# Patient Record
Sex: MALE | Race: WHITE | Employment: OTHER | URBAN - METROPOLITAN AREA
[De-identification: names, ages, dates, MRNs, and addresses within clinical notes are randomized per-mention and may not be internally consistent; named-entity substitution may affect disease eponyms.]

---

## 2017-08-19 PROBLEM — J30.9 ALLERGIC RHINITIS: Status: ACTIVE | Noted: 2017-08-19

## 2017-08-19 PROBLEM — N40.0 BPH (BENIGN PROSTATIC HYPERTROPHY): Status: ACTIVE | Noted: 2017-08-19

## 2017-08-19 PROBLEM — M19.90 DJD (DEGENERATIVE JOINT DISEASE): Status: ACTIVE | Noted: 2017-08-19

## 2017-08-19 PROBLEM — R00.2 PALPITATION: Status: ACTIVE | Noted: 2017-08-19

## 2017-08-19 PROBLEM — Z79.899 ON STATIN THERAPY: Status: ACTIVE | Noted: 2017-08-19

## 2017-08-19 PROBLEM — E34.9 TESTOSTERONE DEFICIENCY: Status: ACTIVE | Noted: 2017-08-19

## 2017-08-19 PROBLEM — N18.30 CKD (CHRONIC KIDNEY DISEASE), STAGE III (HCC): Status: ACTIVE | Noted: 2017-08-19

## 2017-08-19 PROBLEM — E78.5 HYPERLIPIDEMIA: Status: ACTIVE | Noted: 2017-08-19

## 2017-08-19 PROBLEM — M10.9 GOUT: Status: ACTIVE | Noted: 2017-08-19

## 2017-08-19 PROBLEM — H81.09 MENIERE DISEASE: Status: ACTIVE | Noted: 2017-08-19

## 2017-08-19 PROBLEM — I63.9 CVA (CEREBROVASCULAR ACCIDENT) (HCC): Status: ACTIVE | Noted: 2017-08-19

## 2017-08-19 PROBLEM — I49.1 PAC (PREMATURE ATRIAL CONTRACTION): Status: ACTIVE | Noted: 2017-08-19

## 2017-08-19 PROBLEM — L71.9 ROSACEA: Status: ACTIVE | Noted: 2017-08-19

## 2017-08-19 PROBLEM — C61 PROSTATE CA (HCC): Status: ACTIVE | Noted: 2017-08-19

## 2017-08-19 PROBLEM — I12.9 HYPERTENSION WITH RENAL DISEASE: Status: ACTIVE | Noted: 2017-08-19

## 2017-09-05 RX ORDER — NEBIVOLOL HYDROCHLORIDE 5 MG/1
TABLET ORAL
Qty: 30 TAB | Refills: 11 | Status: SHIPPED | OUTPATIENT
Start: 2017-09-05 | End: 2018-03-09 | Stop reason: SDUPTHER

## 2017-09-05 NOTE — TELEPHONE ENCOUNTER
Requested Prescriptions     Pending Prescriptions Disp Refills    BYSTOLIC 5 mg tablet [Pharmacy Med Name: BYSTOLIC 5 MG TABLET] 30 Tab 11     Sig: TAKE ONE TABLET BY MOUTH DAILY

## 2017-09-11 ENCOUNTER — OFFICE VISIT (OUTPATIENT)
Dept: INTERNAL MEDICINE CLINIC | Age: 82
End: 2017-09-11

## 2017-09-11 VITALS
HEART RATE: 51 BPM | OXYGEN SATURATION: 93 % | WEIGHT: 220.6 LBS | SYSTOLIC BLOOD PRESSURE: 154 MMHG | HEIGHT: 74 IN | RESPIRATION RATE: 18 BRPM | DIASTOLIC BLOOD PRESSURE: 70 MMHG | BODY MASS INDEX: 28.31 KG/M2

## 2017-09-11 DIAGNOSIS — E78.2 MIXED HYPERLIPIDEMIA: ICD-10-CM

## 2017-09-11 DIAGNOSIS — J30.89 NON-SEASONAL ALLERGIC RHINITIS DUE TO OTHER ALLERGIC TRIGGER: ICD-10-CM

## 2017-09-11 DIAGNOSIS — I49.1 PAC (PREMATURE ATRIAL CONTRACTION): ICD-10-CM

## 2017-09-11 DIAGNOSIS — I63.9 CEREBROVASCULAR ACCIDENT (CVA), UNSPECIFIED MECHANISM (HCC): ICD-10-CM

## 2017-09-11 DIAGNOSIS — I12.9 HYPERTENSION WITH RENAL DISEASE: Primary | ICD-10-CM

## 2017-09-11 DIAGNOSIS — M15.9 PRIMARY OSTEOARTHRITIS INVOLVING MULTIPLE JOINTS: ICD-10-CM

## 2017-09-11 DIAGNOSIS — N18.30 CKD (CHRONIC KIDNEY DISEASE), STAGE III (HCC): ICD-10-CM

## 2017-09-11 DIAGNOSIS — M10.9 GOUT, UNSPECIFIED CAUSE, UNSPECIFIED CHRONICITY, UNSPECIFIED SITE: ICD-10-CM

## 2017-09-11 DIAGNOSIS — L71.9 ROSACEA: ICD-10-CM

## 2017-09-11 DIAGNOSIS — Z12.5 PROSTATE CANCER SCREENING: ICD-10-CM

## 2017-09-11 DIAGNOSIS — Z00.00 MEDICARE ANNUAL WELLNESS VISIT, INITIAL: ICD-10-CM

## 2017-09-11 LAB
ALBUMIN SERPL-MCNC: 4.1 G/DL (ref 3.9–5.4)
ALKALINE PHOS POC: 95 U/L (ref 38–126)
ALT SERPL-CCNC: 60 U/L (ref 9–52)
AST SERPL-CCNC: 46 U/L (ref 14–36)
BACTERIA UA POCT, BACTPOCT: NORMAL
BILIRUB UR QL STRIP: NEGATIVE
BUN BLD-MCNC: 27 MG/DL (ref 9–20)
CALCIUM BLD-MCNC: 9.1 MG/DL (ref 8.4–10.2)
CASTS UA POCT: 0
CHLORIDE BLD-SCNC: 104 MMOL/L (ref 98–107)
CHOLEST SERPL-MCNC: 149 MG/DL (ref 0–200)
CK (CPK) POC: 182 U/L (ref 30–135)
CLUE CELLS, CLUEPOCT: NEGATIVE
CO2 POC: 27 MMOL/L (ref 22–32)
CREAT BLD-MCNC: 1.1 MG/DL (ref 0.8–1.5)
CRYSTALS UA POCT, CRYSPOCT: NEGATIVE
EGFR (POC): 62.2
EPITHELIAL CELLS POCT, EPITHPOCT: NORMAL
GLUCOSE POC: 112 MG/DL (ref 75–110)
GLUCOSE UR-MCNC: NEGATIVE MG/DL
GRAN# POC: 2 K/UL (ref 2–7.8)
GRAN% POC: 55.4 % (ref 37–92)
HCT VFR BLD CALC: 41.2 % (ref 37–51)
HDLC SERPL-MCNC: 34 MG/DL (ref 35–130)
HGB BLD-MCNC: 13.9 G/DL (ref 12–18)
KETONES P FAST UR STRIP-MCNC: NEGATIVE MG/DL
LDL CHOLESTEROL POC: 83.4 MG/DL (ref 0–130)
LY# POC: 1.1 K/UL (ref 0.6–4.1)
LY% POC: 36.3 % (ref 10–58.5)
MCH RBC QN: 33.5 PG (ref 26–32)
MCHC RBC-ENTMCNC: 33.7 G/DL (ref 30–36)
MCV RBC: 99 FL (ref 80–97)
MID #, POC: 0.02 K/UL (ref 0–1.8)
MID% POC: 8.3 % (ref 0.1–24)
MUCUS UA POCT, MUCPOCT: NORMAL
PH UR STRIP: 6 [PH] (ref 5–7)
PLATELET # BLD: 194 K/UL (ref 140–440)
POTASSIUM SERPL-SCNC: 4.7 MMOL/L (ref 3.6–5)
PROT SERPL-MCNC: 6.9 G/DL (ref 6.3–8.2)
PROTEIN,URINE POC: NEGATIVE MG/DL
PSA SERPL-MCNC: 1.2 NG/ML (ref 0–4)
RBC # BLD: 4.16 M/UL (ref 4.2–6.3)
RBC UA POCT, RBCPOCT: 0
SODIUM SERPL-SCNC: 144 MMOL/L (ref 137–145)
SP GR UR STRIP: 1.02 (ref 1.01–1.02)
T4 FREE SERPL-MCNC: 0.88 NG/DL (ref 0.71–1.85)
TCHOL/HDL RATIO (POC): 4.4 (ref 0–4)
TOTAL BILIRUBIN POC: 0.6 MG/DL (ref 0.2–1.3)
TRICH UA POCT, TRICHPOC: NEGATIVE
TRIGL SERPL-MCNC: 158 MG/DL (ref 0–200)
TSH BLD-ACNC: 3.38 UIU/ML (ref 0.4–4.2)
UA UROBILINOGEN AMB POC: NORMAL (ref 0.2–1)
URINALYSIS CLARITY POC: CLEAR
URINALYSIS COLOR POC: NORMAL
URINE BLOOD POC: NEGATIVE
URINE LEUKOCYTES POC: NEGATIVE
URINE NITRITES POC: NEGATIVE
VLDLC SERPL CALC-MCNC: 31.6 MG/DL
WBC # BLD: 3.3 K/UL (ref 4.1–10.9)
WBC UA POCT, WBCPOCT: NORMAL
YEAST UA POCT, YEASTPOC: NEGATIVE

## 2017-09-11 RX ORDER — SILODOSIN 8 MG/1
8 CAPSULE ORAL
COMMUNITY

## 2017-09-11 RX ORDER — AMLODIPINE BESYLATE 10 MG/1
TABLET ORAL DAILY
COMMUNITY
End: 2018-03-09 | Stop reason: SDUPTHER

## 2017-09-11 NOTE — MR AVS SNAPSHOT
Visit Information Date & Time Provider Department Dept. Phone Encounter #  
 9/11/2017  8:10 AM Ellen Payton MD [place] [address] [phone] [id number] Your Appointments 1/4/2018  9:00 AM  
FOLLOW UP 10 with [facility] ([facility]) Appt Note: [address] [facility] [address] [phone] [facility] [place] [place] [phone] Upcoming Health Maintenance Date Due ZOSTER VACCINE AGE 60> 8/17/1994 GLAUCOMA SCREENING Q2Y 10/17/1999 DTaP/Tdap/Td series (1 - Tdap) 6/22/2013 INFLUENZA AGE 9 TO ADULT 8/1/2017 MEDICARE YEARLY EXAM 9/12/2018 Allergies as of 9/11/2017  Review Complete On: 9/11/2017 By: Ellen Payton MD  
  
 Severity Noted Reaction Type Reactions Sulfanilamide  08/19/2017    Rash  
 Sulfa (Sulfonamide Antibiotics) Low 01/18/2011   Systemic Rash Very mild rash several years ago Current Immunizations  Never Reviewed Name Date Influenza Vaccine 11/17/2016, 11/2/2015 Pneumococcal Conjugate (PCV-13) 7/24/2015 Pneumococcal Vaccine (Unspecified Type) 10/13/2005 Td 6/21/2013 Not reviewed this visit You Were Diagnosed With   
  
 Codes Comments Hypertension with renal disease    -  Primary ICD-10-CM: I12.9 ICD-9-CM: 403.90 Mixed hyperlipidemia     ICD-10-CM: E78.2 ICD-9-CM: 272.2 Gout, unspecified cause, unspecified chronicity, unspecified site     ICD-10-CM: M10.9 ICD-9-CM: 274.9 Primary osteoarthritis involving multiple joints     ICD-10-CM: M15.0 ICD-9-CM: 715.09 Cerebrovascular accident (CVA), unspecified mechanism ([facility] [address]     ICD-10-CM: I63.9 ICD-9-CM: 434.91 CKD (chronic kidney disease), stage III     ICD-10-CM: N18.3 ICD-9-CM: 757. 3 Rosacea     ICD-10-CM: L71.9 ICD-9-CM: 695.3 PAC (premature atrial contraction)     ICD-10-CM: I49.1 ICD-9-CM: 427.61 Non-seasonal allergic rhinitis due to other allergic trigger     ICD-10-CM: J30.89 ICD-9-CM: 477.8 Prostate cancer screening     ICD-10-CM: Z12.5 ICD-9-CM: V76.44 Medicare annual wellness visit, initial     ICD-10-CM: Z00.00 ICD-9-CM: V70.0 Vitals BP Pulse Resp Height(growth percentile) Weight(growth percentile) SpO2  
 154/70 (BP 1 Location: Right arm, BP Patient Position: Sitting) (!) 51 18 6' 2\" (1.88 m) 220 lb 9.6 oz (100.1 kg) 93% BMI Smoking Status 28.32 kg/m2 Never Smoker BMI and BSA Data Body Mass Index Body Surface Area  
 28.32 kg/m 2 2.29 m 2 Preferred Pharmacy Pharmacy Name Phone [facility] [address] [place] [phone] Your Updated Medication List  
  
   
This list is accurate as of: 9/11/17  8:56 AM.  Always use your most recent med list.  
  
  
  
  
 AFRIN EXTRA MOISTURIZING NA  
2 Drops by Nasal route nightly. To both nostrils  
  
 allopurinol 100 mg tablet Commonly known as:  Phineas Quince Take 100 mg by mouth daily. amLODIPine 10 mg tablet Commonly known as:  Brea Donovan Take  by mouth daily. aspirin 325 mg tablet Commonly known as:  ASPIRIN Take 325 mg by mouth daily. BYSTOLIC 5 mg tablet Generic drug:  nebivolol TAKE ONE TABLET BY MOUTH DAILY Cetirizine 10 mg Cap Take 1 Tab by mouth daily. cycloSPORINE 0.05 % ophthalmic emulsion Commonly known as:  RESTASIS Administer 2 Drops to both eyes daily. diclofenac EC 75 mg EC tablet Commonly known as:  VOLTAREN Take 75 mg by mouth two (2) times a day. DIOVAN -12.5 mg per tablet Generic drug:  valsartan-hydroCHLOROthiazide Take 1 Tab by mouth daily. doxycycline 100 mg capsule Commonly known as:  VIBRAMYCIN Take 100 mg by mouth daily. FISH OIL PO Take  by mouth. PLAVIX 75 mg Tab Generic drug:  clopidogrel Take 75 mg by mouth daily. PROSCAR 5 mg tablet Generic drug:  finasteride Take 5 mg by mouth daily. RAPAFLO 8 mg capsule Generic drug:  silodosin Take 8 mg by mouth daily (with breakfast). We Performed the Following AMB POC CK (CPK) [42252 CPT(R)] AMB POC COMPLETE CBC,AUTOMATED ENTER A4416022 CPT(R)] AMB POC COMPREHENSIVE METABOLIC PANEL [22211 CPT(R)] AMB POC LIPID PROFILE [57667 CPT(R)] AMB POC PSA  (MEDICARE) [ HCPCS] AMB POC T4, FREE [id number] CPT(R)] AMB POC TSH [50111 CPT(R)] AMB POC URINALYSIS DIP STICK AUTO W/ MICRO  [37046 CPT(R)] Introducing [facility]! [employer] introduces [employer] patient portal. Now you can access parts of your medical record, email your doctor's office, and request medication refills online. 1. In your internet browser, go to [email or web address] [email or web address] the Trend 2. Click on the First Time User? Click Here link in the Sign In box. You will see the New Member Sign Up page. 3. Enter your [employer] Access Code exactly as it appears below. You will not need to use this code after youve completed the sign-up process. If you do not sign up before the expiration date, you must request a new code. · [employer] Access Code: [email or web address] Expires: 12/10/2017  7:58 AM 
 
4. Enter the last four digits of your Social Security Number (xxxx) and Date of Birth (mm/dd/yyyy) as indicated and click Submit. You will be taken to the next sign-up page. 5. Create a [employer] Desk ID. This will be your [employer]k login ID and cannot be changed, so think of one that is secure and easy to remember. 6. Create a [employer] password. You can change your password at any time. 7. Enter your Password Reset Question and Answer. This can be used at a later time if you forget your password. 8. Enter your e-mail address. You will receive e-mail notification when new information is available in [address] 9. Click Sign Up. You can now view and download portions of your medical record. 10. Click the Download Summary menu link to download a portable copy of your medical information. If you have questions, please visit the Frequently Asked Questions section of the [employer] website. Remember, [employer] is NOT to be used for urgent needs. For medical emergencies, dial 911. Now available from your iPhone and Android! Please provide this summary of care documentation to your next provider. Your primary care clinician is listed as Jose. If you have any questions after today's visit, please call [phone].

## 2017-09-11 NOTE — PROGRESS NOTES
This is an Initial Medicare Annual Wellness Exam (AWV) (Performed 12 months after IPPE or effective date of Medicare Part B enrollment, Once in a lifetime)    I have reviewed the patient's medical history in detail and updated the computerized patient record. He presents today for his initial annual Medicare wellness examination. He is also here in follow-up of his medical problems include hypertension, hyperlipidemia, CKD, DJD, gout, and previous CVA. He is doing well and taken his medications as well as trying to get some exercise. He is following his diet. He currently denies chest pain shortness breath or cardiorespiratory complaints. There are no GI/ complaints. There are no headaches or neurologic complaints. He has no other complaints on complete review of systems.     History     Past Medical History:   Diagnosis Date    Allergic rhinitis 8/19/2017    BPH (benign prostatic hypertrophy) 8/19/2017    Cancer ([address]     basal cell carcinoma right ear    Cancer (HCC)     prostate    CKD (chronic kidney disease), stage III 8/19/2017    CVA (cerebrovascular accident) ([facility] [address] 8/19/2017    DJD (degenerative joint disease) 8/19/2017    Gout     Hyperlipidemia 8/19/2017    Hypertension     Hypertension with renal disease 8/19/2017    Meniere disease 8/19/2017    Nausea & vomiting     On statin therapy 8/19/2017    Other ill-defined conditions     meneres disease    PAC (premature atrial contraction) 8/19/2017    Palpitation 8/19/2017    Prostate CA ([address] 8/19/2017    Rosacea 8/19/2017    Stroke ([facility] [address] 2009    stroke    Testosterone deficiency 8/19/2017      Past Surgical History:   Procedure Laterality Date    ABDOMEN SURGERY PROC UNLISTED      bilateral inguinal hernia repair    COLORECTAL SCRN; HI RISK IND  5/3/2016         HX HEENT  1970    tonsillectomy    HX HEENT  1980    basal cell carcinoma right ear    HX PROSTATECTOMY      radiation only    WI COLONOSCOPY FLX DX W/COLLJ SPEC WHEN PFRMD  1/19/2011          Current Outpatient Prescriptions   Medication Sig Dispense Refill    amLODIPine (NORVASC) 10 mg tablet Take  by mouth daily.  silodosin (RAPAFLO) 8 mg capsule Take 8 mg by mouth daily (with breakfast).  OXYMETAZOLINE HCL (AFRIN EXTRA MOISTURIZING NA) 2 Drops by Nasal route nightly. To both nostrils      BYSTOLIC 5 mg tablet TAKE ONE TABLET BY MOUTH DAILY 30 Tab 11    DOCOSAHEXANOIC ACID/EPA (FISH OIL PO) Take  by mouth.  cycloSPORINE (RESTASIS) 0.05 % ophthalmic emulsion Administer 2 Drops to both eyes daily.  Cetirizine 10 mg cap Take 1 Tab by mouth daily.  diclofenac EC (VOLTAREN) 75 mg EC tablet Take 75 mg by mouth two (2) times a day.  allopurinol (ZYLOPRIM) 100 mg tablet Take 100 mg by mouth daily.  finasteride (PROSCAR) 5 mg tablet Take 5 mg by mouth daily.  doxycycline (VIBRAMYCIN) 100 mg capsule Take 100 mg by mouth daily.  clopidogrel (PLAVIX) 75 mg tablet Take 75 mg by mouth daily.  aspirin (ASPIRIN) 325 mg tablet Take 325 mg by mouth daily.  valsartan-hydrochlorothiazide (DIOVAN HCT) 320-12.5 mg per tablet Take 1 Tab by mouth daily.        Allergies   Allergen Reactions    Sulfanilamide Rash    Sulfa (Sulfonamide Antibiotics) Rash     Very mild rash several years ago     Family History   Problem Relation Age of Onset    Cancer Mother      uterine, cervical    Cancer Father      colon ca     Social History   Substance Use Topics    Smoking status: Never Smoker    Smokeless tobacco: Never Used    Alcohol use No     Patient Active Problem List   Diagnosis Code    Meniere disease H81.09    Testosterone deficiency E29.1    Rosacea L71.9    Prostate CA ([facility] [address] C61    Palpitation R00.2    PAC (premature atrial contraction) I49.1    On statin therapy Z79.899    Hypertension with renal disease I12.9    Hyperlipidemia E78.5    Gout M10.9    DJD (degenerative joint disease) M19.90    CVA (cerebrovascular accident) ([facility] [address] I63.9    CKD (chronic kidney disease), stage III N18.3    BPH (benign prostatic hypertrophy) N40.0    Allergic rhinitis J30.9    Medicare annual wellness visit, initial Z00.00       Depression Risk Factor Screening:     PHQ over the last two weeks 9/11/2017   Little interest or pleasure in doing things Not at all   Feeling down, depressed or hopeless Not at all   Total Score PHQ 2 0     Alcohol Risk Factor Screening: You do not drink alcohol or very rarely. Functional Ability and Level of Safety:     Hearing Loss  Hearing is good. Whisper Test done with abnormal results. Activities of Daily Living  The home contains: no safety equipment  Patient does total self care    Fall RiskFall Risk Assessment, last 12 mths 9/11/2017   Able to walk? Yes   Fall in past 12 months? No       Abuse Screen  Patient is not abused    Cognitive Screening   Evaluation of Cognitive Function:  Has your family/caregiver stated any concerns about your memory: no  Normal     ROS:    Constitutional: He denies fevers, weight loss, sweats. Eyes: No blurred or double vision. ENT: No difficulty with swallowing, taste, speech or smell. Neck: no stiffness or swelling  Respiratory: No cough wheezing or shortness of breath. Cardiovascular: Denies chest pain, palpitations, unexplained indigestion or syncope. Gastrointestinal:  No changes in bowel movements, no abdominal pain, no bloating. Genitourinary:  He denies frequency, nocturia or stranguria. Extremities: No joint pain, stiffness or swelling. Neurological:  No numbness, tingling, burring paresthesias or loss of motor strength. No syncope, dizziness or frequent headache  Lymphatic: no adenopathy noted  Hematologic: no easy bruising or bleeding gums  Skin:  No recent rashes or mole changes. Psychiatric/Behavioral:  Negative for depression.       Vitals:    09/11/17 0826   BP: 154/70   Pulse: (!) 51   Resp: 18   SpO2: 93%   Weight: 220 lb 9.6 oz (100.1 kg) Height: 6' 2\" (1.88 m)   PainSc:   0 - No pain        PHYSICAL EXAM:    General appearance - alert, well appearing, and in no distress  Mental status - alert, oriented to person, place, and time  HEENT:  Ears - bilateral TM's and external ear canals clear  Eyes - pupillary responses were normal.  Extraocular muscle function intact. Lids and conjunctiva not injected. Fundoscopic exam revealed sharp disc margins. eye movements intact  Pharynx- clear with teeth in good repair. No masses were noted  Neck - supple without thyromegaly or burit. No JVD noted  Lungs - clear to auscultation and percussion  Cardiac- normal rate, regular rhythm without murmurs. PMI not displaced. No gallop, rub or click  Abdomen - flat, soft, non-tender without palpable organomegaly or mass. No pulsatile mass was felt, and not bruit was heard. Bowel sounds were active  : Circumcised, Testes descended w/o masses  Rectal: Deferred to his urologist and apparently recently done  Extremities -  no clubbing cyanosis or edema  Lymphatics - no palpable lymphadenopathy, no hepatosplenomegaly  Hematologic: no petechiae or purpura  Peripheral vascular -Femoral, Dorsalis pedis and posterior tibial pulses felt without difficulty  Skin - no rash or unusual mole change noted  Neurological - Cranial nerves II-XII grossly intact. Motor strength 5/5. DTR's 2+ and symmetric. Station and gait normal  Back exam - full range of motion, no tenderness, palpable spasm or pain on motion  Musculoskeletal - no joint tenderness, deformity or swelling      Patient Care Team   Patient Care Team:  Dilma Brizuela MD as PCP - General (Internal Medicine)    Assessment/Plan   Education and counseling provided:  Are appropriate based on today's review and evaluation    ASSESSMENT:   1. Hypertension with renal disease    2. Mixed hyperlipidemia    3. Gout, unspecified cause, unspecified chronicity, unspecified site    4.  Primary osteoarthritis involving multiple joints    5. Cerebrovascular accident (CVA), unspecified mechanism ([address]    6. CKD (chronic kidney disease), stage III    7. Rosacea    8. PAC (premature atrial contraction)    9. Non-seasonal allergic rhinitis due to other allergic trigger    10. Prostate cancer screening    11. Medicare annual wellness visit, initial      Impression  1. Hypertension he does have a labile component his blood pressure is a little up today which is usual for him with his labile blood pressure  2. Hyperlipidemia that status is stable on last check we reviewed those numbers repeat status is pending  3. DJD seems to be stable  4. Prior CVA continue aspirin daily  5. Chronic kidney disease repeat status is pending  6. Gout no recent attacks  7. Rosacea currently stable  8. PACs asymptomatic  9. Allergic rhinitis he does take Afrin on a regular basis over the past month I cautioned him against that encouraged him to stop that and go back to his Nasonex I did warn of problems with urinary obstruction and outlet problems  Medicare annual wellness examination and questionnaire performed on patient today. The results were reviewed with him and his questions were answered. Lifestyle recommendations modifications made. Labs are noted is pending we will make further recommendations adjusted based upon lab. Follow stable continue same and recheck in 3 months with a sooner follow-up should to be problems    PLAN:  .  Orders Placed This Encounter    AMB POC CK (CPK)    AMB POC COMPLETE CBC,AUTOMATED ENTER    AMB POC COMPREHENSIVE METABOLIC PANEL    AMB POC LIPID PROFILE    AMB POC T4, FREE    AMB POC URINALYSIS DIP STICK AUTO W/ MICRO     AMB POC TSH    AMB POC PSA  (MEDICARE)    amLODIPine (NORVASC) 10 mg tablet    silodosin (RAPAFLO) 8 mg capsule    OXYMETAZOLINE HCL (AFRIN EXTRA MOISTURIZING NA)         ATTENTION:   This medical record was transcribed using an electronic medical records system.   Although proofread, it may and can contain electronic and spelling errors. Other human spelling and other errors may be present. Corrections may be executed at a later time. Please feel free to contact us for any clarifications as needed.       Follow-up Disposition: Not on File      Excell MD [place]     Health Maintenance Due   Topic Date Due    ZOSTER VACCINE AGE 60>  08/17/1994    GLAUCOMA SCREENING Q2Y  10/17/1999    DTaP/Tdap/Td series (1 - Tdap) 06/22/2013    INFLUENZA AGE 9 TO ADULT  08/01/2017

## 2017-09-11 NOTE — PROGRESS NOTES
1. Have you been to the ER, urgent care clinic since your last visit? Hospitalized since your last visit? No    2. Have you seen or consulted any other health care providers outside of the [address] [place] [address] since your last visit? Include any pap smears or colon screening. Saw Verónica Mehta urologist for follow up of prostate cancer. Has [employer] Directive. 3 month.      No medication this am    Annual exam.

## 2017-09-13 NOTE — PROGRESS NOTES
White blood count is a little bit low and MCV is a little upset I will check a B12 level. His HDL cholesterol remains low as he usually is. As we have discussed before increasing the fiber in his diet adding fish oil and aerobic exercise with only thing he really would help that.

## 2017-09-19 ENCOUNTER — LAB ONLY (OUTPATIENT)
Dept: INTERNAL MEDICINE CLINIC | Age: 82
End: 2017-09-19

## 2017-09-19 DIAGNOSIS — E53.8 VITAMIN B 12 DEFICIENCY: Primary | ICD-10-CM

## 2017-09-20 LAB — VIT B12 SERPL-MCNC: 274 PG/ML (ref 211–946)

## 2017-10-06 ENCOUNTER — TELEPHONE (OUTPATIENT)
Dept: INTERNAL MEDICINE CLINIC | Age: 82
End: 2017-10-06

## 2017-10-23 ENCOUNTER — HOSPITAL ENCOUNTER (OUTPATIENT)
Dept: CT IMAGING | Age: 82
Discharge: HOME | End: 2017-10-23
Attending: OTOLARYNGOLOGY
Payer: MEDICARE

## 2017-10-23 DIAGNOSIS — R22.0 INTRACRANIAL SWELLING: ICD-10-CM

## 2017-10-23 PROCEDURE — 74011250636 HC RX REV CODE- 250/636: Performed by: OTOLARYNGOLOGY

## 2017-10-23 PROCEDURE — 74011636320 HC RX REV CODE- 636/320: Performed by: OTOLARYNGOLOGY

## 2017-10-23 PROCEDURE — 70491 CT SOFT TISSUE NECK W/DYE: CPT

## 2017-10-23 RX ORDER — SODIUM CHLORIDE 9 MG/ML
50 INJECTION, SOLUTION INTRAVENOUS
Status: COMPLETED | OUTPATIENT
Start: 2017-10-23 | End: 2017-10-23

## 2017-10-23 RX ORDER — SODIUM CHLORIDE 0.9 % (FLUSH) 0.9 %
10 SYRINGE (ML) INJECTION
Status: COMPLETED | OUTPATIENT
Start: 2017-10-23 | End: 2017-10-23

## 2017-10-23 RX ADMIN — SODIUM CHLORIDE 50 ML/HR: 900 INJECTION, SOLUTION INTRAVENOUS at 15:00

## 2017-10-23 RX ADMIN — Medication 10 ML: at 15:00

## 2017-10-23 RX ADMIN — IOPAMIDOL 100 ML: 612 INJECTION, SOLUTION INTRAVENOUS at 15:00

## 2017-11-06 ENCOUNTER — HOSPITAL ENCOUNTER (OUTPATIENT)
Dept: ULTRASOUND IMAGING | Age: 82
Discharge: HOME | End: 2017-11-06
Attending: OTOLARYNGOLOGY
Payer: MEDICARE

## 2017-11-06 DIAGNOSIS — R22.1 NECK MASS: ICD-10-CM

## 2017-11-06 PROCEDURE — 74011000250 HC RX REV CODE- 250: Performed by: RADIOLOGY

## 2017-11-06 PROCEDURE — 76942 ECHO GUIDE FOR BIOPSY: CPT

## 2017-11-06 PROCEDURE — 88112 CYTOPATH CELL ENHANCE TECH: CPT | Performed by: OTOLARYNGOLOGY

## 2017-11-06 PROCEDURE — 88305 TISSUE EXAM BY PATHOLOGIST: CPT | Performed by: OTOLARYNGOLOGY

## 2017-11-06 RX ORDER — LIDOCAINE HYDROCHLORIDE 10 MG/ML
5 INJECTION, SOLUTION EPIDURAL; INFILTRATION; INTRACAUDAL; PERINEURAL
Status: COMPLETED | OUTPATIENT
Start: 2017-11-06 | End: 2017-11-06

## 2017-11-06 RX ADMIN — LIDOCAINE HYDROCHLORIDE 5 ML: 10 INJECTION, SOLUTION EPIDURAL; INFILTRATION; INTRACAUDAL; PERINEURAL at 17:00

## 2017-11-13 ENCOUNTER — CLINICAL SUPPORT (OUTPATIENT)
Dept: INTERNAL MEDICINE CLINIC | Age: 82
End: 2017-11-13

## 2017-11-13 DIAGNOSIS — Z23 ENCOUNTER FOR IMMUNIZATION: ICD-10-CM

## 2017-11-13 NOTE — PROGRESS NOTES
Le Hector is a 80 y.o. male who presents for routine immunizations. He denies any symptoms , reactions or allergies that would exclude them from being immunized today. Risks and adverse reactions were discussed and the VIS was given to them. All questions were addressed. He was observed for 15 min post injection. There were no reactions observed.    Also advised to call if any problems occur after he leaves the office    Reina Carlson RN

## 2017-11-13 NOTE — PATIENT INSTRUCTIONS
Vaccine Information Statement    Influenza (Flu) Vaccine (Inactivated or Recombinant): What you need to know    Many Vaccine Information Statements are available in [place]ian and other languages. See www.immunize.org/vis  Hojas de Información Sobre Vacunas están disponibles en Español y en muchos otros idiomas. Visite www.immunize.org/vis    1. Why get vaccinated? Influenza (flu) is a contagious disease that spreads around the [place] every year, usually between October and May. Flu is caused by influenza viruses, and is spread mainly by coughing, sneezing, and close contact. Anyone can get flu. Flu strikes suddenly and can last several days. Symptoms vary by age, but can include:   fever/chills   sore throat   muscle aches   fatigue   cough   headache    runny or stuffy nose    Flu can also lead to pneumonia and blood infections, and cause diarrhea and seizures in children. If you have a medical condition, such as heart or lung disease, flu can make it worse. Flu is more dangerous for some people. Infants and young children, people 72years of age and older, pregnant women, and people with certain health conditions or a weakened immune system are at greatest risk. Each year thousands of people in the [facility] die from flu, and many more are hospitalized. Flu vaccine can:   keep you from getting flu,   make flu less severe if you do get it, and   keep you from spreading flu to your family and other people. 2. Inactivated and recombinant flu vaccines    A dose of flu vaccine is recommended every flu season. Children 6 months through 6years of age may need two doses during the same flu season. Everyone else needs only one dose each flu season.        Some inactivated flu vaccines contain a very small amount of a mercury-based preservative called thimerosal. Studies have not shown thimerosal in vaccines to be harmful, but flu vaccines that do not contain thimerosal are available. There is no live flu virus in flu shots. They cannot cause the flu. There are many flu viruses, and they are always changing. Each year a new flu vaccine is made to protect against three or four viruses that are likely to cause disease in the upcoming flu season. But even when the vaccine doesnt exactly match these viruses, it may still provide some protection    Flu vaccine cannot prevent:   flu that is caused by a virus not covered by the vaccine, or   illnesses that look like flu but are not. It takes about 2 weeks for protection to develop after vaccination, and protection lasts through the flu season. 3. Some people should not get this vaccine    Tell the person who is giving you the vaccine:     If you have any severe, life-threatening allergies. If you ever had a life-threatening allergic reaction after a dose of flu vaccine, or have a severe allergy to any part of this vaccine, you may be advised not to get vaccinated. Most, but not all, types of flu vaccine contain a small amount of egg protein.  If you ever had Guillain-Barré Syndrome (also called GBS). Some people with a history of GBS should not get this vaccine. This should be discussed with your doctor.  If you are not feeling well. It is usually okay to get flu vaccine when you have a mild illness, but you might be asked to come back when you feel better. 4. Risks of a vaccine reaction    With any medicine, including vaccines, there is a chance of reactions. These are usually mild and go away on their own, but serious reactions are also possible. Most people who get a flu shot do not have any problems with it.      Minor problems following a flu shot include:    soreness, redness, or swelling where the shot was given     hoarseness   sore, red or itchy eyes   cough   fever   aches   headache   itching   fatigue  If these problems occur, they usually begin soon after the shot and last 1 or 2 days. More serious problems following a flu shot can include the following:     There may be a small increased risk of Guillain-Barré Syndrome (GBS) after inactivated flu vaccine. This risk has been estimated at 1 or 2 additional cases per million people vaccinated. This is much lower than the risk of severe complications from flu, which can be prevented by flu vaccine.  Young children who get the flu shot along with pneumococcal vaccine (PCV13) and/or DTaP vaccine at the same time might be slightly more likely to have a seizure caused by fever. Ask your doctor for more information. Tell your doctor if a child who is getting flu vaccine has ever had a seizure. Problems that could happen after any injected vaccine:      People sometimes faint after a medical procedure, including vaccination. Sitting or lying down for about 15 minutes can help prevent fainting, and injuries caused by a fall. Tell your doctor if you feel dizzy, or have vision changes or ringing in the ears.  Some people get severe pain in the shoulder and have difficulty moving the arm where a shot was given. This happens very rarely.  Any medication can cause a severe allergic reaction. Such reactions from a vaccine are very rare, estimated at about 1 in a million doses, and would happen within a few minutes to a few hours after the vaccination. As with any medicine, there is a very remote chance of a vaccine causing a serious injury or death. The safety of vaccines is always being monitored. For more information, visit: www.cdc.gov/vaccinesafety/    5. What if there is a serious reaction? What should I look for?  Look for anything that concerns you, such as signs of a severe allergic reaction, very high fever, or unusual behavior.     Signs of a severe allergic reaction can include hives, swelling of the face and throat, difficulty breathing, a fast heartbeat, dizziness, and weakness - usually within a few minutes to a few hours after the vaccination. What should I do?  If you think it is a severe allergic reaction or other emergency that cant wait, call 9-1-1 and get the person to the nearest hospital. Otherwise, call your doctor.  Reactions should be reported to the Vaccine Adverse Event Reporting System (VAERS). Your doctor should file this report, or you can do it yourself through  the VAERS web site at www.vaers. [facility].gov, or by calling 7-985.725.6235. VAERS does not give medical advice. 6. The National Vaccine Injury Compensation Program    The [facility] Vaccine Injury Compensation Program (VICP) is a federal program that was created to compensate people who may have been injured by certain vaccines. Persons who believe they may have been injured by a vaccine can learn about the program and about filing a claim by calling 4-751.417.8029 or visiting the [employer] website at www.[facility].gov/vaccinecompensation. There is a time limit to file a claim for compensation. 7. How can I learn more?  Ask your healthcare provider. He or she can give you the vaccine package insert or suggest other sources of information.  Call your local or state health department.  Contact the Centers for Disease Control and Prevention (CDC):  - Call 5-690.724.4546 (1-800-CDC-INFO) or  - Visit CDCs website at www.cdc.gov/flu    Vaccine Information Statement   Inactivated Influenza Vaccine   8/7/2015  42 DAMIR Kaye Colonconstanza [id number]    Department of Health and Human Services  Centers for Disease Control and Prevention    Office Use Only

## 2017-12-03 DIAGNOSIS — I10 HYPERTENSION, UNSPECIFIED TYPE: Primary | ICD-10-CM

## 2017-12-03 DIAGNOSIS — Z86.73 HISTORY OF CVA (CEREBROVASCULAR ACCIDENT): ICD-10-CM

## 2017-12-04 RX ORDER — VALSARTAN AND HYDROCHLOROTHIAZIDE 320; 12.5 MG/1; MG/1
TABLET, FILM COATED ORAL
Qty: 30 TAB | Refills: 11 | Status: SHIPPED | OUTPATIENT
Start: 2017-12-04 | End: 2018-03-09 | Stop reason: SDUPTHER

## 2017-12-04 RX ORDER — CLOPIDOGREL BISULFATE 75 MG/1
TABLET ORAL
Qty: 30 TAB | Refills: 11 | Status: SHIPPED | OUTPATIENT
Start: 2017-12-04 | End: 2018-03-09 | Stop reason: SDUPTHER

## 2017-12-04 NOTE — TELEPHONE ENCOUNTER
Requested Prescriptions     Pending Prescriptions Disp Refills    clopidogrel (PLAVIX) 75 mg tab [Pharmacy Med Name: CLOPIDOGREL 75 MG TABLET] 30 Tab 11     Sig: TAKE ONE TABLET BY MOUTH DAILY    valsartan-hydroCHLOROthiazide (DIOVAN-HCT) 320-12.5 mg per tablet [Pharmacy Med Name: VALSARTAN-HCTZ 320-12.5 MG TAB] 30 Tab 11     Sig: TAKE ONE TABLET BY MOUTH DAILY

## 2018-01-02 PROBLEM — M15.9 PRIMARY OSTEOARTHRITIS INVOLVING MULTIPLE JOINTS: Status: ACTIVE | Noted: 2017-08-19

## 2018-01-02 PROBLEM — E78.2 MIXED HYPERLIPIDEMIA: Status: ACTIVE | Noted: 2017-08-19

## 2018-01-04 ENCOUNTER — OFFICE VISIT (OUTPATIENT)
Dept: INTERNAL MEDICINE CLINIC | Age: 83
End: 2018-01-04

## 2018-01-04 VITALS
HEIGHT: 74 IN | SYSTOLIC BLOOD PRESSURE: 136 MMHG | RESPIRATION RATE: 16 BRPM | WEIGHT: 220 LBS | DIASTOLIC BLOOD PRESSURE: 80 MMHG | HEART RATE: 60 BPM | OXYGEN SATURATION: 98 % | BODY MASS INDEX: 28.23 KG/M2 | TEMPERATURE: 98 F

## 2018-01-04 DIAGNOSIS — M15.9 PRIMARY OSTEOARTHRITIS INVOLVING MULTIPLE JOINTS: ICD-10-CM

## 2018-01-04 DIAGNOSIS — I12.9 HYPERTENSION WITH RENAL DISEASE: Primary | ICD-10-CM

## 2018-01-04 DIAGNOSIS — E78.2 MIXED HYPERLIPIDEMIA: ICD-10-CM

## 2018-01-04 DIAGNOSIS — J30.89 CHRONIC NONSEASONAL ALLERGIC RHINITIS DUE TO OTHER ALLERGEN: ICD-10-CM

## 2018-01-04 DIAGNOSIS — I63.9 CEREBROVASCULAR ACCIDENT (CVA), UNSPECIFIED MECHANISM (HCC): ICD-10-CM

## 2018-01-04 DIAGNOSIS — N18.30 CKD (CHRONIC KIDNEY DISEASE), STAGE III (HCC): ICD-10-CM

## 2018-01-04 DIAGNOSIS — M10.9 GOUT, UNSPECIFIED CAUSE, UNSPECIFIED CHRONICITY, UNSPECIFIED SITE: ICD-10-CM

## 2018-01-04 LAB
ALBUMIN SERPL-MCNC: 4.1 G/DL (ref 3.9–5.4)
ALKALINE PHOS POC: 78 U/L (ref 38–126)
ALT SERPL-CCNC: 55 U/L (ref 9–52)
AST SERPL-CCNC: 44 U/L (ref 14–36)
BUN BLD-MCNC: 24 MG/DL (ref 9–20)
CALCIUM BLD-MCNC: 9.4 MG/DL (ref 8.4–10.2)
CHLORIDE BLD-SCNC: 102 MMOL/L (ref 98–107)
CHOLEST SERPL-MCNC: 156 MG/DL (ref 0–200)
CO2 POC: 29 MMOL/L (ref 22–32)
CREAT BLD-MCNC: 1.1 MG/DL (ref 0.8–1.5)
EGFR (POC): 61.8
GLUCOSE POC: 115 MG/DL (ref 75–110)
HDLC SERPL-MCNC: 38 MG/DL (ref 35–130)
LDL CHOLESTEROL POC: 94.6 MG/DL (ref 0–130)
POTASSIUM SERPL-SCNC: 4.4 MMOL/L (ref 3.6–5)
PROT SERPL-MCNC: 7.1 G/DL (ref 6.3–8.2)
SODIUM SERPL-SCNC: 140 MMOL/L (ref 137–145)
TCHOL/HDL RATIO (POC): 4.1 (ref 0–4)
TOTAL BILIRUBIN POC: 1 MG/DL (ref 0.2–1.3)
TRIGL SERPL-MCNC: 117 MG/DL (ref 0–200)
VLDLC SERPL CALC-MCNC: 23.4 MG/DL

## 2018-01-04 NOTE — PROGRESS NOTES
1. Have you been to the ER, urgent care clinic since your last visit? Hospitalized since your last visit? No    2. Have you seen or consulted any other health care providers outside of the [address] [place] [address] since your last visit? Include any pap smears or colon screening. Yes, Dr. Jarrod Park in November 2017 for a cyst on his neck and needs the cyst removed on the 01-. Also, saw Dr. Elijah Watson at [facility] for prostate check. To follow up in 4 months. Chief Complaint   Patient presents with    Hypertension     3 mo. f/u    Chronic Kidney Disease     3 mo. f/u    Cholesterol Problem     3 mo.  f/u       Fasting

## 2018-01-04 NOTE — PATIENT INSTRUCTIONS
Arthritis: Care Instructions  Your Care Instructions  Arthritis, also called osteoarthritis, is a breakdown of the cartilage that cushions your joints. When the cartilage wears down, your bones rub against each other. This causes pain and stiffness. Many people have some arthritis as they age. Arthritis most often affects the joints of the spine, hands, hips, knees, or feet. You can take simple measures to protect your joints, ease your pain, and help you stay active. Follow-up care is a key part of your treatment and safety. Be sure to make and go to all appointments, and call your doctor if you are having problems. It's also a good idea to know your test results and keep a list of the medicines you take. How can you care for yourself at home? · Stay at a healthy weight. Being overweight puts extra strain on your joints. · Talk to your doctor or physical therapist about exercises that will help ease joint pain. ¨ Stretch. You may enjoy gentle forms of yoga to help keep your joints and muscles flexible. ¨ Walk instead of jog. Other types of exercise that are less stressful on the joints include riding a bicycle, swimming, kary chi, or water exercise. ¨ Lift weights. Strong muscles help reduce stress on your joints. Stronger thigh muscles, for example, take some of the stress off of the knees and hips. Learn the right way to lift weights so you do not make joint pain worse. · Take your medicines exactly as prescribed. Call your doctor if you think you are having a problem with your medicine. · Take pain medicines exactly as directed. ¨ If the doctor gave you a prescription medicine for pain, take it as prescribed. ¨ If you are not taking a prescription pain medicine, ask your doctor if you can take an over-the-counter medicine. · Use a cane, crutch, walker, or another device if you need help to get around. These can help rest your joints.  You also can use other things to make life easier, such as a higher toilet seat and padded handles on kitchen utensils. · Do not sit in low chairs, which can make it hard to get up. · Put heat or cold on your sore joints as needed. Use whichever helps you most. You also can take turns with hot and cold packs. ¨ Apply heat 2 or 3 times a day for 20 to 30 minutes-using a heating pad, hot shower, or hot pack-to relieve pain and stiffness. ¨ Put ice or a cold pack on your sore joint for 10 to 20 minutes at a time. Put a thin cloth between the ice and your skin. When should you call for help? Call your doctor now or seek immediate medical care if:  ? · You have sudden swelling, warmth, or pain in any joint. ? · You have joint pain and a fever or rash. ? · You have such bad pain that you cannot use a joint. ? Watch closely for changes in your health, and be sure to contact your doctor if:  ? · You have mild joint symptoms that continue even with more than 6 weeks of care at home. ? · You have stomach pain or other problems with your medicine. Where can you learn more? Go to http://krishna-manpreet.info/. Enter W161 in the search box to learn more about \"Arthritis: Care Instructions. \"  Current as of: October 31, 2016  Content Version: 11.4  © 7956-8865 [employer]. Care instructions adapted under license by [employer] (which disclaims liability or warranty for this information). If you have questions about a medical condition or this instruction, always ask your healthcare professional. [place] [address] any warranty or liability for your use of this information.

## 2018-01-04 NOTE — PROGRESS NOTES
Chief Complaint   Patient presents with    Hypertension     3 mo. f/u    Chronic Kidney Disease     3 mo. f/u    Cholesterol Problem     3 mo. f/u       SUBJECTIVE:    Connie Ford is a 80 y.o. male who returns in follow-up for his hypertension, hyperlipidemia, DJD, chronic kidney disease, gout, allergic rhinitis, and prior CVA. He is taking his medications and trying to follow his diet as well as try to get some exercise. He denies any chest pain, shortness of breath or cardiorespiratory complaints. There are no GI or  complaints. He denies any headaches or neurologic complaints. He does note occasional ringing in his ears without other ENT complaints. That is chronic and unchanged. He denies any arthritic complaints and there are no other complaints on complete review of systems. Current Outpatient Prescriptions   Medication Sig Dispense Refill    clopidogrel (PLAVIX) 75 mg tab TAKE ONE TABLET BY MOUTH DAILY 30 Tab 11    valsartan-hydroCHLOROthiazide (DIOVAN-HCT) 320-12.5 mg per tablet TAKE ONE TABLET BY MOUTH DAILY 30 Tab 11    amLODIPine (NORVASC) 10 mg tablet Take  by mouth daily.  silodosin (RAPAFLO) 8 mg capsule Take 8 mg by mouth daily (with breakfast).  BYSTOLIC 5 mg tablet TAKE ONE TABLET BY MOUTH DAILY 30 Tab 11    DOCOSAHEXANOIC ACID/EPA (FISH OIL PO) Take  by mouth.  cycloSPORINE (RESTASIS) 0.05 % ophthalmic emulsion Administer 2 Drops to both eyes daily.  Cetirizine 10 mg cap Take 1 Tab by mouth daily.  diclofenac EC (VOLTAREN) 75 mg EC tablet Take 75 mg by mouth two (2) times a day.  allopurinol (ZYLOPRIM) 100 mg tablet Take 100 mg by mouth daily.  finasteride (PROSCAR) 5 mg tablet Take 5 mg by mouth daily.  doxycycline (VIBRAMYCIN) 100 mg capsule Take 100 mg by mouth daily.  aspirin (ASPIRIN) 325 mg tablet Take 325 mg by mouth daily.        Past Medical History:   Diagnosis Date    Allergic rhinitis 8/19/2017    BPH (benign prostatic hypertrophy) 8/19/2017    Cancer (HCC)     basal cell carcinoma right ear    Cancer (HCC)     prostate    CKD (chronic kidney disease), stage III 8/19/2017    CVA (cerebrovascular accident) ([facility] [address] 8/19/2017    DJD (degenerative joint disease) 8/19/2017    Gout     Hyperlipidemia 8/19/2017    Hypertension     Hypertension with renal disease 8/19/2017    Meniere disease 8/19/2017    Nausea & vomiting     On statin therapy 8/19/2017    Other ill-defined conditions(799.89)     meneres disease    PAC (premature atrial contraction) 8/19/2017    Palpitation 8/19/2017    Prostate CA ([facility] [address] 8/19/2017    Rosacea 8/19/2017    Stroke ([facility] [address] 2009    stroke    Testosterone deficiency 8/19/2017     Past Surgical History:   Procedure Laterality Date    ABDOMEN SURGERY PROC UNLISTED      bilateral inguinal hernia repair    COLORECTAL SCRN; HI RISK IND  5/3/2016         HX HEENT  1970    tonsillectomy    HX HEENT  1980    basal cell carcinoma right ear    HX PROSTATECTOMY      radiation only    CO COLONOSCOPY FLX DX W/COLLJ SPEC WHEN PFRMD  1/19/2011          Allergies   Allergen Reactions    Sulfanilamide Rash    Sulfa (Sulfonamide Antibiotics) Rash     Very mild rash several years ago       REVIEW OF SYSTEMS:  General: negative for - chills or fever, or weight loss or gain  ENT: negative for - headaches, nasal congestion or sore throat but occasional tinnitus  Eyes: no blurred or visual changes  Neck: No stiffness or swollen nodes  Respiratory: negative for - cough, hemoptysis, shortness of breath or wheezing  Cardiovascular : negative for - chest pain, edema, palpitations or shortness of breath  Gastrointestinal: negative for - abdominal pain, blood in stools, heartburn or nausea/vomiting  Genito-Urinary: no dysuria, trouble voiding, or hematuria  Musculoskeletal: negative for - gait disturbance, joint pain, joint stiffness or joint swelling  Neurological: no TIA or stroke symptoms  Hematologic: no bruises, no bleeding  Lymphatic: no swollen glands  Integument: no lumps, mole changes, nail changes or rash  Endocrine:no malaise/lethargy poly uria or polydipsia or unexpected weight changes        Social History     Social History    Marital status:      Spouse name: N/A    Number of children: N/A    Years of education: N/A     Social History Main Topics    Smoking status: Never Smoker    Smokeless tobacco: Never Used    Alcohol use No    Drug use: No    Sexual activity: No     Other Topics Concern    None     Social History Narrative     Family History   Problem Relation Age of Onset    Cancer Mother      uterine, cervical    Cancer Father      colon ca       OBJECTIVE:     Visit Vitals    /80    Pulse 60    Temp 98 °F (36.7 °C) (Oral)    Resp 16    Ht 6' 2\" (1.88 m)    Wt 220 lb (99.8 kg)    SpO2 98%    BMI 28.25 kg/m2     CONSTITUTIONAL:   well nourished, appears age appropriate  EYES: sclera anicteric, PERRL, EOMI  ENMT:nars clear, moist mucous membranes, pharynx clear  NECK: supple. Thyroid normal, No JVD or bruits  RESPIRATORY: Chest: clear to ascultation and percussion, normal inspiratory effort  CARDIOVASCULAR: Heart: regular rate and rhythm no murmurs, rubs or gallops, PMI not displaced, No thrills  GASTROINTESTINAL: Abdomen: non distended, soft, non tender, bowel sounds normal  HEMATOLOGIC: no purpura, petechiae or bruising  LYMPHATIC: No lymph node enlargemant  MUSCULOSKELETAL: Extremities: no edema or active synovitis, pulse 1+   INTEGUMENT: No unusual rashes or suspicious skin lesions noted. Nails appear normal.  PERIPHERAL VASCULAR: normal pulses femoral, PT and DP  NEUROLOGIC: non-focal exam, A & O X 3  PSYCHIATRIC:, appropriate affect     ASSESSMENT:   1. Hypertension with renal disease    2. Mixed hyperlipidemia    3. CKD (chronic kidney disease), stage III    4. Cerebrovascular accident (CVA), unspecified mechanism ([address]    5.  Gout, unspecified cause, unspecified chronicity, unspecified site    6. Primary osteoarthritis involving multiple joints    7. Chronic nonseasonal allergic rhinitis due to other allergen      Impression  1. Hypertension that seems to be well controlled on repeat by me was close to normal by the nurses check  2. Hyperlipidemia reviewed prior labs repeat status pending will make adjustments if necessary  3. CKD repeat status pending reviewed prior labs with him  4. Prior CVA continue aspirin and Plavix daily  5. Gout that is currently stable taken allopurinol  6. DJD currently stable  7. Allergic rhinitis that may become accounting for some of the tinnitus he is having but I do not see a problem with the years  I will call the lab and follow stable continue the same. Immunizations up-to-date. Recheck schedule III months or sooner should there be a problem. PLAN:  .  Orders Placed This Encounter    AMB POC LIPID PROFILE    AMB POC COMPREHENSIVE METABOLIC PANEL         ATTENTION:   This medical record was transcribed using an electronic medical records system. Although proofread, it may and can contain electronic and spelling errors. Other human spelling and other errors may be present. Corrections may be executed at a later time. Please feel free to contact us for any clarifications as needed. Follow-up Disposition:  Return in about 3 months (around 4/4/2018).     Results for orders placed or performed in visit on 01/04/18   AMB POC LIPID PROFILE   Result Value Ref Range    Cholesterol (POC)  0 - 200 mg/dL    Triglycerides (POC)  0 - 200 mg/dL    HDL Cholesterol (POC)  35 - 130 mg/dL    VLDL (POC)  MG/DL    LDL Cholesterol (POC)  0.0 - 130.0 MG/DL    TChol/HDL Ratio (POC)  0.0 - 4.0   AMB POC COMPREHENSIVE METABOLIC PANEL   Result Value Ref Range    GLUCOSE  75 - 110 mg/dL    BUN  9 - 20 mg/dL    Creatinine (POC)  0.8 - 1.5 mg/dL    Sodium (POC)  137 - 145 MMOL/L    Potassium (POC)  3.6 - 5.0 MMOL/L    CHLORIDE  98 - 107 MMOL/L    CO2  22 - 32 MMOL/L    CALCIUM  8.4 - 10.2 mg/dL    TOTAL PROTEIN  6.3 - 8.2 g/dL    ALBUMIN  3.9 - 5.4 g/dL    AST (POC)  14 - 36 U/L    ALT (POC)  9 - 52 U/L    ALKALINE PHOS  38 - 126 U/L    TOTAL BILIRUBIN  0.2 - 1.3 mg/dL    eGFR (POC)         Sedrick Martin MD    The patient verbalized understanding of the problems and plans as explained.

## 2018-01-05 ENCOUNTER — OFFICE VISIT (OUTPATIENT)
Dept: INTERNAL MEDICINE CLINIC | Age: 83
End: 2018-01-05

## 2018-01-05 VITALS
HEART RATE: 64 BPM | SYSTOLIC BLOOD PRESSURE: 128 MMHG | OXYGEN SATURATION: 98 % | HEIGHT: 74 IN | DIASTOLIC BLOOD PRESSURE: 66 MMHG | BODY MASS INDEX: 28.23 KG/M2 | RESPIRATION RATE: 16 BRPM | WEIGHT: 220 LBS

## 2018-01-05 DIAGNOSIS — R59.9 LYMPH NODE ENLARGEMENT: ICD-10-CM

## 2018-01-05 DIAGNOSIS — I12.9 HYPERTENSION WITH RENAL DISEASE: ICD-10-CM

## 2018-01-05 DIAGNOSIS — M10.9 GOUT, UNSPECIFIED CAUSE, UNSPECIFIED CHRONICITY, UNSPECIFIED SITE: ICD-10-CM

## 2018-01-05 DIAGNOSIS — I63.9 CEREBROVASCULAR ACCIDENT (CVA), UNSPECIFIED MECHANISM (HCC): ICD-10-CM

## 2018-01-05 DIAGNOSIS — J30.89 CHRONIC NONSEASONAL ALLERGIC RHINITIS DUE TO OTHER ALLERGEN: ICD-10-CM

## 2018-01-05 DIAGNOSIS — M15.9 PRIMARY OSTEOARTHRITIS INVOLVING MULTIPLE JOINTS: ICD-10-CM

## 2018-01-05 DIAGNOSIS — Z01.810 PRE-OPERATIVE CARDIOVASCULAR EXAMINATION: Primary | ICD-10-CM

## 2018-01-05 DIAGNOSIS — E78.2 MIXED HYPERLIPIDEMIA: ICD-10-CM

## 2018-01-05 DIAGNOSIS — N18.30 CKD (CHRONIC KIDNEY DISEASE), STAGE III (HCC): ICD-10-CM

## 2018-01-05 NOTE — PROGRESS NOTES
HPI:   80 y.o.  presents for preoperative medical consultation and clearance for planned surgery to be done by Dr. Luis Cortes at [facility] on 1/12/2018 for removal of a enlarged lymph node in the right anterior cervical region. He is regular follow-up by me for hypertension, hyperlipidemia, DJD, chronic kidney disease, gout, allergic rhinitis, and prior CVA. He currently denies any head congestion, nasal congestion, fevers, chills or any other cardiorespiratory symptoms of any type. He denies any headaches or neurologic complaints. He denies any GI/ complaints. He has noted the enlargement of the right anterior cervical region but does not have any pain from it. He is noted no arthritic complaints and no other complaints on complete review of systems.     Patient Active Problem List    Diagnosis    Hypertension with renal disease    Mixed hyperlipidemia    Gout    Primary osteoarthritis involving multiple joints    [address]A [facility]ce[facility]bro[facility]) [facility] [address]    CKD (chronic kidney disease), stage III    Rosacea    PAC (premature atrial contraction)    Allergic rhinitis    Pre-operative cardiovascular examination    Lymph node enlargement    Medicare annual wellness visit, initial    Meniere disease    Testosterone deficiency    Prostate CA ([address]    Palpitation    On statin therapy    BPH (benign prostatic hypertrophy)       Past Medical History:   Diagnosis Date    Allergic rhinitis 8/19/2017    BPH (benign prostatic hypertrophy) 8/19/2017    Cancer (HCC)     basal cell carcinoma right ear    Cancer (HCC)     prostate    CKD (chronic kidney disease), stage III 8/19/2017    CVA (cerebrovascular accident) ([address] 8/19/2017    DJD (degenerative joint disease) 8/19/2017    Gout     Hyperlipidemia 8/19/2017    Hypertension     Hypertension with renal disease 8/19/2017    Meniere disease 8/19/2017    Nausea & vomiting     On statin therapy 8/19/2017    Other ill-defined conditions(799.89)     meneres disease    PAC (premature atrial contraction) 8/19/2017    Palpitation 8/19/2017    Prostate CA ([facility] [address] 8/19/2017    Rosacea 8/19/2017    Stroke ([facility] 2009    stroke    Testosterone deficiency 8/19/2017       Social History   Substance Use Topics    Smoking status: Never Smoker    Smokeless tobacco: Never Used    Alcohol use No       Family History   Problem Relation Age of Onset    Cancer Mother      uterine, cervical    Cancer Father      colon ca       Outpatient Prescriptions Marked as Taking for the 1/5/18 encounter (Office Visit) with Sherri Recio MD   Medication Sig Dispense Refill    clopidogrel (PLAVIX) 75 mg tab TAKE ONE TABLET BY MOUTH DAILY 30 Tab 11    valsartan-hydroCHLOROthiazide (DIOVAN-HCT) 320-12.5 mg per tablet TAKE ONE TABLET BY MOUTH DAILY 30 Tab 11    amLODIPine (NORVASC) 10 mg tablet Take  by mouth daily.  silodosin (RAPAFLO) 8 mg capsule Take 8 mg by mouth daily (with breakfast).  BYSTOLIC 5 mg tablet TAKE ONE TABLET BY MOUTH DAILY 30 Tab 11    DOCOSAHEXANOIC ACID/EPA (FISH OIL PO) Take  by mouth.  cycloSPORINE (RESTASIS) 0.05 % ophthalmic emulsion Administer 2 Drops to both eyes daily.  Cetirizine 10 mg cap Take 1 Tab by mouth daily.  diclofenac EC (VOLTAREN) 75 mg EC tablet Take 75 mg by mouth two (2) times a day.  allopurinol (ZYLOPRIM) 100 mg tablet Take 100 mg by mouth daily.  finasteride (PROSCAR) 5 mg tablet Take 5 mg by mouth daily.  doxycycline (VIBRAMYCIN) 100 mg capsule Take 100 mg by mouth daily.  aspirin (ASPIRIN) 325 mg tablet Take 325 mg by mouth daily. Allergies   Allergen Reactions    Sulfanilamide Rash    Sulfa (Sulfonamide Antibiotics) Rash     Very mild rash several years ago       ROS:     Constitutional: He denies fevers, weight loss, sweats. Eyes: No blurred or double vision. ENT: No difficulty with swallowing, taste, speech or smell.   Enlarged node right side noted by Dr. Sebastian Ding  Neck: no stiffness or swelling  Respiratory: No cough wheezing or shortness of breath. Cardiovascular: Denies chest pain, palpitations, unexplained indigestion or syncope. Gastrointestinal:  No changes in bowel movements, no abdominal pain, no bloating. Genitourinary:  He denies frequency, nocturia or stranguria. Extremities: No joint pain, stiffness or swelling. Neurological:  No numbness, tingling, burring paresthesias or loss of motor strength. No syncope, dizziness or frequent headache  Lymphatic: no adenopathy noted  Hematologic: no easy bruising or bleeding gums  Skin:  No recent rashes or mole changes. Psychiatric/Behavioral:  Negative for depression. The patient is not nervous/anxious. PE:     Vitals:    01/05/18 1315 01/05/18 1346   BP: 142/62 128/66   Pulse: 64    Resp: 16    SpO2: 98%    Weight: 220 lb (99.8 kg)    Height: 6' 2\" (1.88 m)         General appearance - alert, well appearing, and in no distress  Mental status - alert, oriented to person, place, and time  HEENT:  Ears - bilateral TM's and external ear canals clear  Eyes - pupillary responses were normal.  Extraocular muscle function intact. Lids and conjunctiva not injected. Fundoscopic exam revealed sharp disc margins. eye movements intact  Pharynx- clear with teeth in good repair. No masses were noted  Neck - supple without thyromegaly or burit. No JVD noted slightly enlarged area and right upper anterior cervical chain but no fixed firm areas noted. Lungs - clear to auscultation and percussion  Cardiac- normal rate, regular rhythm without murmurs. PMI not displaced. No gallop, rub or click  Abdomen - flat, soft, non-tender without palpable organomegaly or mass. No pulsatile mass was felt, and not bruit was heard.   Bowel sounds were active  Extremities -  no clubbing cyanosis or edema  Lymphatics - no palpable lymphadenopathy, no hepatosplenomegaly  Hematologic: no petechiae or purpura  Peripheral vascular -Femoral, Dorsalis pedis and posterior tibial pulses felt without difficulty  Skin - no rash or unusual mole change noted  Neurological - Cranial nerves II-XII grossly intact. Motor strength 5/5. DTR's 2+ and symmetric. Station and gait normal  Back exam - full range of motion, no tenderness, palpable spasm or pain on motion  Musculoskeletal - no joint tenderness, deformity or swelling      Assessment/Plan:     1. Pre-operative cardiovascular examination    2. Lymph node enlargement    3. Hypertension with renal disease    4. Cerebrovascular accident (CVA), unspecified mechanism ([address]    5. Mixed hyperlipidemia    6. CKD (chronic kidney disease), stage III    7. Primary osteoarthritis involving multiple joints    8. Gout, unspecified cause, unspecified chronicity, unspecified site    9. Chronic nonseasonal allergic rhinitis due to other allergen        Impression  1. Preoperative medical consultation and evaluation  2. Lymph node enlargement as described above  3. Hypertension that is well controlled  4. Prior CVA on aspirin daily which she will continue  5. Hyperlipidemia stable last check  6. Chronic kidney disease stable last check  7. DJD that is stable  8. Gout no recent flares  9. Allergic rhinitis probably not playing a role in the lymph node enlargement  He is medically stable for the planned surgery. Copy to Dr. Taya Monzon. Health Maintenance reviewed - updated. No orders of the defined types were placed in this encounter. There are no discontinued medications. Current Outpatient Prescriptions   Medication Sig Dispense Refill    clopidogrel (PLAVIX) 75 mg tab TAKE ONE TABLET BY MOUTH DAILY 30 Tab 11    valsartan-hydroCHLOROthiazide (DIOVAN-HCT) 320-12.5 mg per tablet TAKE ONE TABLET BY MOUTH DAILY 30 Tab 11    amLODIPine (NORVASC) 10 mg tablet Take  by mouth daily.  silodosin (RAPAFLO) 8 mg capsule Take 8 mg by mouth daily (with breakfast).  BYSTOLIC 5 mg tablet TAKE ONE TABLET BY MOUTH DAILY 30 Tab 11    DOCOSAHEXANOIC ACID/EPA (FISH OIL PO) Take  by mouth.  cycloSPORINE (RESTASIS) 0.05 % ophthalmic emulsion Administer 2 Drops to both eyes daily.  Cetirizine 10 mg cap Take 1 Tab by mouth daily.  diclofenac EC (VOLTAREN) 75 mg EC tablet Take 75 mg by mouth two (2) times a day.  allopurinol (ZYLOPRIM) 100 mg tablet Take 100 mg by mouth daily.  finasteride (PROSCAR) 5 mg tablet Take 5 mg by mouth daily.  doxycycline (VIBRAMYCIN) 100 mg capsule Take 100 mg by mouth daily.  aspirin (ASPIRIN) 325 mg tablet Take 325 mg by mouth daily. No results found for any visits on 01/05/18. Recommended healthy diet low in carbohydrates, fats, sodium and cholesterol. Recommended regular cardiovascular exercise 3-6 times per week for 30-60 minutes daily. Verbal and written instructions (see AVS) provided. Patient expresses understanding of diagnosis and treatment plan.       Sedrick Martin MD

## 2018-01-05 NOTE — PATIENT INSTRUCTIONS
Laryngectomy: Before Your Surgery  What is laryngectomy? Laryngectomy is the removal of all or part of the voice box (larynx). The voice box is in the neck and contains the vocal cords. It also helps you swallow and breathe. 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 You will be asleep during the surgery. The doctor will take out all or part of the voice box through a cut in the front of your neck. This cut is called an incision. How much of the voice box the doctor takes out depends on how large the cancer is and how far it has spread. You will have stitches or staples in the incision. Your ability to talk after surgery depends on how much of the voice box is removed. If all of it is removed, you will not be able to talk. In this case, the doctor will also make a hole in your neck to help you breathe. This is called a tracheotomy. The hole is called a stoma. You will probably have a tube in your neck to drain fluid from the incision for 1 to 4 days after surgery. The stitches or staples will be taken out 1 to 2 weeks after surgery. The scar will fade over time. For a week or more after surgery you will need to get food through a tube. The tube goes into your nose and down your throat to your stomach. Your throat will heal in 2 to 3 weeks. You can go home 1 to 2 weeks after surgery. You will probably be able to go back to work 6 to 8 weeks after surgery. It may take 3 to 4 months to feel normal again. Speech therapy will help you learn new ways to communicate if you can't talk normally after surgery. Most people can do their normal activities after a laryngectomy. Losing your ability to talk can be very upsetting and hard to accept. It can affect your self-image and lead to depression. If you need help after surgery, you may want to see a counselor. Follow-up care is a key part of your treatment and safety. Be sure to make and go to all appointments, and call your doctor if you are having problems. It's also a good idea to know your test results and keep a list of the medicines you take. What happens before surgery? ?Surgery can be stressful. This information will help you understand what you can expect. And it will help you safely prepare for surgery. ? Preparing for surgery  ? · Understand exactly what surgery is planned, along with the risks, benefits, and other options. · Tell your doctors ALL the medicines, vitamins, supplements, and herbal remedies you take. Some of these can increase the risk of bleeding or interact with anesthesia. ? · If you take blood thinners, such as warfarin (Coumadin), clopidogrel (Plavix), or aspirin, be sure to talk to your doctor. He or she will tell you if you should stop taking these medicines before your surgery. Make sure that you understand exactly what your doctor wants you to do.   ? · Your doctor will tell you which medicines to take or stop before your surgery. You may need to stop taking certain medicines a week or more before surgery. So talk to your doctor as soon as you can.   ? · If you have an advance directive, let your doctor know. It may include a living will and a durable power of  for health care. Bring a copy to the hospital. If you don't have one, you may want to prepare one. It lets your doctor and loved ones know your health care wishes. Doctors advise that everyone prepare these papers before any type of surgery or procedure. What happens on the day of surgery? · Follow the instructions exactly about when to stop eating and drinking. If you don't, your surgery may be canceled. If your doctor told you to take your medicines on the day of surgery, take them with only a sip of water. ? · Take a bath or shower before you come in for your surgery. Do not apply lotions, perfumes, deodorants, or nail polish. ? · Do not shave the surgical site yourself. ? · Take off all jewelry and piercings.  And take out contact lenses, if you wear them. ? At the hospital or surgery center   · Bring a picture ID. ? · The area for surgery is often marked to make sure there are no errors. ? · You will be kept comfortable and safe by your anesthesia provider. You will be asleep during the surgery. ? · How long the surgery takes depends on how much of the larynx the doctor takes out. ? · You will not be able to eat normally until your throat has healed. You will get food through a tube that goes into your nose and down into your stomach. Going home   · Be sure you have someone to drive you home. Anesthesia and pain medicine make it unsafe for you to drive. ? · You will be given more specific instructions about recovering from your surgery. They will cover things like diet, wound care, follow-up care, driving, and getting back to your normal routine. When should you call your doctor? · You have questions or concerns. ? · You don't understand how to prepare for your surgery. ? · You become ill before the surgery (such as fever, flu, or a cold). ? · You need to reschedule or have changed your mind about having the surgery. Where can you learn more? Go to http://krishna-manpreet.info/. Enter T078 in the search box to learn more about \"Laryngectomy: Before Your Surgery. \"  Current as of: May 12, 2017  Content Version: 11.4  © 3701-8939 [employer]. Care instructions adapted under license by [employer] (which disclaims liability or warranty for this information). If you have questions about a medical condition or this instruction, always ask your healthcare professional. [place] [address] any warranty or liability for your use of this information.

## 2018-01-05 NOTE — MR AVS SNAPSHOT
Visit Information Date & Time Provider Department Dept. Phone Encounter #  
 1/5/2018  1:10 PM Jenny Clark MD [facility] [id number] Your Appointments 4/19/2018  8:20 AM  
FOLLOW UP 10 with MD CLAU Tovar [facility] ([address]) Appt Note: [address]  [address] [id number] [address] [address] Upcoming Health Maintenance Date Due ZOSTER VACCINE AGE 60> 8/17/1994 GLAUCOMA SCREENING Q2Y 10/17/1999 DTaP/Tdap/Td series (1 - Tdap) 6/22/2013 MEDICARE YEARLY EXAM 9/12/2018 Allergies as of 1/5/2018  Review Complete On: 1/5/2018 By: Esau Choi RN Severity Noted Reaction Type Reactions Sulfanilamide  08/19/2017    Rash  
 Sulfa (Sulfonamide Antibiotics) Low 01/18/2011   Systemic Rash Very mild rash several years ago Current Immunizations  Never Reviewed Name Date Influenza High Dose Vaccine PF 11/13/2017 Influenza Vaccine 11/17/2016, 11/2/2015 Pneumococcal Conjugate (PCV-13) 7/24/2015 Pneumococcal Vaccine (Unspecified Type) 10/13/2005 Td 6/21/2013 Not reviewed this visit Vitals BP Pulse Resp Height(growth percentile) Weight(growth percentile) SpO2  
 142/62 (BP 1 Location: Left arm, BP Patient Position: Sitting) 64 16 6' 2\" (1.88 m) 220 lb (99.8 kg) 98% BMI Smoking Status 28.25 kg/m2 Never Smoker BMI and BSA Data Body Mass Index Body Surface Area  
 28.25 kg/m 2 2.28 m 2 Preferred Pharmacy Pharmacy Name Phone [facility] [place], [address] [place] [address] Prachi Sewell [phone] Your Updated Medication List  
  
   
This list is accurate as of: 1/5/18  1:46 PM.  Always use your most recent med list.  
  
  
  
  
 allopurinol 100 mg tablet Commonly known as:  Jaelyn Bowser  
 Take 100 mg by mouth daily. amLODIPine 10 mg tablet Commonly known as:  Senora Ty Take  by mouth daily. aspirin 325 mg tablet Commonly known as:  ASPIRIN Take 325 mg by mouth daily. BYSTOLIC 5 mg tablet Generic drug:  nebivolol TAKE ONE TABLET BY MOUTH DAILY Cetirizine 10 mg Cap Take 1 Tab by mouth daily. clopidogrel 75 mg Tab Commonly known as:  PLAVIX TAKE ONE TABLET BY MOUTH DAILY  
  
 cycloSPORINE 0.05 % ophthalmic emulsion Commonly known as:  RESTASIS Administer 2 Drops to both eyes daily. diclofenac EC 75 mg EC tablet Commonly known as:  VOLTAREN Take 75 mg by mouth two (2) times a day. doxycycline 100 mg capsule Commonly known as:  VIBRAMYCIN Take 100 mg by mouth daily. FISH OIL PO Take  by mouth. PROSCAR 5 mg tablet Generic drug:  finasteride Take 5 mg by mouth daily. RAPAFLO 8 mg capsule Generic drug:  silodosin Take 8 mg by mouth daily (with breakfast). valsartan-hydroCHLOROthiazide 320-12.5 mg per tablet Commonly known as:  DIOVAN-HCT  
TAKE ONE TABLET BY MOUTH DAILY Patient Instructions Laryngectomy: Before Your Surgery What is laryngectomy? Laryngectomy is the removal of all or part of the voice box (larynx). The voice box is in the neck and contains the vocal cords. It also helps you swallow and breathe. 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 You will be asleep during the surgery. The doctor will take out all or part of the voice box through a cut in the front of your neck. This cut is called an incision. How much of the voice box the doctor takes out depends on how large the cancer is and how far it has spread. You will have stitches or staples in the incision. Your ability to talk after surgery depends on how much of the voice box is removed. If all of it is removed, you will not be able to talk.  In this case, the doctor will also make a hole in your neck to help you breathe. This is called a tracheotomy. The hole is called a stoma. You will probably have a tube in your neck to drain fluid from the incision for 1 to 4 days after surgery. The stitches or staples will be taken out 1 to 2 weeks after surgery. The scar will fade over time. For a week or more after surgery you will need to get food through a tube. The tube goes into your nose and down your throat to your stomach. Your throat will heal in 2 to 3 weeks. You can go home 1 to 2 weeks after surgery. You will probably be able to go back to work 6 to 8 weeks after surgery. It may take 3 to 4 months to feel normal again. Speech therapy will help you learn new ways to communicate if you can't talk normally after surgery. Most people can do their normal activities after a laryngectomy. Losing your ability to talk can be very upsetting and hard to accept. It can affect your self-image and lead to depression. If you need help after surgery, you may want to see a counselor. Follow-up care is a key part of your treatment and safety. Be sure to make and go to all appointments, and call your doctor if you are having problems. It's also a good idea to know your test results and keep a list of the medicines you take. What happens before surgery? ?Surgery can be stressful. This information will help you understand what you can expect. And it will help you safely prepare for surgery. ? Preparing for surgery ? · Understand exactly what surgery is planned, along with the risks, benefits, and other options. · Tell your doctors ALL the medicines, vitamins, supplements, and herbal remedies you take. Some of these can increase the risk of bleeding or interact with anesthesia. ? · If you take blood thinners, such as warfarin (Coumadin), clopidogrel (Plavix), or aspirin, be sure to talk to your doctor.  He or she will tell you if you should stop taking these medicines before your surgery. Make sure that you understand exactly what your doctor wants you to do.  
? · Your doctor will tell you which medicines to take or stop before your surgery. You may need to stop taking certain medicines a week or more before surgery. So talk to your doctor as soon as you can.  
? · If you have an advance directive, let your doctor know. It may include a living will and a durable power of  for health care. Bring a copy to the hospital. If you don't have one, you may want to prepare one. It lets your doctor and loved ones know your health care wishes. Doctors advise that everyone prepare these papers before any type of surgery or procedure. What happens on the day of surgery? · Follow the instructions exactly about when to stop eating and drinking. If you don't, your surgery may be canceled. If your doctor told you to take your medicines on the day of surgery, take them with only a sip of water. ? · Take a bath or shower before you come in for your surgery. Do not apply lotions, perfumes, deodorants, or nail polish. ? · Do not shave the surgical site yourself. ? · Take off all jewelry and piercings. And take out contact lenses, if you wear them. ? At the hospital or surgery center · Bring a picture ID. ? · The area for surgery is often marked to make sure there are no errors. ? · You will be kept comfortable and safe by your anesthesia provider. You will be asleep during the surgery. ? · How long the surgery takes depends on how much of the larynx the doctor takes out. ? · You will not be able to eat normally until your throat has healed. You will get food through a tube that goes into your nose and down into your stomach. Going home · Be sure you have someone to drive you home. Anesthesia and pain medicine make it unsafe for you to drive.   
? · You will be given more specific instructions about recovering from your surgery. They will cover things like diet, wound care, follow-up care, driving, and getting back to your normal routine. When should you call your doctor? · You have questions or concerns. ? · You don't understand how to prepare for your surgery. ? · You become ill before the surgery (such as fever, flu, or a cold). ? · You need to reschedule or have changed your mind about having the surgery. Where can you learn more? Go to http://krishna-manpreet.info/. Enter Z635 in the search box to learn more about \"Laryngectomy: Before Your Surgery. \" Current as of: May 12, 2017 Content Version: 11.4 © 0554-0889 [employer]. Care instructions adapted under license by [employer] (which disclaims liability or warranty for this information). If you have questions about a medical condition or this instruction, always ask your healthcare professional. [address] any warranty or liability for your use of this information. Introducing [facility]! [employer] introduces [employer] patient portal. Now you can access parts of your medical record, email your doctor's office, and request medication refills online. 1. In your internet browser, go to [email or web address] 2. Click on the First Time User? Click Here link in the Sign In box. You will see the New Member Sign Up page. 3. Enter your Livescribe Access Code exactly as it appears below. You will not need to use this code after youve completed the sign-up process. If you do not sign up before the expiration date, you must request a new code. · Livescribe Access Code: [email or web address] Expires: 3/20/2018  8:53 AM 
 
4. Enter the last four digits of your Social Security Number (xxxx) and Date of Birth (mm/dd/yyyy) as indicated and click Submit. You will be taken to the next sign-up page. 5. Create a Livescribe ID.  This will be your Livescribe login ID and cannot be changed, so think of one that is secure and easy to remember. 6. Create a [facility] [facility] password. You can change your password at any time. 7. Enter your Password Reset Question and Answer. This can be used at a later time if you forget your password. 8. Enter your e-mail address. You will receive e-mail notification when new information is available in [address] 9. Click Sign Up. You can now view and download portions of your medical record. 10. Click the Download Summary menu link to download a portable copy of your medical information. If you have questions, please visit the Frequently Asked Questions section of the [employer] website. Remember, [facility] is NOT to be used for urgent needs. For medical emergencies, dial 911. Now available from your iPhone and Android! Please provide this summary of care documentation to your next provider. Your primary care clinician is listed as Jose. If you have any questions after today's visit, please call [phone].

## 2018-01-05 NOTE — PROGRESS NOTES
Here today for pre op for a palpable cyst on the right side of his neck. To be removed by Dr. Clint Arnold. Scheduled for January 12, 2018. 1. Have you been to the ER, urgent care clinic since your last visit? Hospitalized since your last visit?  no    2. Have you seen or consulted any other health care providers outside of the [address] [place] [address] since your last visit? Include any pap smears or colon screening. no

## 2018-01-09 NOTE — PERIOP NOTES
[facility]  Ambulatory Surgery Unit  Pre-operative Instructions    Surgery/Procedure Date  01/12/2018            Tentative Arrival Time will call day before with exact time of arrival      1. On the day of your surgery/procedure, please report to the Ambulatory Surgery Unit Registration Desk and sign in at your designated time. The Ambulatory Surgery Unit is located in [facility] on the [facility] side of the [facility] across from the [address] [place] [address]. Please have all of your health insurance cards and a photo ID. 2. You must have someone with you to drive you home, as you should not drive a car for 24 hours following anesthesia. Please make arrangements for a responsible adult friend or family member to stay with you for at least the first 24 hours after your surgery. 3. Do not have anything to eat or drink (including water, gum, mints, coffee, juice) after midnight   01/11/2018. This may not apply to medications prescribed by your physician. (Please note below the special instructions with medications to take the morning of surgery, if applicable.)    4. We recommend you do not drink any alcoholic beverages for 24 hours before and after your surgery. 5. Contact your surgeons office for instructions on the following medications: non-steroidal anti-inflammatory drugs (i.e. Advil, Aleve), vitamins, and supplements. (Some surgeons will want you to stop these medications prior to surgery and others may allow you to take them)   **If you are currently taking Plavix, Coumadin, Aspirin and/or other blood-thinning agents, contact your surgeon for instructions. ** Your surgeon will partner with the physician prescribing these medications to determine if it is safe to stop or if you need to continue taking. Please do not stop taking these medications without instructions from your surgeon.     6. In an effort to help prevent surgical site infection, we ask that you shower with an anti-bacterial soap (i.e. Dial or Safeguard) for 3 days prior to and on the morning of surgery, using a fresh towel after each shower. (Please begin this process with fresh bed linens.) Do not apply any lotions, powders, or deodorants after the shower on the day of your procedure. If applicable, please do not shave the operative site for 48 hours prior to surgery. 7. Wear comfortable clothes. Wear glasses instead of contacts. Do not bring any jewelry or money (other than copays or fees as instructed). Do not wear make-up, particularly mascara, the morning of your surgery. Do not wear nail polish, particularly if you are having foot /hand surgery. Wear your hair loose or down, no ponytails, buns, haley pins or clips. All body piercings must be removed. 8. You should understand that if you do not follow these instructions your surgery may be cancelled. If your physical condition changes (i.e. fever, cold or flu) please contact your surgeon as soon as possible. 9. It is important that you be on time. If a situation occurs where you may be late, or if you have any questions or problems, please call [phone].    10. Your surgery time may be subject to change. You will receive a phone call the day prior to surgery to confirm your arrival time. 11. Pediatric patients: please bring a change of clothes, diapers, bottle/sippy cup, pacifier, etc.      Special Instructions: Take all medications and inhalers, as prescribed, on the morning of surgery with a sip of water EXCEPT: no blood thinners. I understand a pre-operative phone call will be made to verify my surgery time. In the event that I am not available, I give permission for a message to be left on my answering service and/or with another person? yes         ___________________      ___________________      ________________  Pt verbalized understanding of preop instructions via telephone.   (Signature of Patient)          (Witness) (Date and Time)

## 2018-01-11 ENCOUNTER — ANESTHESIA EVENT (OUTPATIENT)
Dept: SURGERY | Age: 83
End: 2018-01-11
Payer: MEDICARE

## 2018-01-12 ENCOUNTER — ANESTHESIA (OUTPATIENT)
Dept: SURGERY | Age: 83
End: 2018-01-12
Payer: MEDICARE

## 2018-01-12 ENCOUNTER — HOSPITAL ENCOUNTER (OUTPATIENT)
Age: 83
Setting detail: OUTPATIENT SURGERY
Discharge: HOME | End: 2018-01-12
Attending: OTOLARYNGOLOGY | Admitting: OTOLARYNGOLOGY
Payer: MEDICARE

## 2018-01-12 VITALS
TEMPERATURE: 97.6 F | SYSTOLIC BLOOD PRESSURE: 132 MMHG | WEIGHT: 217.13 LBS | DIASTOLIC BLOOD PRESSURE: 59 MMHG | HEART RATE: 75 BPM | RESPIRATION RATE: 13 BRPM | OXYGEN SATURATION: 95 % | HEIGHT: 74 IN | BODY MASS INDEX: 27.87 KG/M2

## 2018-01-12 PROCEDURE — 77030011267 HC ELECTRD BLD COVD -A: Performed by: OTOLARYNGOLOGY

## 2018-01-12 PROCEDURE — 77030013079 HC BLNKT BAIR HGGR 3M -A: Performed by: ANESTHESIOLOGY

## 2018-01-12 PROCEDURE — 74011000250 HC RX REV CODE- 250: Performed by: OTOLARYNGOLOGY

## 2018-01-12 PROCEDURE — 76060000062 HC AMB SURG ANES 1 TO 1.5 HR: Performed by: OTOLARYNGOLOGY

## 2018-01-12 PROCEDURE — 76030000001 HC AMB SURG OR TIME 1 TO 1.5: Performed by: OTOLARYNGOLOGY

## 2018-01-12 PROCEDURE — 77030002996 HC SUT SLK J&J -A: Performed by: OTOLARYNGOLOGY

## 2018-01-12 PROCEDURE — 74011250637 HC RX REV CODE- 250/637

## 2018-01-12 PROCEDURE — 77030021352 HC CBL LD SYS DISP COVD -B: Performed by: OTOLARYNGOLOGY

## 2018-01-12 PROCEDURE — 77030018836 HC SOL IRR NACL ICUM -A: Performed by: OTOLARYNGOLOGY

## 2018-01-12 PROCEDURE — 77030002888 HC SUT CHRMC J&J -A: Performed by: OTOLARYNGOLOGY

## 2018-01-12 PROCEDURE — 77030011265 HC ELECTRD BLD HEX COVD -A: Performed by: OTOLARYNGOLOGY

## 2018-01-12 PROCEDURE — 77030002916 HC SUT ETHLN J&J -A: Performed by: OTOLARYNGOLOGY

## 2018-01-12 PROCEDURE — 77030008684 HC TU ET CUF COVD -B: Performed by: ANESTHESIOLOGY

## 2018-01-12 PROCEDURE — 88305 TISSUE EXAM BY PATHOLOGIST: CPT | Performed by: OTOLARYNGOLOGY

## 2018-01-12 PROCEDURE — 77030011640 HC PAD GRND REM COVD -A: Performed by: OTOLARYNGOLOGY

## 2018-01-12 PROCEDURE — 76210000034 HC AMBSU PH I REC 0.5 TO 1 HR: Performed by: OTOLARYNGOLOGY

## 2018-01-12 PROCEDURE — 76210000046 HC AMBSU PH II REC FIRST 0.5 HR: Performed by: OTOLARYNGOLOGY

## 2018-01-12 PROCEDURE — 74011250636 HC RX REV CODE- 250/636: Performed by: ANESTHESIOLOGY

## 2018-01-12 PROCEDURE — 77030006671 HC BLD MYRIN BVR BD -A: Performed by: OTOLARYNGOLOGY

## 2018-01-12 PROCEDURE — 74011250636 HC RX REV CODE- 250/636

## 2018-01-12 PROCEDURE — 74011250636 HC RX REV CODE- 250/636: Performed by: OTOLARYNGOLOGY

## 2018-01-12 PROCEDURE — 77030014006 HC SPNG HEMSTAT J&J -A: Performed by: OTOLARYNGOLOGY

## 2018-01-12 PROCEDURE — 74011000250 HC RX REV CODE- 250

## 2018-01-12 PROCEDURE — 77030020255 HC SOL INJ LR 1000ML BG: Performed by: OTOLARYNGOLOGY

## 2018-01-12 PROCEDURE — 77030012406 HC DRN WND PENRS BARD -A: Performed by: OTOLARYNGOLOGY

## 2018-01-12 PROCEDURE — 77030026438 HC STYL ET INTUB CARD -A: Performed by: ANESTHESIOLOGY

## 2018-01-12 RX ORDER — HYDROMORPHONE HYDROCHLORIDE 1 MG/ML
.2-.5 INJECTION, SOLUTION INTRAMUSCULAR; INTRAVENOUS; SUBCUTANEOUS ONCE
Status: DISCONTINUED | OUTPATIENT
Start: 2018-01-12 | End: 2018-01-12 | Stop reason: HOSPADM

## 2018-01-12 RX ORDER — SODIUM CHLORIDE 0.9 % (FLUSH) 0.9 %
5-10 SYRINGE (ML) INJECTION EVERY 8 HOURS
Status: DISCONTINUED | OUTPATIENT
Start: 2018-01-12 | End: 2018-01-12 | Stop reason: HOSPADM

## 2018-01-12 RX ORDER — PROPOFOL 10 MG/ML
INJECTION, EMULSION INTRAVENOUS AS NEEDED
Status: DISCONTINUED | OUTPATIENT
Start: 2018-01-12 | End: 2018-01-12 | Stop reason: HOSPADM

## 2018-01-12 RX ORDER — SODIUM CHLORIDE, SODIUM LACTATE, POTASSIUM CHLORIDE, CALCIUM CHLORIDE 600; 310; 30; 20 MG/100ML; MG/100ML; MG/100ML; MG/100ML
25 INJECTION, SOLUTION INTRAVENOUS CONTINUOUS
Status: DISCONTINUED | OUTPATIENT
Start: 2018-01-12 | End: 2018-01-12 | Stop reason: HOSPADM

## 2018-01-12 RX ORDER — DEXAMETHASONE SODIUM PHOSPHATE 10 MG/ML
10 INJECTION INTRAMUSCULAR; INTRAVENOUS ONCE
Status: COMPLETED | OUTPATIENT
Start: 2018-01-12 | End: 2018-01-12

## 2018-01-12 RX ORDER — FENTANYL CITRATE 50 UG/ML
INJECTION, SOLUTION INTRAMUSCULAR; INTRAVENOUS AS NEEDED
Status: DISCONTINUED | OUTPATIENT
Start: 2018-01-12 | End: 2018-01-12 | Stop reason: HOSPADM

## 2018-01-12 RX ORDER — OXYCODONE AND ACETAMINOPHEN 5; 325 MG/1; MG/1
1 TABLET ORAL ONCE
Status: DISCONTINUED | OUTPATIENT
Start: 2018-01-12 | End: 2018-01-12 | Stop reason: HOSPADM

## 2018-01-12 RX ORDER — LIDOCAINE HYDROCHLORIDE AND EPINEPHRINE 10; 10 MG/ML; UG/ML
INJECTION, SOLUTION INFILTRATION; PERINEURAL AS NEEDED
Status: DISCONTINUED | OUTPATIENT
Start: 2018-01-12 | End: 2018-01-12 | Stop reason: HOSPADM

## 2018-01-12 RX ORDER — SODIUM CHLORIDE 0.9 % (FLUSH) 0.9 %
5-10 SYRINGE (ML) INJECTION AS NEEDED
Status: DISCONTINUED | OUTPATIENT
Start: 2018-01-12 | End: 2018-01-12 | Stop reason: HOSPADM

## 2018-01-12 RX ORDER — EPHEDRINE SULFATE 50 MG/ML
INJECTION, SOLUTION INTRAVENOUS AS NEEDED
Status: DISCONTINUED | OUTPATIENT
Start: 2018-01-12 | End: 2018-01-12 | Stop reason: HOSPADM

## 2018-01-12 RX ORDER — FENTANYL CITRATE 50 UG/ML
25 INJECTION, SOLUTION INTRAMUSCULAR; INTRAVENOUS
Status: DISCONTINUED | OUTPATIENT
Start: 2018-01-12 | End: 2018-01-12 | Stop reason: HOSPADM

## 2018-01-12 RX ORDER — SCOLOPAMINE TRANSDERMAL SYSTEM 1 MG/1
PATCH, EXTENDED RELEASE TRANSDERMAL
Status: COMPLETED
Start: 2018-01-12 | End: 2018-01-12

## 2018-01-12 RX ORDER — DIPHENHYDRAMINE HYDROCHLORIDE 50 MG/ML
12.5 INJECTION, SOLUTION INTRAMUSCULAR; INTRAVENOUS AS NEEDED
Status: DISCONTINUED | OUTPATIENT
Start: 2018-01-12 | End: 2018-01-12 | Stop reason: HOSPADM

## 2018-01-12 RX ORDER — CEFAZOLIN SODIUM/WATER 2 G/20 ML
2 SYRINGE (ML) INTRAVENOUS ONCE
Status: COMPLETED | OUTPATIENT
Start: 2018-01-12 | End: 2018-01-12

## 2018-01-12 RX ORDER — ONDANSETRON 2 MG/ML
INJECTION INTRAMUSCULAR; INTRAVENOUS AS NEEDED
Status: DISCONTINUED | OUTPATIENT
Start: 2018-01-12 | End: 2018-01-12 | Stop reason: HOSPADM

## 2018-01-12 RX ORDER — ROCURONIUM BROMIDE 10 MG/ML
INJECTION, SOLUTION INTRAVENOUS AS NEEDED
Status: DISCONTINUED | OUTPATIENT
Start: 2018-01-12 | End: 2018-01-12 | Stop reason: HOSPADM

## 2018-01-12 RX ORDER — LIDOCAINE HYDROCHLORIDE 10 MG/ML
0.1 INJECTION, SOLUTION EPIDURAL; INFILTRATION; INTRACAUDAL; PERINEURAL AS NEEDED
Status: DISCONTINUED | OUTPATIENT
Start: 2018-01-12 | End: 2018-01-12 | Stop reason: HOSPADM

## 2018-01-12 RX ORDER — CEFAZOLIN SODIUM/WATER 2 G/20 ML
SYRINGE (ML) INTRAVENOUS
Status: COMPLETED
Start: 2018-01-12 | End: 2018-01-12

## 2018-01-12 RX ORDER — DEXAMETHASONE SODIUM PHOSPHATE 10 MG/ML
INJECTION INTRAMUSCULAR; INTRAVENOUS
Status: COMPLETED
Start: 2018-01-12 | End: 2018-01-12

## 2018-01-12 RX ORDER — SCOLOPAMINE TRANSDERMAL SYSTEM 1 MG/1
PATCH, EXTENDED RELEASE TRANSDERMAL AS NEEDED
Status: DISCONTINUED | OUTPATIENT
Start: 2018-01-12 | End: 2018-01-12 | Stop reason: HOSPADM

## 2018-01-12 RX ORDER — GLYCOPYRROLATE 0.2 MG/ML
INJECTION INTRAMUSCULAR; INTRAVENOUS AS NEEDED
Status: DISCONTINUED | OUTPATIENT
Start: 2018-01-12 | End: 2018-01-12 | Stop reason: HOSPADM

## 2018-01-12 RX ORDER — SUCCINYLCHOLINE CHLORIDE 20 MG/ML
INJECTION INTRAMUSCULAR; INTRAVENOUS AS NEEDED
Status: DISCONTINUED | OUTPATIENT
Start: 2018-01-12 | End: 2018-01-12 | Stop reason: HOSPADM

## 2018-01-12 RX ORDER — MORPHINE SULFATE 10 MG/ML
2 INJECTION, SOLUTION INTRAMUSCULAR; INTRAVENOUS
Status: DISCONTINUED | OUTPATIENT
Start: 2018-01-12 | End: 2018-01-12 | Stop reason: HOSPADM

## 2018-01-12 RX ORDER — LIDOCAINE HYDROCHLORIDE 20 MG/ML
INJECTION, SOLUTION EPIDURAL; INFILTRATION; INTRACAUDAL; PERINEURAL AS NEEDED
Status: DISCONTINUED | OUTPATIENT
Start: 2018-01-12 | End: 2018-01-12 | Stop reason: HOSPADM

## 2018-01-12 RX ADMIN — LIDOCAINE HYDROCHLORIDE 100 MG: 20 INJECTION, SOLUTION EPIDURAL; INFILTRATION; INTRACAUDAL; PERINEURAL at 08:58

## 2018-01-12 RX ADMIN — SUCCINYLCHOLINE CHLORIDE 200 MG: 20 INJECTION INTRAMUSCULAR; INTRAVENOUS at 08:59

## 2018-01-12 RX ADMIN — SCOLOPAMINE TRANSDERMAL SYSTEM 1 PATCH: 1 PATCH, EXTENDED RELEASE TRANSDERMAL at 08:35

## 2018-01-12 RX ADMIN — PROPOFOL 200 MG: 10 INJECTION, EMULSION INTRAVENOUS at 08:58

## 2018-01-12 RX ADMIN — Medication 2 G: at 09:05

## 2018-01-12 RX ADMIN — ONDANSETRON 4 MG: 2 INJECTION INTRAMUSCULAR; INTRAVENOUS at 09:41

## 2018-01-12 RX ADMIN — EPHEDRINE SULFATE 10 MG: 50 INJECTION, SOLUTION INTRAVENOUS at 09:52

## 2018-01-12 RX ADMIN — FENTANYL CITRATE 50 MCG: 50 INJECTION, SOLUTION INTRAMUSCULAR; INTRAVENOUS at 08:58

## 2018-01-12 RX ADMIN — FENTANYL CITRATE 25 MCG: 50 INJECTION, SOLUTION INTRAMUSCULAR; INTRAVENOUS at 09:26

## 2018-01-12 RX ADMIN — ROCURONIUM BROMIDE 5 MG: 10 INJECTION, SOLUTION INTRAVENOUS at 08:58

## 2018-01-12 RX ADMIN — FENTANYL CITRATE 25 MCG: 50 INJECTION, SOLUTION INTRAMUSCULAR; INTRAVENOUS at 09:15

## 2018-01-12 RX ADMIN — GLYCOPYRROLATE 0.1 MG: 0.2 INJECTION INTRAMUSCULAR; INTRAVENOUS at 09:29

## 2018-01-12 RX ADMIN — GLYCOPYRROLATE 0.1 MG: 0.2 INJECTION INTRAMUSCULAR; INTRAVENOUS at 09:13

## 2018-01-12 RX ADMIN — DEXAMETHASONE SODIUM PHOSPHATE 10 MG: 10 INJECTION INTRAMUSCULAR; INTRAVENOUS at 08:05

## 2018-01-12 RX ADMIN — EPHEDRINE SULFATE 15 MG: 50 INJECTION, SOLUTION INTRAVENOUS at 09:38

## 2018-01-12 RX ADMIN — PROPOFOL 50 MG: 10 INJECTION, EMULSION INTRAVENOUS at 09:26

## 2018-01-12 RX ADMIN — SODIUM CHLORIDE, SODIUM LACTATE, POTASSIUM CHLORIDE, AND CALCIUM CHLORIDE 25 ML/HR: 600; 310; 30; 20 INJECTION, SOLUTION INTRAVENOUS at 08:01

## 2018-01-12 RX ADMIN — DEXAMETHASONE SODIUM PHOSPHATE 10 MG: 10 INJECTION, SOLUTION INTRAMUSCULAR; INTRAVENOUS at 08:05

## 2018-01-12 RX ADMIN — EPHEDRINE SULFATE 10 MG: 50 INJECTION, SOLUTION INTRAVENOUS at 09:17

## 2018-01-12 RX ADMIN — SODIUM CHLORIDE, SODIUM LACTATE, POTASSIUM CHLORIDE, AND CALCIUM CHLORIDE: 600; 310; 30; 20 INJECTION, SOLUTION INTRAVENOUS at 09:47

## 2018-01-12 RX ADMIN — EPHEDRINE SULFATE 10 MG: 50 INJECTION, SOLUTION INTRAVENOUS at 09:07

## 2018-01-12 NOTE — OP NOTES
[facility]  ACUTE CARE OP NOTE    Name:DICK PURVIS  MR#: [id number]  : 1934  ACCOUNT #: [de-identified]   DATE OF SERVICE: 2018    SURGEON:  DICK TORRE MD    PREOPERATIVE DIAGNOSIS:  RIGHT NECK ABNORMALITY. POSTOPERATIVE DIAGNOSIS:  RIGHT BRANCHIAL CLEFT CYST. PROCEDURE:  EXCISION OF RIGHT BRANCHIAL CLEFT CYST. INDICATIONS:  The patient is an 25-year-old male with a history of persistent right neck fullness previously observed incidentally on ENT examination. The CT scan of the area confirmed a cystic abnormality, separate from the adjacent major salivary glands. A fine-needle aspiration proved nondiagnostic. Preoperatively, the alternatives, potential benefits and possible risks of the procedure were explained to the patient who understood and requested to proceed. DESCRIPTION OF PROCEDURE:  The patient was brought to the operating room and placed supine on the operating table. Following the smooth induction of general endotracheal tube anesthesia, the table was turned 90 degrees and the patient was positioned for operation. The right neck skin was cleansed with topical alcohol solution. 1% Xylocaine with 1:100,000 epinephrine was injected in a horizontally oriented neck crease approximately 2 fingerbreadths below the margin of the right mandible. The injection was carried posteriorly to the anterior border of the sternocleidomastoid muscle. A routine Betadine prep and drape was carried out. A 15 blade was used to crosshatch the previously injected skin and a horizontally oriented incision was carried through the skin and subcutaneous soft tissue. The platysma was identified and incised. A subplatysmal dissection was carried out utilizing primarily blunt dissection to identify the capsule of the cystic abnormality. A capsular dissection was carried out as the cystic abnormality was elevated off of the underlying soft tissues.   The marginal mandibular branch of the facial nerve was left undisturbed as it was retracted superiorly above the cyst.  The anterior border of the sternocleidomastoid muscle was identified adjacent to the cyst and the fibrous attachments were relieved. The hypoglossal and lingual nerves were allowed to remain in their anatomic position deep to the cyst.  As the cyst was elevated out of the deep tissues, a moderate amount of fluid was observed to exit and the cyst was thereby decompressed. Better visualization was afforded to the medial attachments and the cyst could be removed entirely. A search was undertaken for any extending tracts from the cyst; however, none were perceptible and the cyst was removed completely without attachments. Once this was completed, the wound was inspected for hemostasis. With light use of the bipolar cautery, meticulous hemostasis was observed. The wound was copiously irrigated with sterile saline and closed over a 1/4-inch Penrose drain. Interrupted 3-0 chromic was used to approximate the margins of the platysma and the subcutaneous soft tissues. A running 4-0 plain gut suture was used anteriorly and posteriorly relative to the Penrose drain. The drain was secured with a 4-0 nylon suture. Telfa and 4 x 4's, as well as Spandage provided a sterile dressing. The patient was aroused from general anesthesia, extubated in the operating room and transferred to the recovery area in satisfactory condition. ESTIMATED BLOOD LOSS:  20 mL    COMPLICATIONS:  None apparent. SPECIMENS:  Right branchial cleft cyst.    ANESTHESIA:  General endotracheal tube anesthesia.       Meredith Jon MD       [facility] /   D: 01/12/2018 10:08     T: 01/12/2018 10:38  JOB #: [id number]  CC: Rose Choi MD

## 2018-01-12 NOTE — IP AVS SNAPSHOT
[address] 
[phone] Patient: Smiley Shepard MRN: [id number] :1934 About your hospitalization You were admitted on:  2018 You last received care in the:  [facility] PACU You were discharged on:  2018 Why you were hospitalized Your primary diagnosis was:  Not on File Follow-up Information Follow up With Details Comments Contact Info MD Blanca Eduardo [address] 
[phone] Discharge Orders None A check avril indicates which time of day the medication should be taken. My Medications CONTINUE taking these medications Instructions Each Dose to Equal  
 Morning Noon Evening Bedtime  
 allopurinol 100 mg tablet Commonly known as:  Paula Montgomery Your last dose was: Your next dose is: Take 100 mg by mouth daily. 100 mg  
    
   
   
   
  
 amLODIPine 10 mg tablet Commonly known as:  Luis Antonio Gutierrez Your last dose was: Your next dose is: Take  by mouth daily. BYSTOLIC 5 mg tablet Generic drug:  nebivolol Your last dose was: Your next dose is: TAKE ONE TABLET BY MOUTH DAILY Cetirizine 10 mg Cap Your last dose was: Your next dose is: Take 1 Tab by mouth daily. 1 Tab  
    
   
   
   
  
 cycloSPORINE 0.05 % ophthalmic emulsion Commonly known as:  RESTASIS Your last dose was: Your next dose is:    
   
   
 Administer 2 Drops to both eyes daily. 2 Drop  
    
   
   
   
  
 doxycycline 100 mg capsule Commonly known as:  VIBRAMYCIN Your last dose was: Your next dose is: Take 100 mg by mouth daily. 100 mg FISH OIL PO Your last dose was: Your next dose is: Take  by mouth. PROSCAR 5 mg tablet Generic drug:  finasteride Your last dose was: Your next dose is: Take 5 mg by mouth daily. 5 mg RAPAFLO 8 mg capsule Generic drug:  silodosin Your last dose was: Your next dose is: Take 8 mg by mouth daily (with breakfast). 8 mg  
    
   
   
   
  
 valsartan-hydroCHLOROthiazide 320-12.5 mg per tablet Commonly known as:  DIOVAN-HCT Your last dose was: Your next dose is: TAKE ONE TABLET BY MOUTH DAILY ASK your doctor about these medications Instructions Each Dose to Equal  
 Morning Noon Evening Bedtime  
 aspirin 325 mg tablet Commonly known as:  ASPIRIN Your last dose was: Your next dose is: Take 325 mg by mouth daily. 325 mg  
    
   
   
   
  
 clopidogrel 75 mg Tab Commonly known as:  PLAVIX Your last dose was: Your next dose is: TAKE ONE TABLET BY MOUTH DAILY  
     
   
   
   
  
 diclofenac EC 75 mg EC tablet Commonly known as:  VOLTAREN Your last dose was: Your next dose is: Take 75 mg by mouth two (2) times a day. 75 mg Discharge Instructions [facility], AXEL Garcia. Makayla Woodward M.D., Ph.D., F.A.C.S. Otolaryngology [address] [place] [address]  
[phone] Neck Surgery Discharge Instructions: 
Patient/family to change gauze dressing 2-3 times/day. Clean any crusts around drain site with peroxide and q-tips. Replace dressing at drain site as needed. Prescriptions for Norco and Keflex have been filled prior to surgery. Take antibiotic as prescribed. Take pain medication as needed. Call Dr. Makayal Woodward' office for any questions. May resume Diclofenac in 3 days, resume Aspirin and Plavix in 5 days per . DO NOT TAKE TYLENOL/ACETAMINOPHEN WITH PERCOCET, LORTAB, [address] TAKE NARCOTIC PAIN MEDICATIONS WITH FOOD If given 2 pain narcotics do NOT take together! Narcotics tend to be constipating, we suggest taking a stool softener such as Colace or Miralax (follow package instructions). DO NOT DRIVE WHILE TAKING NARCOTIC PAIN MEDICATIONS. DO NOT TAKE SLEEPING MEDICATIONS OR ANTIANXIETY MEDICATIONS WHILE TAKING NARCOTIC PAIN MEDICATIONS,  ESPECIALLY THE NIGHT OF ANESTHESIA. CPAP PATIENTS BE SURE TO WEAR MACHINE WHENEVER NAPPING OR SLEEPING. DISCHARGE SUMMARY from Nurse The following personal items collected during your admission are returned to you:  
Dental Appliance: Dental Appliances: None Vision: Visual Aid: Glasses (in coat) Hearing Aid:   
Jewelry:   
Clothing: Clothing:  (clothing and glasses under stretcher) Other Valuables:   
Valuables sent to safe:   
 
 
PATIENT INSTRUCTIONS: 
 
 
B - Balance E - Eyes F-  Face looks uneven A-  Arms unable to move or move even S-  Speech slurred or non-existent T-  Time-call 911 as soon as signs and symptoms begin-DO NOT go Back to bed or wait to see if you get better-TIME IS BRAIN. If you have not received your influenza and/or pneumococcal vaccine, please follow up with your primary care physician. The discharge information has been reviewed with the patient and caregiver. The patient and caregiver verbalized understanding. Too Combs THROMBOSIS AND PULMONARY EMBOLUS 
 
SURGICAL PATIENTS Surgical patients are the #1 risk for DVT and PE. WHAT IS DVT? WHAT IS PE? 
DVT is a serious condition where blood clots develop deep in the veins of the legs. PE occurs when a blood clot breaks loose from the wall of a vein and travels to the lungs blocking the pulmonary artery or one of its branches impairing blood flow from the heart, which could result in death. RISK FACTORS 
? Surgery lasting longer than 45 minutes ? History of inflammatory bowel disease 
? Oral contraceptive or hormone replacement therapy ? Immobilization ? Varicose veins / swollen legs ? Smoking  
? CHF / Acute MI / Irregular heart beat ? Family history of thrombosis ? General anesthesia greater than 2 hours ? Obesity ? Infection of less than one month ? Less than 1 month postpartum ? COPD / Pneumonia ? Arthroscopic surgery ? Malignancy / cancer ? Spine surgery ? Blood abnormalities ? Stroke / Paralysis / Coma SIGNS AND SYMPTOMS OF DEEP VEIN TROMBOSIS Usually occurs in one leg, above or below the knee ? Swelling  one calf or thigh may be larger than the other ? Feeling increased warmth in the area of the leg that is swollen or painful ? Leg pain, which may increase when standing or walking ? Swelling along the vein of the leg ? When swollen areas is pressed with a finger, a depression may remain ? Tenderness of the leg that may be confined to one area ? Change in leg color (bluish or red) SIGNS AND SYMPTOMS OF PULMONARY EMBOLUS 
? Chest pain that gets worse with deep breath, coughing or chest movement ? Coughing up blood ? Sweating ? Shortness of breath or difficulty breathing ? Rapid heart beat ? Lightheadedness HOW TO REDUCE THE POSSIBLE RISK OF DVT ? Exercise  simple activities as rotating ankles and wrists, wiggling toes and fingers, tightening and relaxing muscles in calves and thighs promotes circulation while recovering from surgery. Please do these exercises every hour during waking hours ? ROSSANA hose  If you have been given white support hose while having surgery, wear them home. You may remove them for half an hour every 8-hour period and stop wearing them 48 hours after surgery or as prescribed by your physician. ROSSANA hose may be reused for air travel or extended car travel ? Take mediation as prescribed by your physician (Lovenox, Coumadin, Aspirin) ? Resume your normal activities as soon as your doctor advises you to do so. 
? Remember, when traveling, to [place] your legs frequently. Wear non skid shoes when ROSSANA hose are on. They are very slippery! PATIENTS WHO BELIEVE THEY MAY BE EXPERIENCING SIGNS AND SYMPTOMS OF DVT OR PE SHOULD SEEK MEDICAL HELP IMMEDIATELY 
 
 
 
  
 
 
 
 
 
 
ACO Transitions of Care Introducing [facility] [address] offers a voluntary care coordination program to provide high quality service and care to [facility] fee-for-service beneficiaries. Syl Bah was designed to help you enhance your health and well-being through the following services: ? Transitions of Care  support for individuals who are transitioning from one care setting to another (example: Hospital to home). ? Chronic and Complex Care Coordination  support for individuals and caregivers of those with serious or chronic illnesses or with more than one chronic (ongoing) condition and those who take a number of different medications. If you meet specific medical criteria, a [address] [place] [address] Rd may call you directly to coordinate your care with your primary care physician and your other care providers. For questions about the [facility] programs, please, contact your physicians office. For general questions or additional information about Accountable Care Organizations: 
Please visit www.medicare.gov/acos. html or call 1-800-MEDICARE (0-165.642.8570) TTY users should call 5-538.864.3640. Introducing [facility] & HEALTH SERVICES! [employer] introduces [facility] patient portal. Now you can access parts of your medical record, email your doctor's office, and request medication refills online. 1. In your internet browser, go to [email or web address] 2. Click on the First Time User? Click Here link in the Sign In box. You will see the New Member Sign Up page. 3. Enter your [facility] Access Code exactly as it appears below. You will not need to use this code after youve completed the sign-up process. If you do not sign up before the expiration date, you must request a new code. · [facility] Access Code: [email or web address] Expires: 3/20/2018  8:53 AM 
 
 4. Enter the last four digits of your Social Security Number (xxxx) and Date of Birth (mm/dd/yyyy) as indicated and click Submit. You will be taken to the next sign-up page. 5. Create a Bevvy ID. This will be your Bev[employer] login ID and cannot be changed, so think of one that is secure and easy to remember. 6. Create a Bevvy password. You can change your password at any time. 7. Enter your Password Reset Question and Answer. This can be used at a later time if you forget your password. 8. Enter your e-mail address. You will receive e-mail notification when new information is available in [address] 9. Click Sign Up. You can now view and download portions of your medical record. 10. Click the Download Summary menu link to download a portable copy of your medical information. If you have questions, please visit the Frequently Asked Questions section of the [employer] website. Remember, [employer] is NOT to be used for urgent needs. For medical emergencies, dial 911. Now available from your iPhone and Android! Providers Seen During Your Hospitalization Provider Specialty Primary office phone Colby Walker MD Otolaryngology [phone] Your Primary Care Physician (PCP) Primary Care Physician Office Phone Office Fax Kristina Good [phone] You are allergic to the following Allergen Reactions Sulfanilamide Rash  
    
 Sulfa (Sulfonamide Antibiotics) Rash Very mild rash several years ago Recent Documentation Height Weight BMI Smoking Status 1.88 m 98.5 kg 27.88 kg/m2 Never Smoker Emergency Contacts Name Discharge Info Relation Home Work Mobile [address] CAREGIVER [3] Spouse [3] [phone] Patient Belongings  The following personal items are in your possession at time of discharge: 
  Dental Appliances: None  Visual Aid: Glasses (in coat)   Hearing Aids/Status: Does not own         Clothing:  (clothing and glasses under stretcher) Discharge Instructions Attachments/References SCOPOLAMINE (ABSORBED THROUGH THE SKIN) (ENGLISH) Patient Handouts Scopolamine (Absorbed through the skin) Scopolamine ([id number]) Treat nausea and vomiting. Brand Name(s): Transderm Scop There may be other brand names for this medicine. When This Medicine Should Not Be Used: You should not use this medicine if you have had an allergic reaction to scopolamine, or if you have narrow angle glaucoma. How to Use This Medicine:  
Patch · Your doctor will tell you how many patches to use, where to apply them, and how often to apply them. Do not use more patches or apply them more often than your doctor tells you to. · Read and follow the patient instructions that come with this medicine. Talk to your doctor or pharmacist if you have any questions. · To prevent motion sickness, apply the patch at least 4 hours before you need it. · Wash and dry your hands thoroughly before applying the patch. · Leave the patch in its sealed wrapper until you are ready to put it on. Tear the wrapper open carefully. NEVER CUT the wrapper or the patch with scissors. Do not use any patch that has been cut by accident. · Take the liner off the sticky side before applying. · Apply the patch to dry, hairless skin behind the ear. · If the patch is loose or falls off, apply a new patch at a different place behind the ear. · After you take off the patch, wash the place where the patch was and your hands thoroughly. · Only one patch should be used at any time. If a dose is missed: · If you forget to wear or change a patch, put one on as soon as you can. If it is almost time to put on your next patch, wait until then to apply a new patch and skip the one you missed. Do not apply extra patches to make up for a missed dose. How to Store and Dispose of This Medicine: · Store the patches at room temperature in a closed container, away from heat, moisture, and direct light. · Fold the used patch in half with the sticky sides together. Throw any used patch away so that children or pets cannot get to it. You will also need to throw away old patches after the expiration date has passed. · Keep all medicine out of the reach of children. Never share your medicine with anyone. Drugs and Foods to Avoid: Ask your doctor or pharmacist before using any other medicine, including over-the-counter medicines, vitamins, and herbal products. · Tell your doctor if you use anything else that makes you sleepy. Some examples are allergy medicine, narcotic pain medicine, and alcohol. · Do not drink alcohol while you are using this medicine. Warnings While Using This Medicine: · Make sure your doctor knows if you are pregnant or breastfeeding, or if you have glaucoma, prostate problems, trouble urinating, blocked bowels, liver disease, kidney disease, or a history of seizures or mental illness. · This medicine can cause blurring of vision and other vision problems if it comes in contact with the eyes. This medicine may also cause problems with urination. If any of these reactions occur, remove the patch and call your doctor right away. · This medicine may make you dizzy or drowsy. Avoid driving, using machines, or doing anything else that could be dangerous if you are not alert. If you plan to participate in underwater sports, this medicine may cause disorienting effects. If this is a concern for you, talk with your doctor. · This medicine may make you sweat less and cause your body to get too hot. Be careful in hot weather, when you are exercising, or if using a sauna or whirlpool. · Tell any doctor or dentist who treats you that you are using this medicine. This medicine may affect certain medical test results. · Skin burns have been reported at the patch site in several patients wearing an aluminized transdermal system during a magnetic resonance imaging scan (MRI). Because Transderm Sc?p® contains aluminum, it is recommended to remove the system before undergoing an MRI. Possible Side Effects While Using This Medicine:  
Call your doctor right away if you notice any of these side effects: · Allergic reaction: Itching or hives, swelling in your face or hands, swelling or tingling in your mouth or throat, chest tightness, trouble breathing · Blurred vision. · Confusion or memory loss. · Fast, slow, or uneven heartbeat. · Lightheadedness, dizziness, drowsiness, or fainting. · Seeing, hearing, or feeling things that are not there. · Severe eye pain. · Trouble urinating. If you notice these less serious side effects, talk with your doctor: · Dry mouth. · Dry, itchy, or red eyes. · Restlessness. · Skin rash or redness. If you notice other side effects that you think are caused by this medicine, tell your doctor. Call your doctor for medical advice about side effects. You may report side effects to FDA at 0-929-FDA-8663 © 2017 [address] Information is for End User's use only and may not be sold, redistributed or otherwise used for commercial purposes. The above information is an  only. It is not intended as medical advice for individual conditions or treatments. Talk to your doctor, nurse or pharmacist before following any medical regimen to see if it is safe and effective for you. Please provide this summary of care documentation to your next provider. Signatures-by signing, you are acknowledging that this After Visit Summary has been reviewed with you and you have received a copy. Patient Signature:  ____________________________________________________________ Date:  ____________________________________________________________ `    
    
 Provider Signature:  ____________________________________________________________ Date:  ____________________________________________________________

## 2018-01-12 NOTE — PERIOP NOTES
Reviewed discharge instructions w/pts. Wife. She verbalized understanding. Vss. Denies pain. Dressing to right side of neck intact. Educated wife on how to change dressing. Had pts. Wife demonstrate changing the gauze. She did so without difficulty. Extra gauze given to pts. Wife. Pt. W/flat affect. Alert and oriented x4. Assisted to bathroom. Encouraged pt. To try to urinate before discharge. Pt. In bathroom, states does not have the urge to urinate at this time. Encouraged pt. To drink plenty of fluids today. Discussed w/wife that pt. Was unable to urinate at this time. Family instructed that if pt. Has not voided within 8 hours then to call Dr. Leonid Haque. Dr. Leonid Haque notified after pt. Discharge that pt. Was unable to void at this time. Pt discharged via wheelchair, accompanied by RN. Pt discharged awake and alert, respirations equal and unlabored, skin warm, dry, and intact. Pt and family members' questions and concerns addressed prior to discharge.

## 2018-01-12 NOTE — BRIEF OP NOTE
BRIEF OPERATIVE NOTE    Date of Procedure: 1/12/2018   Preoperative Diagnosis: RIGHT NECK MASS  Postoperative Diagnosis: RIGHT NECK MASS    Procedure(s):  EXCISION RIGHT BRANCHIAL CLEFT CYST  Surgeon(s) and Role:     * Dara De La Cruz MD - Primary         Assistant Staff:       Surgical Staff:  Circ-1: Nataly Martinez RN  Scrub Tech-1: Silas Almanzar.  [facility]  Surg Asst-1: Sen Bradshaw RN  Event Time In   Incision Start 0915   Incision Close 5546     Anesthesia: General   Estimated Blood Loss: 20 ml  Specimens:   ID Type Source Tests Collected by Time Destination   1 : Right Branchial Cleft Cyst Preservative   Dara De La Cruz MD 1/12/2018 4464 Pathology      Findings: as expected   Complications: none apparent  Implants: * No implants in log * Internal Medicine

## 2018-01-12 NOTE — PERIOP NOTES
[facility]  1934  [id number]    Situation:  Verbal report given from: Nguyen Figueroa CRNA, Steph Heck, RN  Procedure: Procedure(s):  EXCISION RIGHT NECK MASS    Background:    Preoperative diagnosis: RIGHT NECK MASS    Postoperative diagnosis: RIGHT NECK MASS    :  Dr. Cindy Stephens    Assistant(s): Circ-1: Sol Stoddard RN  Scrub Tech-1: Cleopatra Clarke.  [place]  Surg Asst-1: Dash Jaimes RN    Specimens:   ID Type Source Tests Collected by Time Destination   1 : Right Bracnhial Cleft Cyst Preservative   Yesica Beasley MD 1/12/2018 9235 Pathology       Assessment:  Intra-procedure medications         Anesthesia gave intra-procedure sedation and medications, see anesthesia flow sheet     Intravenous fluids: LR@ KVO     Vital signs stable       Recommendation:    Permission to share finding with family or friend yes

## 2018-01-12 NOTE — ANESTHESIA POSTPROCEDURE EVALUATION
Post-Anesthesia Evaluation and Assessment    Patient: John Michele MRN: [id number]  SSN: [id number]    YOB: 1934  Age: 80 y.o. Sex: male       Cardiovascular Function/Vital Signs  Visit Vitals    /60    Pulse 80    Temp 36.4 °C (97.6 °F)    Resp 13    Ht 6' 2\" (1.88 m)    Wt 98.5 kg (217 lb 2 oz)    SpO2 96%    BMI 27.88 kg/m2       Patient is status post general anesthesia for Procedure(s):  EXCISION RIGHT NECK MASS. Nausea/Vomiting: None    Postoperative hydration reviewed and adequate. Pain:  Pain Scale 1: Numeric (0 - 10) (01/12/18 1032)  Pain Intensity 1: 0 (01/12/18 1032)   Managed    Neurological Status:   Neuro (WDL): Exceptions to WDL (01/12/18 1032)  Neuro  Neurologic State: Drowsy; Eyes open spontaneously (01/12/18 1032)   At baseline    Mental Status and Level of Consciousness: Arousable    Pulmonary Status:   O2 Device: Room air (01/12/18 1032)   Adequate oxygenation and airway patent    Complications related to anesthesia: None    Post-anesthesia assessment completed.  No concerns    Signed By: Matt Zamora MD     January 12, 2018

## 2018-01-12 NOTE — ANESTHESIA PREPROCEDURE EVALUATION
Anesthetic History     PONV          Review of Systems / Medical History  Patient summary reviewed, nursing notes reviewed and pertinent labs reviewed    Pulmonary  Within defined limits                 Neuro/Psych       CVA  TIA     Cardiovascular    Hypertension        Dysrhythmias (Pacs)       Exercise tolerance: >4 METS     GI/Hepatic/Renal  Within defined limits              Endo/Other        Arthritis and cancer (skin cancer , prostate ca)     Other Findings   Comments: Meniere disease      Testosterone deficiency           Physical Exam    Airway  Mallampati: I  TM Distance: 4 - 6 cm  Neck ROM: normal range of motion   Mouth opening: Normal     Cardiovascular  Regular rate and rhythm,  S1 and S2 normal,  no murmur, click, rub, or gallop             Dental  No notable dental hx       Pulmonary  Breath sounds clear to auscultation               Abdominal  GI exam deferred       Other Findings            Anesthetic Plan    ASA: 3  Anesthesia type: general            Anesthetic plan and risks discussed with: Patient

## 2018-01-12 NOTE — DISCHARGE INSTRUCTIONS
[facility], [facility]. Jarrod Pakr M.D., Ph.D., F.A.C.S. [facility]   [place], [address] [facility]   [phone]       Neck Surgery Discharge Instructions:  Patient/family to change gauze dressing 2-3 times/day. Clean any crusts around drain site with peroxide and q-tips. Replace dressing at drain site as needed. Prescriptions for Norco and Keflex have been filled prior to surgery. Take antibiotic as prescribed. Take pain medication as needed. Call Dr. Jarrod Park' office for any questions. May resume Diclofenac in 3 days, resume Aspirin and Plavix in 5 days per . DO NOT TAKE TYLENOL/ACETAMINOPHEN WITH PERCOCET, LORTAB, [address] TAKE NARCOTIC PAIN MEDICATIONS WITH FOOD     If given 2 pain narcotics do NOT take together! Narcotics tend to be constipating, we suggest taking a stool softener such as Colace or Miralax (follow package instructions). DO NOT DRIVE WHILE TAKING NARCOTIC PAIN MEDICATIONS. DO NOT TAKE SLEEPING MEDICATIONS OR ANTIANXIETY MEDICATIONS WHILE TAKING NARCOTIC PAIN MEDICATIONS,  ESPECIALLY THE NIGHT OF ANESTHESIA. CPAP PATIENTS BE SURE TO WEAR MACHINE WHENEVER NAPPING OR SLEEPING. DISCHARGE SUMMARY from Nurse    The following personal items collected during your admission are returned to you:   Dental Appliance: Dental Appliances: None  Vision: Visual Aid: Glasses (in coat)  Hearing Aid:    Jewelry:    Clothing: Clothing:  (clothing and glasses under stretcher)  Other Valuables:    Valuables sent to safe:        PATIENT INSTRUCTIONS:    After General Anesthesia or Intravenous Sedation, for 24 hours or while taking prescription Narcotics:        Someone should be with you for the next 24 hours. For your own safety, a responsible adult must drive you home. · Limit your activities  · Recommended activity: Rest today, up with assistance today.  Do not climb stairs or shower unattended for the next 24 hours. · Please start with a soft bland diet and advance as tolerated (no nausea) to regular diet. · If you have a sore throat you should try the following: fluids, warm salt water gargles, or throat lozenges. If it does not improve after several days please follow up with your primary physician. · Do not drive and operate hazardous machinery  · Do not make important personal or business decisions  · Do  not drink alcoholic beverages  · If you have not urinated within 8 hours after discharge, please contact your surgeon on call. Report the following to your surgeon:  · Excessive pain, swelling, redness or odor of or around the surgical area  · Temperature over 100.5  · Nausea and vomiting lasting longer than 4 hours or if unable to take medications  · Any signs of decreased circulation or nerve impairment to extremity: change in color, persistent  numbness, tingling, coldness or increase pain      · You will receive a Post Operative Call from one of the Recovery Room Nurses on the day after your surgery to check on you. It is very important for us to know how you are recovering after your surgery. If you have an issue or need to speak with someone, please call your surgeon, do not wait for the post operative call. · You may receive an e-mail or letter in the mail from [place] regarding your experience with us in the Ambulatory Surgery Unit. Your feedback is valuable to us and we appreciate your participation in the survey. · If the above instructions are not adequate, please contact Sarah Camacho RN, [facility] anesthesia Nurse Manager or our Anesthesiologist, at [phone]. If you are having problems after your surgery, call the physician at his office number. · We wish you a speedy recovery ? What to do at Home:      *  Please give a list of your current medications to your Primary Care Provider.     *  Please update this list whenever your medications are discontinued, doses are      changed, or new medications (including over-the-counter products) are added. *  Please carry medication information at all times in case of emergency situations. These are general instructions for a healthy lifestyle:    No smoking/ No tobacco products/ Avoid exposure to second hand smoke    Surgeon General's Warning:  Quitting smoking now greatly reduces serious risk to your health. Obesity, smoking, and sedentary lifestyle greatly increases your risk for illness    A healthy diet, regular physical exercise & weight monitoring are important for maintaining a healthy lifestyle    You may be retaining fluid if you have a history of heart failure or if you experience any of the following symptoms:  Weight gain of 3 pounds or more overnight or 5 pounds in a week, increased swelling in our hands or feet or shortness of breath while lying flat in bed. Please call your doctor as soon as you notice any of these symptoms; do not wait until your next office visit. Recognize signs and symptoms of STROKE:    B - Balance  E - Eyes    F-  Face looks uneven    A-  Arms unable to move or move even    S-  Speech slurred or non-existent    T-  Time-call 911 as soon as signs and symptoms begin-DO NOT go       Back to bed or wait to see if you get better-TIME IS BRAIN. If you have not received your influenza and/or pneumococcal vaccine, please follow up with your primary care physician. The discharge information has been reviewed with the patient and caregiver. The patient and caregiver verbalized understanding. Too Juniorian THROMBOSIS AND PULMONARY EMBOLUS    SURGICAL PATIENTS  Surgical patients are the #1 risk for DVT and PE. WHAT IS DVT? WHAT IS PE?  DVT is a serious condition where blood clots develop deep in the veins of the legs.   PE occurs when a blood clot breaks loose from the wall of a vein and travels to the lungs blocking the pulmonary artery or one of its branches impairing blood flow from the heart, which could result in death. RISK FACTORS   Surgery lasting longer than 45 minutes   History of inflammatory bowel disease   Oral contraceptive or hormone replacement therapy   Immobilization   Varicose veins / swollen legs   Smoking    CHF / Acute MI / Irregular heart beat   Family history of thrombosis   General anesthesia greater than 2 hours   Obesity   Infection of less than one month   Less than 1 month postpartum   COPD / Pneumonia   Arthroscopic surgery   Malignancy / cancer   Spine surgery   Blood abnormalities   Stroke / Paralysis / Coma    SIGNS AND SYMPTOMS OF DEEP VEIN TROMBOSIS  Usually occurs in one leg, above or below the knee   Swelling - one calf or thigh may be larger than the other   Feeling increased warmth in the area of the leg that is swollen or painful   Leg pain, which may increase when standing or walking   Swelling along the vein of the leg   When swollen areas is pressed with a finger, a depression may remain   Tenderness of the leg that may be confined to one area   Change in leg color (bluish or red)    SIGNS AND SYMPTOMS OF PULMONARY EMBOLUS   Chest pain that gets worse with deep breath, coughing or chest movement   Coughing up blood   Sweating   Shortness of breath or difficulty breathing   Rapid heart beat   Lightheadedness    HOW TO REDUCE THE POSSIBLE RISK OF DVT   Exercise - simple activities as rotating ankles and wrists, wiggling toes and fingers, tightening and relaxing muscles in calves and thighs promotes circulation while recovering from surgery. Please do these exercises every hour during waking hours   ROSSANA hose - If you have been given white support hose while having surgery, wear them home. You may remove them for half an hour every 8-hour period and stop wearing them 48 hours after surgery or as prescribed by your physician.  ROSSANA hose may be reused for air travel or extended car travel   Take mediation as prescribed by your physician (Lovenox, Coumadin, Aspirin)   Resume your normal activities as soon as your doctor advises you to do so.  Remember, when traveling, to [place] your legs frequently. Wear non skid shoes when ROSSANA hose are on. They are very slippery!     PATIENTS WHO BELIEVE THEY MAY BE EXPERIENCING SIGNS AND SYMPTOMS OF DVT OR PE SHOULD SEEK MEDICAL HELP IMMEDIATELY

## 2018-02-07 RX ORDER — ALLOPURINOL 100 MG/1
TABLET ORAL
Qty: 30 TAB | Refills: 11 | Status: SHIPPED | OUTPATIENT
Start: 2018-02-07 | End: 2018-03-09 | Stop reason: SDUPTHER

## 2018-02-07 NOTE — TELEPHONE ENCOUNTER
Requested Prescriptions     Pending Prescriptions Disp Refills    allopurinol (ZYLOPRIM) 100 mg tablet [Pharmacy Med Name: ALLOPURINOL 100 MG TABLET] 30 Tab 11     Sig: TAKE ONE TABLET BY MOUTH DAILY

## 2018-03-08 DIAGNOSIS — I10 HYPERTENSION, UNSPECIFIED TYPE: ICD-10-CM

## 2018-03-09 DIAGNOSIS — Z86.73 HISTORY OF CVA (CEREBROVASCULAR ACCIDENT): ICD-10-CM

## 2018-03-09 DIAGNOSIS — I10 HYPERTENSION, UNSPECIFIED TYPE: ICD-10-CM

## 2018-03-09 RX ORDER — DICLOFENAC SODIUM 75 MG/1
75 TABLET, DELAYED RELEASE ORAL 2 TIMES DAILY
Qty: 180 TAB | Refills: 3 | Status: SHIPPED | OUTPATIENT
Start: 2018-03-09 | End: 2019-01-22

## 2018-03-09 RX ORDER — ALLOPURINOL 100 MG/1
TABLET ORAL
Qty: 30 TAB | Refills: 11 | Status: SHIPPED | OUTPATIENT
Start: 2018-03-09 | End: 2019-04-06 | Stop reason: SDUPTHER

## 2018-03-09 RX ORDER — DOXYCYCLINE 100 MG/1
100 CAPSULE ORAL DAILY
Qty: 90 CAP | Refills: 3 | Status: SHIPPED | OUTPATIENT
Start: 2018-03-09 | End: 2019-01-22

## 2018-03-09 RX ORDER — CYCLOSPORINE 0.5 MG/ML
2 EMULSION OPHTHALMIC DAILY
Qty: 3 EACH | Refills: 3 | Status: SHIPPED | OUTPATIENT
Start: 2018-03-09 | End: 2019-06-28 | Stop reason: SDUPTHER

## 2018-03-09 RX ORDER — VALSARTAN AND HYDROCHLOROTHIAZIDE 320; 12.5 MG/1; MG/1
TABLET, FILM COATED ORAL
Qty: 30 TAB | Refills: 11 | Status: SHIPPED | OUTPATIENT
Start: 2018-03-09 | End: 2018-08-10 | Stop reason: ALTCHOICE

## 2018-03-09 RX ORDER — NEBIVOLOL 5 MG/1
TABLET ORAL
Qty: 90 TAB | Refills: 3 | Status: SHIPPED | OUTPATIENT
Start: 2018-03-09 | End: 2019-05-07 | Stop reason: SDUPTHER

## 2018-03-09 RX ORDER — CLOPIDOGREL BISULFATE 75 MG/1
TABLET ORAL
Qty: 90 TAB | Refills: 3 | Status: SHIPPED | OUTPATIENT
Start: 2018-03-09 | End: 2019-01-22

## 2018-03-09 RX ORDER — AMLODIPINE BESYLATE 10 MG/1
10 TABLET ORAL DAILY
Qty: 90 TAB | Refills: 3 | Status: SHIPPED | OUTPATIENT
Start: 2018-03-09 | End: 2018-04-19 | Stop reason: SDUPTHER

## 2018-03-09 NOTE — TELEPHONE ENCOUNTER
Requested Prescriptions     Pending Prescriptions Disp Refills    cycloSPORINE (RESTASIS) 0.05 % ophthalmic emulsion 3 Each 3     Sig: Administer 2 Drops to both eyes daily.  valsartan-hydroCHLOROthiazide (DIOVAN-HCT) 320-12.5 mg per tablet 30 Tab 11     Sig: TAKE ONE TABLET BY MOUTH DAILY    doxycycline (VIBRAMYCIN) 100 mg capsule 90 Cap 3     Sig: Take 1 Cap by mouth daily.  nebivolol (BYSTOLIC) 5 mg tablet 90 Tab 3     Sig: TAKE ONE TABLET BY MOUTH DAILY    amLODIPine (NORVASC) 10 mg tablet 90 Tab 3     Sig: Take 1 Tab by mouth daily.  diclofenac EC (VOLTAREN) 75 mg EC tablet 180 Tab 3     Sig: Take 1 Tab by mouth two (2) times a day.     clopidogrel (PLAVIX) 75 mg tab 90 Tab 3     Sig: TAKE ONE TABLET BY MOUTH DAILY    allopurinol (ZYLOPRIM) 100 mg tablet 30 Tab 11     Sig: TAKE ONE TABLET BY MOUTH DAILY

## 2018-04-18 PROBLEM — J30.89 NON-SEASONAL ALLERGIC RHINITIS: Status: ACTIVE | Noted: 2017-08-19

## 2018-04-19 ENCOUNTER — OFFICE VISIT (OUTPATIENT)
Dept: INTERNAL MEDICINE CLINIC | Age: 83
End: 2018-04-19

## 2018-04-19 VITALS
RESPIRATION RATE: 16 BRPM | DIASTOLIC BLOOD PRESSURE: 62 MMHG | SYSTOLIC BLOOD PRESSURE: 118 MMHG | BODY MASS INDEX: 26.95 KG/M2 | OXYGEN SATURATION: 97 % | HEIGHT: 74 IN | HEART RATE: 56 BPM | TEMPERATURE: 97.6 F | WEIGHT: 210 LBS

## 2018-04-19 DIAGNOSIS — C61 PROSTATE CA (HCC): ICD-10-CM

## 2018-04-19 DIAGNOSIS — M15.9 PRIMARY OSTEOARTHRITIS INVOLVING MULTIPLE JOINTS: ICD-10-CM

## 2018-04-19 DIAGNOSIS — M10.9 GOUT, UNSPECIFIED CAUSE, UNSPECIFIED CHRONICITY, UNSPECIFIED SITE: ICD-10-CM

## 2018-04-19 DIAGNOSIS — N18.30 CKD (CHRONIC KIDNEY DISEASE), STAGE III (HCC): ICD-10-CM

## 2018-04-19 DIAGNOSIS — I63.9 CEREBROVASCULAR ACCIDENT (CVA), UNSPECIFIED MECHANISM (HCC): ICD-10-CM

## 2018-04-19 DIAGNOSIS — I12.9 HYPERTENSION WITH RENAL DISEASE: Primary | ICD-10-CM

## 2018-04-19 DIAGNOSIS — E78.2 MIXED HYPERLIPIDEMIA: ICD-10-CM

## 2018-04-19 DIAGNOSIS — J30.89 NON-SEASONAL ALLERGIC RHINITIS DUE TO OTHER ALLERGIC TRIGGER: ICD-10-CM

## 2018-04-19 LAB
ALBUMIN SERPL-MCNC: 3.8 G/DL (ref 3.9–5.4)
ALKALINE PHOS POC: 86 U/L (ref 38–126)
ALT SERPL-CCNC: 75 U/L (ref 9–52)
AST SERPL-CCNC: 34 U/L (ref 14–36)
BUN BLD-MCNC: 25 MG/DL (ref 9–20)
CALCIUM BLD-MCNC: 9 MG/DL (ref 8.4–10.2)
CHLORIDE BLD-SCNC: 101 MMOL/L (ref 98–107)
CHOLEST SERPL-MCNC: 108 MG/DL (ref 0–200)
CK (CPK) POC: 100 U/L (ref 30–135)
CO2 POC: 28 MMOL/L (ref 22–32)
CREAT BLD-MCNC: 1 MG/DL (ref 0.8–1.5)
EGFR (POC): 69.3
GLUCOSE POC: 112 MG/DL (ref 75–110)
GRAN# POC: 5.1 K/UL (ref 2–7.8)
GRAN% POC: 71.7 % (ref 37–92)
HCT VFR BLD CALC: 46.5 % (ref 37–51)
HDLC SERPL-MCNC: 41 MG/DL (ref 35–130)
HGB BLD-MCNC: 15.5 G/DL (ref 12–18)
LDL CHOLESTEROL POC: 50.4 MG/DL (ref 0–130)
LY# POC: 1.5 K/UL (ref 0.6–4.1)
LY% POC: 21.6 % (ref 10–58.5)
MCH RBC QN: 33.4 PG (ref 26–32)
MCHC RBC-ENTMCNC: 33.3 G/DL (ref 30–36)
MCV RBC: 100 FL (ref 80–97)
MID #, POC: 0.4 K/UL (ref 0–1.8)
MID% POC: 6.7 % (ref 0.1–24)
PLATELET # BLD: 156 K/UL (ref 140–440)
POTASSIUM SERPL-SCNC: 4.6 MMOL/L (ref 3.6–5)
PROT SERPL-MCNC: 6.3 G/DL (ref 6.3–8.2)
RBC # BLD: 4.65 M/UL (ref 4.2–6.3)
SODIUM SERPL-SCNC: 138 MMOL/L (ref 137–145)
TCHOL/HDL RATIO (POC): 2.6 (ref 0–4)
TOTAL BILIRUBIN POC: 1.3 MG/DL (ref 0.2–1.3)
TRIGL SERPL-MCNC: 83 MG/DL (ref 0–200)
VLDLC SERPL CALC-MCNC: 16.6 MG/DL
WBC # BLD: 7 K/UL (ref 4.1–10.9)

## 2018-04-19 RX ORDER — AMLODIPINE BESYLATE 10 MG/1
5 TABLET ORAL DAILY
Qty: 90 TAB | Refills: 3
Start: 2018-04-19 | End: 2019-02-08 | Stop reason: SDUPTHER

## 2018-04-19 NOTE — ACP (ADVANCE CARE PLANNING)
Patient states he has a Medical Advance Directives at home and will bring it in to place a copy on his chart.

## 2018-04-19 NOTE — MR AVS SNAPSHOT
[address] [facility] [address] [address] [phone] Patient: Vinay Sanchez MRN: [id number] :1934 Visit Information Date & Time Provider Department Dept. Phone Encounter #  
 2018  8:20 AM Bravo Quesada MD [address] [place] [address] ASSOC[facility]ATES [phone] [id number] Follow-up Instructions Return in about 3 months (around 2018). Your Appointments 2018  8:20 AM  
FOLLOW UP 10 with [facility] ([facility]) Appt Note: [address] [id number] [address] [facility] [address] Upcoming Health Maintenance Date Due ZOSTER VACCINE AGE 60> 1994 GLAUCOMA SCREENING Q2Y 10/17/1999 DTaP/Tdap/Td series (1 - Tdap) 2013 MEDICARE YEARLY EXAM 2018 Allergies as of 2018  Review Complete On: 2018 By: Bravo Quesada MD  
  
 Severity Noted Reaction Type Reactions Sulfanilamide  2017    Rash  
 Sulfa (Sulfonamide Antibiotics) Low 2011   Systemic Rash Very mild rash several years ago Current Immunizations  Never Reviewed Name Date Influenza High Dose Vaccine PF 2017 Influenza Vaccine 2016, 2015 Pneumococcal Conjugate (PCV-13) 2015 Pneumococcal Vaccine (Unspecified Type) 10/13/2005 Td 2013 Not reviewed this visit You Were Diagnosed With   
  
 Codes Comments Hypertension with renal disease    -  Primary ICD-10-CM: I12.9 ICD-9-CM: 403.90 Mixed hyperlipidemia     ICD-10-CM: E78.2 ICD-9-CM: 272.2 Primary osteoarthritis involving multiple joints     ICD-10-CM: M15.0 ICD-9-CM: 715.09 Cerebrovascular accident (CVA), unspecified mechanism ([facility]     ICD-10-CM: I63.9 ICD-9-CM: 434.91   
 CKD (chronic kidney disease), stage III     ICD-10-CM: N18.3 ICD-9-CM: 585.3 Gout, unspecified cause, unspecified chronicity, unspecified site     ICD-10-CM: M10.9 ICD-9-CM: 274.9 Non-seasonal allergic rhinitis due to other allergic trigger     ICD-10-CM: J30.89 ICD-9-CM: 477.8 Prostate [place] [facility])     ICD-10-CM: Q55 ICD-9-CM: 141 Vitals BP Pulse Temp Resp Height(growth percentile) Weight(growth percentile)  
 118/62 (BP 1 Location: Left arm, BP Patient Position: Sitting) (!) 56 97.6 °F (36.4 °C) (Oral) 16 6' 2\" (1.88 m) 210 lb (95.3 kg) SpO2 BMI Smoking Status 97% 26.96 kg/m2 Never Smoker Vitals History BMI and BSA Data Body Mass Index Body Surface Area  
 26.96 kg/m 2 2.23 m 2 Preferred Pharmacy Pharmacy Name Phone [facility] [address] [facility] [phone] Your Updated Medication List  
  
   
This list is accurate as of 4/19/18  9:21 AM.  Always use your most recent med list.  
  
  
  
  
 allopurinol 100 mg tablet Commonly known as:  ZYLOPRIM  
TAKE ONE TABLET BY MOUTH DAILY  
  
 amLODIPine 10 mg tablet Commonly known as:  Skip Newcomer Take 0.5 Tabs by mouth daily. aspirin 325 mg tablet Commonly known as:  ASPIRIN Take 325 mg by mouth daily. Cetirizine 10 mg Cap Take 1 Tab by mouth daily. clopidogrel 75 mg Tab Commonly known as:  PLAVIX TAKE ONE TABLET BY MOUTH DAILY  
  
 cycloSPORINE 0.05 % ophthalmic emulsion Commonly known as:  RESTASIS Administer 2 Drops to both eyes daily. diclofenac EC 75 mg EC tablet Commonly known as:  VOLTAREN Take 1 Tab by mouth two (2) times a day. doxycycline 100 mg capsule Commonly known as:  VIBRAMYCIN Take 1 Cap by mouth daily. FISH OIL PO Take  by mouth. nebivolol 5 mg tablet Commonly known as:  BYSTOLIC  
TAKE ONE TABLET BY MOUTH DAILY  
  
 PROSCAR 5 mg tablet Generic drug:  finasteride Take 5 mg by mouth daily. RAPAFLO 8 mg capsule Generic drug:  silodosin Take 8 mg by mouth daily (with breakfast). valsartan-hydroCHLOROthiazide 320-12.5 mg per tablet Commonly known as:  DIOVAN-HCT  
TAKE ONE TABLET BY MOUTH DAILY We Performed the Following AMB POC CK (CPK) [20772 CPT(R)] AMB POC COMPLETE CBC,AUTOMATED ENTER [id number] CPT(R)] AMB POC COMPREHENSIVE METABOLIC PANEL [21112 CPT(R)] AMB POC LIPID PROFILE [80232 CPT(R)] Follow-up Instructions Return in about 3 months (around 7/19/2018). Patient Instructions Arthritis: Care Instructions Your Care Instructions Arthritis, also called osteoarthritis, is a breakdown of the cartilage that cushions your joints. When the cartilage wears down, your bones rub against each other. This causes pain and stiffness. Many people have some arthritis as they age. Arthritis most often affects the joints of the spine, hands, hips, knees, or feet. You can take simple measures to protect your joints, ease your pain, and help you stay active. Follow-up care is a key part of your treatment and safety. Be sure to make and go to all appointments, and call your doctor if you are having problems. It's also a good idea to know your test results and keep a list of the medicines you take. How can you care for yourself at home? · Stay at a healthy weight. Being overweight puts extra strain on your joints. · Talk to your doctor or physical therapist about exercises that will help ease joint pain. ¨ Stretch. You may enjoy gentle forms of yoga to help keep your joints and muscles flexible. ¨ Walk instead of jog. Other types of exercise that are less stressful on the joints include riding a bicycle, swimming, kary chi, or water exercise. ¨ Lift weights. Strong muscles help reduce stress on your joints.  Stronger thigh muscles, for example, take some of the stress off of the knees and hips. Learn the right way to lift weights so you do not make joint pain worse. · Take your medicines exactly as prescribed. Call your doctor if you think you are having a problem with your medicine. · Take pain medicines exactly as directed. ¨ If the doctor gave you a prescription medicine for pain, take it as prescribed. ¨ If you are not taking a prescription pain medicine, ask your doctor if you can take an over-the-counter medicine. · Use a cane, crutch, walker, or another device if you need help to get around. These can help rest your joints. You also can use other things to make life easier, such as a higher toilet seat and padded handles on kitchen utensils. · Do not sit in low chairs, which can make it hard to get up. · Put heat or cold on your sore joints as needed. Use whichever helps you most. You also can take turns with hot and cold packs. ¨ Apply heat 2 or 3 times a day for 20 to 30 minutes-using a heating pad, hot shower, or hot pack-to relieve pain and stiffness. ¨ Put ice or a cold pack on your sore joint for 10 to 20 minutes at a time. Put a thin cloth between the ice and your skin. When should you call for help? Call your doctor now or seek immediate medical care if: 
? · You have sudden swelling, warmth, or pain in any joint. ? · You have joint pain and a fever or rash. ? · You have such bad pain that you cannot use a joint. ? Watch closely for changes in your health, and be sure to contact your doctor if: 
? · You have mild joint symptoms that continue even with more than 6 weeks of care at home. ? · You have stomach pain or other problems with your medicine. Where can you learn more? Go to http://krishna-manpreet.info/. Enter C686 in the search box to learn more about \"Arthritis: Care Instructions. \" Current as of: October 31, 2016 Content Version: 11.4 © 7668-0419 Healthwise, Incorporated.  Care instructions adapted under license by [address] (which disclaims liability or warranty for this information). If you have questions about a medical condition or this instruction, always ask your healthcare professional. [address] any warranty or liability for your use of this information. Introducing [facility]! [employer] introduces [employer] patient portal. Now you can access parts of your medical record, email your doctor's office, and request medication refills online. 1. In your internet browser, go to [email or web address] 2. Click on the First Time User? Click Here link in the Sign In box. You will see the New Member Sign Up page. 3. Enter your [employer] Access Code exactly as it appears below. You will not need to use this code after youve completed the sign-up process. If you do not sign up before the expiration date, you must request a new code. · [employer] Access Code: [email or web address] Expires: 7/18/2018  8:07 AM 
 
4. Enter the last four digits of your Social Security Number (xxxx) and Date of Birth (mm/dd/yyyy) as indicated and click Submit. You will be taken to the next sign-up page. 5. Create a [employer] ID. This will be your [employer] login ID and cannot be changed, so think of one that is secure and easy to remember. 6. Create a [employer] password. You can change your password at any time. 7. Enter your Password Reset Question and Answer. This can be used at a later time if you forget your password. 8. Enter your e-mail address. You will receive e-mail notification when new information is available in [address] 9. Click Sign Up. You can now view and download portions of your medical record. 10. Click the Download Summary menu link to download a portable copy of your medical information. If you have questions, please visit the Frequently Asked Questions section of the [employer] website.  Remember, [employer] is NOT to be used for urgent needs. For medical emergencies, dial 911. Now available from your iPhone and Android! Please provide this summary of care documentation to your next provider. Your primary care clinician is listed as Kervin Sims. If you have any questions after today's visit, please call [phone].

## 2018-04-19 NOTE — PROGRESS NOTES
Chief Complaint   Patient presents with    Hypertension     3 mo. f/u    Chronic Kidney Disease    Cholesterol Problem       SUBJECTIVE:    Jonah Yin is a 80 y.o. male who returns in follow-up of his medical problems include hypertension, hyperlipidemia, prior CVA, history of PACs, allergic rhinitis, chronic kidney disease stage III, and prostate cancer status post treatment. He is intermittently having some periods of dizziness which she has had before but these have become more frequent over the past 2 weeks. Also of note is been having significant problems with hearing has seen ENT Dr. mcnally who is heading on tapering course of prednisone which has been off the past 3 days. During that time he was not sleeping well. He denies any change in bowel habits or any other GI or  complaints. He denies any chest pain, shortness of breath, palpitations or cardiorespiratory complaints. He denies any headaches or other neurologic complaints except intermittent dizzy spells. He denies any current arthritic complaints and then no other complaints on complete review of systems other than those noted with a hearing problem. Current Outpatient Prescriptions   Medication Sig Dispense Refill    amLODIPine (NORVASC) 10 mg tablet Take 0.5 Tabs by mouth daily. 90 Tab 3    cycloSPORINE (RESTASIS) 0.05 % ophthalmic emulsion Administer 2 Drops to both eyes daily. 3 Each 3    valsartan-hydroCHLOROthiazide (DIOVAN-HCT) 320-12.5 mg per tablet TAKE ONE TABLET BY MOUTH DAILY 30 Tab 11    doxycycline (VIBRAMYCIN) 100 mg capsule Take 1 Cap by mouth daily. 90 Cap 3    nebivolol (BYSTOLIC) 5 mg tablet TAKE ONE TABLET BY MOUTH DAILY 90 Tab 3    diclofenac EC (VOLTAREN) 75 mg EC tablet Take 1 Tab by mouth two (2) times a day.  180 Tab 3    clopidogrel (PLAVIX) 75 mg tab TAKE ONE TABLET BY MOUTH DAILY 90 Tab 3    allopurinol (ZYLOPRIM) 100 mg tablet TAKE ONE TABLET BY MOUTH DAILY 30 Tab 11    silodosin (RAPAFLO) 8 mg capsule Take 8 mg by mouth daily (with breakfast).  DOCOSAHEXANOIC ACID/EPA (FISH OIL PO) Take  by mouth.  Cetirizine 10 mg cap Take 1 Tab by mouth daily.  finasteride (PROSCAR) 5 mg tablet Take 5 mg by mouth daily.  aspirin (ASPIRIN) 325 mg tablet Take 325 mg by mouth daily. Past Medical History:   Diagnosis Date    Adverse effect of anesthesia     very bad gag reflex    Allergic rhinitis 8/19/2017    BPH (benign prostatic hypertrophy) 8/19/2017    Cancer (HCC)     basal cell carcinoma right ear    Cancer (HCC)     prostate    CKD (chronic kidney disease), stage III 8/19/2017    CVA (cerebrovascular accident) ([facility] 8/19/2017    DJD (degenerative joint disease) 8/19/2017    Gout     Hyperlipidemia 8/19/2017    Hypertension     Hypertension with renal disease 8/19/2017    Meniere disease 8/19/2017    Nausea & vomiting     On statin therapy 8/19/2017    Other ill-defined conditions(799.89)     meneres disease    PAC (premature atrial contraction) 8/19/2017    Palpitation 8/19/2017    Prostate CA ([facility] 8/19/2017    Rosacea 8/19/2017    Stroke ([facility] 2009    stroke    Testosterone deficiency 8/19/2017     Past Surgical History:   Procedure Laterality Date    ABDOMEN SURGERY PROC UNLISTED      bilateral inguinal hernia repair    COLORECTAL SCRN; HI RISK IND  5/3/2016         HX HEENT  1970    tonsillectomy    HX HEENT  1980    basal cell carcinoma right ear    HX PROSTATECTOMY      radiation only    MN COLONOSCOPY FLX DX W/COLLJ SPEC WHEN PFRMD  1/19/2011          Allergies   Allergen Reactions    Sulfanilamide Rash    Sulfa (Sulfonamide Antibiotics) Rash     Very mild rash several years ago       REVIEW OF SYSTEMS:  General: negative for - chills or fever, or weight loss or gain  ENT: negative for - headaches, nasal congestion or tinnitus.   Decreased hearing  Eyes: no blurred or visual changes  Neck: No stiffness or swollen nodes  Respiratory: negative for - cough, hemoptysis, shortness of breath or wheezing  Cardiovascular : negative for - chest pain, edema, palpitations or shortness of breath  Gastrointestinal: negative for - abdominal pain, blood in stools, heartburn or nausea/vomiting  Genito-Urinary: no dysuria, trouble voiding, or hematuria  Musculoskeletal: negative for - gait disturbance, joint pain, joint stiffness or joint swelling  Neurological: no TIA or stroke symptoms. Intermittent dizzy spells  Hematologic: no bruises, no bleeding  Lymphatic: no swollen glands  Integument: no lumps, mole changes, nail changes or rash  Endocrine:no malaise/lethargy poly uria or polydipsia or unexpected weight changes        Social History     Social History    Marital status:      Spouse name: N/A    Number of children: N/A    Years of education: N/A     Social History Main Topics    Smoking status: Never Smoker    Smokeless tobacco: Never Used    Alcohol use No    Drug use: No    Sexual activity: No     Other Topics Concern    None     Social History Narrative     Family History   Problem Relation Age of Onset    Cancer Mother      uterine, cervical    Cancer Father      colon ca       OBJECTIVE:     Visit Vitals    /62 (BP 1 Location: Left arm, BP Patient Position: Sitting)    Pulse (!) 56    Temp 97.6 °F (36.4 °C) (Oral)    Resp 16    Ht 6' 2\" (1.88 m)    Wt 210 lb (95.3 kg)    SpO2 97%    BMI 26.96 kg/m2     CONSTITUTIONAL:   well nourished, appears age appropriate  EYES: sclera anicteric, PERRL, EOMI  ENMT:nares clear, moist mucous membranes, pharynx clear  NECK: supple.  Thyroid normal, No JVD or bruits  RESPIRATORY: Chest: clear to ascultation and percussion, normal inspiratory effort  CARDIOVASCULAR: Heart: regular rate and rhythm no murmurs, rubs or gallops, PMI not displaced, No thrills  GASTROINTESTINAL: Abdomen: non distended, soft, non tender, bowel sounds normal  HEMATOLOGIC: no purpura, petechiae or bruising  LYMPHATIC: No lymph node enlargemant  MUSCULOSKELETAL: Extremities: no edema or active synovitis, pulse 1+   INTEGUMENT: No unusual rashes or suspicious skin lesions noted. Nails appear normal.  PERIPHERAL VASCULAR: normal pulses femoral, PT and DP  NEUROLOGIC: non-focal exam, A & O X 3  PSYCHIATRIC:, appropriate affect     ASSESSMENT:   1. Hypertension with renal disease    2. Mixed hyperlipidemia    3. Primary osteoarthritis involving multiple joints    4. Cerebrovascular accident (CVA), unspecified mechanism ([address]    5. CKD (chronic kidney disease), stage III    6. Gout, unspecified cause, unspecified chronicity, unspecified site    7. Non-seasonal allergic rhinitis due to other allergic trigger    8. Prostate CA (HCC)      Impression  1. Hypertension that is well controlled continue current therapy although since his blood pressures little on the low side is having dizzy spells I will go ahead at this point and decrease his Norvasc from 10 mg daily to 5 mg daily and see if that will help eliminate the dizzy spells and I will also check a CBC just to make sure his blood count has not fallen with the doses of prednisone  2. Hyperlipidemia repeat status pending reviewed prior labs will make adjustments if needed  3. DJD that seems to be stable  4. Prior CVA continue aspirin daily I do not think that is playing any role in his dizzy spells  5. CKD stage III repeat status pending  6. Gout that is stable  7. Allergic rhinitis stable  8. Prostate carcinoma status post previous treatment without evidence recurrence  I will call the lab and make further recommendations adjustments. Follow stable continue same and I will follow him up in 3 months or sooner should they be a problem. Above discussed with his wife as well.     PLAN:  .  Orders Placed This Encounter    AMB POC LIPID PROFILE    AMB POC COMPLETE CBC,AUTOMATED ENTER    AMB POC COMPREHENSIVE METABOLIC PANEL    AMB POC CK (CPK)    amLODIPine (NORVASC) 10 mg tablet ATTENTION:   This medical record was transcribed using an electronic medical records system. Although proofread, it may and can contain electronic and spelling errors. Other human spelling and other errors may be present. Corrections may be executed at a later time. Please feel free to contact us for any clarifications as needed. Follow-up Disposition:  Return in about 3 months (around 7/19/2018). No results found for any visits on 04/19/18. Kamila Plunkett MD    The patient verbalized understanding of the problems and plans as explained.

## 2018-04-19 NOTE — PATIENT INSTRUCTIONS
Arthritis: Care Instructions  Your Care Instructions  Arthritis, also called osteoarthritis, is a breakdown of the cartilage that cushions your joints. When the cartilage wears down, your bones rub against each other. This causes pain and stiffness. Many people have some arthritis as they age. Arthritis most often affects the joints of the spine, hands, hips, knees, or feet. You can take simple measures to protect your joints, ease your pain, and help you stay active. Follow-up care is a key part of your treatment and safety. Be sure to make and go to all appointments, and call your doctor if you are having problems. It's also a good idea to know your test results and keep a list of the medicines you take. How can you care for yourself at home? · Stay at a healthy weight. Being overweight puts extra strain on your joints. · Talk to your doctor or physical therapist about exercises that will help ease joint pain. ¨ Stretch. You may enjoy gentle forms of yoga to help keep your joints and muscles flexible. ¨ Walk instead of jog. Other types of exercise that are less stressful on the joints include riding a bicycle, swimming, kary chi, or water exercise. ¨ Lift weights. Strong muscles help reduce stress on your joints. Stronger thigh muscles, for example, take some of the stress off of the knees and hips. Learn the right way to lift weights so you do not make joint pain worse. · Take your medicines exactly as prescribed. Call your doctor if you think you are having a problem with your medicine. · Take pain medicines exactly as directed. ¨ If the doctor gave you a prescription medicine for pain, take it as prescribed. ¨ If you are not taking a prescription pain medicine, ask your doctor if you can take an over-the-counter medicine. · Use a cane, crutch, walker, or another device if you need help to get around. These can help rest your joints.  You also can use other things to make life easier, such as a higher toilet seat and padded handles on kitchen utensils. · Do not sit in low chairs, which can make it hard to get up. · Put heat or cold on your sore joints as needed. Use whichever helps you most. You also can take turns with hot and cold packs. ¨ Apply heat 2 or 3 times a day for 20 to 30 minutes-using a heating pad, hot shower, or hot pack-to relieve pain and stiffness. ¨ Put ice or a cold pack on your sore joint for 10 to 20 minutes at a time. Put a thin cloth between the ice and your skin. When should you call for help? Call your doctor now or seek immediate medical care if:  ? · You have sudden swelling, warmth, or pain in any joint. ? · You have joint pain and a fever or rash. ? · You have such bad pain that you cannot use a joint. ? Watch closely for changes in your health, and be sure to contact your doctor if:  ? · You have mild joint symptoms that continue even with more than 6 weeks of care at home. ? · You have stomach pain or other problems with your medicine. Where can you learn more? Go to http://krishna-manpreet.info/. Enter V879 in the search box to learn more about \"Arthritis: Care Instructions. \"  Current as of: October 31, 2016  Content Version: 11.4  © 7764-7023 [employer]. Care instructions adapted under license by [employer] (which disclaims liability or warranty for this information). If you have questions about a medical condition or this instruction, always ask your healthcare professional. [place] [address] any warranty or liability for your use of this information.

## 2018-04-19 NOTE — PROGRESS NOTES
1. Have you been to the ER, urgent care clinic since your last visit? Hospitalized since your last visit? 01-, [facility], Dr. Danielle Mcgraw, removal of right neck mass. 2. Have you seen or consulted any other health care providers outside of the [address] [place] [address] since your last visit? Include any pap smears or colon screening. Yes, Dr. 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 Chief Complaint   Patient presents with    Hypertension     3 mo.  f/u    Chronic Kidney Disease    Cholesterol Problem       Fasting

## 2018-07-26 ENCOUNTER — OFFICE VISIT (OUTPATIENT)
Dept: INTERNAL MEDICINE CLINIC | Age: 83
End: 2018-07-26

## 2018-07-26 VITALS
BODY MASS INDEX: 27.98 KG/M2 | SYSTOLIC BLOOD PRESSURE: 132 MMHG | HEIGHT: 74 IN | DIASTOLIC BLOOD PRESSURE: 88 MMHG | HEART RATE: 49 BPM | WEIGHT: 218 LBS | OXYGEN SATURATION: 97 %

## 2018-07-26 DIAGNOSIS — M15.9 PRIMARY OSTEOARTHRITIS INVOLVING MULTIPLE JOINTS: ICD-10-CM

## 2018-07-26 DIAGNOSIS — J30.89 NON-SEASONAL ALLERGIC RHINITIS DUE TO OTHER ALLERGIC TRIGGER: ICD-10-CM

## 2018-07-26 DIAGNOSIS — E78.2 MIXED HYPERLIPIDEMIA: ICD-10-CM

## 2018-07-26 DIAGNOSIS — N18.30 CKD (CHRONIC KIDNEY DISEASE), STAGE III (HCC): ICD-10-CM

## 2018-07-26 DIAGNOSIS — C61 PROSTATE CA (HCC): ICD-10-CM

## 2018-07-26 DIAGNOSIS — I63.9 CEREBROVASCULAR ACCIDENT (CVA), UNSPECIFIED MECHANISM (HCC): ICD-10-CM

## 2018-07-26 DIAGNOSIS — I12.9 HYPERTENSION WITH RENAL DISEASE: Primary | ICD-10-CM

## 2018-07-26 DIAGNOSIS — M10.9 GOUT, UNSPECIFIED CAUSE, UNSPECIFIED CHRONICITY, UNSPECIFIED SITE: ICD-10-CM

## 2018-07-26 RX ORDER — MOMETASONE FUROATE 50 UG/1
2 SPRAY, METERED NASAL DAILY
COMMUNITY
End: 2019-01-22

## 2018-07-26 NOTE — MR AVS SNAPSHOT
[facility] 
 
 
 [address] [address] [phone] Patient: Marcella Gray MRN: [id number] :1934 Visit Information Date & Time Provider Department Dept. Phone Encounter #  
 2018  8:20 AM Dominique Rios MD [address] [place] [address] [facility] [phone] [id number] Follow-up Instructions Return in about 3 months (around 10/26/2018). Follow-up and Disposition History Your Appointments 2018  8:50 AM  
FOLLOW UP 10 with MD CLAU Correa [facility] ([facility]) Appt Note: 3 MO FLP   yearly [address] [id number] [address] [address] Upcoming Health Maintenance Date Due ZOSTER VACCINE AGE 60> 1994 GLAUCOMA SCREENING Q2Y 10/17/1999 DTaP/Tdap/Td series (1 - Tdap) 2013 Influenza Age 5 to Adult 2018 MEDICARE YEARLY EXAM 2018 Allergies as of 2018  Review Complete On: 2018 By: Dominique Rios MD  
  
 Severity Noted Reaction Type Reactions Sulfanilamide  2017    Rash  
 Sulfa (Sulfonamide Antibiotics) Low 2011   Systemic Rash Very mild rash several years ago Current Immunizations  Never Reviewed Name Date Influenza High Dose Vaccine PF 2017 Influenza Vaccine 2016, 2015 Pneumococcal Conjugate (PCV-13) 2015 Pneumococcal Vaccine (Unspecified Type) 10/13/2005 Td 2013 Not reviewed this visit You Were Diagnosed With   
  
 Codes Comments Hypertension with renal disease    -  Primary ICD-10-CM: I12.9 ICD-9-CM: 403.90 Mixed hyperlipidemia     ICD-10-CM: E78.2 ICD-9-CM: 272.2 Cerebrovascular accident (CVA), unspecified mechanism ([facility]     ICD-10-CM: I63.9 ICD-9-CM: 434.91   
 CKD (chronic kidney disease), stage III     ICD-10-CM: N18.3 ICD-9-CM: 585.3 Primary osteoarthritis involving multiple joints     ICD-10-CM: M15.0 ICD-9-CM: 715.09 Gout, unspecified cause, unspecified chronicity, unspecified site     ICD-10-CM: M10.9 ICD-9-CM: 274.9 Non-seasonal allergic rhinitis due to other allergic trigger     ICD-10-CM: J30.89 ICD-9-CM: 477.8 Prostate CA [facility])     ICD-10-CM: O96 ICD-9-CM: 639 Vitals BP Pulse Height(growth percentile) Weight(growth percentile) SpO2 BMI  
 132/88 (!) 49 6' 2\" (1.88 m) 218 lb (98.9 kg) 97% 27.99 kg/m2 Smoking Status Never Smoker Vitals History BMI and BSA Data Body Mass Index Body Surface Area  
 27.99 kg/m 2 2.27 m 2 Preferred Pharmacy Pharmacy Name Phone [address] [place], [address] [facility] [phone] Your Updated Medication List  
  
   
This list is accurate as of 7/26/18  8:57 AM.  Always use your most recent med list.  
  
  
  
  
 allopurinol 100 mg tablet Commonly known as:  ZYLOPRIM  
TAKE ONE TABLET BY MOUTH DAILY  
  
 amLODIPine 10 mg tablet Commonly known as:  [place] Citron Take 0.5 Tabs by mouth daily. aspirin 325 mg tablet Commonly known as:  ASPIRIN Take 325 mg by mouth daily. Cetirizine 10 mg Cap Take 1 Tab by mouth daily. clopidogrel 75 mg Tab Commonly known as:  PLAVIX TAKE ONE TABLET BY MOUTH DAILY  
  
 cycloSPORINE 0.05 % ophthalmic emulsion Commonly known as:  RESTASIS Administer 2 Drops to both eyes daily. diclofenac EC 75 mg EC tablet Commonly known as:  VOLTAREN Take 1 Tab by mouth two (2) times a day. doxycycline 100 mg capsule Commonly known as:  VIBRAMYCIN Take 1 Cap by mouth daily. FISH OIL PO Take  by mouth.  
  
 mometasone 50 mcg/actuation nasal spray Commonly known as:  NASONEX  
2 Sprays daily. nebivolol 5 mg tablet Commonly known as:  BYSTOLIC  
TAKE ONE TABLET BY MOUTH DAILY  
  
 RAPAFLO 8 mg capsule Generic drug:  silodosin Take 8 mg by mouth daily (with breakfast). valsartan-hydroCHLOROthiazide 320-12.5 mg per tablet Commonly known as:  DIOVAN-HCT  
TAKE ONE TABLET BY MOUTH DAILY We Performed the Following LIPID PANEL [78753 CPT(R)] METABOLIC PANEL, COMPREHENSIVE [77127 CPT(R)] Follow-up Instructions Return in about 3 months (around 10/26/2018). Patient Instructions Arthritis: Care Instructions Your Care Instructions Arthritis, also called osteoarthritis, is a breakdown of the cartilage that cushions your joints. When the cartilage wears down, your bones rub against each other. This causes pain and stiffness. Many people have some arthritis as they age. Arthritis most often affects the joints of the spine, hands, hips, knees, or feet. You can take simple measures to protect your joints, ease your pain, and help you stay active. Follow-up care is a key part of your treatment and safety. Be sure to make and go to all appointments, and call your doctor if you are having problems. It's also a good idea to know your test results and keep a list of the medicines you take. How can you care for yourself at home? · Stay at a healthy weight. Being overweight puts extra strain on your joints. · Talk to your doctor or physical therapist about exercises that will help ease joint pain. ¨ Stretch. You may enjoy gentle forms of yoga to help keep your joints and muscles flexible. ¨ Walk instead of jog. Other types of exercise that are less stressful on the joints include riding a bicycle, swimming, kary chi, or water exercise. ¨ Lift weights. Strong muscles help reduce stress on your joints. Stronger thigh muscles, for example, take some of the stress off of the knees and hips. Learn the right way to lift weights so you do not make joint pain worse. · Take your medicines exactly as prescribed. Call your doctor if you think you are having a problem with your medicine. · Take pain medicines exactly as directed. ¨ If the doctor gave you a prescription medicine for pain, take it as prescribed. ¨ If you are not taking a prescription pain medicine, ask your doctor if you can take an over-the-counter medicine. · Use a cane, crutch, walker, or another device if you need help to get around. These can help rest your joints. You also can use other things to make life easier, such as a higher toilet seat and padded handles on kitchen utensils. · Do not sit in low chairs, which can make it hard to get up. · Put heat or cold on your sore joints as needed. Use whichever helps you most. You also can take turns with hot and cold packs. ¨ Apply heat 2 or 3 times a day for 20 to 30 minutes-using a heating pad, hot shower, or hot pack-to relieve pain and stiffness. ¨ Put ice or a cold pack on your sore joint for 10 to 20 minutes at a time. Put a thin cloth between the ice and your skin. When should you call for help? Call your doctor now or seek immediate medical care if: 
  · You have sudden swelling, warmth, or pain in any joint.  
  · You have joint pain and a fever or rash.  
  · You have such bad pain that you cannot use a joint.  
 Watch closely for changes in your health, and be sure to contact your doctor if: 
  · You have mild joint symptoms that continue even with more than 6 weeks of care at home.  
  · You have stomach pain or other problems with your medicine. Where can you learn more? Go to http://krishna-manpreet.info/. Enter U566 in the search box to learn more about \"Arthritis: Care Instructions. \" Current as of: October 10, 2017 Content Version: 11.7 © 2953-9320 [employer].  Care instructions adapted under license by [employer] (which disclaims liability or warranty for this information). If you have questions about a medical condition or this instruction, always ask your healthcare professional. [address] any warranty or liability for your use of this information. Patient Instructions History Introducing [facility]! [employer] introduces [employer] patient portal. Now you can access parts of your medical record, email your doctor's office, and request medication refills online. 1. In your internet browser, go to [email or web address]://[employer]. Language Learning Class/[employer] 2. Click on the First Time User? Click Here link in the Sign In box. You will see the New Member Sign Up page. 3. Enter your [employer] Access Code exactly as it appears below. You will not need to use this code after youve completed the sign-up process. If you do not sign up before the expiration date, you must request a new code. · Softfront Access Code: [email or web address] Expires: 10/24/2018  8:15 AM 
 
4. Enter the last four digits of your Social Security Number (xxxx) and Date of Birth (mm/dd/yyyy) as indicated and click Submit. You will be taken to the next sign-up page. 5. Create a Softfront ID. This will be your Softfront login ID and cannot be changed, so think of one that is secure and easy to remember. 6. Create a [employer] password. You can change your password at any time. 7. Enter your Password Reset Question and Answer. This can be used at a later time if you forget your password. 8. Enter your e-mail address. You will receive e-mail notification when new information is available in [address] 9. Click Sign Up. You can now view and download portions of your medical record. 10. Click the Download Summary menu link to download a portable copy of your medical information. If you have questions, please visit the Frequently Asked Questions section of the [employer] website. Remember, Softfront is NOT to be used for urgent needs. For medical emergencies, dial 911. Now available from your iPhone and Android! Please provide this summary of care documentation to your next provider. Your primary care clinician is listed as Jose. If you have any questions after today's visit, please call [phone].

## 2018-07-26 NOTE — PROGRESS NOTES
Sofiyor Jeans presents with   Chief Complaint   Patient presents with    Hypertension     3 month follow up   Patient is here for a 3 month followup and fasting labs. No new complaints. States he has an ACP & will bring. Sees Dr Riya Cortez for a \"cancer shot\" every 6 months. 1. Have you been to the ER, urgent care clinic since your last visit? Hospitalized since your last visit? No    2. Have you seen or consulted any other health care providers outside of the [facility] since your last visit? Include any pap smears or colon screening.  No

## 2018-07-26 NOTE — PATIENT INSTRUCTIONS
Arthritis: Care Instructions Your Care Instructions Arthritis, also called osteoarthritis, is a breakdown of the cartilage that cushions your joints. When the cartilage wears down, your bones rub against each other. This causes pain and stiffness. Many people have some arthritis as they age. Arthritis most often affects the joints of the spine, hands, hips, knees, or feet. You can take simple measures to protect your joints, ease your pain, and help you stay active. Follow-up care is a key part of your treatment and safety. Be sure to make and go to all appointments, and call your doctor if you are having problems. It's also a good idea to know your test results and keep a list of the medicines you take. How can you care for yourself at home? · Stay at a healthy weight. Being overweight puts extra strain on your joints. · Talk to your doctor or physical therapist about exercises that will help ease joint pain. ¨ Stretch. You may enjoy gentle forms of yoga to help keep your joints and muscles flexible. ¨ Walk instead of jog. Other types of exercise that are less stressful on the joints include riding a bicycle, swimming, kary chi, or water exercise. ¨ Lift weights. Strong muscles help reduce stress on your joints. Stronger thigh muscles, for example, take some of the stress off of the knees and hips. Learn the right way to lift weights so you do not make joint pain worse. · Take your medicines exactly as prescribed. Call your doctor if you think you are having a problem with your medicine. · Take pain medicines exactly as directed. ¨ If the doctor gave you a prescription medicine for pain, take it as prescribed. ¨ If you are not taking a prescription pain medicine, ask your doctor if you can take an over-the-counter medicine. · Use a cane, crutch, walker, or another device if you need help to get around. These can help rest your joints.  You also can use other things to make life easier, such as a higher toilet seat and padded handles on kitchen utensils. · Do not sit in low chairs, which can make it hard to get up. · Put heat or cold on your sore joints as needed. Use whichever helps you most. You also can take turns with hot and cold packs. ¨ Apply heat 2 or 3 times a day for 20 to 30 minutes-using a heating pad, hot shower, or hot pack-to relieve pain and stiffness. ¨ Put ice or a cold pack on your sore joint for 10 to 20 minutes at a time. Put a thin cloth between the ice and your skin. When should you call for help? Call your doctor now or seek immediate medical care if: 
  · You have sudden swelling, warmth, or pain in any joint.  
  · You have joint pain and a fever or rash.  
  · You have such bad pain that you cannot use a joint.  
 Watch closely for changes in your health, and be sure to contact your doctor if: 
  · You have mild joint symptoms that continue even with more than 6 weeks of care at home.  
  · You have stomach pain or other problems with your medicine. Where can you learn more? Go to http://krishna-manpreet.info/. Enter I821 in the search box to learn more about \"Arthritis: Care Instructions. \" Current as of: October 10, 2017 Content Version: 11.7 © 4873-0632 [employer]. Care instructions adapted under license by [employer] (which disclaims liability or warranty for this information). If you have questions about a medical condition or this instruction, always ask your healthcare professional. [place] [address] any warranty or liability for your use of this information.

## 2018-07-26 NOTE — PROGRESS NOTES
Chief Complaint   Patient presents with    Hypertension     3 month follow up       SUBJECTIVE:    Zee Gutierrez is a 80 y.o. male who returns in follow-up of his medical problems include hypertension, hyperlipidemia, prior CVA, history of PACs, allergic rhinitis, chronic kidney disease stage III, and prostate cancer status post treatment. He denies any change in bowel habits or any other GI or  complaints. He denies any chest pain, shortness of breath, palpitations or cardiorespiratory complaints. He denies any headaches or other neurologic complaints except intermittent dizzy spells which are chronic and unchanged. He denies any current arthritic complaints and then no other complaints on complete review of systems except hot flashes related to his chemotherapy for his prostate cancer      Current Outpatient Prescriptions   Medication Sig Dispense Refill    mometasone (NASONEX) 50 mcg/actuation nasal spray 2 Sprays daily.  amLODIPine (NORVASC) 10 mg tablet Take 0.5 Tabs by mouth daily. 90 Tab 3    cycloSPORINE (RESTASIS) 0.05 % ophthalmic emulsion Administer 2 Drops to both eyes daily. 3 Each 3    valsartan-hydroCHLOROthiazide (DIOVAN-HCT) 320-12.5 mg per tablet TAKE ONE TABLET BY MOUTH DAILY 30 Tab 11    doxycycline (VIBRAMYCIN) 100 mg capsule Take 1 Cap by mouth daily. 90 Cap 3    nebivolol (BYSTOLIC) 5 mg tablet TAKE ONE TABLET BY MOUTH DAILY 90 Tab 3    diclofenac EC (VOLTAREN) 75 mg EC tablet Take 1 Tab by mouth two (2) times a day. 180 Tab 3    clopidogrel (PLAVIX) 75 mg tab TAKE ONE TABLET BY MOUTH DAILY 90 Tab 3    allopurinol (ZYLOPRIM) 100 mg tablet TAKE ONE TABLET BY MOUTH DAILY 30 Tab 11    silodosin (RAPAFLO) 8 mg capsule Take 8 mg by mouth daily (with breakfast).  DOCOSAHEXANOIC ACID/EPA (FISH OIL PO) Take  by mouth.  Cetirizine 10 mg cap Take 1 Tab by mouth daily.  aspirin (ASPIRIN) 325 mg tablet Take 325 mg by mouth daily.        Past Medical History: Diagnosis Date    Adverse effect of anesthesia     very bad gag reflex    Allergic rhinitis 8/19/2017    BPH (benign prostatic hypertrophy) 8/19/2017    Cancer (HCC)     basal cell carcinoma right ear    Cancer (HCC)     prostate    CKD (chronic kidney disease), stage III 8/19/2017    CVA (cerebrovascular accident) ([address] 8/19/2017    DJD (degenerative joint disease) 8/19/2017    Gout     Hyperlipidemia 8/19/2017    Hypertension     Hypertension with renal disease 8/19/2017    Meniere disease 8/19/2017    Nausea & vomiting     On statin therapy 8/19/2017    Other ill-defined conditions(799.89)     meneres disease    PAC (premature atrial contraction) 8/19/2017    Palpitation 8/19/2017    Prostate CA ([address] 8/19/2017    Rosacea 8/19/2017    Stroke [facility] 2009    stroke    Testosterone deficiency 8/19/2017     Past Surgical History:   Procedure Laterality Date    ABDOMEN SURGERY PROC UNLISTED      bilateral inguinal hernia repair    COLORECTAL SCRN; HI RISK IND  5/3/2016         HX HEENT  1970    tonsillectomy    HX HEENT  1980    basal cell carcinoma right ear    HX PROSTATECTOMY      radiation only    MS COLONOSCOPY FLX DX W/COLLJ SPEC WHEN PFRMD  1/19/2011          Allergies   Allergen Reactions    Sulfanilamide Rash    Sulfa (Sulfonamide Antibiotics) Rash     Very mild rash several years ago       REVIEW OF SYSTEMS:  General: negative for - chills or fever, or weight loss or gain. Heart flashes related to chemotherapy from his prostate cancer  ENT: negative for - headaches, nasal congestion or tinnitus.   Decreased hearing  Eyes: no blurred or visual changes  Neck: No stiffness or swollen nodes  Respiratory: negative for - cough, hemoptysis, shortness of breath or wheezing  Cardiovascular : negative for - chest pain, edema, palpitations or shortness of breath  Gastrointestinal: negative for - abdominal pain, blood in stools, heartburn or nausea/vomiting  Genito-Urinary: no dysuria, trouble voiding, or hematuria  Musculoskeletal: negative for - gait disturbance, joint pain, joint stiffness or joint swelling  Neurological: no TIA or stroke symptoms. Intermittent dizzy spells  Hematologic: no bruises, no bleeding  Lymphatic: no swollen glands  Integument: no lumps, mole changes, nail changes or rash  Endocrine:no malaise/lethargy poly uria or polydipsia or unexpected weight changes        Social History     Social History    Marital status:      Spouse name: N/A    Number of children: N/A    Years of education: N/A     Social History Main Topics    Smoking status: Never Smoker    Smokeless tobacco: Never Used    Alcohol use No    Drug use: No    Sexual activity: No     Other Topics Concern    None     Social History Narrative     Family History   Problem Relation Age of Onset    Cancer Mother      uterine, cervical    Cancer Father      colon ca       OBJECTIVE:     Visit Vitals    /88    Pulse (!) 49    Ht 6' 2\" (1.88 m)    Wt 218 lb (98.9 kg)    SpO2 97%    BMI 27.99 kg/m2     CONSTITUTIONAL:   well nourished, appears age appropriate  EYES: sclera anicteric, PERRL, EOMI  ENMT:nares clear, moist mucous membranes, pharynx clear  NECK: supple. Thyroid normal, No JVD or bruits  RESPIRATORY: Chest: clear to ascultation and percussion, normal inspiratory effort  CARDIOVASCULAR: Heart: regular rate and rhythm no murmurs, rubs or gallops, PMI not displaced, No thrills  GASTROINTESTINAL: Abdomen: non distended, soft, non tender, bowel sounds normal  HEMATOLOGIC: no purpura, petechiae or bruising  LYMPHATIC: No lymph node enlargemant  MUSCULOSKELETAL: Extremities: no edema or active synovitis, pulse 1+   INTEGUMENT: No unusual rashes or suspicious skin lesions noted. Nails appear normal.  PERIPHERAL VASCULAR: normal pulses femoral, PT and DP  NEUROLOGIC: non-focal exam, A & O X 3  PSYCHIATRIC:, appropriate affect     ASSESSMENT:   1. Hypertension with renal disease    2. Mixed hyperlipidemia    3. Cerebrovascular accident (CVA), unspecified mechanism ([address]    4. CKD (chronic kidney disease), stage III    5. Primary osteoarthritis involving multiple joints    6. Gout, unspecified cause, unspecified chronicity, unspecified site    7. Non-seasonal allergic rhinitis due to other allergic trigger    8. Prostate CA (HCC)      Impression  1. Hypertension that is well controlled so continue Norvasc that is low dose of 5 mg daily and other medications. 2.  Hyperlipidemia repeat status pending and I reviewed prior labs and I will make adjustments if needed  3. DJD that seems to be stable  4. Prior CVA continue aspirin daily. I do not think that is playing any role in his dizzy spells  5. CKD stage III repeat status pending  6. Gout that is stable  7. Allergic rhinitis stable  8. Prostate carcinoma status post previous treatment without evidence recurrence and then with some side effects on the hormonal treatment but nothing to really change with that. I will call the lab results and make further recommendations or adjustments. If stable continue same and I will follow him up in 3 months or sooner should there be a problem. PLAN:  .  Orders Placed This Encounter    METABOLIC PANEL, COMPREHENSIVE    LIPID PANEL    mometasone (NASONEX) 50 mcg/actuation nasal spray         ATTENTION:   This medical record was transcribed using an electronic medical records system. Although proofread, it may and can contain electronic and spelling errors. Other human spelling and other errors may be present. Corrections may be executed at a later time. Please feel free to contact us for any clarifications as needed. Follow-up Disposition:  Return in about 3 months (around 10/26/2018). No results found for any visits on 07/26/18. Princess Brooks MD    The patient verbalized understanding of the problems and plans as explained.

## 2018-07-27 LAB
ALBUMIN SERPL-MCNC: 3.9 G/DL (ref 3.5–4.7)
ALBUMIN/GLOB SERPL: 1.8 {RATIO} (ref 1.2–2.2)
ALP SERPL-CCNC: 82 IU/L (ref 39–117)
ALT SERPL-CCNC: 52 IU/L (ref 0–44)
AST SERPL-CCNC: 50 IU/L (ref 0–40)
BILIRUB SERPL-MCNC: 0.4 MG/DL (ref 0–1.2)
BUN SERPL-MCNC: 22 MG/DL (ref 8–27)
BUN/CREAT SERPL: 22 (ref 10–24)
CALCIUM SERPL-MCNC: 9.4 MG/DL (ref 8.6–10.2)
CHLORIDE SERPL-SCNC: 103 MMOL/L (ref 96–106)
CHOLEST SERPL-MCNC: 145 MG/DL (ref 100–199)
CO2 SERPL-SCNC: 24 MMOL/L (ref 20–29)
CREAT SERPL-MCNC: 1 MG/DL (ref 0.76–1.27)
GLOBULIN SER CALC-MCNC: 2.2 G/DL (ref 1.5–4.5)
GLUCOSE SERPL-MCNC: 122 MG/DL (ref 65–99)
HDLC SERPL-MCNC: 35 MG/DL
LDLC SERPL CALC-MCNC: 82 MG/DL (ref 0–99)
POTASSIUM SERPL-SCNC: 4.5 MMOL/L (ref 3.5–5.2)
PROT SERPL-MCNC: 6.1 G/DL (ref 6–8.5)
SODIUM SERPL-SCNC: 143 MMOL/L (ref 134–144)
TRIGL SERPL-MCNC: 142 MG/DL (ref 0–149)
VLDLC SERPL CALC-MCNC: 28 MG/DL (ref 5–40)

## 2018-08-09 ENCOUNTER — TELEPHONE (OUTPATIENT)
Dept: INTERNAL MEDICINE CLINIC | Age: 83
End: 2018-08-09

## 2018-08-09 NOTE — TELEPHONE ENCOUNTER
Patient identified with name &     Patient was called and notified of the recall on Valsartan. Patient is to call the pharmacy to see if it is recall.   Patient is to have the pharmacy to send a request for a new script if it is on the recall list.

## 2018-08-10 RX ORDER — LOSARTAN POTASSIUM AND HYDROCHLOROTHIAZIDE 25; 100 MG/1; MG/1
1 TABLET ORAL DAILY
Qty: 90 TAB | Refills: 3 | Status: SHIPPED | OUTPATIENT
Start: 2018-08-10 | End: 2018-11-07 | Stop reason: ALTCHOICE

## 2018-08-10 NOTE — TELEPHONE ENCOUNTER
Patient's wife called concerning recall on Valsartan. Mary instructed her to notify us to send in replacement. Informed wife we will send in Losartan/HCTZ 100mg-25mg. Requested Prescriptions     Pending Prescriptions Disp Refills    losartan-hydroCHLOROthiazide (HYZAAR) 100-25 mg per tablet 90 Tab 3     Sig: Take 1 Tab by mouth daily.

## 2018-09-27 ENCOUNTER — CLINICAL SUPPORT (OUTPATIENT)
Dept: INTERNAL MEDICINE CLINIC | Age: 83
End: 2018-09-27

## 2018-09-27 DIAGNOSIS — Z23 ENCOUNTER FOR IMMUNIZATION: ICD-10-CM

## 2018-09-27 NOTE — PROGRESS NOTES
After obtaining consent, and per verbal order from Dr. Ko Cook, patient received influenza vaccine given by Brown Baker LPN in Left Deltoid. Influenza Vaccine 0.5 mL IM now. Patient was observed for 15 minutes post injection. Patient tolerated injection well with no adverse effects. VIS given.

## 2018-09-27 NOTE — MR AVS SNAPSHOT
[facility] 
 
 
 [address] [address] [phone] Patient: Cristian Lyles MRN: [id number] :1934 Visit Information Date & Time Provider Department Dept. Phone Encounter #  
 2018  3:00 PM Naima Del Rosario [address] [phone] [id number] Your Appointments 2018  8:50 AM  
FOLLOW UP 10 with MD CLAU [facility] ([facility]) Appt Note: 3 MO FLP   yearly [address] [address] [address] [facility] [address] Upcoming Health Maintenance Date Due Shingrix Vaccine Age 50> (1 of 2) 10/17/1984 GLAUCOMA SCREENING Q2Y 10/17/1999 DTaP/Tdap/Td series (1 - Tdap) 2013 Influenza Age 5 to Adult 2018 MEDICARE YEARLY EXAM 2018 Allergies as of 2018  Review Complete On: 2018 By: Naima Del Rosario MD  
  
 Severity Noted Reaction Type Reactions Sulfanilamide  2017    Rash  
 Sulfa (Sulfonamide Antibiotics) Low 2011   Systemic Rash Very mild rash several years ago Current Immunizations  Never Reviewed Name Date Influenza High Dose Vaccine PF 2017 Influenza Vaccine 2016, 2015 Influenza Vaccine (Tri) Adjuvanted  Incomplete Pneumococcal Conjugate (PCV-13) 2015 Pneumococcal Vaccine (Unspecified Type) 10/13/2005 Td 2013 Not reviewed this visit You Were Diagnosed With   
  
 Codes Comments Encounter for immunization     ICD-10-CM: N69 ICD-9-CM: V03.89 Vitals Smoking Status Never Smoker Preferred Pharmacy Pharmacy Name Phone [facility] [address], [address] [place] [address] [phone] Your Updated Medication List  
  
   
 This list is accurate as of 9/27/18  3:07 PM.  Always use your most recent med list.  
  
  
  
  
 allopurinol 100 mg tablet Commonly known as:  ZYLOPRIM  
TAKE ONE TABLET BY MOUTH DAILY  
  
 amLODIPine 10 mg tablet Commonly known as:  [place] Take 0.5 Tabs by mouth daily. aspirin 325 mg tablet Commonly known as:  ASPIRIN Take 325 mg by mouth daily. Cetirizine 10 mg Cap Take 1 Tab by mouth daily. clopidogrel 75 mg Tab Commonly known as:  PLAVIX TAKE ONE TABLET BY MOUTH DAILY  
  
 cycloSPORINE 0.05 % ophthalmic emulsion Commonly known as:  RESTASIS Administer 2 Drops to both eyes daily. diclofenac EC 75 mg EC tablet Commonly known as:  VOLTAREN Take 1 Tab by mouth two (2) times a day. doxycycline 100 mg capsule Commonly known as:  VIBRAMYCIN Take 1 Cap by mouth daily. FISH OIL PO Take  by mouth.  
  
 losartan-hydroCHLOROthiazide 100-25 mg per tablet Commonly known as:  HYZAAR Take 1 Tab by mouth daily. mometasone 50 mcg/actuation nasal spray Commonly known as:  NASONEX  
2 Sprays daily. nebivolol 5 mg tablet Commonly known as:  BYSTOLIC  
TAKE ONE TABLET BY MOUTH DAILY  
  
 RAPAFLO 8 mg capsule Generic drug:  silodosin Take 8 mg by mouth daily (with breakfast). We Performed the Following ADMIN INFLUENZA VIRUS VAC [ [facility]] INFLUENZA VACCINE INACTIVATED (IIV), SUBUNIT, ADJUVANTED, IM [id number] CPT(R)] Introducing [facility]! Samanta James introduces [employer] patient portal. Now you can access parts of your medical record, email your doctor's office, and request medication refills online. 1. In your internet browser, go to [email or web address] 2. Click on the First Time User? Click Here link in the Sign In box. You will see the New Member Sign Up page. 3. Enter your [employer] Access Code exactly as it appears below.  You will not need to use this code after youve completed the sign-up process. If you do not sign up before the expiration date, you must request a new code. · [employer] Access Code: [email or web address] Expires: 10/24/2018  8:15 AM 
 
4. Enter the last four digits of your Social Security Number (xxxx) and Date of Birth (mm/dd/yyyy) as indicated and click Submit. You will be taken to the next sign-up page. 5. Create a [employer] ID. This will be your [employer] login ID and cannot be changed, so think of one that is secure and easy to remember. 6. Create a [employer] password. You can change your password at any time. 7. Enter your Password Reset Question and Answer. This can be used at a later time if you forget your password. 8. Enter your e-mail address. You will receive e-mail notification when new information is available in [address] 9. Click Sign Up. You can now view and download portions of your medical record. 10. Click the Download Summary menu link to download a portable copy of your medical information. If you have questions, please visit the Frequently Asked Questions section of the [employer] website. Remember, [employer] is NOT to be used for urgent needs. For medical emergencies, dial 911. Now available from your iPhone and Android! Please provide this summary of care documentation to your next provider. Your primary care clinician is listed as Jose. If you have any questions after today's visit, please call [phone].

## 2018-10-12 ENCOUNTER — APPOINTMENT (OUTPATIENT)
Dept: CT IMAGING | Age: 83
End: 2018-10-12
Attending: EMERGENCY MEDICINE
Payer: MEDICARE

## 2018-10-12 ENCOUNTER — HOSPITAL ENCOUNTER (EMERGENCY)
Age: 83
Discharge: HOME | End: 2018-10-13
Attending: EMERGENCY MEDICINE | Admitting: EMERGENCY MEDICINE
Payer: MEDICARE

## 2018-10-12 VITALS
TEMPERATURE: 98.6 F | DIASTOLIC BLOOD PRESSURE: 71 MMHG | HEIGHT: 74 IN | WEIGHT: 215 LBS | SYSTOLIC BLOOD PRESSURE: 179 MMHG | BODY MASS INDEX: 27.59 KG/M2 | HEART RATE: 62 BPM | RESPIRATION RATE: 16 BRPM | OXYGEN SATURATION: 99 %

## 2018-10-12 DIAGNOSIS — H57.12 ACUTE LEFT EYE PAIN: ICD-10-CM

## 2018-10-12 DIAGNOSIS — J01.91 ACUTE RECURRENT SINUSITIS, UNSPECIFIED LOCATION: Primary | ICD-10-CM

## 2018-10-12 LAB
ALBUMIN SERPL-MCNC: 3.7 G/DL (ref 3.5–5)
ALBUMIN/GLOB SERPL: 0.8 {RATIO} (ref 1.1–2.2)
ALP SERPL-CCNC: 91 U/L (ref 45–117)
ALT SERPL-CCNC: 43 U/L (ref 12–78)
ANION GAP SERPL CALC-SCNC: 7 MMOL/L (ref 5–15)
AST SERPL-CCNC: 36 U/L (ref 15–37)
BASOPHILS # BLD: 0 K/UL (ref 0–0.1)
BASOPHILS NFR BLD: 0 % (ref 0–1)
BILIRUB SERPL-MCNC: 0.5 MG/DL (ref 0.2–1)
BUN SERPL-MCNC: 24 MG/DL (ref 6–20)
BUN/CREAT SERPL: 24 (ref 12–20)
CALCIUM SERPL-MCNC: 9.4 MG/DL (ref 8.5–10.1)
CHLORIDE SERPL-SCNC: 102 MMOL/L (ref 97–108)
CO2 SERPL-SCNC: 28 MMOL/L (ref 21–32)
CREAT SERPL-MCNC: 1 MG/DL (ref 0.7–1.3)
DIFFERENTIAL METHOD BLD: ABNORMAL
EOSINOPHIL # BLD: 0 K/UL (ref 0–0.4)
EOSINOPHIL NFR BLD: 0 % (ref 0–7)
ERYTHROCYTE [DISTWIDTH] IN BLOOD BY AUTOMATED COUNT: 12.8 % (ref 11.5–14.5)
ERYTHROCYTE [SEDIMENTATION RATE] IN BLOOD: 58 MM/HR (ref 0–20)
GLOBULIN SER CALC-MCNC: 4.5 G/DL (ref 2–4)
GLUCOSE SERPL-MCNC: 117 MG/DL (ref 65–100)
HCT VFR BLD AUTO: 41.4 % (ref 36.6–50.3)
HGB BLD-MCNC: 14.7 G/DL (ref 12.1–17)
IMM GRANULOCYTES # BLD: 0 K/UL (ref 0–0.04)
IMM GRANULOCYTES NFR BLD AUTO: 0 % (ref 0–0.5)
LYMPHOCYTES # BLD: 1.3 K/UL (ref 0.8–3.5)
LYMPHOCYTES NFR BLD: 14 % (ref 12–49)
MCH RBC QN AUTO: 33 PG (ref 26–34)
MCHC RBC AUTO-ENTMCNC: 35.5 G/DL (ref 30–36.5)
MCV RBC AUTO: 93 FL (ref 80–99)
MONOCYTES # BLD: 1.1 K/UL (ref 0–1)
MONOCYTES NFR BLD: 12 % (ref 5–13)
NEUTS SEG # BLD: 6.5 K/UL (ref 1.8–8)
NEUTS SEG NFR BLD: 73 % (ref 32–75)
NRBC # BLD: 0 K/UL (ref 0–0.01)
NRBC BLD-RTO: 0 PER 100 WBC
PLATELET # BLD AUTO: 266 K/UL (ref 150–400)
PMV BLD AUTO: 9.4 FL (ref 8.9–12.9)
POTASSIUM SERPL-SCNC: 3.8 MMOL/L (ref 3.5–5.1)
PROT SERPL-MCNC: 8.2 G/DL (ref 6.4–8.2)
RBC # BLD AUTO: 4.45 M/UL (ref 4.1–5.7)
SODIUM SERPL-SCNC: 137 MMOL/L (ref 136–145)
WBC # BLD AUTO: 8.9 K/UL (ref 4.1–11.1)

## 2018-10-12 PROCEDURE — 85652 RBC SED RATE AUTOMATED: CPT | Performed by: EMERGENCY MEDICINE

## 2018-10-12 PROCEDURE — 85025 COMPLETE CBC W/AUTO DIFF WBC: CPT | Performed by: EMERGENCY MEDICINE

## 2018-10-12 PROCEDURE — 80053 COMPREHEN METABOLIC PANEL: CPT | Performed by: EMERGENCY MEDICINE

## 2018-10-12 PROCEDURE — 74011000250 HC RX REV CODE- 250: Performed by: EMERGENCY MEDICINE

## 2018-10-12 PROCEDURE — 74011636637 HC RX REV CODE- 636/637: Performed by: EMERGENCY MEDICINE

## 2018-10-12 PROCEDURE — 36415 COLL VENOUS BLD VENIPUNCTURE: CPT | Performed by: EMERGENCY MEDICINE

## 2018-10-12 PROCEDURE — 74011250637 HC RX REV CODE- 250/637: Performed by: EMERGENCY MEDICINE

## 2018-10-12 PROCEDURE — 99283 EMERGENCY DEPT VISIT LOW MDM: CPT

## 2018-10-12 PROCEDURE — 70450 CT HEAD/BRAIN W/O DYE: CPT

## 2018-10-12 PROCEDURE — A9270 NON-COVERED ITEM OR SERVICE: HCPCS | Performed by: EMERGENCY MEDICINE

## 2018-10-12 RX ORDER — TETRACAINE HYDROCHLORIDE 5 MG/ML
1 SOLUTION OPHTHALMIC
Status: COMPLETED | OUTPATIENT
Start: 2018-10-12 | End: 2018-10-12

## 2018-10-12 RX ORDER — PREDNISONE 20 MG/1
60 TABLET ORAL
Status: COMPLETED | OUTPATIENT
Start: 2018-10-12 | End: 2018-10-12

## 2018-10-12 RX ORDER — AMOXICILLIN AND CLAVULANATE POTASSIUM 875; 125 MG/1; MG/1
1 TABLET, FILM COATED ORAL
Status: COMPLETED | OUTPATIENT
Start: 2018-10-12 | End: 2018-10-12

## 2018-10-12 RX ORDER — AMOXICILLIN AND CLAVULANATE POTASSIUM 875; 125 MG/1; MG/1
1 TABLET, FILM COATED ORAL 2 TIMES DAILY
Qty: 14 TAB | Refills: 0 | Status: SHIPPED | OUTPATIENT
Start: 2018-10-12 | End: 2018-11-07 | Stop reason: ALTCHOICE

## 2018-10-12 RX ORDER — PREDNISONE 10 MG/1
TABLET ORAL
Qty: 48 TAB | Refills: 0 | Status: SHIPPED | OUTPATIENT
Start: 2018-10-12 | End: 2018-10-13 | Stop reason: SINTOL

## 2018-10-12 RX ADMIN — TETRACAINE HYDROCHLORIDE 1 DROP: 5 SOLUTION OPHTHALMIC at 23:01

## 2018-10-12 RX ADMIN — PREDNISONE 60 MG: 20 TABLET ORAL at 23:00

## 2018-10-12 RX ADMIN — AMOXICILLIN AND CLAVULANATE POTASSIUM 1 TABLET: 875; 125 TABLET, FILM COATED ORAL at 22:42

## 2018-10-12 RX ADMIN — FLUORESCEIN SODIUM 1 STRIP: 1 STRIP OPHTHALMIC at 23:00

## 2018-10-13 RX ORDER — DEXAMETHASONE 6 MG/1
6 TABLET ORAL DAILY
Qty: 3 TAB | Refills: 0 | Status: SHIPPED | OUTPATIENT
Start: 2018-10-13 | End: 2018-10-16

## 2018-10-13 NOTE — DISCHARGE INSTRUCTIONS
Sinusitis: Care Instructions  Your Care Instructions    Sinusitis is an infection of the lining of the sinus cavities in your head. Sinusitis often follows a cold. It causes pain and pressure in your head and face. In most cases, sinusitis gets better on its own in 1 to 2 weeks. But some mild symptoms may last for several weeks. Sometimes antibiotics are needed. Follow-up care is a key part of your treatment and safety. Be sure to make and go to all appointments, and call your doctor if you are having problems. It's also a good idea to know your test results and keep a list of the medicines you take. How can you care for yourself at home? · Take an over-the-counter pain medicine, such as acetaminophen (Tylenol), ibuprofen (Advil, Motrin), or naproxen (Aleve). Read and follow all instructions on the label. · If the doctor prescribed antibiotics, take them as directed. Do not stop taking them just because you feel better. You need to take the full course of antibiotics. · Be careful when taking over-the-counter cold or flu medicines and Tylenol at the same time. Many of these medicines have acetaminophen, which is Tylenol. Read the labels to make sure that you are not taking more than the recommended dose. Too much acetaminophen (Tylenol) can be harmful. · Breathe warm, moist air from a steamy shower, a hot bath, or a sink filled with hot water. Avoid cold, dry air. Using a humidifier in your home may help. Follow the directions for cleaning the machine. · Use saline (saltwater) nasal washes to help keep your nasal passages open and wash out mucus and bacteria. You can buy saline nose drops at a grocery store or drugstore. Or you can make your own at home by adding 1 teaspoon of salt and 1 teaspoon of baking soda to 2 cups of distilled water. If you make your own, fill a bulb syringe with the solution, insert the tip into your nostril, and squeeze gently. Clearance Crane your nose.   · Put a hot, wet towel or a warm gel pack on your face 3 or 4 times a day for 5 to 10 minutes each time. · Try a decongestant nasal spray like oxymetazoline (Afrin). Do not use it for more than 3 days in a row. Using it for more than 3 days can make your congestion worse. When should you call for help? Call your doctor now or seek immediate medical care if:    · You have new or worse swelling or redness in your face or around your eyes.     · You have a new or higher fever.    Watch closely for changes in your health, and be sure to contact your doctor if:    · You have new or worse facial pain.     · The mucus from your nose becomes thicker (like pus) or has new blood in it.     · You are not getting better as expected. Where can you learn more? Go to http://krishna-manpreet.info/. Enter G215 in the search box to learn more about \"Sinusitis: Care Instructions. \"  Current as of: March 28, 2018  Content Version: 11.8  © 6257-1182 [employer]. Care instructions adapted under license by [employer] (which disclaims liability or warranty for this information). If you have questions about a medical condition or this instruction, always ask your healthcare professional. [place] [address] any warranty or liability for your use of this information. Learning About Your Eyes  What do your eyes do? You see with your eyes. In a healthy eye, light goes through the pupil. Then your lens focuses the light on the retina at the back of the eye. The retina changes the light into electrical signals that go to the brain. The brain turns these into images. What problems can happen to your eyes? Problems with your eyes may include:  · Infections. These include pinkeye, a stye, or blepharitis. ¨ Pinkeye is redness and swelling of the lining of the eyelid and the surface of the eye. A gray or yellow fluid may ooze from the eye.   ¨ A stye is an infection in the tiny oil glands along the edge of the eyelid. Your eyelid may be swollen or tender. And you may have a tender lump on it. Fluid may drain from this lump. ¨ Blepharitis is swelling or infection of the eyelid. The edge of your eyelid may be itchy, red, and irritated. You may also have sores on your eyelid. Your eye may burn or itch. · Eye injuries, such as a chemical or object in your eye. Long-term eye problems may include:  · Cataracts. This is a painless, cloudy area in the lens of the eye. It blocks light from the retina and may cause vision problems. · Nearsightedness. This means it is hard to see things far from you. · Farsightedness. This means it is hard to see things close to you. · Macular degeneration. This is the loss of your central vision. · Glaucoma. This causes you to lose your vision, especially your side vision. It can lead to blindness. · Diabetic retinopathy. This can damage the retina and make your vision worse over time. How can you prevent eye problems? · Don't rub your eyes. Rubbing can irritate them. It can also move bacteria into your eyes and cause infections. If you need to touch your eyes, wash your hands first.  · Protect your eyes from dust and air pollution. For example, wear safety glasses when you rake or mow the lawn. · If you have diabetes, keep your blood sugar under control. · Wear goggles or protective glasses when you work with power tools or chemicals or when you hammer nails. · Wear goggles or protective glasses when you play sports that can be dangerous for your eyes. These include racquetball and hockey. · When you are in the sun, wear sunglasses that block UV rays and hats with big brims. When you use a tanning lamp or tanning parsons, protect your eyes. · Get regular vision checkups:  ¨ Every 2 years if you wear glasses. ¨ Every 5 years if you don't wear glasses.   ¨ As your doctor recommends, if you have diabetes, a family history of eye problems, or a vision problem such as glaucoma, cataracts, or macular degeneration. ¨ As your doctor recommends, if you take medicines that may affect the eyes. These include any medicine that you put in your eyes, medicines (called blood thinners) that prevent blood clots, and medicines for bladder control problems. Where can you learn more? Go to http://krishna-manpreet.info/. Enter R092 in the search box to learn more about \"Learning About Your Eyes. \"  Current as of: December 3, 2017  Content Version: 11.8  © 1379-2304 [employer]. Care instructions adapted under license by [employer] (which disclaims liability or warranty for this information).  If you have questions about a medical condition or this instruction, always ask your healthcare professional. [address] any warranty or liability for your use of this information.

## 2018-10-13 NOTE — ED PROVIDER NOTES
EMERGENCY DEPARTMENT HISTORY AND PHYSICAL EXAM 
 
Date: 10/12/2018 Patient Name: Awa Souza History of Presenting Illness Chief Complaint Patient presents with  Eye Pain Pt sent by [facility] for CT scan Pt c/o L eye watering and intermittent pain to L temple area History Provided By: Patient HPI: Awa Souza is a 80 y.o. male, pmhx Stroke, HTN, CKD, who presents ambulatory to the ED c/o persistent, pressuring pain located behind the left eye x1 week. Pt states over the past x1 month, he has had persistent sinus pressure and sinus drainage. He notes the pain has gradually worsened behind the left eye over this past week, noting his vision has started to Ulysses Adore. \" Pt denies any blurred vision. He notes he has attempted to be seen by his PCP and his ENT this past week, but both are currently on vacation. Pt states he was seen at [facility], where he was referred to the ED for further evaluation of his sxs. He notes his optometrist is located in [place], where he used to live. He denies taking any medications for relief of symptoms or any other relieving or exacerbating factors. Pt specifically denies any fever, congestion, cough, shortness of breath, chest pain, abdominal pain, nausea, vomiting, diarrhea, dysuria, or urinary frequency. PCP: Sandhya Cox MD 
 
PMHx: Significant for Stroke, HTN, Gout, Prostate Cancer, DJD, CKD, BPH PSHx: Significant for Colonoscopy, Prostatectomy Social Hx: -tobacco, -EtOH, -Illicit Drugs There are no other complaints, changes, or physical findings at this time. Current Outpatient Prescriptions Medication Sig Dispense Refill  dexamethasone (DECADRON) 6 mg tablet Take 1 Tab by mouth daily for 3 days. 3 Tab 0  
 amoxicillin-clavulanate (AUGMENTIN) 875-125 mg per tablet Take 1 Tab by mouth two (2) times a day. 14 Tab 0  
 losartan-hydroCHLOROthiazide (HYZAAR) 100-25 mg per tablet Take 1 Tab by mouth daily.  90 Tab 3  
  mometasone (NASONEX) 50 mcg/actuation nasal spray 2 Sprays daily.  amLODIPine (NORVASC) 10 mg tablet Take 0.5 Tabs by mouth daily. 90 Tab 3  cycloSPORINE (RESTASIS) 0.05 % ophthalmic emulsion Administer 2 Drops to both eyes daily. 3 Each 3  
 doxycycline (VIBRAMYCIN) 100 mg capsule Take 1 Cap by mouth daily. 90 Cap 3  
 nebivolol (BYSTOLIC) 5 mg tablet TAKE ONE TABLET BY MOUTH DAILY 90 Tab 3  
 diclofenac EC (VOLTAREN) 75 mg EC tablet Take 1 Tab by mouth two (2) times a day. 180 Tab 3  clopidogrel (PLAVIX) 75 mg tab TAKE ONE TABLET BY MOUTH DAILY 90 Tab 3  
 allopurinol (ZYLOPRIM) 100 mg tablet TAKE ONE TABLET BY MOUTH DAILY 30 Tab 11  
 silodosin (RAPAFLO) 8 mg capsule Take 8 mg by mouth daily (with breakfast).  DOCOSAHEXANOIC ACID/EPA (FISH OIL PO) Take  by mouth.  aspirin (ASPIRIN) 325 mg tablet Take 325 mg by mouth daily.  Cetirizine 10 mg cap Take 1 Tab by mouth daily. Past History Past Medical History: 
Past Medical History:  
Diagnosis Date  Adverse effect of anesthesia   
 very bad gag reflex  Allergic rhinitis 8/19/2017  BPH (benign prostatic hypertrophy) 8/19/2017  Cancer ([facility] [address]   
 basal cell carcinoma right ear  Cancer [facility])   
 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  
 stroke  Testosterone deficiency 8/19/2017 Past Surgical History: 
Past Surgical History:  
Procedure Laterality Date  ABDOMEN SURGERY PROC UNLISTED    
 bilateral inguinal hernia repair  COLORECTAL SCRN; HI RISK IND  5/3/2016  HX HEENT  1970  
 tonsillectomy  HX HEENT  1980  
 basal cell carcinoma right ear  HX PROSTATECTOMY    
 radiation only  OR COLONOSCOPY FLX DX W/COLLJ SPEC WHEN PFRMD  1/19/2011 Family History: 
Family History Problem Relation Age of Onset  Cancer Mother   
  uterine, cervical  
 Cancer Father   
  colon ca Social History: 
Social History Substance Use Topics  Smoking status: Never Smoker  Smokeless tobacco: Never Used  Alcohol use No  
 
 
Allergies: Allergies Allergen Reactions  Sulfanilamide Rash  Sulfa (Sulfonamide Antibiotics) Rash Very mild rash several years ago Review of Systems Review of Systems Constitutional: Negative for chills and fever. HENT: Positive for rhinorrhea and sinus pressure. Eyes: Positive for pain. Respiratory: Negative for cough, chest tightness and shortness of breath. Cardiovascular: Negative for chest pain and leg swelling. Gastrointestinal: Negative for abdominal pain, diarrhea, nausea and vomiting. Endocrine: Negative. Genitourinary: Negative for difficulty urinating and dysuria. Musculoskeletal: Negative for myalgias. Skin: Negative. Neurological: Positive for headaches. Psychiatric/Behavioral: Negative. All other systems reviewed and are negative. Physical Exam  
Physical Exam  
Constitutional: He is oriented to person, place, and time. He appears well-developed and well-nourished. No distress. HENT:  
Head: Normocephalic and atraumatic. Nose: Nose normal.  
Mouth/Throat: No oropharyngeal exudate. Eyes: EOM and lids are normal. Pupils are equal, round, and reactive to light. Left conjunctiva is injected. Slit lamp exam: 
     The left eye shows no fluorescein uptake. Neck: Normal range of motion. Neck supple. No JVD present. Cardiovascular: Normal rate, regular rhythm, normal heart sounds and intact distal pulses. Exam reveals no friction rub. No murmur heard. Pulmonary/Chest: Effort normal and breath sounds normal. No stridor. No respiratory distress. He has no wheezes. He has no rales. Abdominal: Soft. Bowel sounds are normal. He exhibits no distension. There is no tenderness. There is no rebound. Musculoskeletal: Normal range of motion. He exhibits no tenderness. Neurological: He is alert and oriented to person, place, and time. No cranial nerve deficit. Skin: Skin is warm and dry. No rash noted. He is not diaphoretic. Psychiatric: He has a normal mood and affect. His speech is normal and behavior is normal. Judgment and thought content normal. Cognition and memory are normal.  
Nursing note and vitals reviewed. Diagnostic Study Results Labs - Recent Results (from the past 12 hour(s)) CBC WITH AUTOMATED DIFF Collection Time: 10/12/18 10:11 PM  
Result Value Ref Range WBC 8.9 4.1 - 11.1 K/uL  
 RBC 4.45 4.10 - 5.70 M/uL  
 HGB 14.7 12.1 - 17.0 g/dL HCT 41.4 36.6 - 50.3 % MCV 93.0 80.0 - 99.0 FL  
 MCH 33.0 26.0 - 34.0 PG  
 MCHC 35.5 30.0 - 36.5 g/dL  
 RDW 12.8 11.5 - 14.5 % PLATELET 939 903 - 034 K/uL MPV 9.4 8.9 - 12.9 FL  
 NRBC 0.0 0  WBC ABSOLUTE NRBC 0.00 0.00 - 0.01 K/uL NEUTROPHILS 73 32 - 75 % LYMPHOCYTES 14 12 - 49 % MONOCYTES 12 5 - 13 % EOSINOPHILS 0 0 - 7 % BASOPHILS 0 0 - 1 % IMMATURE GRANULOCYTES 0 0.0 - 0.5 % ABS. NEUTROPHILS 6.5 1.8 - 8.0 K/UL  
 ABS. LYMPHOCYTES 1.3 0.8 - 3.5 K/UL  
 ABS. MONOCYTES 1.1 (H) 0.0 - 1.0 K/UL  
 ABS. EOSINOPHILS 0.0 0.0 - 0.4 K/UL  
 ABS. BASOPHILS 0.0 0.0 - 0.1 K/UL  
 ABS. IMM. GRANS. 0.0 0.00 - 0.04 K/UL  
 DF AUTOMATED METABOLIC PANEL, COMPREHENSIVE Collection Time: 10/12/18 10:11 PM  
Result Value Ref Range Sodium 137 136 - 145 mmol/L  Potassium 3.8 3.5 - 5.1 mmol/L  
 Chloride 102 97 - 108 mmol/L  
 CO2 28 21 - 32 mmol/L Anion gap 7 5 - 15 mmol/L Glucose 117 (H) 65 - 100 mg/dL BUN 24 (H) 6 - 20 MG/DL Creatinine 1.00 0.70 - 1.30 MG/DL  
 BUN/Creatinine ratio 24 (H) 12 - 20 GFR est AA >60 >60 ml/min/1.73m2 GFR est non-AA >60 >60 ml/min/1.73m2 Calcium 9.4 8.5 - 10.1 MG/DL Bilirubin, total 0.5 0.2 - 1.0 MG/DL  
 ALT (SGPT) 43 12 - 78 U/L  
 AST (SGOT) 36 15 - 37 U/L Alk. phosphatase 91 45 - 117 U/L Protein, total 8.2 6.4 - 8.2 g/dL Albumin 3.7 3.5 - 5.0 g/dL Globulin 4.5 (H) 2.0 - 4.0 g/dL A-G Ratio 0.8 (L) 1.1 - 2.2 SED RATE (ESR) Collection Time: 10/12/18 10:11 PM  
Result Value Ref Range Sed rate, automated 58 (H) 0 - 20 mm/hr Radiologic Studies -  
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 No evidence of acute infarct,  
intracranial hemorrhage, or intracranial mass. Narrative:  EXAM:  CT HEAD WO CONT INDICATION:   L eye pain COMPARISON: None. CONTRAST:  None. TECHNIQUE: Unenhanced CT of the head was performed using 5 mm images. Brain and  
bone windows were generated. CT dose reduction was achieved through use of a  
standardized protocol tailored for this examination and automatic exposure  
control for dose modulation. FINDINGS:  
The ventricles and sulci are normal in size, shape and configuration and  
midline. A small focus of encephalomalacia is again seen in the left posterior  
temporal lobe, compatible with old infarct. There is no significant white matter  
disease. There is no intracranial hemorrhage, extra-axial collection, mass, mass  
effect or midline shift. The basilar cisterns are open. No acute infarct is  
identified. The bone windows demonstrate no abnormalities. The visualized portions of the paranasal sinuses and mastoid air cells are clear. CXR Results  (Last 48 hours) None Medical Decision Making I am the first provider for this patient. I reviewed the vital signs, available nursing notes, past medical history, past surgical history, family history and social history. Vital Signs-Reviewed the patient's vital signs. Patient Vitals for the past 12 hrs: 
 Temp Pulse Resp BP SpO2  
10/12/18 1758 98.6 °F (37 °C) 62 16 179/71 99 % Pulse Oximetry Analysis - 99% on RA Cardiac Monitor:  
Rate: 62 bpm 
Rhythm: Normal Sinus Rhythm Records Reviewed: Nursing Notes, Old Medical Records, Previous Radiology Studies and Previous Laboratory Studies Provider Notes (Medical Decision Making): DDX: 
Corneal abrasion, temporal arteritis, ich, conjunctivitis, sinusitis Plan: 
Head ct, labs, fluorescein testing, analgesic Impression: 
Chronic eye pain, temporal headache ED Course:  
Initial assessment performed. The patients presenting problems have been discussed, and they are in agreement with the care plan formulated and outlined with them. I have encouraged them to ask questions as they arise throughout their visit. I reviewed our electronic medical record system for any past medical records that were available that may contribute to the patients current condition, the nursing notes and and vital signs from today's visit Nursing notes will be reviewed as they become available in realtime while the pt has been in the ED. Amaris Garrett MD 
 
I personally reviewed pt's imaging. Official read by radiology listed above. Amaris Garrett MD 
 
11:45 AM 
Progress note: 
Pt noted to be feeling better, ready for discharge. Discussed lab and imaging findings with pt and/or family, specifically noting head ct without any acute findings. Pt will follow up as instructed.  All questions have been answered, pt voiced understanding and agreement with plan. If narcotics were prescribed, pt was advised not to drive or operate heavy machinery. If abx were prescribed, pt advised that diarrhea and rash are possible side effects of the medications. Specific return precautions provided in addition to instructions for pt to return to the ED immediately should sx worsen at any time he experiences loss of vision, or vomiting. She Steel MD 
 
 
Critical Care Time:  
 
none PLAN: 
1. Discharge Medication List as of 10/12/2018 11:45 PM  
  
START taking these medications Details  
amoxicillin-clavulanate (AUGMENTIN) 875-125 mg per tablet Take 1 Tab by mouth two (2) times a day., Normal, Disp-14 Tab, R-0  
  
predniSONE (STERAPRED DS) 10 mg dose pack Take as directed on packaging, Normal, Disp-48 Tab, R-0  
  
  
CONTINUE these medications which have NOT CHANGED Details  
losartan-hydroCHLOROthiazide (HYZAAR) 100-25 mg per tablet Take 1 Tab by mouth daily. , Normal, Disp-90 Tab, R-3  
  
mometasone (NASONEX) 50 mcg/actuation nasal spray 2 Sprays daily. , Historical Med  
  
amLODIPine (NORVASC) 10 mg tablet Take 0.5 Tabs by mouth daily. , No Print, Disp-90 Tab, R-3  
  
cycloSPORINE (RESTASIS) 0.05 % ophthalmic emulsion Administer 2 Drops to both eyes daily. , NormalPatient needs 90 day supplyDisp-3 Each, R-3  
  
doxycycline (VIBRAMYCIN) 100 mg capsule Take 1 Cap by mouth daily. , Normal, Disp-90 Cap, R-3  
  
nebivolol (BYSTOLIC) 5 mg tablet TAKE ONE TABLET BY MOUTH DAILY, Normal, Disp-90 Tab, R-3  
  
diclofenac EC (VOLTAREN) 75 mg EC tablet Take 1 Tab by mouth two (2) times a day., Normal, Disp-180 Tab, R-3  
  
clopidogrel (PLAVIX) 75 mg tab TAKE ONE TABLET BY MOUTH DAILY, Normal, Disp-90 Tab, R-3  
  
allopurinol (ZYLOPRIM) 100 mg tablet TAKE ONE TABLET BY MOUTH DAILY, Normal, Disp-30 Tab, R-11  
  
silodosin (RAPAFLO) 8 mg capsule Take 8 mg by mouth daily (with breakfast). , Historical Med DOCOSAHEXANOIC ACID/EPA (FISH OIL PO) Take  by mouth., Historical Med  
  
aspirin (ASPIRIN) 325 mg tablet Take 325 mg by mouth daily. , Historical Med Cetirizine 10 mg cap Take 1 Tab by mouth daily. , Historical Med 2. Follow-up Information Follow up With Details Comments Contact Info Bubba Weinberg MD In 2 days  [facility] [address] [facility] [address]. 
[phone] Risa Oh MD Schedule an appointment as soon as possible for a visit in 2 days  [facility] [address] [address] 
[phone] Return to ED if worse Disposition: 
 
11:45 AM 
The patient's results have been reviewed with family and/or caregiver. They verbally convey their understanding and agreement of the patient's signs, symptoms, diagnosis, treatment and prognosis and additionally agree to follow up as recommended in the discharge instructions or to return to the Emergency Room should the patient's condition change prior to their follow-up appointment. The family and/or caregiver verbally agrees with the care-plan and all of their questions have been answered. The discharge instructions have also been provided to the them with educational information regarding the patient's diagnosis as well a list of reasons why the patient would want to return to the ER prior to their follow-up appointment should their condition change. Natalia Watters MD 
 
 
Diagnosis Clinical Impression: 1. Acute recurrent sinusitis, unspecified location 2. Acute left eye pain Attestations: This note is prepared by Chauncey Edgar, acting as Scribe for MD Natalia Hamilton MD : The scribe's documentation has been prepared under my direction and personally reviewed by me in its entirety. I confirm that the note above accurately reflects all work, treatment, procedures, and medical decision making performed by me. This note will not be viewable in [address]

## 2018-11-06 PROBLEM — Z00.00 MEDICARE ANNUAL WELLNESS VISIT, SUBSEQUENT: Status: ACTIVE | Noted: 2018-01-05

## 2018-11-07 ENCOUNTER — OFFICE VISIT (OUTPATIENT)
Dept: INTERNAL MEDICINE CLINIC | Age: 83
End: 2018-11-07

## 2018-11-07 VITALS
RESPIRATION RATE: 17 BRPM | OXYGEN SATURATION: 97 % | DIASTOLIC BLOOD PRESSURE: 60 MMHG | BODY MASS INDEX: 28.03 KG/M2 | WEIGHT: 218.4 LBS | HEART RATE: 81 BPM | HEIGHT: 74 IN | SYSTOLIC BLOOD PRESSURE: 138 MMHG

## 2018-11-07 DIAGNOSIS — N18.30 CKD (CHRONIC KIDNEY DISEASE), STAGE III (HCC): ICD-10-CM

## 2018-11-07 DIAGNOSIS — J30.89 NON-SEASONAL ALLERGIC RHINITIS DUE TO OTHER ALLERGIC TRIGGER: ICD-10-CM

## 2018-11-07 DIAGNOSIS — Z00.00 MEDICARE ANNUAL WELLNESS VISIT, SUBSEQUENT: ICD-10-CM

## 2018-11-07 DIAGNOSIS — M15.9 PRIMARY OSTEOARTHRITIS INVOLVING MULTIPLE JOINTS: ICD-10-CM

## 2018-11-07 DIAGNOSIS — I12.9 HYPERTENSION WITH RENAL DISEASE: Primary | ICD-10-CM

## 2018-11-07 DIAGNOSIS — E78.2 MIXED HYPERLIPIDEMIA: ICD-10-CM

## 2018-11-07 DIAGNOSIS — L71.9 ROSACEA: ICD-10-CM

## 2018-11-07 DIAGNOSIS — M10.9 GOUT, UNSPECIFIED CAUSE, UNSPECIFIED CHRONICITY, UNSPECIFIED SITE: ICD-10-CM

## 2018-11-07 DIAGNOSIS — C61 PROSTATE CA (HCC): ICD-10-CM

## 2018-11-07 DIAGNOSIS — I49.1 PAC (PREMATURE ATRIAL CONTRACTION): ICD-10-CM

## 2018-11-07 DIAGNOSIS — I63.9 CEREBROVASCULAR ACCIDENT (CVA), UNSPECIFIED MECHANISM (HCC): ICD-10-CM

## 2018-11-07 RX ORDER — TELMISARTAN AND HYDROCHLORTHIAZIDE 80; 25 MG/1; MG/1
1 TABLET ORAL DAILY
Qty: 30 TAB | Status: SHIPPED | OUTPATIENT
Start: 2018-11-07 | End: 2019-12-03 | Stop reason: SDUPTHER

## 2018-11-07 RX ORDER — AZELASTINE HCL 205.5 UG/1
SPRAY NASAL 2 TIMES DAILY
COMMUNITY
End: 2019-01-22

## 2018-11-07 NOTE — PATIENT INSTRUCTIONS
Arthritis: Care Instructions Your Care Instructions Arthritis, also called osteoarthritis, is a breakdown of the cartilage that cushions your joints. When the cartilage wears down, your bones rub against each other. This causes pain and stiffness. Many people have some arthritis as they age. Arthritis most often affects the joints of the spine, hands, hips, knees, or feet. You can take simple measures to protect your joints, ease your pain, and help you stay active. Follow-up care is a key part of your treatment and safety. Be sure to make and go to all appointments, and call your doctor if you are having problems. It's also a good idea to know your test results and keep a list of the medicines you take. How can you care for yourself at home? · Stay at a healthy weight. Being overweight puts extra strain on your joints. · Talk to your doctor or physical therapist about exercises that will help ease joint pain. ? Stretch. You may enjoy gentle forms of yoga to help keep your joints and muscles flexible. ? Walk instead of jog. Other types of exercise that are less stressful on the joints include riding a bicycle, swimming, kary chi, or water exercise. ? Lift weights. Strong muscles help reduce stress on your joints. Stronger thigh muscles, for example, take some of the stress off of the knees and hips. Learn the right way to lift weights so you do not make joint pain worse. · Take your medicines exactly as prescribed. Call your doctor if you think you are having a problem with your medicine. · Take pain medicines exactly as directed. ? If the doctor gave you a prescription medicine for pain, take it as prescribed. ? If you are not taking a prescription pain medicine, ask your doctor if you can take an over-the-counter medicine. · Use a cane, crutch, walker, or another device if you need help to get around. These can help rest your joints.  You also can use other things to make life easier, such as a higher toilet seat and padded handles on kitchen utensils. · Do not sit in low chairs, which can make it hard to get up. · Put heat or cold on your sore joints as needed. Use whichever helps you most. You also can take turns with hot and cold packs. ? Apply heat 2 or 3 times a day for 20 to 30 minutesusing a heating pad, hot shower, or hot packto relieve pain and stiffness. ? Put ice or a cold pack on your sore joint for 10 to 20 minutes at a time. Put a thin cloth between the ice and your skin. When should you call for help? Call your doctor now or seek immediate medical care if: 
  · You have sudden swelling, warmth, or pain in any joint.  
  · You have joint pain and a fever or rash.  
  · You have such bad pain that you cannot use a joint.  
 Watch closely for changes in your health, and be sure to contact your doctor if: 
  · You have mild joint symptoms that continue even with more than 6 weeks of care at home.  
  · You have stomach pain or other problems with your medicine. Where can you learn more? Go to http://krishna-manpreet.info/. Enter M651 in the search box to learn more about \"Arthritis: Care Instructions. \" Current as of: June 11, 2018 Content Version: 11.8 © 9552-3896 [employer]. Care instructions adapted under license by [employer] (which disclaims liability or warranty for this information). If you have questions about a medical condition or this instruction, always ask your healthcare professional. [place] [address] any warranty or liability for your use of this information.

## 2018-11-07 NOTE — PROGRESS NOTES
Chief Complaint Patient presents with [place] Annual Wellness Visit 1. Have you been to the ER, urgent care clinic since your last visit? Hospitalized since your last visit? Yes, Went to [facility] on October 12, 2018 for pain in eyes. 2. Have you seen or consulted any other health care providers outside of the [address] [place] [address] since your last visit? Include any pap smears or colon screening. Yes, [facility] to figure out sinus problems and has new diagnosis. 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 Visit Vitals /60 (BP 1 Location: Left arm, BP Patient Position: Sitting) Pulse 81 Resp 17 Ht 6' 2\" (1.88 m) Wt 218 lb 6.4 oz (99.1 kg) SpO2 97% BMI 28.04 kg/m²

## 2018-11-07 NOTE — PROGRESS NOTES
This is a Subsequent Medicare Annual Wellness Visit providing Personalized Prevention Plan Services (PPPS) (Performed 12 months after initial AWV and PPPS ) I have reviewed the patient's medical history in detail and updated the computerized patient record. He presents today for his Medicare subsequent annual wellness examination screening questionnaire. He is also here in follow-up of his multiple medical problems include hypertension, hyperlipidemia, CKD stage III, prior CVA, history of PACs, prostate carcinoma, allergic rhinitis, DJD and other medical problems. Since he last saw me he ended up going to the emergency room for evaluation of headache and was subsequent found to have a sinus infection is been seen by Dr. Candie mcnally from ENT as well as seen by an eye doctor and he has gotten better with the current treatment. He currently denies any further headaches and denies any dizziness or neurologic complaints. He denies any chest congestion cough, chest pain, palpitations, PND, orthopnea or cardiorespiratory complaints. He denies any GI or  complaints. He denies any current change of his chronic arthritic complaints and then no other complaints on complete review of systems. He is taking his medications and trying to follow his diet and remain physically active. History Past Medical History:  
Diagnosis Date  Adverse effect of anesthesia   
 very bad gag reflex  Allergic rhinitis 8/19/2017  BPH (benign prostatic hypertrophy) 8/19/2017  Cancer ([address]   
 basal cell carcinoma right ear  Cancer [facility])   
 prostate  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  CKD (chronic kidney disease), stage III ([address] 8/19/2017  CVA (cerebrovascular accident) ([address] 8/19/2017  DJD (degenerative joint disease) 8/19/2017  Gout  Hyperlipidemia 8/19/2017  Hypertension  Hypertension with renal disease 8/19/2017  Meniere disease 8/19/2017  Nausea & vomiting  On statin therapy 8/19/2017  Other ill-defined conditions(799.89) meneres disease  PAC (premature atrial contraction) 8/19/2017  Palpitation 8/19/2017  Prostate CA ([address] 8/19/2017  Pseudophakia of both eyes 10/16/2018  Rosacea 8/19/2017  Stroke [facility]) 2009  
 stroke  Testosterone deficiency 8/19/2017 Past Surgical History:  
Procedure Laterality Date  ABDOMEN SURGERY PROC UNLISTED    
 bilateral inguinal hernia repair  COLORECTAL SCRN; HI RISK IND  5/3/2016  HX HEENT  1970  
 tonsillectomy  HX HEENT  1980  
 basal cell carcinoma right ear  HX PROSTATECTOMY    
 radiation only  MT COLONOSCOPY FLX DX W/COLLJ SPEC WHEN PFRMD  1/19/2011 Social History Tobacco Use  Smoking status: Never Smoker  Smokeless tobacco: Never Used Substance Use Topics  Alcohol use: No  
 Drug use: No  
 
Current Outpatient Medications Medication Sig Dispense Refill  Cetirizine (ZYRTEC) 10 mg cap Take  by mouth.  CIPROFLOXACIN HCL OP Apply  to eye.  Azelastine (ASTEPRO) 0.15 % (205.5 mcg) nasal spray two (2) times a day.  telmisartan-hydroCHLOROthiazide (MICARDIS HCT) 80-25 mg per tablet Take 1 Tab by mouth daily. 30 Tab prn  mometasone (NASONEX) 50 mcg/actuation nasal spray 2 Sprays daily.  amLODIPine (NORVASC) 10 mg tablet Take 0.5 Tabs by mouth daily. 90 Tab 3  cycloSPORINE (RESTASIS) 0.05 % ophthalmic emulsion Administer 2 Drops to both eyes daily. 3 Each 3  
 doxycycline (VIBRAMYCIN) 100 mg capsule Take 1 Cap by mouth daily. 90 Cap 3  
 nebivolol (BYSTOLIC) 5 mg tablet TAKE ONE TABLET BY MOUTH DAILY 90 Tab 3  
 diclofenac EC (VOLTAREN) 75 mg EC tablet Take 1 Tab by mouth two (2) times a day. 180 Tab 3  clopidogrel (PLAVIX) 75 mg tab TAKE ONE TABLET BY MOUTH DAILY 90 Tab 3  
 allopurinol (ZYLOPRIM) 100 mg tablet TAKE ONE TABLET BY MOUTH DAILY 30 Tab 11  
 silodosin (RAPAFLO) 8 mg capsule Take 8 mg by mouth daily (with breakfast).  DOCOSAHEXANOIC ACID/EPA (FISH OIL PO) Take  by mouth.  aspirin (ASPIRIN) 325 mg tablet Take 325 mg by mouth daily. Allergies Allergen Reactions  Sulfanilamide Rash  Sulfa (Sulfonamide Antibiotics) Rash Very mild rash several years ago Family History Problem Relation Age of Onset  Cancer Mother   
     uterine, cervical  
 Cancer Father   
     colon ca Patient Active Problem List  
 Diagnosis  Hypertension with renal disease  Mixed hyperlipidemia  Gout  Primary osteoarthritis involving multiple joints  [facility] ([facility]rebro[facility]) [facility]  CKD (chronic kidney disease), stage III ([facility]  Rosacea  PAC (premature atrial contraction)  Non-seasonal allergic rhinitis  Medicare annual wellness visit, subsequent  Lymph node enlargement  Meniere disease  Testosterone deficiency  Prostate CA ([facility]  Palpitation  BPH (benign prostatic hypertrophy) Patient Care Team: 
Antonio Deluca MD as PCP - General (Internal Medicine) Depression Risk Factor Screening: PHQ over the last two weeks 11/7/2018 Little interest or pleasure in doing things Not at all Feeling down, depressed, irritable, or hopeless Not at all Total Score PHQ 2 0 Alcohol Risk Factor Screening: You do not drink alcohol or very rarely. Functional Ability and Level of Safety:  
 
Fall Risk Fall Risk Assessment, last 12 mths 11/7/2018 Able to walk? Yes Fall in past 12 months? No  
 
 
Hearing Loss  
mild Activities of Daily Living Self-care. ADL Assessment 1/4/2018 Feeding yourself No Help Needed Getting from bed to chair No Help Needed Getting dressed No Help Needed Bathing or showering No Help Needed Walk across the room (includes cane/walker) No Help Needed Using the telphone No Help Needed Taking your medications No Help Needed Preparing meals No Help Needed Managing money (expenses/bills) No Help Needed Moderately strenuous housework (laundry) No Help Needed Shopping for personal items (toiletries/medicines) No Help Needed Shopping for groceries No Help Needed Driving No Help Needed Climbing a flight of stairs No Help Needed Getting to places beyond walking distances No Help Needed Abuse Screen Patient is not abused Social History Social History Narrative  Not on file Review of Systems ROS: 
 
Constitutional: He denies fevers, weight loss, sweats. Eyes: No blurred or double vision. ENT: No difficulty with swallowing, taste, speech or smell. Neck: no stiffness or swelling Respiratory: No cough wheezing or shortness of breath. Cardiovascular: Denies chest pain, palpitations, unexplained indigestion or syncope. Gastrointestinal:  No changes in bowel movements, no abdominal pain, no bloating. Genitourinary:  He denies frequency, nocturia or stranguria. Extremities: No joint pain, stiffness or swelling. Neurological:  No numbness, tingling, burring paresthesias or loss of motor strength. No syncope, dizziness or frequent headache Lymphatic: no adenopathy noted Hematologic: no easy bruising or bleeding gums Skin:  No recent rashes or mole changes. Psychiatric/Behavioral:  Negative for depression. Physical Examination Evaluation of Cognitive Function: 
Mood/affect:  happy Appearance: age appropriate Family member/caregiver input: wife Visit Vitals /60 (BP 1 Location: Left arm, BP Patient Position: Sitting) Pulse 81 Resp 17 Ht 6' 2\" (1.88 m) Wt 218 lb 6.4 oz (99.1 kg) SpO2 97% BMI 28.04 kg/m² Vitals:  
 11/07/18 1028 BP: 138/60 Pulse: 81 Resp: 17 SpO2: 97% Weight: 218 lb 6.4 oz (99.1 kg) Height: 6' 2\" (1.88 m) PainSc:   0 - No pain PHYSICAL EXAM: 
 
General appearance - alert, well appearing, and in no distress Mental status - alert, oriented to person, place, and time HEENT: 
Ears - bilateral TM's and external ear canals clear Eyes - pupillary responses were normal.  Extraocular muscle function intact. Lids and conjunctiva not injected. Fundoscopic exam revealed sharp disc margins. eye movements intact Pharynx- clear with teeth in good repair. No masses were noted Neck - supple without thyromegaly or burit. No JVD noted Lungs - clear to auscultation and percussion Cardiac- normal rate, regular rhythm without murmurs. PMI not displaced. No gallop, rub or click Abdomen - flat, soft, non-tender without palpable organomegaly or mass. No pulsatile mass was felt, and not bruit was heard. Bowel sounds were active : Circumcised, Testes descended w/o masses Rectal: normal sphincter tone, prostate normal, no masses, stool brown and hemacult negative Extremities -  no clubbing cyanosis or edema Lymphatics - no palpable lymphadenopathy, no hepatosplenomegaly Hematologic: no petechiae or purpura Peripheral vascular -Femoral, Dorsalis pedis and posterior tibial pulses felt without difficulty Skin - no rash or unusual mole change noted Neurological - Cranial nerves II-XII grossly intact. Motor strength 5/5. DTR's 2+ and symmetric. Station and gait normal 
Back exam - full range of motion, no tenderness, palpable spasm or pain on motion Musculoskeletal - no joint tenderness, deformity or swelling Results for orders placed or performed during the hospital encounter of 10/12/18 CBC WITH AUTOMATED DIFF Result Value Ref Range WBC 8.9 4.1 - 11.1 K/uL  
 RBC 4.45 4.10 - 5.70 M/uL  
 HGB 14.7 12.1 - 17.0 g/dL HCT 41.4 36.6 - 50.3 % MCV 93.0 80.0 - 99.0 FL  
 MCH 33.0 26.0 - 34.0 PG  
 MCHC 35.5 30.0 - 36.5 g/dL  
 RDW 12.8 11.5 - 14.5 % PLATELET 260 508 - 839 K/uL MPV 9.4 8.9 - 12.9 FL  
 NRBC 0.0 0  WBC ABSOLUTE NRBC 0.00 0.00 - 0.01 K/uL NEUTROPHILS 73 32 - 75 % LYMPHOCYTES 14 12 - 49 % MONOCYTES 12 5 - 13 % EOSINOPHILS 0 0 - 7 % BASOPHILS 0 0 - 1 % IMMATURE GRANULOCYTES 0 0.0 - 0.5 % ABS. NEUTROPHILS 6.5 1.8 - 8.0 K/UL  
 ABS. LYMPHOCYTES 1.3 0.8 - 3.5 K/UL  
 ABS. MONOCYTES 1.1 (H) 0.0 - 1.0 K/UL  
 ABS. EOSINOPHILS 0.0 0.0 - 0.4 K/UL  
 ABS. BASOPHILS 0.0 0.0 - 0.1 K/UL  
 ABS. IMM. GRANS. 0.0 0.00 - 0.04 K/UL  
 DF AUTOMATED METABOLIC PANEL, COMPREHENSIVE Result Value Ref Range Sodium 137 136 - 145 mmol/L Potassium 3.8 3.5 - 5.1 mmol/L Chloride 102 97 - 108 mmol/L  
 CO2 28 21 - 32 mmol/L Anion gap 7 5 - 15 mmol/L Glucose 117 (H) 65 - 100 mg/dL BUN 24 (H) 6 - 20 MG/DL Creatinine 1.00 0.70 - 1.30 MG/DL  
 BUN/Creatinine ratio 24 (H) 12 - 20 GFR est AA >60 >60 ml/min/1.73m2 GFR est non-AA >60 >60 ml/min/1.73m2 Calcium 9.4 8.5 - 10.1 MG/DL Bilirubin, total 0.5 0.2 - 1.0 MG/DL  
 ALT (SGPT) 43 12 - 78 U/L  
 AST (SGOT) 36 15 - 37 U/L Alk. phosphatase 91 45 - 117 U/L Protein, total 8.2 6.4 - 8.2 g/dL Albumin 3.7 3.5 - 5.0 g/dL Globulin 4.5 (H) 2.0 - 4.0 g/dL A-G Ratio 0.8 (L) 1.1 - 2.2 SED RATE (ESR) Result Value Ref Range Sed rate, automated 58 (H) 0 - 20 mm/hr Advice/Referrals/Counseling Education and counseling provided: 
Are appropriate based on today's review and evaluation End-of-Life planning (with patient's consent) Pneumococcal Vaccine Influenza Vaccine Colorectal cancer screening tests Assessment/Plan ASSESSMENT:  
1. Hypertension with renal disease 2. Mixed hyperlipidemia 3. Primary osteoarthritis involving multiple joints 4. Cerebrovascular accident (CVA), unspecified mechanism ([address] 5. CKD (chronic kidney disease), stage III ([facility] 6. Gout, unspecified cause, unspecified chronicity, unspecified site 7. PAC (premature atrial contraction) 8. Rosacea 9. Non-seasonal allergic rhinitis due to other allergic trigger 10. Medicare annual wellness visit, subsequent 11. Prostate CA ([facility] Impression 1. Hypertension with a labile component he brought in a list of blood pressures from home and in light of the labile blood pressure list which is losartan HCT to losartan HCT and hopefully insurance will cover that since I think that would give us better control. I reviewed his other medicines and he will continue his amlodipine 10 mg daily as well as his Bystolic 5 mg daily. 2.  Hyperlipidemia prior labs reviewed repeat status pending I will adjust if necessary. 3 DJD that is stable 4. CKD repeat status is pending next #5 prior CVA continue aspirin daily 6. Gout that is stable 7. History of PACs appears to be normal today 8. Rosacea stable 9. Allergic rhinitis stable 10. Prostate carcinoma repeat PSA pending Medicare subsequent annual wellness examination screening questionnaire is completed today. The results were reviewed with he and his wife and the questions were answered. Lifestyle recommendations and modifications discussed and made. I will call with lab results and make further recommendations or adjustments if necessary. Follow stable continue same and I will recheck a myself again and 3 months or sooner should there be a problem. He will continue to monitor his blood pressures at home. PLAN: 
. Orders Placed This Encounter  T4, FREE  
 METABOLIC PANEL, COMPREHENSIVE  
 TSH 3RD GENERATION  
 LIPID PANEL  
 CK  PROSTATE SPECIFIC AG  
 AMB POC COMPLETE CBC,AUTOMATED ENTER  AMB POC URINALYSIS DIP STICK AUTO W/ MICRO  Cetirizine (ZYRTEC) 10 mg cap  CIPROFLOXACIN HCL OP  
 Azelastine (ASTEPRO) 0.15 % (205.5 mcg) nasal spray  telmisartan-hydroCHLOROthiazide (MICARDIS HCT) 80-25 mg per tablet ATTENTION:  
This medical record was transcribed using an electronic medical records system.   Although proofread, it may and can contain electronic and spelling errors. Other human spelling and other errors may be present. Corrections may be executed at a later time. Please feel free to contact us for any clarifications as needed. Follow-up Disposition: 
Return in about 3 months (around 2/7/2019). Lee Ann Valera MD 
 
Recommended healthy diet low in carbohydrates, fats, sodium and cholesterol. Recommended regular cardiovascular exercise 3-6 times per week for 30-60 minutes daily. Current Outpatient Medications Medication Sig Dispense Refill  Cetirizine (ZYRTEC) 10 mg cap Take  by mouth.  CIPROFLOXACIN HCL OP Apply  to eye.  Azelastine (ASTEPRO) 0.15 % (205.5 mcg) nasal spray two (2) times a day.  telmisartan-hydroCHLOROthiazide (MICARDIS HCT) 80-25 mg per tablet Take 1 Tab by mouth daily. 30 Tab prn  mometasone (NASONEX) 50 mcg/actuation nasal spray 2 Sprays daily.  amLODIPine (NORVASC) 10 mg tablet Take 0.5 Tabs by mouth daily. 90 Tab 3  cycloSPORINE (RESTASIS) 0.05 % ophthalmic emulsion Administer 2 Drops to both eyes daily. 3 Each 3  
 doxycycline (VIBRAMYCIN) 100 mg capsule Take 1 Cap by mouth daily. 90 Cap 3  
 nebivolol (BYSTOLIC) 5 mg tablet TAKE ONE TABLET BY MOUTH DAILY 90 Tab 3  
 diclofenac EC (VOLTAREN) 75 mg EC tablet Take 1 Tab by mouth two (2) times a day. 180 Tab 3  clopidogrel (PLAVIX) 75 mg tab TAKE ONE TABLET BY MOUTH DAILY 90 Tab 3  
 allopurinol (ZYLOPRIM) 100 mg tablet TAKE ONE TABLET BY MOUTH DAILY 30 Tab 11  
 silodosin (RAPAFLO) 8 mg capsule Take 8 mg by mouth daily (with breakfast).  DOCOSAHEXANOIC ACID/EPA (FISH OIL PO) Take  by mouth.  aspirin (ASPIRIN) 325 mg tablet Take 325 mg by mouth daily. No results found for any visits on 11/07/18. Verbal and written instructions (see AVS) provided. Patient expresses understanding of diagnosis and treatment plan.  
 
Lee Ann Valera MD

## 2018-11-08 LAB
ALBUMIN SERPL-MCNC: 3.7 G/DL (ref 3.5–4.7)
ALBUMIN/GLOB SERPL: 1.4 {RATIO} (ref 1.2–2.2)
ALP SERPL-CCNC: 87 IU/L (ref 39–117)
ALT SERPL-CCNC: 59 IU/L (ref 0–44)
AST SERPL-CCNC: 54 IU/L (ref 0–40)
BACTERIA UA POCT, BACTPOCT: NORMAL
BILIRUB SERPL-MCNC: 0.4 MG/DL (ref 0–1.2)
BILIRUB UR QL STRIP: NEGATIVE
BUN SERPL-MCNC: 14 MG/DL (ref 8–27)
BUN/CREAT SERPL: 14 (ref 10–24)
CALCIUM SERPL-MCNC: 9 MG/DL (ref 8.6–10.2)
CASTS UA POCT: 0
CHLORIDE SERPL-SCNC: 102 MMOL/L (ref 96–106)
CHOLEST SERPL-MCNC: 121 MG/DL (ref 100–199)
CK SERPL-CCNC: 65 U/L (ref 24–204)
CLUE CELLS, CLUEPOCT: NEGATIVE
CO2 SERPL-SCNC: 27 MMOL/L (ref 20–29)
CREAT SERPL-MCNC: 0.98 MG/DL (ref 0.76–1.27)
CRYSTALS UA POCT, CRYSPOCT: NEGATIVE
EPITHELIAL CELLS POCT: NEGATIVE
GLOBULIN SER CALC-MCNC: 2.7 G/DL (ref 1.5–4.5)
GLUCOSE SERPL-MCNC: 120 MG/DL (ref 65–99)
GLUCOSE UR-MCNC: NEGATIVE MG/DL
GRAN# POC: 1.9 K/UL (ref 2–7.8)
GRAN% POC: 46.7 % (ref 37–92)
HCT VFR BLD CALC: 39.7 % (ref 37–51)
HDLC SERPL-MCNC: 33 MG/DL
HGB BLD-MCNC: 13.3 G/DL (ref 12–18)
KETONES P FAST UR STRIP-MCNC: NEGATIVE MG/DL
LDLC SERPL CALC-MCNC: 61 MG/DL (ref 0–99)
LY# POC: 1.5 K/UL (ref 0.6–4.1)
LY% POC: 44.9 % (ref 10–58.5)
MCH RBC QN: 32 PG (ref 26–32)
MCHC RBC-ENTMCNC: 33.6 G/DL (ref 30–36)
MCV RBC: 95 FL (ref 80–97)
MID #, POC: 0.2 K/UL (ref 0–1.8)
MID% POC: 8.4 % (ref 0.1–24)
MUCUS UA POCT, MUCPOCT: NORMAL
PH UR STRIP: 6 [PH] (ref 5–7)
PLATELET # BLD: 308 K/UL (ref 140–440)
POTASSIUM SERPL-SCNC: 4.1 MMOL/L (ref 3.5–5.2)
PROT SERPL-MCNC: 6.4 G/DL (ref 6–8.5)
PROT UR QL STRIP: NEGATIVE
PSA SERPL-MCNC: 0.3 NG/ML (ref 0–4)
RBC # BLD: 4.17 M/UL (ref 4.2–6.3)
RBC UA POCT, RBCPOCT: 0
SODIUM SERPL-SCNC: 140 MMOL/L (ref 134–144)
SP GR UR STRIP: 1.02 (ref 1.01–1.02)
T4 FREE SERPL-MCNC: 1.23 NG/DL (ref 0.82–1.77)
TRICH UA POCT, TRICHPOC: NEGATIVE
TRIGL SERPL-MCNC: 136 MG/DL (ref 0–149)
TSH SERPL DL<=0.005 MIU/L-ACNC: 4.25 UIU/ML (ref 0.45–4.5)
UA UROBILINOGEN AMB POC: NORMAL (ref 0.2–1)
URINALYSIS CLARITY POC: CLEAR
URINALYSIS COLOR POC: NORMAL
URINE BLOOD POC: NEGATIVE
URINE CULT COMMENT, POCT: NORMAL
URINE LEUKOCYTES POC: NEGATIVE
URINE NITRITES POC: NEGATIVE
VLDLC SERPL CALC-MCNC: 27 MG/DL (ref 5–40)
WBC # BLD: 3.6 K/UL (ref 4.1–10.9)
WBC UA POCT, WBCPOCT: 0
YEAST UA POCT, YEASTPOC: NEGATIVE

## 2018-12-13 ENCOUNTER — HOSPITAL ENCOUNTER (OUTPATIENT)
Dept: CT IMAGING | Age: 83
Discharge: HOME | End: 2018-12-13
Attending: OTOLARYNGOLOGY
Payer: MEDICARE

## 2018-12-13 DIAGNOSIS — J32.9 CHRONIC INFECTION OF SINUS: ICD-10-CM

## 2018-12-13 PROCEDURE — 70486 CT MAXILLOFACIAL W/O DYE: CPT

## 2019-01-11 NOTE — PERIOP NOTES
[facility] Ambulatory Surgery Unit Pre-operative Instructions Surgery/Procedure Date  1/22/19         Tentative Arrival Time 8178 1. On the day of your surgery/procedure, please report to the Ambulatory Surgery Unit Registration Desk and sign in at your designated time. The Ambulatory Surgery Unit is located in [facility] on the [facility] side of the hospitals across from the [address] [place] [address]. Please have all of your health insurance cards and a photo ID. 2. You must have someone with you to drive you home, as you should not drive a car for 24 hours following anesthesia. Please make arrangements for a responsible adult friend or family member to stay with you for at least the first 24 hours after your surgery. 3. Do not have anything to eat or drink (including water, gum, mints, coffee, juice) after 11:59 PM  01/21/19. This may not apply to medications prescribed by your physician. (Please note below the special instructions with medications to take the morning of surgery, if applicable.) 4. We recommend you do not drink any alcoholic beverages for 24 hours before and after your surgery. 5. Contact your surgeons office for instructions on the following medications: non-steroidal anti-inflammatory drugs (i.e. Advil, Aleve), vitamins, and supplements. (Some surgeons will want you to stop these medications prior to surgery and others may allow you to take them) **If you are currently taking Plavix, Coumadin, Aspirin and/or other blood-thinning agents, contact your surgeon for instructions. ** Your surgeon will partner with the physician prescribing these medications to determine if it is safe to stop or if you need to continue taking. Please do not stop taking these medications without instructions from your surgeon.  
 
6. In an effort to help prevent surgical site infection, we ask that you shower with an anti-bacterial soap (i.e. Dial/Safeguard, or the soap provided to you at your preadmission testing appointment) for 3 days prior to and on the morning of surgery, using a fresh towel after each shower. (Please begin this process with fresh bed linens.) Do not apply any lotions, powders, or deodorants after the shower on the day of your procedure. If applicable, please do not shave the operative site for 48 hours prior to surgery. 7. Wear comfortable clothes. Wear glasses instead of contacts. Do not bring any jewelry or money (other than copays or fees as instructed). Do not wear make-up, particularly mascara, the morning of your surgery. Do not wear nail polish, particularly if you are having foot /hand surgery. Wear your hair loose or down, no ponytails, buns, haley pins or clips. All body piercings must be removed. 8. You should understand that if you do not follow these instructions your surgery may be cancelled. If your physical condition changes (i.e. fever, cold or flu) please contact your surgeon as soon as possible. 9. It is important that you be on time. If a situation occurs where you may be late, or if you have any questions or problems, please call [phone]. 
 
10. Your surgery time may be subject to change. You will receive a phone call the day prior to surgery to confirm your arrival time. Special Instructions: Take all medications and inhalers, as prescribed, on the morning of surgery with a sip of water EXCEPT: Plavix and aspirin per Dr Tosha Harris I understand a pre-operative phone call will be made to verify my surgery time. In the event that I am not available, I give permission for a message to be left on my answering service and/or with another person? yes 
 
 
 
 ___________________      ___________________      ________________ 
(Signature of Patient)          (Witness)                   (Date and Time)Preop instructions reviewed  Pt verbalized understanding.

## 2019-01-21 ENCOUNTER — ANESTHESIA EVENT (OUTPATIENT)
Dept: SURGERY | Age: 84
End: 2019-01-21
Payer: MEDICARE

## 2019-01-22 ENCOUNTER — HOSPITAL ENCOUNTER (OUTPATIENT)
Age: 84
Setting detail: OUTPATIENT SURGERY
Discharge: HOME | End: 2019-01-22
Attending: OTOLARYNGOLOGY | Admitting: OTOLARYNGOLOGY
Payer: MEDICARE

## 2019-01-22 ENCOUNTER — ANESTHESIA (OUTPATIENT)
Dept: SURGERY | Age: 84
End: 2019-01-22
Payer: MEDICARE

## 2019-01-22 VITALS
HEIGHT: 74 IN | OXYGEN SATURATION: 96 % | HEART RATE: 57 BPM | DIASTOLIC BLOOD PRESSURE: 50 MMHG | WEIGHT: 218 LBS | TEMPERATURE: 97.6 F | SYSTOLIC BLOOD PRESSURE: 108 MMHG | BODY MASS INDEX: 27.98 KG/M2 | RESPIRATION RATE: 13 BRPM

## 2019-01-22 PROCEDURE — 76030000020 HC AMB SURG 1.5 TO 2 HR INTENSV-TIER 1: Performed by: OTOLARYNGOLOGY

## 2019-01-22 PROCEDURE — 77030034511 HC PK NSL XEROGEL DISOLV ENTM -C: Performed by: OTOLARYNGOLOGY

## 2019-01-22 PROCEDURE — 74011000250 HC RX REV CODE- 250: Performed by: OTOLARYNGOLOGY

## 2019-01-22 PROCEDURE — 76210000036 HC AMBSU PH I REC 1.5 TO 2 HR: Performed by: OTOLARYNGOLOGY

## 2019-01-22 PROCEDURE — 77030006908 HC BLD SNUS SHV MEDT -D: Performed by: OTOLARYNGOLOGY

## 2019-01-22 PROCEDURE — 77030008684 HC TU ET CUF COVD -B: Performed by: NURSE ANESTHETIST, CERTIFIED REGISTERED

## 2019-01-22 PROCEDURE — 74011250636 HC RX REV CODE- 250/636

## 2019-01-22 PROCEDURE — 77030013079 HC BLNKT BAIR HGGR 3M -A: Performed by: NURSE ANESTHETIST, CERTIFIED REGISTERED

## 2019-01-22 PROCEDURE — 77030020256 HC SOL INJ NACL 0.9%  500ML: Performed by: OTOLARYNGOLOGY

## 2019-01-22 PROCEDURE — 77030026438 HC STYL ET INTUB CARD -A: Performed by: NURSE ANESTHETIST, CERTIFIED REGISTERED

## 2019-01-22 PROCEDURE — 77030003666 HC NDL SPINAL BD -A: Performed by: OTOLARYNGOLOGY

## 2019-01-22 PROCEDURE — 77030018836 HC SOL IRR NACL ICUM -A: Performed by: OTOLARYNGOLOGY

## 2019-01-22 PROCEDURE — 77030020255 HC SOL INJ LR 1000ML BG: Performed by: OTOLARYNGOLOGY

## 2019-01-22 PROCEDURE — 88305 TISSUE EXAM BY PATHOLOGIST: CPT

## 2019-01-22 PROCEDURE — 74011250636 HC RX REV CODE- 250/636: Performed by: OTOLARYNGOLOGY

## 2019-01-22 PROCEDURE — 74011250637 HC RX REV CODE- 250/637: Performed by: OTOLARYNGOLOGY

## 2019-01-22 PROCEDURE — 76210000050 HC AMBSU PH II REC 0.5 TO 1 HR: Performed by: OTOLARYNGOLOGY

## 2019-01-22 PROCEDURE — 74011250636 HC RX REV CODE- 250/636: Performed by: ANESTHESIOLOGY

## 2019-01-22 PROCEDURE — 77030006689 HC BLD OPHTH BVR BD -A: Performed by: OTOLARYNGOLOGY

## 2019-01-22 PROCEDURE — 74011000250 HC RX REV CODE- 250

## 2019-01-22 PROCEDURE — 76060000063 HC AMB SURG ANES 1.5 TO 2 HR: Performed by: OTOLARYNGOLOGY

## 2019-01-22 RX ORDER — ACETAMINOPHEN 10 MG/ML
INJECTION, SOLUTION INTRAVENOUS AS NEEDED
Status: DISCONTINUED | OUTPATIENT
Start: 2019-01-22 | End: 2019-01-22 | Stop reason: HOSPADM

## 2019-01-22 RX ORDER — CEFAZOLIN SODIUM/WATER 2 G/20 ML
2 SYRINGE (ML) INTRAVENOUS ONCE
Status: COMPLETED | OUTPATIENT
Start: 2019-01-22 | End: 2019-01-22

## 2019-01-22 RX ORDER — SODIUM CHLORIDE 0.9 % (FLUSH) 0.9 %
5-40 SYRINGE (ML) INJECTION EVERY 8 HOURS
Status: DISCONTINUED | OUTPATIENT
Start: 2019-01-22 | End: 2019-01-22 | Stop reason: HOSPADM

## 2019-01-22 RX ORDER — LIDOCAINE HYDROCHLORIDE 20 MG/ML
INJECTION, SOLUTION EPIDURAL; INFILTRATION; INTRACAUDAL; PERINEURAL AS NEEDED
Status: DISCONTINUED | OUTPATIENT
Start: 2019-01-22 | End: 2019-01-22 | Stop reason: HOSPADM

## 2019-01-22 RX ORDER — SUCCINYLCHOLINE CHLORIDE 20 MG/ML
INJECTION INTRAMUSCULAR; INTRAVENOUS AS NEEDED
Status: DISCONTINUED | OUTPATIENT
Start: 2019-01-22 | End: 2019-01-22 | Stop reason: HOSPADM

## 2019-01-22 RX ORDER — GENTAMICIN SULFATE 1 MG/G
CREAM TOPICAL ONCE
Status: COMPLETED | OUTPATIENT
Start: 2019-01-22 | End: 2019-01-22

## 2019-01-22 RX ORDER — HYDROMORPHONE HYDROCHLORIDE 1 MG/ML
.2-.5 INJECTION, SOLUTION INTRAMUSCULAR; INTRAVENOUS; SUBCUTANEOUS ONCE
Status: DISCONTINUED | OUTPATIENT
Start: 2019-01-22 | End: 2019-01-22 | Stop reason: HOSPADM

## 2019-01-22 RX ORDER — SODIUM CHLORIDE, SODIUM LACTATE, POTASSIUM CHLORIDE, CALCIUM CHLORIDE 600; 310; 30; 20 MG/100ML; MG/100ML; MG/100ML; MG/100ML
25 INJECTION, SOLUTION INTRAVENOUS CONTINUOUS
Status: DISCONTINUED | OUTPATIENT
Start: 2019-01-22 | End: 2019-01-22 | Stop reason: HOSPADM

## 2019-01-22 RX ORDER — LIDOCAINE HYDROCHLORIDE 10 MG/ML
0.1 INJECTION, SOLUTION EPIDURAL; INFILTRATION; INTRACAUDAL; PERINEURAL AS NEEDED
Status: DISCONTINUED | OUTPATIENT
Start: 2019-01-22 | End: 2019-01-22 | Stop reason: HOSPADM

## 2019-01-22 RX ORDER — TRIAMCINOLONE ACETONIDE 1 MG/G
CREAM TOPICAL ONCE
Status: COMPLETED | OUTPATIENT
Start: 2019-01-22 | End: 2019-01-22

## 2019-01-22 RX ORDER — LIDOCAINE HYDROCHLORIDE AND EPINEPHRINE 10; 10 MG/ML; UG/ML
1.5 INJECTION, SOLUTION INFILTRATION; PERINEURAL ONCE
Status: COMPLETED | OUTPATIENT
Start: 2019-01-22 | End: 2019-01-22

## 2019-01-22 RX ORDER — MORPHINE SULFATE 10 MG/ML
2 INJECTION, SOLUTION INTRAMUSCULAR; INTRAVENOUS
Status: DISCONTINUED | OUTPATIENT
Start: 2019-01-22 | End: 2019-01-22 | Stop reason: HOSPADM

## 2019-01-22 RX ORDER — DIPHENHYDRAMINE HYDROCHLORIDE 50 MG/ML
12.5 INJECTION, SOLUTION INTRAMUSCULAR; INTRAVENOUS AS NEEDED
Status: DISCONTINUED | OUTPATIENT
Start: 2019-01-22 | End: 2019-01-22 | Stop reason: HOSPADM

## 2019-01-22 RX ORDER — OXYMETAZOLINE HCL 0.05 %
3 SPRAY, NON-AEROSOL (ML) NASAL ONCE
Status: COMPLETED | OUTPATIENT
Start: 2019-01-22 | End: 2019-01-22

## 2019-01-22 RX ORDER — ROCURONIUM BROMIDE 10 MG/ML
INJECTION, SOLUTION INTRAVENOUS AS NEEDED
Status: DISCONTINUED | OUTPATIENT
Start: 2019-01-22 | End: 2019-01-22 | Stop reason: HOSPADM

## 2019-01-22 RX ORDER — PROPOFOL 10 MG/ML
INJECTION, EMULSION INTRAVENOUS AS NEEDED
Status: DISCONTINUED | OUTPATIENT
Start: 2019-01-22 | End: 2019-01-22 | Stop reason: HOSPADM

## 2019-01-22 RX ORDER — SODIUM CHLORIDE 0.9 % (FLUSH) 0.9 %
5-40 SYRINGE (ML) INJECTION AS NEEDED
Status: DISCONTINUED | OUTPATIENT
Start: 2019-01-22 | End: 2019-01-22 | Stop reason: HOSPADM

## 2019-01-22 RX ORDER — PHENYLEPHRINE 10 MG/250 ML(40 MCG/ML)IN 0.9 % SOD.CHLORIDE INTRAVENOUS
Status: DISCONTINUED | OUTPATIENT
Start: 2019-01-22 | End: 2019-01-22 | Stop reason: HOSPADM

## 2019-01-22 RX ORDER — EPHEDRINE SULFATE 50 MG/ML
INJECTION, SOLUTION INTRAVENOUS AS NEEDED
Status: DISCONTINUED | OUTPATIENT
Start: 2019-01-22 | End: 2019-01-22 | Stop reason: HOSPADM

## 2019-01-22 RX ORDER — OXYCODONE AND ACETAMINOPHEN 5; 325 MG/1; MG/1
1 TABLET ORAL
Status: DISCONTINUED | OUTPATIENT
Start: 2019-01-22 | End: 2019-01-22 | Stop reason: HOSPADM

## 2019-01-22 RX ORDER — FENTANYL CITRATE 50 UG/ML
INJECTION, SOLUTION INTRAMUSCULAR; INTRAVENOUS AS NEEDED
Status: DISCONTINUED | OUTPATIENT
Start: 2019-01-22 | End: 2019-01-22 | Stop reason: HOSPADM

## 2019-01-22 RX ORDER — OXYMETAZOLINE HCL 0.05 %
2 SPRAY, NON-AEROSOL (ML) NASAL ONCE
Status: COMPLETED | OUTPATIENT
Start: 2019-01-22 | End: 2019-01-22

## 2019-01-22 RX ORDER — FENTANYL CITRATE 50 UG/ML
25 INJECTION, SOLUTION INTRAMUSCULAR; INTRAVENOUS
Status: DISCONTINUED | OUTPATIENT
Start: 2019-01-22 | End: 2019-01-22 | Stop reason: HOSPADM

## 2019-01-22 RX ORDER — ONDANSETRON 2 MG/ML
INJECTION INTRAMUSCULAR; INTRAVENOUS AS NEEDED
Status: DISCONTINUED | OUTPATIENT
Start: 2019-01-22 | End: 2019-01-22 | Stop reason: HOSPADM

## 2019-01-22 RX ORDER — BACITRACIN ZINC 500 UNIT/G
OINTMENT (GRAM) TOPICAL ONCE
Status: COMPLETED | OUTPATIENT
Start: 2019-01-22 | End: 2019-01-22

## 2019-01-22 RX ADMIN — SUCCINYLCHOLINE CHLORIDE 140 MG: 20 INJECTION INTRAMUSCULAR; INTRAVENOUS at 09:02

## 2019-01-22 RX ADMIN — SODIUM CHLORIDE, SODIUM LACTATE, POTASSIUM CHLORIDE, AND CALCIUM CHLORIDE 25 ML/HR: 600; 310; 30; 20 INJECTION, SOLUTION INTRAVENOUS at 08:22

## 2019-01-22 RX ADMIN — EPHEDRINE SULFATE 5 MG: 50 INJECTION, SOLUTION INTRAVENOUS at 09:24

## 2019-01-22 RX ADMIN — ONDANSETRON 4 MG: 2 INJECTION INTRAMUSCULAR; INTRAVENOUS at 10:04

## 2019-01-22 RX ADMIN — ROCURONIUM BROMIDE 20 MG: 10 INJECTION, SOLUTION INTRAVENOUS at 09:11

## 2019-01-22 RX ADMIN — FENTANYL CITRATE 50 MCG: 50 INJECTION, SOLUTION INTRAMUSCULAR; INTRAVENOUS at 09:02

## 2019-01-22 RX ADMIN — OXYMETAZOLINE HYDROCHLORIDE 3 SPRAY: 5 SPRAY NASAL at 08:22

## 2019-01-22 RX ADMIN — EPHEDRINE SULFATE 5 MG: 50 INJECTION, SOLUTION INTRAVENOUS at 09:51

## 2019-01-22 RX ADMIN — PHENYLEPHRINE 10 MG/250 ML(40 MCG/ML)IN 0.9 % SOD.CHLORIDE INTRAVENOUS 20 MCG/MIN: at 09:40

## 2019-01-22 RX ADMIN — ACETAMINOPHEN 1000 MG: 10 INJECTION, SOLUTION INTRAVENOUS at 09:33

## 2019-01-22 RX ADMIN — EPHEDRINE SULFATE 5 MG: 50 INJECTION, SOLUTION INTRAVENOUS at 09:33

## 2019-01-22 RX ADMIN — ROCURONIUM BROMIDE 10 MG: 10 INJECTION, SOLUTION INTRAVENOUS at 09:20

## 2019-01-22 RX ADMIN — PROPOFOL 200 MG: 10 INJECTION, EMULSION INTRAVENOUS at 09:02

## 2019-01-22 RX ADMIN — Medication 2 G: at 09:06

## 2019-01-22 RX ADMIN — LIDOCAINE HYDROCHLORIDE 60 MG: 20 INJECTION, SOLUTION EPIDURAL; INFILTRATION; INTRACAUDAL; PERINEURAL at 09:02

## 2019-01-22 RX ADMIN — EPHEDRINE SULFATE 5 MG: 50 INJECTION, SOLUTION INTRAVENOUS at 09:18

## 2019-01-22 RX ADMIN — LIDOCAINE HYDROCHLORIDE 80 MG: 20 INJECTION, SOLUTION EPIDURAL; INFILTRATION; INTRACAUDAL; PERINEURAL at 10:19

## 2019-01-22 NOTE — ANESTHESIA POSTPROCEDURE EVALUATION
Procedure(s): BILATERAL IMAGE GUIDANCE ETHMOIDECTOMY EXPLORATION NASAL FRONTAL DUCT WITH BILATERAL MAXILLARY ANTROSTOMY. Anesthesia Post Evaluation Patient location during evaluation: PACU Note status: Adequate. Level of consciousness: responsive to verbal stimuli and sleepy but conscious Pain management: satisfactory to patient Airway patency: patent Anesthetic complications: no 
Cardiovascular status: acceptable Respiratory status: acceptable Hydration status: acceptable Comments: +Post-Anesthesia Evaluation and Assessment Patient: Olga Lidia Greco MRN: [id number]  SSN: [id number] YOB: 1934  Age: 80 y.o. Sex: male Cardiovascular Function/Vital Signs /50 (BP 1 Location: Right arm, BP Patient Position: Sitting)   Pulse (!) 57   Temp 36.4 °C (97.6 °F)   Resp 13   Ht 6' 2\" (1.88 m)   Wt 98.9 kg (218 lb)   SpO2 96%   BMI 27.99 kg/m² Patient is status post Procedure(s): BILATERAL IMAGE GUIDANCE ETHMOIDECTOMY EXPLORATION NASAL FRONTAL DUCT WITH BILATERAL MAXILLARY ANTROSTOMY. Nausea/Vomiting: Controlled. Postoperative hydration reviewed and adequate. Pain: 
Pain Scale 1: Numeric (0 - 10) (01/22/19 1230) Pain Intensity 1: 0 (01/22/19 1230) Managed. Neurological Status:  
Neuro (WDL): Within Defined Limits (01/22/19 1230) At baseline. Mental Status and Level of Consciousness: Arousable. Pulmonary Status:  
O2 Device: Room air (01/22/19 1230) Adequate oxygenation and airway patent. Complications related to anesthesia: None Post-anesthesia assessment completed. No concerns. Signed By: Mckenna Reed MD  
 1/22/2019 Post anesthesia nausea and vomiting:  controlled Visit Vitals /50 (BP 1 Location: Right arm, BP Patient Position: Sitting) Pulse (!) 57 Temp 36.4 °C (97.6 °F) Resp 13 Ht 6' 2\" (1.88 m) Wt 98.9 kg (218 lb) SpO2 96% BMI 27.99 kg/m²

## 2019-01-22 NOTE — DISCHARGE INSTRUCTIONS
[facility], AXEL Castillo. Dayami Jackson M.D., Ph.D., F.A.C.S. Celia Lopezu, [address] [facility]  [phone]    POST OPERATIVE INSTRUCTIONS-SINUS SURGERY/SEPTOPLASTY    The following instructions are designed to help you recover from surgery as easily as possible. Taking care of yourself can prevent complications. It is very important that you read this sheet often and follow the instructions carefully while you are at home. Your doctor or nurse will be happy to answer any questions. 1. DIET:  You may resume your normal diet in 24 hours. We suggest nothing heavy or greasy and avoid dairy products the first 24 hours. It is important to remember that good overall diet and health promotes healing. 2.  ACTIVITY RESTRICTIONS:  The following guidelines should be followed until your doctor tells you otherwise:   Do not blow your nose, sniff and if you have to sneeze or cough-do with mouth open   Do not lift anything over five pounds.  Do not bend over. Avoid doing things like tying your shoes or picking up things off the floor.  Do not do heavy exercise or play contact sports   Do not strain for a bowel movement. If you are constipated, take a stool softener or a gentle laxative.  Go back to work or school in one week. 3.  HOW TO TAKE CARE OF YOUR NOSE AND SINUSES:  Take the antibiotic prescribed by your doctor. Call if you have any problems or questions. We expect bloody drainage from your nose. You are to change the dressing below your nose as needed and expect to do this 10 to 12 times the first day. We want the drainage to come forward and not down your throat. We suggest you rest and sleep elevated on several pillows on your side. You will have less drainage each day and the swelling will reduce in several days. Call the office if you have bloody saturated gauze more than once an hour.     Clean the nasal openings twice daily with a Q-tip soaked in hydrogen peroxide until clear of all crusts. Take the pain medication prescribed by your doctor as needed for discomfort. No Aspirin, Motrin, Alleve, Advil or similar products for 3 weeks. You make take Tylenol (Acetaminophen) when you no longer need prescribed medication. Do not take Tylenol on top of pain medication because Tylenol is in the pain medication. Avoid smoke, dust, fumes or anything else that might irritate the nose. Protect yourself from any unexpected injury to your nose or face, such as being hit by small children or a restless bedmate. You may find that a cool mist room humidifier is helpful in decreasing the amount of dryness in your nose and mouth. After surgery you should use a nasal saline spray such as Ayr or Simply Saline. Use 4 sprays in each nostril every two hours while awake to help prevent crusts from forming in your nose. 4. RETURN APPOINTMENT:   You need to make a follow-up appointment with your doctor in one week. At this time your nose will be gently and carefully examined and your packing will be removed. 5. NOTIFY YOUR DOCTOR IF YOU HAVE:    A sudden increase in the amount of bleeding from the nose   A fever above 101 degrees F, which persists despite increasing the amount of fluids you take. A person with a fever should try to drink one cup of water each waking hour.  Persistent sharp pain that is not relieved by the pain medication you receive.  Increased swelling or redness of your nose. If you have any questions or concerns following your surgery do not hesitate to contact our office at [phone]    >>>You received an IV form of Tylenol 1000mg during your surgery, you may take tylenol (or pain medication containing Tylenol or Acetaminophen) in 6 hours at 3:30pm.<<<     DO NOT TAKE TYLENOL/ACETAMINOPHEN WITH PERCOCET, [address], [address] [facility].   TAKE NARCOTIC PAIN MEDICATIONS WITH FOOD Narcotics tend to be constipating, we suggest taking a stool softener such as Colace or Miralax (follow package instructions). DO NOT DRIVE WHILE TAKING NARCOTIC PAIN MEDICATIONS. DO NOT TAKE SLEEPING MEDICATIONS OR ANTIANXIETY MEDICATIONS WHILE TAKING NARCOTIC PAIN MEDICATIONS,  ESPECIALLY THE NIGHT OF ANESTHESIA! CPAP PATIENTS BE SURE TO WEAR MACHINE WHENEVER NAPPING OR SLEEPING! DISCHARGE SUMMARY from Nurse    The following personal items collected during your admission are returned to you:   Dental Appliance: Dental Appliances: None  Vision: Visual Aid: Glasses, At home  Hearing Aid:    Jewelry: Jewelry: Ring(wife)  Clothing: Clothing: With patient  Other Valuables: Other Valuables: Other (comment)(hearing aids to wife)  Valuables sent to safe:        PATIENT INSTRUCTIONS:    After General Anesthesia or Intravenous Sedation, for 24 hours or while taking prescription Narcotics:        Someone should be with you for the next 24 hours. For your own safety, a responsible adult must drive you home. · Limit your activities  · Recommended activity: Rest today, up with assistance today. Do not climb stairs or shower unattended for the next 24 hours. · Please start with a soft bland diet and advance as tolerated (no nausea) to regular diet. · If you have a sore throat you should try the following: fluids, warm salt water gargles, or throat lozenges. If it does not improve after several days please follow up with your primary physician. · Do not drive and operate hazardous machinery  · Do not make important personal or business decisions  · Do  not drink alcoholic beverages  · If you have not urinated within 8 hours after discharge, please contact your surgeon on call.     Report the following to your surgeon:  · Excessive pain, swelling, redness or odor of or around the surgical area  · Temperature over 100.5  · Nausea and vomiting lasting longer than 4 hours or if unable to take medications  · Any signs of decreased circulation or nerve impairment to extremity: change in color, persistent  numbness, tingling, coldness or increase pain      · You will receive a Post Operative Call from one of the Recovery Room Nurses on the day after your surgery to check on you. It is very important for us to know how you are recovering after your surgery. If you have an issue or need to speak with someone, please call your surgeon, do not wait for the post operative call. · You may receive an e-mail or letter in the mail from [facility] regarding your experience with us in the Ambulatory Surgery Unit. Your feedback is valuable to us and we appreciate your participation in the survey. · If the above instructions are not adequate or you are having problems after your surgery, call the physician at their office number. · We wish you a speedy recovery ? What to do at Home:      *  Please give a list of your current medications to your Primary Care Provider. *  Please update this list whenever your medications are discontinued, doses are      changed, or new medications (including over-the-counter products) are added. *  Please carry medication information at all times in case of emergency situations. These are general instructions for a healthy lifestyle:    No smoking/ No tobacco products/ Avoid exposure to second hand smoke    Surgeon General's Warning:  Quitting smoking now greatly reduces serious risk to your health.     Obesity, smoking, and sedentary lifestyle greatly increases your risk for illness    A healthy diet, regular physical exercise & weight monitoring are important for maintaining a healthy lifestyle    You may be retaining fluid if you have a history of heart failure or if you experience any of the following symptoms:  Weight gain of 3 pounds or more overnight or 5 pounds in a week, increased swelling in our hands or feet or shortness of breath while lying flat in bed. Please call your doctor as soon as you notice any of these symptoms; do not wait until your next office visit. Recognize signs and symptoms of STROKE:    B - Balance  E - Eyes    F-  Face looks uneven  A-  Arms unable to move or move even  S-  Speech slurred or non-existent  T-  Time-call 911 as soon as signs and symptoms begin-DO NOT go       Back to bed or wait to see if you get better-TIME IS BRAIN. If you have not received your influenza and/or pneumococcal vaccine, please follow up with your primary care physician. The discharge information has been reviewed with the patient and caregiver. The patient and caregiver verbalized understanding. [place] THROMBOSIS AND PULMONARY EMBOLUS    SURGICAL PATIENTS  Surgical patients are the #1 risk for DVT and PE. WHAT IS DVT? WHAT IS PE?  DVT is a serious condition where blood clots develop deep in the veins of the legs. PE occurs when a blood clot breaks loose from the wall of a vein and travels to the lungs blocking the pulmonary artery or one of its branches impairing blood flow from the heart, which could result in death.   RISK FACTORS   Surgery lasting longer than 45 minutes   History of inflammatory bowel disease   Oral contraceptive or hormone replacement therapy   Immobilization   Varicose veins / swollen legs   Smoking    CHF / Acute MI / Irregular heart beat   Family history of thrombosis   General anesthesia greater than 2 hours   Obesity   Infection of less than one month   Less than 1 month postpartum   COPD / Pneumonia   Arthroscopic surgery   Malignancy / cancer   Spine surgery   Blood abnormalities   Stroke / Paralysis / Coma    SIGNS AND SYMPTOMS OF DEEP VEIN TROMBOSIS  Usually occurs in one leg, above or below the knee   Swelling - one calf or thigh may be larger than the other   Feeling increased warmth in the area of the leg that is swollen or painful   Leg pain, which may increase when standing or walking   Swelling along the vein of the leg   When swollen areas is pressed with a finger, a depression may remain   Tenderness of the leg that may be confined to one area   Change in leg color (bluish or red)    SIGNS AND SYMPTOMS OF PULMONARY EMBOLUS   Chest pain that gets worse with deep breath, coughing or chest movement   Coughing up blood   Sweating   Shortness of breath or difficulty breathing   Rapid heart beat   Lightheadedness    HOW TO REDUCE THE POSSIBLE RISK OF DVT   Exercise - simple activities as rotating ankles and wrists, wiggling toes and fingers, tightening and relaxing muscles in calves and thighs promotes circulation while recovering from surgery. Please do these exercises every hour during waking hours   ROSSANA hose - If you have been given white support hose while having surgery, wear them home. You may remove them for half an hour every 8-hour period and stop wearing them 48 hours after surgery or as prescribed by your physician. ROSSANA hose may be reused for air travel or extended car travel   Take mediation as prescribed by your physician (Lovenox, Coumadin, Aspirin)   Resume your normal activities as soon as your doctor advises you to do so.  Remember, when traveling, to [place] your legs frequently. Wear non skid shoes when ROSSANA hose are on. They are very slippery!     PATIENTS WHO BELIEVE THEY MAY BE EXPERIENCING SIGNS AND SYMPTOMS OF DVT OR PE SHOULD SEEK MEDICAL HELP IMMEDIATELY

## 2019-01-22 NOTE — PERIOP NOTES
Lacy Mike 1934 
[id number] Situation: 
Verbal report given from: Rah PANCHAL and [employer] Procedure: Procedure(s): BILATERAL IMAGE GUIDANCE ETHMOIDECTOMY EXPLORATION NASAL FRONTAL DUCT WITH BILATERAL MAXILLARY ANTROSTOMY Background: 
 
Preoperative diagnosis: CHRONIC SINUSITIS Postoperative diagnosis: CHRONIC SINUSITIS :  Dr. Harleen Galvez Assistant(s): Circ-1: Sanchez Gonzales RN Scrub Tech-1: Bj Nelson Specimens:  
ID Type Source Tests Collected by Time Destination 1 : contents of left middle meatus Preservative Nose  Eva Lewis MD 1/22/2019 6072 Pathology 2 : contents of right middle meatus Preservative Nose  Eva Lewis MD 1/22/2019 0945 Pathology Assessment: 
Intra-procedure medications Anesthesia gave intra-procedure sedation and medications, see anesthesia flow sheet Intravenous fluids: Marylene Medici Vital signs stable Recommendation: 
 
Permission to share finding with Danitza/wife : yes

## 2019-01-22 NOTE — BRIEF OP NOTE
BRIEF OPERATIVE NOTE Date of Procedure: 1/22/2019 Preoperative Diagnosis: CHRONIC BILATERAL MAXILLARY, ETHMOID, FRONTAL SINUSITIS Postoperative Diagnosis: CHRONIC BILATERAL MAXILLARY, ETHMOID, FRONTAL SINUSITIS Procedure(s): BILATERAL ENDOSCOPIC IMAGE GUIDANCE ETHMOIDECTOMY EXPLORATION NASAL FRONTAL DUCT WITH BILATERAL MAXILLARY ANTROSTOMY Surgeon(s) and Role: Kathy Joel MD - Primary Surgical Assistant: none Surgical Staff: 
Circ-1: Gloria Barry RN Scrub Tech-1: Yaritza Lundberg Event Time In Time Out Incision Start 4903 Incision Close 1021 Anesthesia: General  
Estimated Blood Loss: 150 ml Specimens:  
ID Type Source Tests Collected by Time Destination 1 : contents of left middle meatus Preservative Nose  Betzaida Tellez MD 1/22/2019 4915 Pathology 2 : contents of right middle meatus Preservative Nose  Betzaida Tellez MD 1/22/2019 0945 Pathology Findings: as expected Complications: none apparent Implants: * No implants in log *

## 2019-01-22 NOTE — PERIOP NOTES
Permission received to review discharge instructions and discuss private health information with Brandi Bradshaw, wife.

## 2019-01-22 NOTE — ANESTHESIA PREPROCEDURE EVALUATION
Anesthetic History PONV Review of Systems / Medical History Patient summary reviewed, nursing notes reviewed and pertinent labs reviewed Pulmonary Within defined limits Neuro/Psych CVA TIA Cardiovascular Hypertension Dysrhythmias Hyperlipidemia Exercise tolerance: >4 METS Comments: PACs GI/Hepatic/Renal 
  
 
 
Renal disease: CRI Endo/Other Arthritis Other Findings Comments: Meniere disease   Testosterone deficiency Gout Physical Exam 
 
Airway Mallampati: I 
TM Distance: 4 - 6 cm Neck ROM: normal range of motion Mouth opening: Normal 
 
 Cardiovascular Regular rate and rhythm,  S1 and S2 normal,  no murmur, click, rub, or gallop Dental 
 
Dentition: Caps/crowns Pulmonary Breath sounds clear to auscultation Abdominal 
GI exam deferred Other Findings Anesthetic Plan ASA: 3 Anesthesia type: general 
 
 
 
 
 
Anesthetic plan and risks discussed with: Patient

## 2019-01-22 NOTE — PERIOP NOTES
1033: pt reaching for face tent and attempting to pull off. Oral suction provided thin bloody drainage. 1035: large blood clot removed from pt airway with suction. 1040: Dr. Ines Mullins at bedside, notified of blood clot removed. Will continue to monitor pt for bleeding and need for suctioning. Per Dr. Ines Mullins, send Afrin nasal spray home with pt and advise only to use if bleeding occurs and does not stop. 1105: pt wife/Danitza at bedside. Updated about pt condition, pt sleeping, opens eyes to verbal stimuli. VSS. Pt calm not pulling at face mask. Mumtaz De La Rosa states,\"we have abx at home and Dr. Ines Mullins said to stop Plavix and ASA for 5 days\". 1110: Reviewed pt DC instructions with wife/Danitza, who verbalized understanding with no further questions. 1140: pt with increased alertness, sat upright, tolerated small amount of ice chips, oral suctioning as needed, pt requested to take off face tent, removed but placed below chin for blow by. 
1200: pt wife states,\"I don't feel comfortable taking him home yet can we let him wake up more?\" 
1245: Asst'd pt to dress, and allowed pt wife to stay to witness pt to make her feel more comfortable about his ability to function at home for safety. Pt followed directions without difficulty, increased alertness, able to walk independently with safe gait to [facility], pt wife witnessed pt ambulate to . Reji Velez states, \"I think we will be okay to go home now and I can call my son to help me\". 1250: Mumtaz De La Rosa states, \"I parked by the Emergency Room so it will take me a bit to get the car\". : pt transported via [facility] by nurse to [facility] to be DC to home with wife. Pt ambulated from [facility] to [facility] without difficulty.

## 2019-01-22 NOTE — OP NOTES
[facility]  OPERATIVE REPORT    Name:DICK PURVIS  MR#: [id number]  : 1934  ACCOUNT #: [de-identified]   DATE OF SERVICE: 2019    PREOPERATIVE DIAGNOSIS:  Chronic maxillary, ethmoid and frontal sinusitis. POSTOPERATIVE DIAGNOSIS:  Chronic maxillary, ethmoid and frontal sinusitis. PROCEDURE PERFORMED:  Bilateral endoscopic image guidance ethmoidectomy, exploration of nasal frontal duct, bilateral maxillary antrostomy. SURGEON:  Rubina Peters. Ko Mistry MD    INDICATIONS:  The patient is an 54-year-old male with a long history of chronic sinusitis difficulties which have been refractory to medical therapy. The patient has observed frequent persistent left frontal headache in addition to nasal congestion. A preoperative CT scan confirmed the presence of complete opacification of the left frontal sinus in addition to evidence of chronic sinus disease involving the ethmoids, right inferior frontal and bilateral maxillary sinuses. Preoperatively, the alternatives, potential benefits and possible risks of the procedure were explained to the patient and his family who understood and requested to proceed. DESCRIPTION OF PROCEDURE:  The patient received 0.25% oxymetazoline in the preanesthesia holding area. The patient was brought to the operating room and placed supine on the operating table. Following the smooth induction of general endotracheal tube anesthesia, the table was turned 90 degrees and the patient was positioned for operation. The Fusion image guidance system was applied and tested and found to function satisfactorily. Routine Betadine prep and drape was carried out. Using a combination of 0 degree and 30 degree nasal telescopes, the nasal cavity was examined. Image guidance system was used to confirm the location of the nasolacrimal duct on each side.   1% Xylocaine with epinephrine was injected to the middle turbinate, middle meatus and sphenopalatine artery regions bilaterally. After satisfactory vasoconstriction was observed, attention was turned to the left nasal cavity. The left middle meatus was gently medialized to give better exposure to the middle meatus. After use of the image guidance to confirm landmarks, a sharp sickle knife was used to make a left uncinate incision and the uncinate process was then removed using a Blakesley forceps. Once this was completed, the Straightshot handpiece with a 4 mm sinus blade attached was used to optimize the margins of the left maxillary antrostomy. Attention was turned to the ethmoidectomy. The anterior ethmoid bulla was entered with a straight Blakesley forceps and a subsequent left ethmoidectomy was carried out. Once again, the image guidance system was used to allow identification and preservation of the lamina papyracea and fovea ethmoidalis. The Straightshot handpiece with a 4 mm sinus blade attached was used to optimize the margins of the ethmoid dissection. Once this was satisfactorily completed, the image guidance was utilized to allow exploration of the left nasal frontal duct. After patency of the left nasal frontal duct was confirmed and having completed dissection of the left agger nasi cells, the left sinus cavity was satisfactorily completed. Attention was turned to the contralateral side. In a nearly identical fashion, the right middle turbinate was medialized to give better exposure to the middle meatus. A sharp sickle knife was used to make an uncinate incision posterior to the nasolacrimal duct. A straight Blakesley forceps and subsequently the Straightshot handpiece with 4 mm sinus blade was used to create and then optimize the margins of mucosa at the right maxillary antrostomy.   Straight Blakesley forceps was used to enter the anterior ethmoid bulla and an ethmoidectomy was carried out with use of the image guidance system to allow identification and preservation of the lamina papyracea and fovea ethmoidalis. After dissecting the agger nasi cells on the right side, the image guidance system with frontal sinus seeker was used to cannulate the right nasal frontal duct to confirm patency. Once this was completed, minor optimization of the ethmoid and maxillary sinus mucosa was completed and attention was turned to placement of nasal packing. Xerogel was placed to each sinus cavity. A mixture of triamcinolone and gentamicin cream was subsequently applied topically to the areas of sinus surgery. At the conclusion of the patient's operation, minimal bleeding was observed. The patient tolerated the procedure well and was aroused from general anesthesia. The patient was extubated in the operating room and transferred to the recovery area in satisfactory condition. ESTIMATED BLOOD LOSS:  150 mL. COMPLICATIONS:  None apparent. SPECIMENS REMOVED:  1. Contents of left middle meatus. 2.  Contents of right middle meatus    ASSISTANTS:  None. ANESTHESIA:  General endotracheal tube anesthesia. IMPLANTS:  None.       Ghanshyam Martinez MD       [facility] / [place]  D: 01/22/2019 10:56     T: 01/22/2019 11:39  JOB #: [id number]  CC: Paula Mathis MD

## 2019-02-07 NOTE — PROGRESS NOTES
Chief Complaint   Patient presents with    Hypertension     3 month follow up    Chronic Kidney Disease       SUBJECTIVE:    Ofilia Frankel is a 80 y.o. male returns in follow-up for his medical problems include hypertension, hyperlipidemia, prior CVA, DJD, history of PACs, rosacea, history of prostate cancer, CKD stage III and other medical problems. He is taking his medications and trying to follow his diet and try to get some exercise regular. He has had several fainting episodes and actually one episode he caught his blood pressure while he was actually having the fainting spell and he recorded a blood pressure 106/47 with a pulse of 67. The other is he caught his blood pressure afterwards and the blood pressure was okay after the event. He notes no associated chest pain, palpitations, PND, orthopnea or other cardiorespiratory complaints and any other symptoms associated with the fainting other than a lightheaded sensation. He denies any headaches, dizziness at all the times all other neurologic complaints. He denies any GI or  complaints. He denies any current arthritic complaints and there are no other complaints on complete review of systems. Current Outpatient Medications   Medication Sig Dispense Refill    doxycycline (MONODOX) 100 mg capsule Take 100 mg by mouth two (2) times a day.  mometasone (NASONEX) 50 mcg/actuation nasal spray 2 Sprays daily.  clopidogrel (PLAVIX) 75 mg tab Take  by mouth.  diclofenac EC (VOLTAREN) 75 mg EC tablet Take  by mouth.  omega-3 fatty acids 1,000 mg cap Take  by mouth.  aspirin (ASPIRIN) 325 mg tablet Take 325 mg by mouth daily.  amLODIPine (NORVASC) 5 mg tablet Take 1 Tab by mouth daily. 90 Tab prn    leuprolide acetate (LUPRON DEPOT, 6 MONTH, IM) by IntraMUSCular route every 6 months.  Cetirizine (ZYRTEC) 10 mg cap Take  by mouth.       telmisartan-hydroCHLOROthiazide (MICARDIS HCT) 80-25 mg per tablet Take 1 Tab by mouth daily. 30 Tab prn    cycloSPORINE (RESTASIS) 0.05 % ophthalmic emulsion Administer 2 Drops to both eyes daily. 3 Each 3    nebivolol (BYSTOLIC) 5 mg tablet TAKE ONE TABLET BY MOUTH DAILY 90 Tab 3    allopurinol (ZYLOPRIM) 100 mg tablet TAKE ONE TABLET BY MOUTH DAILY 30 Tab 11    silodosin (RAPAFLO) 8 mg capsule Take 8 mg by mouth daily (with breakfast).        Past Medical History:   Diagnosis Date    Adverse effect of anesthesia     very bad gag reflex    Allergic rhinitis 8/19/2017    BPH (benign prostatic hypertrophy) 8/19/2017    Cancer (HCC)     basal cell carcinoma right ear    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    CKD (chronic kidney disease), stage III ([address] 8/19/2017    CVA (cerebrovascular accident) ([address] 2009    DJD (degenerative joint disease) 8/19/2017    Gout     Hyperlipidemia 8/19/2017    Hypertension     Hypertension with renal disease 8/19/2017    Meniere disease 8/19/2017    Nausea & vomiting     On statin therapy 8/19/2017    PAC (premature atrial contraction) 8/19/2017    Palpitation 8/19/2017    Prostate CA ([facility] 8/19/2017    Pseudophakia of both eyes 10/16/2018    Rosacea 8/19/2017    Testosterone deficiency 8/19/2017     Past Surgical History:   Procedure Laterality Date    ABDOMEN SURGERY PROC UNLISTED      bilateral inguinal hernia repair    COLORECTAL SCRN; HI RISK IND  5/3/2016         HX HEENT  1970    tonsillectomy    HX HEENT  1980    basal cell carcinoma right ear    HX OTHER SURGICAL Right 01/21/2018    EXCISION RIGHT NECK MASS     HX PROSTATECTOMY      radiation only    [place] COLONOSCOPY FLX DX W/COLLJ SPEC WHEN PFRMD  1/19/2011          Allergies   Allergen Reactions    Sulfanilamide Rash    Sulfa (Sulfonamide Antibiotics) Rash     Very mild rash several years ago       REVIEW OF SYSTEMS:  General: negative for - chills or fever, or weight loss or gain  ENT: negative for - headaches, nasal congestion or tinnitus  Eyes: no blurred or visual changes  Neck: No stiffness or swollen nodes  Respiratory: negative for - cough, hemoptysis, shortness of breath or wheezing  Cardiovascular : negative for - chest pain, edema, palpitations or shortness of breath. Positive for fainting spells with documented low blood pressure during 1 of these  Gastrointestinal: negative for - abdominal pain, blood in stools, heartburn or nausea/vomiting  Genito-Urinary: no dysuria, trouble voiding, or hematuria  Musculoskeletal: negative for - gait disturbance, joint pain, joint stiffness or joint swelling  Neurological: no TIA or stroke symptoms  Hematologic: no bruises, no bleeding  Lymphatic: no swollen glands  Integument: no lumps, mole changes, nail changes or rash  Endocrine:no malaise/lethargy poly uria or polydipsia or unexpected weight changes        Social History     Socioeconomic History    Marital status:      Spouse name: Not on file    Number of children: Not on file    Years of education: Not on file    Highest education level: Not on file   Tobacco Use    Smoking status: Never Smoker    Smokeless tobacco: Never Used   Substance and Sexual Activity    Alcohol use: No    Drug use: No    Sexual activity: No     Family History   Problem Relation Age of Onset    Cancer Mother         uterine, cervical    Cancer Father         colon ca       OBJECTIVE:     Visit Vitals  /62 (BP 1 Location: Left arm, BP Patient Position: Sitting)   Pulse 60   Temp 97.5 °F (36.4 °C) (Oral)   Resp 16   Ht 6' 2\" (1.88 m)   Wt 221 lb 12.8 oz (100.6 kg)   SpO2 95%   BMI 28.48 kg/m²     CONSTITUTIONAL:   well nourished, appears age appropriate  EYES: sclera anicteric, PERRL, EOMI  ENMT:nares clear, moist mucous membranes, pharynx clear  NECK: supple.  Thyroid normal, No JVD or bruits  RESPIRATORY: Chest: clear to ascultation and percussion, normal inspiratory effort  CARDIOVASCULAR: Heart: regular rate and rhythm no murmurs, rubs or gallops, PMI not displaced, No thrills  GASTROINTESTINAL: Abdomen: non distended, soft, non tender, bowel sounds normal  HEMATOLOGIC: no purpura, petechiae or bruising  LYMPHATIC: No lymph node enlargemant  MUSCULOSKELETAL: Extremities: no edema or active synovitis, pulse 1+   INTEGUMENT: No unusual rashes or suspicious skin lesions noted. Nails appear normal.  PERIPHERAL VASCULAR: normal pulses femoral, PT and DP  NEUROLOGIC: non-focal exam, A & O X 3  PSYCHIATRIC:, appropriate affect     ASSESSMENT:   1. Hypertension with renal disease    2. Mixed hyperlipidemia    3. Primary osteoarthritis involving multiple joints    4. Cerebrovascular accident (CVA), unspecified mechanism ([address]    5. CKD (chronic kidney disease), stage III ([address]    6. Prostate CA (HCC)      Impression  1. Hypertension that is controlled today but I think he has a labile white coat component I think having to tightly control with his fainting spells so I am decreasing his Norvasc to 5 mg daily. 2.  Hyperlipidemia prior lab reviewed and repeat status pending I will adjust if needed. 3. DJD that is stable  4. Prior CVA no evidence that these findings were all related to that continue aspirin daily  5. CKD stage III repeat status is pending  6. Prostate carcinoma status post treatment and in remission  At this point we would decrease his blood pressure medicine without other changes pending today's results of his lab. Follow-up as scheduled for 3 months or sooner should he have further fainting spells although I think reducing his blood pressure medicine that would not be a problem.     PLAN:  .  Orders Placed This Encounter    METABOLIC PANEL, COMPREHENSIVE    LIPID PANEL    CK    doxycycline (MONODOX) 100 mg capsule    mometasone (NASONEX) 50 mcg/actuation nasal spray    clopidogrel (PLAVIX) 75 mg tab    diclofenac EC (VOLTAREN) 75 mg EC tablet    omega-3 fatty acids 1,000 mg cap    aspirin (ASPIRIN) 325 mg tablet    amLODIPine (NORVASC) 5 mg tablet ATTENTION:   This medical record was transcribed using an electronic medical records system. Although proofread, it may and can contain electronic and spelling errors. Other human spelling and other errors may be present. Corrections may be executed at a later time. Please feel free to contact us for any clarifications as needed. Follow-up Disposition:  Return in about 3 months (around 5/8/2019). No results found for any visits on 02/08/19. Stacey Butt MD    The patient verbalized understanding of the problems and plans as explained.

## 2019-02-08 ENCOUNTER — OFFICE VISIT (OUTPATIENT)
Dept: INTERNAL MEDICINE CLINIC | Age: 84
End: 2019-02-08

## 2019-02-08 VITALS
SYSTOLIC BLOOD PRESSURE: 130 MMHG | OXYGEN SATURATION: 95 % | DIASTOLIC BLOOD PRESSURE: 62 MMHG | RESPIRATION RATE: 16 BRPM | WEIGHT: 221.8 LBS | HEIGHT: 74 IN | BODY MASS INDEX: 28.47 KG/M2 | HEART RATE: 60 BPM | TEMPERATURE: 97.5 F

## 2019-02-08 DIAGNOSIS — E78.2 MIXED HYPERLIPIDEMIA: ICD-10-CM

## 2019-02-08 DIAGNOSIS — I63.9 CEREBROVASCULAR ACCIDENT (CVA), UNSPECIFIED MECHANISM (HCC): ICD-10-CM

## 2019-02-08 DIAGNOSIS — M15.9 PRIMARY OSTEOARTHRITIS INVOLVING MULTIPLE JOINTS: ICD-10-CM

## 2019-02-08 DIAGNOSIS — I12.9 HYPERTENSION WITH RENAL DISEASE: Primary | ICD-10-CM

## 2019-02-08 DIAGNOSIS — C61 PROSTATE CA (HCC): ICD-10-CM

## 2019-02-08 DIAGNOSIS — N18.30 CKD (CHRONIC KIDNEY DISEASE), STAGE III (HCC): ICD-10-CM

## 2019-02-08 PROBLEM — R55 SYNCOPE: Status: ACTIVE | Noted: 2019-02-08

## 2019-02-08 RX ORDER — OMEGA-3 FATTY ACIDS 1000 MG
CAPSULE ORAL
COMMUNITY
End: 2022-09-25 | Stop reason: ALTCHOICE

## 2019-02-08 RX ORDER — MOMETASONE FUROATE 50 UG/1
2 SPRAY, METERED NASAL DAILY
COMMUNITY
End: 2019-09-03 | Stop reason: ALTCHOICE

## 2019-02-08 RX ORDER — AMLODIPINE BESYLATE 5 MG/1
5 TABLET ORAL DAILY
Qty: 90 TAB | Status: SHIPPED | OUTPATIENT
Start: 2019-02-08 | End: 2020-06-29

## 2019-02-08 RX ORDER — ASPIRIN 325 MG
325 TABLET ORAL DAILY
COMMUNITY

## 2019-02-08 RX ORDER — DOXYCYCLINE 100 MG/1
100 CAPSULE ORAL 2 TIMES DAILY
COMMUNITY
End: 2021-03-10 | Stop reason: ALTCHOICE

## 2019-02-08 RX ORDER — DICLOFENAC SODIUM 75 MG/1
TABLET, DELAYED RELEASE ORAL
COMMUNITY
End: 2021-03-10 | Stop reason: ALTCHOICE

## 2019-02-08 RX ORDER — CLOPIDOGREL BISULFATE 75 MG/1
TABLET ORAL
COMMUNITY
End: 2021-03-10 | Stop reason: ALTCHOICE

## 2019-02-08 NOTE — PATIENT INSTRUCTIONS
Arthritis: Care Instructions  Your Care Instructions  Arthritis, also called osteoarthritis, is a breakdown of the cartilage that cushions your joints. When the cartilage wears down, your bones rub against each other. This causes pain and stiffness. Many people have some arthritis as they age. Arthritis most often affects the joints of the spine, hands, hips, knees, or feet. You can take simple measures to protect your joints, ease your pain, and help you stay active. Follow-up care is a key part of your treatment and safety. Be sure to make and go to all appointments, and call your doctor if you are having problems. It's also a good idea to know your test results and keep a list of the medicines you take. How can you care for yourself at home? · Stay at a healthy weight. Being overweight puts extra strain on your joints. · Talk to your doctor or physical therapist about exercises that will help ease joint pain. ? Stretch. You may enjoy gentle forms of yoga to help keep your joints and muscles flexible. ? Walk instead of jog. Other types of exercise that are less stressful on the joints include riding a bicycle, swimming, kary chi, or water exercise. ? Lift weights. Strong muscles help reduce stress on your joints. Stronger thigh muscles, for example, take some of the stress off of the knees and hips. Learn the right way to lift weights so you do not make joint pain worse. · Take your medicines exactly as prescribed. Call your doctor if you think you are having a problem with your medicine. · Take pain medicines exactly as directed. ? If the doctor gave you a prescription medicine for pain, take it as prescribed. ? If you are not taking a prescription pain medicine, ask your doctor if you can take an over-the-counter medicine. · Use a cane, crutch, walker, or another device if you need help to get around. These can help rest your joints.  You also can use other things to make life easier, such as a higher toilet seat and padded handles on kitchen utensils. · Do not sit in low chairs, which can make it hard to get up. · Put heat or cold on your sore joints as needed. Use whichever helps you most. You also can take turns with hot and cold packs. ? Apply heat 2 or 3 times a day for 20 to 30 minutes--using a heating pad, hot shower, or hot pack--to relieve pain and stiffness. ? Put ice or a cold pack on your sore joint for 10 to 20 minutes at a time. Put a thin cloth between the ice and your skin. When should you call for help? Call your doctor now or seek immediate medical care if:    · You have sudden swelling, warmth, or pain in any joint.     · You have joint pain and a fever or rash.     · You have such bad pain that you cannot use a joint.    Watch closely for changes in your health, and be sure to contact your doctor if:    · You have mild joint symptoms that continue even with more than 6 weeks of care at home.     · You have stomach pain or other problems with your medicine. Where can you learn more? Go to http://krishna-manpreet.info/. Enter R312 in the search box to learn more about \"Arthritis: Care Instructions. \"  Current as of: Britany 10, 2018  Content Version: 11.9  © 6712-0572 [employer]. Care instructions adapted under license by [employer] (which disclaims liability or warranty for this information). If you have questions about a medical condition or this instruction, always ask your healthcare professional. [address] any warranty or liability for your use of this information.

## 2019-02-08 NOTE — PROGRESS NOTES
Chief Complaint   Patient presents with    Hypertension     3 month follow up    Chronic Kidney Disease     1. Have you been to the ER, urgent care clinic since your last visit? Hospitalized since your last visit? No    2. Have you seen or consulted any other health care providers outside of the [address] [place] [address] since your last visit? Include any pap smears or colon screening. Yes, Nasal Surgery at [facility] in January 2019. He does not know the exact day.      Visit Vitals  /62 (BP 1 Location: Left arm, BP Patient Position: Sitting)   Pulse 60   Temp 97.5 °F (36.4 °C) (Oral)   Resp 16   Ht 6' 2\" (1.88 m)   Wt 221 lb 12.8 oz (100.6 kg)   SpO2 95%   BMI 28.48 kg/m²

## 2019-02-09 LAB
ALBUMIN SERPL-MCNC: 4.1 G/DL (ref 3.5–4.7)
ALBUMIN/GLOB SERPL: 1.6 {RATIO} (ref 1.2–2.2)
ALP SERPL-CCNC: 96 IU/L (ref 39–117)
ALT SERPL-CCNC: 47 IU/L (ref 0–44)
AST SERPL-CCNC: 55 IU/L (ref 0–40)
BILIRUB SERPL-MCNC: 0.4 MG/DL (ref 0–1.2)
BUN SERPL-MCNC: 27 MG/DL (ref 8–27)
BUN/CREAT SERPL: 26 (ref 10–24)
CALCIUM SERPL-MCNC: 9.9 MG/DL (ref 8.6–10.2)
CHLORIDE SERPL-SCNC: 104 MMOL/L (ref 96–106)
CHOLEST SERPL-MCNC: 158 MG/DL (ref 100–199)
CK SERPL-CCNC: 109 U/L (ref 24–204)
CO2 SERPL-SCNC: 22 MMOL/L (ref 20–29)
CREAT SERPL-MCNC: 1.05 MG/DL (ref 0.76–1.27)
GLOBULIN SER CALC-MCNC: 2.6 G/DL (ref 1.5–4.5)
GLUCOSE SERPL-MCNC: 130 MG/DL (ref 65–99)
HDLC SERPL-MCNC: 35 MG/DL
LDLC SERPL CALC-MCNC: 91 MG/DL (ref 0–99)
POTASSIUM SERPL-SCNC: 4.7 MMOL/L (ref 3.5–5.2)
PROT SERPL-MCNC: 6.7 G/DL (ref 6–8.5)
SODIUM SERPL-SCNC: 144 MMOL/L (ref 134–144)
TRIGL SERPL-MCNC: 160 MG/DL (ref 0–149)
VLDLC SERPL CALC-MCNC: 32 MG/DL (ref 5–40)

## 2019-04-08 RX ORDER — ALLOPURINOL 100 MG/1
TABLET ORAL
Qty: 90 TAB | Refills: 3 | Status: SHIPPED | OUTPATIENT
Start: 2019-04-08 | End: 2020-04-14

## 2019-04-08 RX ORDER — AMLODIPINE BESYLATE 5 MG/1
5 TABLET ORAL DAILY
Qty: 90 TAB | Refills: 3 | Status: SHIPPED | OUTPATIENT
Start: 2019-04-08 | End: 2019-12-11 | Stop reason: ALTCHOICE

## 2019-04-08 NOTE — TELEPHONE ENCOUNTER
RX refill request from the patient/pharmacy. Patient last seen 02- with labs, and next appt. scheduled for 05-  Requested Prescriptions     Pending Prescriptions Disp Refills    amLODIPine (NORVASC) 5 mg tablet 90 Tab 3     Sig: Take 1 Tab by mouth daily.  allopurinol (ZYLOPRIM) 100 mg tablet [Pharmacy Med Name: ALLOPURINOL 100 MG TABLET] 90 Tab 3     Sig: TAKE ONE TABLET BY MOUTH DAILY   .

## 2019-05-07 RX ORDER — NEBIVOLOL 5 MG/1
TABLET ORAL
Qty: 90 TAB | Refills: 3 | Status: SHIPPED | OUTPATIENT
Start: 2019-05-07 | End: 2020-04-23

## 2019-05-07 NOTE — TELEPHONE ENCOUNTER
RX refill request from the patient/pharmacy. Patient last seen 02- with labs, and next appt. scheduled for 05-  Requested Prescriptions     Pending Prescriptions Disp Refills    nebivolol (BYSTOLIC) 5 mg tablet 90 Tab 3     Sig: TAKE ONE TABLET BY MOUTH DAILY   .

## 2019-05-16 NOTE — PROGRESS NOTES
Chief Complaint Patient presents with  Blood Pressure Check SUBJECTIVE: 
 
Kayley Vaz is a 80 y.o. male who returns in follow-up of his medical problems include hypertension, hyperlipidemia, CKD stage III, prior CVA, BPH with subsequent history of prostate cancer, allergic rhinitis, DJD and other medical problems. He is taking his medications and trying to follow his diet and is trying to get some physical activity on a regular basis. He currently denies any chest pain, shortness of breath, palpitations, PND, orthopnea or other cardiorespiratory complaints. He denies any GI or  complaints. He denies any headaches, dizziness or neurologic complaints. There are no current arthritic complaints other than some chronic joint aches and pains. He has no other complaints on complete review of systems. Current Outpatient Medications Medication Sig Dispense Refill  varicella-zoster recombinant, PF, (SHINGRIX, PF,) 50 mcg/0.5 mL susr injection 0.5 mL by IntraMUSCular route once for 1 dose. 0.5 mL 1  
 nebivolol (BYSTOLIC) 5 mg tablet TAKE ONE TABLET BY MOUTH DAILY 90 Tab 3  
 amLODIPine (NORVASC) 5 mg tablet Take 1 Tab by mouth daily. 90 Tab 3  
 allopurinol (ZYLOPRIM) 100 mg tablet TAKE ONE TABLET BY MOUTH DAILY 90 Tab 3  
 doxycycline (MONODOX) 100 mg capsule Take 100 mg by mouth two (2) times a day.  mometasone (NASONEX) 50 mcg/actuation nasal spray 2 Sprays daily.  clopidogrel (PLAVIX) 75 mg tab Take  by mouth.  diclofenac EC (VOLTAREN) 75 mg EC tablet Take  by mouth.  omega-3 fatty acids 1,000 mg cap Take  by mouth.  aspirin (ASPIRIN) 325 mg tablet Take 325 mg by mouth daily.  amLODIPine (NORVASC) 5 mg tablet Take 1 Tab by mouth daily. 90 Tab prn  leuprolide acetate (LUPRON DEPOT, 6 MONTH, IM) by IntraMUSCular route every 6 months.  Cetirizine (ZYRTEC) 10 mg cap Take  by mouth.  telmisartan-hydroCHLOROthiazide (MICARDIS HCT) 80-25 mg per tablet Take 1 Tab by mouth daily. 30 Tab prn  cycloSPORINE (RESTASIS) 0.05 % ophthalmic emulsion Administer 2 Drops to both eyes daily. 3 Each 3  
 silodosin (RAPAFLO) 8 mg capsule Take 8 mg by mouth daily (with breakfast). Past Medical History:  
Diagnosis Date  Adverse effect of anesthesia   
 very bad gag reflex  Allergic rhinitis 8/19/2017  BPH (benign prostatic hypertrophy) 8/19/2017  Cancer ([address]   
 basal cell carcinoma right ear  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  CKD (chronic kidney disease), stage III ([address] 8/19/2017  CVA (cerebrovascular accident) ([address] 2009  DJD (degenerative joint disease) 8/19/2017  Gout  Hyperlipidemia 8/19/2017  Hypertension  Hypertension with renal disease 8/19/2017  Meniere disease 8/19/2017  Nausea & vomiting  On statin therapy 8/19/2017  PAC (premature atrial contraction) 8/19/2017  Palpitation 8/19/2017  Prostate CA ([address] 8/19/2017  Pseudophakia of both eyes 10/16/2018  Rosacea 8/19/2017  Testosterone deficiency 8/19/2017 Past Surgical History:  
Procedure Laterality Date  ABDOMEN SURGERY PROC UNLISTED    
 bilateral inguinal hernia repair  COLORECTAL SCRN; HI RISK IND  5/3/2016  HX HEENT  1970  
 tonsillectomy  HX HEENT  1980  
 basal cell carcinoma right ear  HX OTHER SURGICAL Right 01/21/2018 EXCISION RIGHT NECK MASS   
 HX PROSTATECTOMY    
 radiation only  [place] COLONOSCOPY FLX DX W/COLLJ SPEC WHEN PFRMD  1/19/2011 Allergies Allergen Reactions  Sulfanilamide Rash  Sulfa (Sulfonamide Antibiotics) Rash Very mild rash several years ago REVIEW OF SYSTEMS: 
General: negative for - chills or fever, or weight loss or gain ENT: negative for - headaches, nasal congestion or tinnitus Eyes: no blurred or visual changes Neck: No stiffness or swollen nodes Respiratory: negative for - cough, hemoptysis, shortness of breath or wheezing Cardiovascular : negative for - chest pain, edema, palpitations or shortness of breath Gastrointestinal: negative for - abdominal pain, blood in stools, heartburn or nausea/vomiting Genito-Urinary: no dysuria, trouble voiding, or hematuria Musculoskeletal: negative for - gait disturbance, change of his chronic joint pain, joint stiffness or joint swelling Neurological: no TIA or stroke symptoms Hematologic: no bruises, no bleeding Lymphatic: no swollen glands Integument: no lumps, mole changes, nail changes or rash Endocrine:no malaise/lethargy poly uria or polydipsia or unexpected weight changes Social History Socioeconomic History  Marital status:  Spouse name: Not on file  Number of children: Not on file  Years of education: Not on file  Highest education level: Not on file Tobacco Use  Smoking status: Never Smoker  Smokeless tobacco: Never Used Substance and Sexual Activity  Alcohol use: No  
 Drug use: No  
 Sexual activity: Never Family History Problem Relation Age of Onset  Cancer Mother   
     uterine, cervical  
 Cancer Father   
     colon ca OBJECTIVE:  
 
Visit Vitals /84 Pulse (!) 48 Temp 97.7 °F (36.5 °C) (Oral) Resp 20 Ht 6' 2.21\" (1.885 m) Wt 218 lb (98.9 kg) SpO2 96% BMI 27.83 kg/m² CONSTITUTIONAL:   well nourished, appears age appropriate EYES: sclera anicteric, PERRL, EOMI 
ENMT:nares clear, moist mucous membranes, pharynx clear NECK: supple. Thyroid normal, No JVD or bruits RESPIRATORY: Chest: clear to ascultation and percussion, normal inspiratory effort CARDIOVASCULAR: Heart: regular rate and rhythm no murmurs, rubs or gallops, PMI not displaced, No thrills GASTROINTESTINAL: Abdomen: non distended, soft, non tender, bowel sounds normal 
HEMATOLOGIC: no purpura, petechiae or bruising LYMPHATIC: No lymph node enlargemant MUSCULOSKELETAL: Extremities: no edema or active synovitis, pulse 1+ INTEGUMENT: No unusual rashes or suspicious skin lesions noted. Nails appear normal. 
PERIPHERAL VASCULAR: normal pulses femoral, PT and DP NEUROLOGIC: non-focal exam, A & O X 3 PSYCHIATRIC:, appropriate affect ASSESSMENT:  
1. Hypertension with renal disease 2. Mixed hyperlipidemia 3. Primary osteoarthritis involving multiple joints 4. Cerebrovascular accident (CVA), unspecified mechanism ([address] 5. Gout, unspecified cause, unspecified chronicity, unspecified site 6. CKD (chronic kidney disease), stage III ([address] Impression 1. Hypertension he has a labile component but is normal by my check today so continue current medicine reviewed with him. 2.  Hyperlipidemia prior lab reviewed and repeat status pending I will adjust if needed. 3   DJD that is stable 4   Prior CVA continue aspirin daily 5. History of gout stable 6. CKD stage III repeat status pending I will call with lab and make further recommendations or adjustments if necessary. Follow-up as scheduled again for 3 months or sooner if there is a problem. PLAN: 
. Orders Placed This Encounter  METABOLIC PANEL, COMPREHENSIVE ([facility])  LIPID PANEL ([facility])  CK ([facility])  varicella-zoster recombinant, PF, (SHINGRIX, PF,) 50 mcg/0.5 mL susr injection ATTENTION:  
This medical record was transcribed using an electronic medical records system. Although proofread, it may and can contain electronic and spelling errors. Other human spelling and other errors may be present. Corrections may be executed at a later time. Please feel free to contact us for any clarifications as needed. No results found for any visits on 05/17/19. Mary Franklin MD 
 
The patient verbalized understanding of the problems and plans as explained.

## 2019-05-17 ENCOUNTER — OFFICE VISIT (OUTPATIENT)
Dept: INTERNAL MEDICINE CLINIC | Age: 84
End: 2019-05-17

## 2019-05-17 VITALS
OXYGEN SATURATION: 96 % | HEART RATE: 48 BPM | RESPIRATION RATE: 20 BRPM | WEIGHT: 218 LBS | HEIGHT: 74 IN | DIASTOLIC BLOOD PRESSURE: 84 MMHG | BODY MASS INDEX: 27.98 KG/M2 | TEMPERATURE: 97.7 F | SYSTOLIC BLOOD PRESSURE: 138 MMHG

## 2019-05-17 DIAGNOSIS — I12.9 HYPERTENSION WITH RENAL DISEASE: Primary | ICD-10-CM

## 2019-05-17 DIAGNOSIS — M15.9 PRIMARY OSTEOARTHRITIS INVOLVING MULTIPLE JOINTS: ICD-10-CM

## 2019-05-17 DIAGNOSIS — N18.30 CKD (CHRONIC KIDNEY DISEASE), STAGE III (HCC): ICD-10-CM

## 2019-05-17 DIAGNOSIS — M10.9 GOUT, UNSPECIFIED CAUSE, UNSPECIFIED CHRONICITY, UNSPECIFIED SITE: ICD-10-CM

## 2019-05-17 DIAGNOSIS — E78.2 MIXED HYPERLIPIDEMIA: ICD-10-CM

## 2019-05-17 DIAGNOSIS — I63.9 CEREBROVASCULAR ACCIDENT (CVA), UNSPECIFIED MECHANISM (HCC): ICD-10-CM

## 2019-05-17 LAB
A-G RATIO,AGRAT: 1.3 RATIO
ALBUMIN SERPL-MCNC: 4.3 G/DL (ref 3.9–5.4)
ALP SERPL-CCNC: 119 U/L (ref 38–126)
ALT SERPL-CCNC: 64 U/L (ref 9–52)
ANION GAP SERPL CALC-SCNC: 18 MMOL/L
AST SERPL W P-5'-P-CCNC: 67 U/L (ref 14–36)
BILIRUB SERPL-MCNC: 1.1 MG/DL (ref 0.2–1.3)
BUN SERPL-MCNC: 23 MG/DL (ref 9–20)
BUN/CREATININE RATIO,BUCR: 26 RATIO
CALCIUM SERPL-MCNC: 9.3 MG/DL (ref 8.4–10.2)
CHLORIDE SERPL-SCNC: 100 MMOL/L (ref 98–107)
CHOL/HDL RATIO,CHHD: 5 RATIO (ref 0–4)
CHOLEST SERPL-MCNC: 158 MG/DL (ref 0–200)
CK SERPL-CCNC: 89 U/L (ref 30–135)
CO2 SERPL-SCNC: 23 MMOL/L (ref 22–32)
CREAT SERPL-MCNC: 0.9 MG/DL (ref 0.8–1.5)
GLOBULIN,GLOB: 3.3
GLUCOSE SERPL-MCNC: 117 MG/DL (ref 75–110)
HDLC SERPL-MCNC: 30 MG/DL (ref 35–130)
LDL/HDL RATIO,LDHD: 3 RATIO
LDLC SERPL CALC-MCNC: 95 MG/DL (ref 0–130)
POTASSIUM SERPL-SCNC: 4.5 MMOL/L (ref 3.6–5)
PROT SERPL-MCNC: 7.6 G/DL (ref 6.3–8.2)
SODIUM SERPL-SCNC: 141 MMOL/L (ref 137–145)
TRIGL SERPL-MCNC: 166 MG/DL (ref 0–200)
VLDLC SERPL CALC-MCNC: 33 MG/DL

## 2019-05-17 NOTE — PROGRESS NOTES
Tala Nicholas  Identified pt with two pt identifiers(name and ). Chief Complaint Patient presents with  Blood Pressure Check 1. Have you been to the ER, urgent care clinic since your last visit? Hospitalized since your last visit? no 
 
2. Have you seen or consulted any other health care providers outside of the [address] [place] [address] since your last visit? Include any pap smears or colon screening. Dr. Addis Mattson for eye exam 
 
 
Would you like to sign up for MyChart today, if you have not already done so? No 
If not, would you like information on MyChart, and how to sign up at a later time? No 
 
 
Medication reconciliation up to date and corrected with patient at this time. Today's provider has been notified of reason for visit, vitals and flowsheets obtained on patients. Reviewed record in preparation for visit, huddled with provider and have obtained necessary documentation. Health Maintenance Due Topic  DTaP/Tdap/Td series (1 - Tdap) Wt Readings from Last 3 Encounters:  
19 218 lb (98.9 kg) 19 221 lb 12.8 oz (100.6 kg) 19 218 lb (98.9 kg) Temp Readings from Last 3 Encounters:  
19 97.7 °F (36.5 °C) (Oral) 19 97.5 °F (36.4 °C) (Oral) 19 97.6 °F (36.4 °C) BP Readings from Last 3 Encounters:  
19 159/72  
19 130/62  
19 108/50 Pulse Readings from Last 3 Encounters:  
19 (!) 48  
19 60  
19 (!) 57 Vitals:  
 19 0930 BP: 159/72 Pulse: (!) 48 Resp: 20 Temp: 97.7 °F (36.5 °C) TempSrc: Oral  
SpO2: 96% Weight: 218 lb (98.9 kg) Height: 6' 2.21\" (1.885 m) PainSc:   0 - No pain Learning Assessment: 
:  
 
Learning Assessment 2017 PRIMARY LEARNER Patient HIGHEST LEVEL OF EDUCATION - PRIMARY LEARNER  [address] [place] [address] PRIMARY LANGUAGE ENGLISH  
LEARNER PREFERENCE PRIMARY LISTENING  
ANSWERED BY patient RELATIONSHIP SELF  
 
 
 Depression Screening: 
:  
 
3 most recent PHQ Screens 5/17/2019 Little interest or pleasure in doing things Not at all Feeling down, depressed, irritable, or hopeless Not at all Total Score PHQ 2 0 Fall Risk Assessment: 
:  
 
Fall Risk Assessment, last 12 mths 5/17/2019 Able to walk? Yes Fall in past 12 months? No  
 
 
Abuse Screening: 
:  
 
Abuse Screening Questionnaire 5/17/2019 4/19/2018 9/11/2017 Do you ever feel afraid of your partner? N N N Are you in a relationship with someone who physically or mentally threatens you? N N N Is it safe for you to go home? Ty Cooley  
 
 
ADL Screening: 
:  
 

## 2019-05-17 NOTE — PATIENT INSTRUCTIONS
Arthritis: Care Instructions Your Care Instructions Arthritis, also called osteoarthritis, is a breakdown of the cartilage that cushions your joints. When the cartilage wears down, your bones rub against each other. This causes pain and stiffness. Many people have some arthritis as they age. Arthritis most often affects the joints of the spine, hands, hips, knees, or feet. You can take simple measures to protect your joints, ease your pain, and help you stay active. Follow-up care is a key part of your treatment and safety. Be sure to make and go to all appointments, and call your doctor if you are having problems. It's also a good idea to know your test results and keep a list of the medicines you take. How can you care for yourself at home? · Stay at a healthy weight. Being overweight puts extra strain on your joints. · Talk to your doctor or physical therapist about exercises that will help ease joint pain. ? Stretch. You may enjoy gentle forms of yoga to help keep your joints and muscles flexible. ? Walk instead of jog. Other types of exercise that are less stressful on the joints include riding a bicycle, swimming, kary chi, or water exercise. ? Lift weights. Strong muscles help reduce stress on your joints. Stronger thigh muscles, for example, take some of the stress off of the knees and hips. Learn the right way to lift weights so you do not make joint pain worse. · Take your medicines exactly as prescribed. Call your doctor if you think you are having a problem with your medicine. · Take pain medicines exactly as directed. ? If the doctor gave you a prescription medicine for pain, take it as prescribed. ? If you are not taking a prescription pain medicine, ask your doctor if you can take an over-the-counter medicine. · Use a cane, crutch, walker, or another device if you need help to get around. These can help rest your joints.  You also can use other things to make life easier, such as a higher toilet seat and padded handles on kitchen utensils. · Do not sit in low chairs, which can make it hard to get up. · Put heat or cold on your sore joints as needed. Use whichever helps you most. You also can take turns with hot and cold packs. ? Apply heat 2 or 3 times a day for 20 to 30 minutesusing a heating pad, hot shower, or hot packto relieve pain and stiffness. ? Put ice or a cold pack on your sore joint for 10 to 20 minutes at a time. Put a thin cloth between the ice and your skin. When should you call for help? Call your doctor now or seek immediate medical care if: 
  · You have sudden swelling, warmth, or pain in any joint.  
  · You have joint pain and a fever or rash.  
  · You have such bad pain that you cannot use a joint.  
 Watch closely for changes in your health, and be sure to contact your doctor if: 
  · You have mild joint symptoms that continue even with more than 6 weeks of care at home.  
  · You have stomach pain or other problems with your medicine. Where can you learn more? Go to http://rkishna-manpreet.info/. Enter Y020 in the search box to learn more about \"Arthritis: Care Instructions. \" Current as of: Britany 10, 2018 Content Version: 11.9 © 4247-1405 Healthwise, Incorporated. Care instructions adapted under license by [employer] (which disclaims liability or warranty for this information). If you have questions about a medical condition or this instruction, always ask your healthcare professional. [place] [address] any warranty or liability for your use of this information.

## 2019-05-30 RX ORDER — DOXYCYCLINE 100 MG/1
CAPSULE ORAL
Qty: 90 CAP | Refills: 3 | Status: SHIPPED | OUTPATIENT
Start: 2019-05-30 | End: 2020-06-02

## 2019-05-30 NOTE — TELEPHONE ENCOUNTER
RX refill request from the patient/pharmacy. Patient last seen 05- with labs, and next appt. scheduled for 09-  Requested Prescriptions     Pending Prescriptions Disp Refills    doxycycline (VIBRAMYCIN) 100 mg capsule [Pharmacy Med Name: DOXYCYCLINE HYCLATE 100 MG CAP] 90 Cap 3     Sig: TAKE ONE CAPSULE BY MOUTH DAILY   .

## 2019-06-05 DIAGNOSIS — Z86.73 HISTORY OF CVA (CEREBROVASCULAR ACCIDENT): ICD-10-CM

## 2019-06-05 RX ORDER — CLOPIDOGREL BISULFATE 75 MG/1
TABLET ORAL
Qty: 90 TAB | Refills: 3 | Status: SHIPPED | OUTPATIENT
Start: 2019-06-05 | End: 2020-06-02

## 2019-06-05 NOTE — TELEPHONE ENCOUNTER
RX refill request from the patient/pharmacy. Patient last seen 05- with labs, and next appt. scheduled for 09-  Requested Prescriptions     Pending Prescriptions Disp Refills    clopidogrel (PLAVIX) 75 mg tab [Pharmacy Med Name: CLOPIDOGREL 75 MG TABLET] 90 Tab 3     Sig: TAKE ONE TABLET BY MOUTH DAILY   .

## 2019-06-28 RX ORDER — DICLOFENAC SODIUM 75 MG/1
TABLET, DELAYED RELEASE ORAL
Qty: 180 TAB | Refills: 2 | Status: SHIPPED | OUTPATIENT
Start: 2019-06-28 | End: 2021-01-26

## 2019-06-28 RX ORDER — CYCLOSPORINE 0.5 MG/ML
EMULSION OPHTHALMIC
Qty: 1 EACH | Refills: 2 | Status: SHIPPED | OUTPATIENT
Start: 2019-06-28 | End: 2020-01-21 | Stop reason: SDUPTHER

## 2019-06-28 NOTE — TELEPHONE ENCOUNTER
PCP: Andrea Guerrero MD    Last appt: 5/17/2019  Future Appointments   Date Time Provider Maximino Araiza   9/3/2019  8:40 AM Andrea Guerrero MD 3 Mukesh Walker       Requested Prescriptions     Pending Prescriptions Disp Refills    RESTASIS 0.05 % dpet [Pharmacy Med Name: RESTASIS 0.05% EYE EMULSION - 30 VL]  2     Sig: INSTILL 2 DROPS IN BOTH EYES DAILY    diclofenac EC (VOLTAREN) 75 mg EC tablet [Pharmacy Med Name: DICLOFENAC SOD DR 75 MG TAB] 180 Tab 2     Sig: TAKE ONE TABLET BY MOUTH TWICE A DAY

## 2019-08-07 LAB — PSA, EXTERNAL: 12.29

## 2019-08-30 NOTE — PROGRESS NOTES
Chief Complaint   Patient presents with    Hypertension     3 month f/u    Cholesterol Problem       SUBJECTIVE:    Eloy Gr is a 80 y.o. male past medical problems include hypertension, prior CVA, CKD stage III, hyperlipidemia, DJD, allergic rhinitis, prostate carcinoma, history of palpitations, history of syncope, and other medical problems. He has been noted to have a significant increase of his PSA up to 12.29 and is now been started on Casodex 50 mg daily. He was previously on Depo-Lupron injection. He currently is taking all his other medications and trying to follow his diet and trying to remain physically active. He denies any chest pain, shortness of breath, palpitations, PND, orthopnea or other cardiorespiratory complaints except he occasionally has a faint sensation or sensation that he is lightheaded but that will pass on its own and is been long-term without any change. He has been monitoring his blood pressure and for the most part it has been running within acceptable range. He denies any headaches, dizziness or neurologic complaints other than intermittent faint sensation. He denies any GI or  complaints. He denies any change of his chronic arthritic complaints and there are no other complaints on complete review of systems. Current Outpatient Medications   Medication Sig Dispense Refill    RESTASIS 0.05 % dpet INSTILL 2 DROPS IN BOTH EYES DAILY 1 Each 2    diclofenac EC (VOLTAREN) 75 mg EC tablet TAKE ONE TABLET BY MOUTH TWICE A  Tab 2    clopidogrel (PLAVIX) 75 mg tab TAKE ONE TABLET BY MOUTH DAILY 90 Tab 3    doxycycline (VIBRAMYCIN) 100 mg capsule TAKE ONE CAPSULE BY MOUTH DAILY 90 Cap 3    nebivolol (BYSTOLIC) 5 mg tablet TAKE ONE TABLET BY MOUTH DAILY 90 Tab 3    amLODIPine (NORVASC) 5 mg tablet Take 1 Tab by mouth daily.  90 Tab 3    allopurinol (ZYLOPRIM) 100 mg tablet TAKE ONE TABLET BY MOUTH DAILY 90 Tab 3    doxycycline (MONODOX) 100 mg capsule Take 100 mg by mouth two (2) times a day.  clopidogrel (PLAVIX) 75 mg tab Take  by mouth.  diclofenac EC (VOLTAREN) 75 mg EC tablet Take  by mouth.  omega-3 fatty acids 1,000 mg cap Take  by mouth.  aspirin (ASPIRIN) 325 mg tablet Take 325 mg by mouth daily.  amLODIPine (NORVASC) 5 mg tablet Take 1 Tab by mouth daily. 90 Tab prn    Cetirizine (ZYRTEC) 10 mg cap Take  by mouth.  telmisartan-hydroCHLOROthiazide (MICARDIS HCT) 80-25 mg per tablet Take 1 Tab by mouth daily. 30 Tab prn    silodosin (RAPAFLO) 8 mg capsule Take 8 mg by mouth daily (with breakfast).       bicalutamide (CASODEX) 50 mg tablet        Past Medical History:   Diagnosis Date    Adverse effect of anesthesia     very bad gag reflex    Allergic rhinitis 8/19/2017    BPH (benign prostatic hypertrophy) 8/19/2017    Cancer ([address]     basal cell carcinoma right ear    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    CKD (chronic kidney disease), stage III ([address] 8/19/2017    CVA (cerebrovascular accident) [facility]) 2009    DJD (degenerative joint disease) 8/19/2017    Gout     Hyperlipidemia 8/19/2017    Hypertension     Hypertension with renal disease 8/19/2017    Meniere disease 8/19/2017    Nausea & vomiting     On statin therapy 8/19/2017    PAC (premature atrial contraction) 8/19/2017    Palpitation 8/19/2017    Prostate CA ([address] 8/19/2017    Pseudophakia of both eyes 10/16/2018    Rosacea 8/19/2017    Testosterone deficiency 8/19/2017     Past Surgical History:   Procedure Laterality Date    ABDOMEN SURGERY PROC UNLISTED      bilateral inguinal hernia repair    COLORECTAL SCRN; HI RISK IND  5/3/2016         HX HEENT  1970    tonsillectomy    HX HEENT  1980    basal cell carcinoma right ear    HX OTHER SURGICAL Right 01/21/2018    EXCISION RIGHT NECK MASS     HX PROSTATECTOMY      radiation only    [place] COLONOSCOPY FLX DX W/COLLJ SPEC WHEN PFRMD  1/19/2011          Allergies   Allergen Reactions    Sulfanilamide Rash    Sulfa (Sulfonamide Antibiotics) Rash     Very mild rash several years ago       REVIEW OF SYSTEMS:  General: negative for - chills or fever, or weight loss or gain  ENT: negative for - headaches, nasal congestion or tinnitus  Eyes: no blurred or visual changes  Neck: No stiffness or swollen nodes  Respiratory: negative for - cough, hemoptysis, shortness of breath or wheezing  Cardiovascular : negative for - chest pain, edema, palpitations or shortness of breath  Gastrointestinal: negative for - abdominal pain, blood in stools, heartburn or nausea/vomiting  Genito-Urinary: no dysuria, trouble voiding, or hematuria  Musculoskeletal: negative for - gait disturbance, change of his chronic joint pain, joint stiffness or joint swelling  Neurological: no TIA or stroke symptoms are intermittent occasional faint sensation which he has had long-term and is without change  Hematologic: no bruises, no bleeding  Lymphatic: no swollen glands  Integument: no lumps, mole changes, nail changes or rash  Endocrine:no malaise/lethargy poly uria or polydipsia or unexpected weight changes        Social History     Socioeconomic History    Marital status:      Spouse name: Not on file    Number of children: Not on file    Years of education: Not on file    Highest education level: Not on file   Tobacco Use    Smoking status: Never Smoker    Smokeless tobacco: Never Used   Substance and Sexual Activity    Alcohol use: No    Drug use: No    Sexual activity: Never     Family History   Problem Relation Age of Onset    Cancer Mother         uterine, cervical    Cancer Father         colon ca       OBJECTIVE:     Visit Vitals  /59 (BP 1 Location: Right arm, BP Patient Position: Sitting)   Pulse (!) 50   Temp 97.7 °F (36.5 °C) (Oral)   Resp 16   Ht 6' 2\" (1.88 m)   Wt 216 lb 6.4 oz (98.2 kg)   SpO2 95%   BMI 27.78 kg/m²     CONSTITUTIONAL:   well nourished, appears age appropriate  EYES: sclera anicteric, PERRL, EOMI  ENMT:nares clear, moist mucous membranes, pharynx clear  NECK: supple. Thyroid normal, No JVD or bruits  RESPIRATORY: Chest: clear to ascultation and percussion, normal inspiratory effort  CARDIOVASCULAR: Heart: regular rate and rhythm no murmurs, rubs or gallops, PMI not displaced, No thrills  GASTROINTESTINAL: Abdomen: non distended, soft, non tender, bowel sounds normal  HEMATOLOGIC: no purpura, petechiae or bruising  LYMPHATIC: No lymph node enlargemant  MUSCULOSKELETAL: Extremities: no edema or active synovitis, pulse 1+   INTEGUMENT: No unusual rashes or suspicious skin lesions noted. Nails appear normal.  PERIPHERAL VASCULAR: normal pulses femoral, PT and DP  NEUROLOGIC: non-focal exam, A & O X 3  PSYCHIATRIC:, appropriate affect     ASSESSMENT:   1. Hypertension with renal disease    2. Mixed hyperlipidemia    3. Cerebrovascular accident (CVA), unspecified mechanism ([address]    4. CKD (chronic kidney disease), stage III ([address]    5. Primary osteoarthritis involving multiple joints    6. Gout, unspecified cause, unspecified chronicity, unspecified site    7. Non-seasonal allergic rhinitis due to other allergic trigger      Impression  1. Hypertension with a known labile component that seems to be stable  2. Hyperlipidemia prior lab reviewed and repeat status pending I will adjust if needed. 3.  Prior CVA continue aspirin daily  4   CKD stage III repeat status pending   5   DJD that is stable  6. Gout no recent symptoms  7. Allergic rhinitis stable  8. Intermittent faint sensation which has been evaluated before and is chronic and unchanged. I did discuss doing a 30-day event monitor and he wants to hold off on that  I will recheck a myself in 3 months or sooner should to be a problem. I will call with lab results. I will see him sooner if there is a problem.     PLAN:  .  Orders Placed This Encounter    METABOLIC PANEL, COMPREHENSIVE ([facility])    LIPID PANEL ([address] [facility])    CK ([facility])    bicalutamide (CASODEX) 50 mg tablet         ATTENTION:   This medical record was transcribed using an electronic medical records system. Although proofread, it may and can contain electronic and spelling errors. Other human spelling and other errors may be present. Corrections may be executed at a later time. Please feel free to contact us for any clarifications as needed. Follow-up and Dispositions    · Return in about 3 months (around 12/3/2019). No results found for any visits on 09/03/19. Kaur Morales MD    The patient verbalized understanding of the problems and plans as explained.

## 2019-09-03 ENCOUNTER — OFFICE VISIT (OUTPATIENT)
Dept: INTERNAL MEDICINE CLINIC | Age: 84
End: 2019-09-03

## 2019-09-03 VITALS
SYSTOLIC BLOOD PRESSURE: 138 MMHG | BODY MASS INDEX: 27.77 KG/M2 | HEART RATE: 50 BPM | TEMPERATURE: 97.7 F | OXYGEN SATURATION: 95 % | WEIGHT: 216.4 LBS | HEIGHT: 74 IN | RESPIRATION RATE: 16 BRPM | DIASTOLIC BLOOD PRESSURE: 59 MMHG

## 2019-09-03 DIAGNOSIS — I12.9 HYPERTENSION WITH RENAL DISEASE: Primary | ICD-10-CM

## 2019-09-03 DIAGNOSIS — N18.30 CKD (CHRONIC KIDNEY DISEASE), STAGE III (HCC): ICD-10-CM

## 2019-09-03 DIAGNOSIS — M10.9 GOUT, UNSPECIFIED CAUSE, UNSPECIFIED CHRONICITY, UNSPECIFIED SITE: ICD-10-CM

## 2019-09-03 DIAGNOSIS — M15.9 PRIMARY OSTEOARTHRITIS INVOLVING MULTIPLE JOINTS: ICD-10-CM

## 2019-09-03 DIAGNOSIS — E78.2 MIXED HYPERLIPIDEMIA: ICD-10-CM

## 2019-09-03 DIAGNOSIS — J30.89 NON-SEASONAL ALLERGIC RHINITIS DUE TO OTHER ALLERGIC TRIGGER: ICD-10-CM

## 2019-09-03 DIAGNOSIS — I63.9 CEREBROVASCULAR ACCIDENT (CVA), UNSPECIFIED MECHANISM (HCC): ICD-10-CM

## 2019-09-03 LAB
A-G RATIO,AGRAT: 1.3 RATIO
ALBUMIN SERPL-MCNC: 3.8 G/DL (ref 3.9–5.4)
ALP SERPL-CCNC: 130 U/L (ref 38–126)
ALT SERPL-CCNC: 61 U/L (ref 0–50)
ANION GAP SERPL CALC-SCNC: 9 MMOL/L
AST SERPL W P-5'-P-CCNC: 60 U/L (ref 14–36)
BILIRUB SERPL-MCNC: 0.5 MG/DL (ref 0.2–1.3)
BUN SERPL-MCNC: 36 MG/DL (ref 9–20)
BUN/CREATININE RATIO,BUCR: 28 RATIO
CALCIUM SERPL-MCNC: 9 MG/DL (ref 8.4–10.2)
CHLORIDE SERPL-SCNC: 106 MMOL/L (ref 98–107)
CHOL/HDL RATIO,CHHD: 4 RATIO (ref 0–4)
CHOLEST SERPL-MCNC: 151 MG/DL (ref 0–200)
CK SERPL-CCNC: 92 U/L (ref 30–135)
CO2 SERPL-SCNC: 26 MMOL/L (ref 22–32)
CREAT SERPL-MCNC: 1.3 MG/DL (ref 0.8–1.5)
GLOBULIN,GLOB: 3
GLUCOSE SERPL-MCNC: 112 MG/DL (ref 75–110)
HDLC SERPL-MCNC: 36 MG/DL (ref 35–130)
LDL/HDL RATIO,LDHD: 3 RATIO
LDLC SERPL CALC-MCNC: 90 MG/DL (ref 0–130)
POTASSIUM SERPL-SCNC: 4.5 MMOL/L (ref 3.6–5)
PROT SERPL-MCNC: 6.8 G/DL (ref 6.3–8.2)
SODIUM SERPL-SCNC: 141 MMOL/L (ref 137–145)
TRIGL SERPL-MCNC: 125 MG/DL (ref 0–200)
VLDLC SERPL CALC-MCNC: 25 MG/DL

## 2019-09-03 RX ORDER — BICALUTAMIDE 50 MG/1
50 TABLET, FILM COATED ORAL DAILY
COMMUNITY
Start: 2019-08-07

## 2019-09-03 NOTE — PROGRESS NOTES
Chief Complaint   Patient presents with    Hypertension     3 month f/u    Cholesterol Problem         1. Have you been to the ER, urgent care clinic since your last visit? Hospitalized since your last visit? no    2. Have you seen or consulted any other health care providers outside of the [address] [place] [address] since your last visit? Include any pap smears or colon screening.   no

## 2019-09-03 NOTE — PATIENT INSTRUCTIONS
Arthritis: Care Instructions  Your Care Instructions  Arthritis, also called osteoarthritis, is a breakdown of the cartilage that cushions your joints. When the cartilage wears down, your bones rub against each other. This causes pain and stiffness. Many people have some arthritis as they age. Arthritis most often affects the joints of the spine, hands, hips, knees, or feet. You can take simple measures to protect your joints, ease your pain, and help you stay active. Follow-up care is a key part of your treatment and safety. Be sure to make and go to all appointments, and call your doctor if you are having problems. It's also a good idea to know your test results and keep a list of the medicines you take. How can you care for yourself at home? · Stay at a healthy weight. Being overweight puts extra strain on your joints. · Talk to your doctor or physical therapist about exercises that will help ease joint pain. ? Stretch. You may enjoy gentle forms of yoga to help keep your joints and muscles flexible. ? Walk instead of jog. Other types of exercise that are less stressful on the joints include riding a bicycle, swimming, kary chi, or water exercise. ? Lift weights. Strong muscles help reduce stress on your joints. Stronger thigh muscles, for example, take some of the stress off of the knees and hips. Learn the right way to lift weights so you do not make joint pain worse. · Take your medicines exactly as prescribed. Call your doctor if you think you are having a problem with your medicine. · Take pain medicines exactly as directed. ? If the doctor gave you a prescription medicine for pain, take it as prescribed. ? If you are not taking a prescription pain medicine, ask your doctor if you can take an over-the-counter medicine. · Use a cane, crutch, walker, or another device if you need help to get around. These can help rest your joints.  You also can use other things to make life easier, such as a higher toilet seat and padded handles on kitchen utensils. · Do not sit in low chairs, which can make it hard to get up. · Put heat or cold on your sore joints as needed. Use whichever helps you most. You also can take turns with hot and cold packs. ? Apply heat 2 or 3 times a day for 20 to 30 minutesusing a heating pad, hot shower, or hot packto relieve pain and stiffness. ? Put ice or a cold pack on your sore joint for 10 to 20 minutes at a time. Put a thin cloth between the ice and your skin. When should you call for help? Call your doctor now or seek immediate medical care if:    · You have sudden swelling, warmth, or pain in any joint.     · You have joint pain and a fever or rash.     · You have such bad pain that you cannot use a joint.    Watch closely for changes in your health, and be sure to contact your doctor if:    · You have mild joint symptoms that continue even with more than 6 weeks of care at home.     · You have stomach pain or other problems with your medicine. Where can you learn more? Go to http://krishna-manpreet.info/. Enter H509 in the search box to learn more about \"Arthritis: Care Instructions. \"  Current as of: April 1, 2019  Content Version: 12.1  © 9929-3583 Healthwise, Incorporated. Care instructions adapted under license by [employer] (which disclaims liability or warranty for this information). If you have questions about a medical condition or this instruction, always ask your healthcare professional. [place] [address] any warranty or liability for your use of this information.

## 2019-09-23 PROBLEM — Z00.00 MEDICARE ANNUAL WELLNESS VISIT, SUBSEQUENT: Status: RESOLVED | Noted: 2018-01-05 | Resolved: 2019-09-23

## 2019-10-17 NOTE — PROGRESS NOTES
After obtaining written consent and per orders of Dr. Ramon Reese, injection of Fluad given by Gaby Triana. Patient tolerated procedure well. VIS was given to them. No reactions noted.

## 2019-10-18 ENCOUNTER — CLINICAL SUPPORT (OUTPATIENT)
Dept: INTERNAL MEDICINE CLINIC | Age: 84
End: 2019-10-18

## 2019-10-18 VITALS
HEART RATE: 53 BPM | DIASTOLIC BLOOD PRESSURE: 70 MMHG | RESPIRATION RATE: 18 BRPM | SYSTOLIC BLOOD PRESSURE: 118 MMHG | OXYGEN SATURATION: 97 % | HEIGHT: 74 IN | WEIGHT: 216.2 LBS | TEMPERATURE: 98.2 F | BODY MASS INDEX: 27.75 KG/M2

## 2019-10-18 DIAGNOSIS — Z23 ENCOUNTER FOR IMMUNIZATION: ICD-10-CM

## 2019-10-18 NOTE — PATIENT INSTRUCTIONS
Vaccine Information Statement    Influenza (Flu) Vaccine (Inactivated or Recombinant): What You Need to Know    Many Vaccine Information Statements are available in Pashto and other languages. See www.immunize.org/vis  Hojas de información sobre vacunas están disponibles en español y en muchos otros idiomas. Visite www.immunize.org/vis    1. Why get vaccinated? Influenza vaccine can prevent influenza (flu). Flu is a contagious disease that spreads around the [facility] every year, usually between October and May. Anyone can get the flu, but it is more dangerous for some people. Infants and young children, people 72years of age and older, pregnant women, and people with certain health conditions or a weakened immune system are at greatest risk of flu complications. Pneumonia, bronchitis, sinus infections and ear infections are examples of flu-related complications. If you have a medical condition, such as heart disease, cancer or diabetes, flu can make it worse. Flu can cause fever and chills, sore throat, muscle aches, fatigue, cough, headache, and runny or stuffy nose. Some people may have vomiting and diarrhea, though this is more common in children than adults. Each year thousands of people in the [facility] die from flu, and many more are hospitalized. Flu vaccine prevents millions of illnesses and flu-related visits to the doctor each year. 2. Influenza vaccines     CDC recommends everyone 10months of age and older get vaccinated every flu season. Children 6 months through 6years of age may need 2 doses during a single flu season. Everyone else needs only 1 dose each flu season. It takes about 2 weeks for protection to develop after vaccination. There are many flu viruses, and they are always changing. Each year a new flu vaccine is made to protect against three or four viruses that are likely to cause disease in the upcoming flu season.  Even when the vaccine doesnt exactly match these viruses, it may still provide some protection. Influenza vaccine does not cause flu. Influenza vaccine may be given at the same time as other vaccines. 3. Talk with your health care provider    Tell your vaccine provider if the person getting the vaccine:   Has had an allergic reaction after a previous dose of influenza vaccine, or has any severe, life-threatening allergies.  Has ever had Guillain-Barré Syndrome (also called GBS). In some cases, your health care provider may decide to postpone influenza vaccination to a future visit. People with minor illnesses, such as a cold, may be vaccinated. People who are moderately or severely ill should usually wait until they recover before getting influenza vaccine. Your health care provider can give you more information. 4. Risks of a reaction     Soreness, redness, and swelling where shot is given, fever, muscle aches, and headache can happen after influenza vaccine.  There may be a very small increased risk of Guillain-Barré Syndrome (GBS) after inactivated influenza vaccine (the flu shot). [facility] children who get the flu shot along with pneumococcal vaccine (PCV13), and/or DTaP vaccine at the same time might be slightly more likely to have a seizure caused by fever. Tell your health care provider if a child who is getting flu vaccine has ever had a seizure. People sometimes faint after medical procedures, including vaccination. Tell your provider if you feel dizzy or have vision changes or ringing in the ears. As with any medicine, there is a very remote chance of a vaccine causing a severe allergic reaction, other serious injury, or death. 5. What if there is a serious problem? An allergic reaction could occur after the vaccinated person leaves the clinic.  If you see signs of a severe allergic reaction (hives, swelling of the face and throat, difficulty breathing, a fast heartbeat, dizziness, or weakness), call 9-1-1 and get the person to the nearest hospital.    For other signs that concern you, call your health care provider. Adverse reactions should be reported to the Vaccine Adverse Event Reporting System (VAERS). Your health care provider will usually file this report, or you can do it yourself. Visit the VAERS website at www.vaers. [facility].gov or call 4-771.635.3347. VAERS is only for reporting reactions, and VAERS staff do not give medical advice. 6. The National Vaccine Injury Compensation Program    The [facility] Vaccine Injury Compensation Program (VICP) is a federal program that was created to compensate people who may have been injured by certain vaccines. Visit the VICP website at www.hrsa.gov/vaccinecompensation or call 1-935.426.7534 to learn about the program and about filing a claim. There is a time limit to file a claim for compensation. 7. How can I learn more?  Ask your health care provider.  Call your local or state health department.  Contact the Centers for Disease Control and Prevention (CDC):  - Call 7-969.936.5334 (1-800-CDC-INFO) or  - Visit CDCs influenza website at www.cdc.gov/flu    Vaccine Information Statement (Interim)  Inactivated Influenza Vaccine   8/15/2019  42 DAMIR Escobedo [id number]   Department of Health and Human Services  Centers for Disease Control and Prevention    Office Use Only

## 2019-12-03 RX ORDER — TELMISARTAN AND HYDROCHLORTHIAZIDE 80; 25 MG/1; MG/1
TABLET ORAL
Qty: 30 TAB | Status: SHIPPED | OUTPATIENT
Start: 2019-12-03 | End: 2020-01-29

## 2019-12-03 NOTE — TELEPHONE ENCOUNTER
RX refill request from the patient/pharmacy. Patient last seen 09- with labs, and next appt. scheduled for 12-  Requested Prescriptions     Pending Prescriptions Disp Refills    telmisartan-hydroCHLOROthiazide (MICARDIS HCT) 80-25 mg per tablet [Pharmacy Med Name: TELMISARTAN-HCTZ 80-25 MG TAB] 30 Tab PRN     Sig: TAKE ONE TABLET BY MOUTH DAILY   .

## 2019-12-10 PROBLEM — Z13.39 ALCOHOL SCREENING: Status: ACTIVE | Noted: 2019-12-10

## 2019-12-10 NOTE — PROGRESS NOTES
This is a Subsequent Medicare Annual Wellness Visit providing Personalized Prevention Plan Services (PPPS) (Performed 12 months after initial AWV and PPPS )    I have reviewed the patient's medical history in detail and updated the computerized patient record. He presents today accompanied by his wife his Medicare subsequent annual wellness examination and screening questionnaire. He is also here in follow-up of his multiple medical problems to include hypertension with a labile component, hyperlipidemia, prior CVA, history of PACs, DJD, Ménière's disease followed by ENT, gout, CKD stage III and other medical problems. He is taking his medications and trying to follow his diet and remain physically active. He currently denies any chest pain, shortness of breath, palpitations, PND, orthopnea or other cardiorespiratory complaints. He notes no GI or  complaints. He notes no headaches, dizziness or neurologic complaints. He has some chronic unchanged arthritic complaints. There are no other complaints on complete review of systems.     History     Past Medical History:   Diagnosis Date    Adverse effect of anesthesia     very bad gag reflex    Allergic rhinitis 8/19/2017    BPH (benign prostatic hypertrophy) 8/19/2017    Cancer (HCC)     basal cell carcinoma right ear    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    CKD (chronic kidney disease), stage III ([address] 8/19/2017    CVA (cerebro[facility] accident[facility]) 2009    DJD (degenerative joint disease) 8/19/2017    Gout     Hyperlipidemia 8/19/2017    Hypertension     Hypertension with renal disease 8/19/2017    Meniere disease 8/19/2017    Nausea & vomiting     On statin therapy 8/19/2017    PAC (premature atrial contraction) 8/19/2017    Palpitation 8/19/2017    Prostate CA ([address] 8/19/2017    Pseudophakia of both eyes 10/16/2018    Rosacea 8/19/2017    Testosterone deficiency 8/19/2017      Past Surgical History:   Procedure Laterality Date    ABDOMEN SURGERY PROC UNLISTED      bilateral inguinal hernia repair    COLORECTAL SCRN; HI RISK IND  5/3/2016         HX HEENT  1970    tonsillectomy    HX HEENT  1980    basal cell carcinoma right ear    HX OTHER SURGICAL Right 01/21/2018    EXCISION RIGHT NECK MASS     HX PROSTATECTOMY      radiation only    NM COLONOSCOPY FLX DX W/COLLJ SPEC WHEN PFRMD  1/19/2011          Social History     Tobacco Use    Smoking status: Never Smoker    Smokeless tobacco: Never Used   Substance Use Topics    Alcohol use: No    Drug use: No     Current Outpatient Medications   Medication Sig Dispense Refill    fluorouracil (EFUDEX) 5 % chemo cream       telmisartan-hydroCHLOROthiazide (MICARDIS HCT) 80-25 mg per tablet TAKE ONE TABLET BY MOUTH DAILY 30 Tab PRN    bicalutamide (CASODEX) 50 mg tablet       RESTASIS 0.05 % dpet INSTILL 2 DROPS IN BOTH EYES DAILY 1 Each 2    diclofenac EC (VOLTAREN) 75 mg EC tablet TAKE ONE TABLET BY MOUTH TWICE A  Tab 2    clopidogrel (PLAVIX) 75 mg tab TAKE ONE TABLET BY MOUTH DAILY 90 Tab 3    doxycycline (VIBRAMYCIN) 100 mg capsule TAKE ONE CAPSULE BY MOUTH DAILY 90 Cap 3    nebivolol (BYSTOLIC) 5 mg tablet TAKE ONE TABLET BY MOUTH DAILY 90 Tab 3    allopurinol (ZYLOPRIM) 100 mg tablet TAKE ONE TABLET BY MOUTH DAILY 90 Tab 3    doxycycline (MONODOX) 100 mg capsule Take 100 mg by mouth two (2) times a day.  clopidogrel (PLAVIX) 75 mg tab Take  by mouth.  diclofenac EC (VOLTAREN) 75 mg EC tablet Take  by mouth.  omega-3 fatty acids 1,000 mg cap Take  by mouth.  aspirin (ASPIRIN) 325 mg tablet Take 325 mg by mouth daily.  amLODIPine (NORVASC) 5 mg tablet Take 1 Tab by mouth daily. 90 Tab prn    Cetirizine (ZYRTEC) 10 mg cap Take  by mouth.  silodosin (RAPAFLO) 8 mg capsule Take 8 mg by mouth daily (with breakfast).        Allergies   Allergen Reactions    Sulfanilamide Rash    Sulfa (Sulfonamide Antibiotics) Rash     Very mild rash several years ago     Family History   Problem Relation Age of Onset    Cancer Mother         uterine, cervical    Cancer Father         colon ca       Patient Active Problem List    Diagnosis    Hypertension with renal disease    Mixed hyperlipidemia    Gout    Primary osteoarthritis involving multiple joints    CVA (cerebrovascular accident) ([address]    CKD (chronic kidney disease), stage III ([address]    Rosacea    PAC (premature atrial contraction)    Non-seasonal allergic rhinitis    Alcohol screening    Syncope    Medicare annual wellness visit, subsequent    Lymph node enlargement    Meniere disease    Testosterone deficiency    Prostate CA ([address]    Palpitation    BPH (benign prostatic hypertrophy)       Patient Care Team:  Nilda Corley MD as PCP - General (Internal Medicine)  Nilda Corley MD as PCP - [facility]    Depression Risk Factor Screening:     3 most recent PHQ Screens 12/11/2019   Little interest or pleasure in doing things Not at all   Feeling down, depressed, irritable, or hopeless Not at all   Total Score PHQ 2 0     Alcohol Risk Factor Screening: You do not drink alcohol or very rarely. Functional Ability and Level of Safety:     Fall Risk     Fall Risk Assessment, last 12 mths 12/11/2019   Able to walk? Yes   Fall in past 12 months? No       Hearing Loss   mild    Activities of Daily Living   Self-care.    ADL Assessment 12/11/2019   Feeding yourself No Help Needed   Getting from bed to chair No Help Needed   Getting dressed No Help Needed   Bathing or showering No Help Needed   Walk across the room (includes cane/walker) No Help Needed   Using the telphone No Help Needed   Taking your medications No Help Needed   Preparing meals No Help Needed   Managing money (expenses/bills) No Help Needed   Moderately strenuous housework (laundry) No Help Needed   Shopping for personal items (toiletries/medicines) No Help Needed   Shopping for groceries No Help Needed   Driving No Help Needed   Climbing a flight of stairs No Help Needed   Getting to places beyond walking distances No Help Needed       Abuse Screen   Patient is not abused    Social History     Patient does not qualify to have social determinant information on file (likely too young). Social History Narrative    Not on file       Review of Systems      ROS:    Constitutional: He denies fevers, weight loss, sweats. Eyes: No blurred or double vision. ENT: No difficulty with swallowing, taste, speech or smell. Neck: no stiffness or swelling  Respiratory: No cough wheezing or shortness of breath. Cardiovascular: Denies chest pain, palpitations, unexplained indigestion or syncope. Gastrointestinal:  No changes in bowel movements, no abdominal pain, no bloating. Genitourinary:  He denies frequency, nocturia or stranguria. Extremities: No joint pain, stiffness or swelling. Neurological:  No numbness, tingling, burring paresthesias or loss of motor strength. No syncope, dizziness or frequent headache  Lymphatic: no adenopathy noted  Hematologic: no easy bruising or bleeding gums  Skin:  No recent rashes or mole changes. Psychiatric/Behavioral:  Negative for depression.       Physical Examination     Evaluation of Cognitive Function:  Mood/affect:  happy  Appearance: age appropriate  Family member/caregiver input: wife    Visit Vitals  /76 (BP 1 Location: Left arm, BP Patient Position: Sitting)   Pulse (!) 52   Temp 97.7 °F (36.5 °C)   Resp 18   Ht 6' 2\" (1.88 m)   Wt 217 lb 3.2 oz (98.5 kg)   SpO2 93%   BMI 27.89 kg/m²     Vitals:    12/11/19 0835   BP: 156/76   Pulse: (!) 52   Resp: 18   Temp: 97.7 °F (36.5 °C)   SpO2: 93%   Weight: 217 lb 3.2 oz (98.5 kg)   Height: 6' 2\" (1.88 m)   PainSc:   0 - No pain        PHYSICAL EXAM:    General appearance - alert, well appearing, and in no distress  Mental status - alert, oriented to person, place, and time  HEENT:  Ears - bilateral TM's and external ear canals clear  Eyes - pupillary responses were normal.  Extraocular muscle function intact. Lids and conjunctiva not injected. Fundoscopic exam revealed sharp disc margins. eye movements intact  Pharynx- clear with teeth in good repair. No masses were noted  Neck - supple without thyromegaly or burit. No JVD noted  Lungs - clear to auscultation and percussion  Cardiac- normal rate, regular rhythm without murmurs. PMI not displaced. No gallop, rub or click  Abdomen - flat, soft, non-tender without palpable organomegaly or mass. No pulsatile mass was felt, and not bruit was heard. Bowel sounds were active  : Circumcised, Testes descended w/o masses  Rectal: normal sphincter tone, prostate normal, no masses, stool brown and hemacult negative  Extremities -  no clubbing cyanosis or edema  Lymphatics - no palpable lymphadenopathy, no hepatosplenomegaly  Hematologic: no petechiae or purpura  Peripheral vascular -Femoral, Dorsalis pedis and posterior tibial pulses felt without difficulty  Skin - no rash or unusual mole change noted  Neurological - Cranial nerves II-XII grossly intact. Motor strength 5/5. DTR's 2+ and symmetric. Station and gait normal  Back exam - full range of motion, no tenderness, palpable spasm or pain on motion  Musculoskeletal - no joint tenderness, deformity or swelling        Results for orders placed or performed in visit on 16/19/66   METABOLIC PANEL, COMPREHENSIVE   Result Value Ref Range    Glucose 112 (H) 75 - 110 mg/dL    BUN 36.0 (H) 9.0 - 20.0 mg/dL    Creatinine 1.3 0.8 - 1.5 mg/dL    Sodium 141 137 - 145 mmol/L    Potassium 4.5 3.6 - 5.0 mmol/L    Chloride 106 98 - 107 mmol/L    CO2 26.0 22.0 - 32.0 mmol/L    Calcium 9.0 8.4 - 10.2 mg/dl    Protein, total 6.8 6.3 - 8.2 g/dL    Albumin 3.8 (L) 3.9 - 5.4 g/dL    ALT (SGPT) 61 (H) 0 - 50 U/L    AST (SGOT) 60.0 (H) 14.0 - 36.0 U/L    Alk.  phosphatase 130 (H) 38 - 126 U/L    Bilirubin, total 0.5 0.2 - 1.3 mg/dL    BUN/Creatinine ratio 28 Ratio    GFR est AA >60 mL/min/1.73m2    GFR est non-AA 53 <60 mL/min/1.73m2    Globulin 3.00     A-G Ratio 1.3 Ratio    Anion gap 9 mmol/L   LIPID PANEL   Result Value Ref Range    Cholesterol, total 151 0 - 200 mg/dL    Triglyceride 125 0 - 200 mg/dL    HDL Cholesterol 36 35 - 130 mg/dL    VLDL 25 mg/dL    LDL, calculated 90 0 - 130 mg/dL    CHOL/HDL Ratio 4 0 - 4 Ratio    LDL/HDL Ratio 3 Ratio   CK   Result Value Ref Range    CK 92.00 30.00 - 135.00 U/L       Advice/Referrals/Counseling   Education and counseling provided:  Are appropriate based on today's review and evaluation  End-of-Life planning (with patient's consent)  Pneumococcal Vaccine  Influenza Vaccine  Colorectal cancer screening tests      Assessment/Plan     ASSESSMENT:   1. Hypertension with renal disease    2. Mixed hyperlipidemia    3. Cerebrovascular accident (CVA), unspecified mechanism ([address]    4. CKD (chronic kidney disease), stage III ([address]    5. Gout, unspecified cause, unspecified chronicity, unspecified site    6. Primary osteoarthritis involving multiple joints    7. Non-seasonal allergic rhinitis due to other allergic trigger    8. PAC (premature atrial contraction)    9. Rosacea    10. Prostate CA [facility]    11. Alcohol screening    12. Medicare annual wellness visit, subsequent      Impression  1. Hypertension has a labile component yesterday by his check at home 134/76 we will not make changes in medicine today. 2.  Hyperlipidemia prior lab reviewed and repeat status pending and I will adjust if needed. 3   Prior CVA currently stable and on Plavix which we will continue  4. CKD stage III repeat status pending  5. Gout currently stable  6. DJD currently stable  7. Allergic rhinitis currently stable and on nasal spray which seems to be working  8. PACs stable exam today  9. Rosacea stable  10. Prostate cancer followed by urology and last PSA 0.815 and on Casodex  11.   Annual alcohol screening is done he does not drink alcohol at all. I did caution regarding drinking more than 2 drinks per day in males with increased cardiovascular risk as well as increased risk of liver disease and other GI problems  Medicare subsequent annual wellness examination screening questionnaire is done today and results were discussed with he and his wife. Lifestyle recommendations modifications discussed and made. I will call with lab results and make further recommendations or adjustments if necessary. Follow-up scheduled again for 3 months or sooner if there is a problem. PLAN:  .  Orders Placed This Encounter    CBC WITH AUTOMATED DIFF    METABOLIC PANEL, COMPREHENSIVE ([facility])    LIPID PANEL ([facility])    CK ([facility])    T4, FREE ([facility])    TSH 3RD GENERATION ([facility])    URINALYSIS W/O MICRO ([facility])    fluorouracil (EFUDEX) 5 % chemo cream         ATTENTION:   This medical record was transcribed using an electronic medical records system. Although proofread, it may and can contain electronic and spelling errors. Other human spelling and other errors may be present. Corrections may be executed at a later time. Please feel free to contact us for any clarifications as needed. Follow-up and Dispositions    · Return in about 3 months (around 3/11/2020). Dale Stewart MD    Recommended healthy diet low in carbohydrates, fats, sodium and cholesterol. Recommended regular cardiovascular exercise 3-6 times per week for 30-60 minutes daily.       Current Outpatient Medications   Medication Sig Dispense Refill    fluorouracil (EFUDEX) 5 % chemo cream       telmisartan-hydroCHLOROthiazide (MICARDIS HCT) 80-25 mg per tablet TAKE ONE TABLET BY MOUTH DAILY 30 Tab PRN    bicalutamide (CASODEX) 50 mg tablet       RESTASIS 0.05 % dpet INSTILL 2 DROPS IN BOTH EYES DAILY 1 Each 2    diclofenac EC (VOLTAREN) 75 mg EC tablet TAKE ONE TABLET BY MOUTH TWICE A  Tab 2    clopidogrel (PLAVIX) 75 mg tab TAKE ONE TABLET BY MOUTH DAILY 90 Tab 3    doxycycline (VIBRAMYCIN) 100 mg capsule TAKE ONE CAPSULE BY MOUTH DAILY 90 Cap 3    nebivolol (BYSTOLIC) 5 mg tablet TAKE ONE TABLET BY MOUTH DAILY 90 Tab 3    allopurinol (ZYLOPRIM) 100 mg tablet TAKE ONE TABLET BY MOUTH DAILY 90 Tab 3    doxycycline (MONODOX) 100 mg capsule Take 100 mg by mouth two (2) times a day.  clopidogrel (PLAVIX) 75 mg tab Take  by mouth.  diclofenac EC (VOLTAREN) 75 mg EC tablet Take  by mouth.  omega-3 fatty acids 1,000 mg cap Take  by mouth.  aspirin (ASPIRIN) 325 mg tablet Take 325 mg by mouth daily.  amLODIPine (NORVASC) 5 mg tablet Take 1 Tab by mouth daily. 90 Tab prn    Cetirizine (ZYRTEC) 10 mg cap Take  by mouth.  silodosin (RAPAFLO) 8 mg capsule Take 8 mg by mouth daily (with breakfast). No results found for any visits on 12/11/19. Verbal and written instructions (see AVS) provided. Patient expresses understanding of diagnosis and treatment plan.     Artemio Felipe MD

## 2019-12-11 ENCOUNTER — OFFICE VISIT (OUTPATIENT)
Dept: INTERNAL MEDICINE CLINIC | Age: 84
End: 2019-12-11

## 2019-12-11 VITALS
TEMPERATURE: 97.7 F | HEART RATE: 52 BPM | HEIGHT: 74 IN | DIASTOLIC BLOOD PRESSURE: 76 MMHG | RESPIRATION RATE: 18 BRPM | BODY MASS INDEX: 27.87 KG/M2 | SYSTOLIC BLOOD PRESSURE: 156 MMHG | WEIGHT: 217.2 LBS | OXYGEN SATURATION: 93 %

## 2019-12-11 DIAGNOSIS — E78.2 MIXED HYPERLIPIDEMIA: ICD-10-CM

## 2019-12-11 DIAGNOSIS — J30.89 NON-SEASONAL ALLERGIC RHINITIS DUE TO OTHER ALLERGIC TRIGGER: ICD-10-CM

## 2019-12-11 DIAGNOSIS — N39.0 URINARY TRACT INFECTION WITH HEMATURIA, SITE UNSPECIFIED: ICD-10-CM

## 2019-12-11 DIAGNOSIS — I49.1 PAC (PREMATURE ATRIAL CONTRACTION): ICD-10-CM

## 2019-12-11 DIAGNOSIS — M15.9 PRIMARY OSTEOARTHRITIS INVOLVING MULTIPLE JOINTS: ICD-10-CM

## 2019-12-11 DIAGNOSIS — M10.9 GOUT, UNSPECIFIED CAUSE, UNSPECIFIED CHRONICITY, UNSPECIFIED SITE: ICD-10-CM

## 2019-12-11 DIAGNOSIS — Z13.39 ALCOHOL SCREENING: ICD-10-CM

## 2019-12-11 DIAGNOSIS — N18.30 CKD (CHRONIC KIDNEY DISEASE), STAGE III (HCC): ICD-10-CM

## 2019-12-11 DIAGNOSIS — I63.9 CEREBROVASCULAR ACCIDENT (CVA), UNSPECIFIED MECHANISM (HCC): ICD-10-CM

## 2019-12-11 DIAGNOSIS — Z00.00 MEDICARE ANNUAL WELLNESS VISIT, SUBSEQUENT: ICD-10-CM

## 2019-12-11 DIAGNOSIS — L71.9 ROSACEA: ICD-10-CM

## 2019-12-11 DIAGNOSIS — C61 PROSTATE CA (HCC): ICD-10-CM

## 2019-12-11 DIAGNOSIS — R31.9 URINARY TRACT INFECTION WITH HEMATURIA, SITE UNSPECIFIED: ICD-10-CM

## 2019-12-11 DIAGNOSIS — I12.9 HYPERTENSION WITH RENAL DISEASE: Primary | ICD-10-CM

## 2019-12-11 LAB
A-G RATIO,AGRAT: 1.3 RATIO
ALBUMIN SERPL-MCNC: 4 G/DL (ref 3.9–5.4)
ALP SERPL-CCNC: 116 U/L (ref 38–126)
ALT SERPL-CCNC: 40 U/L (ref 0–50)
ANION GAP SERPL CALC-SCNC: 13 MMOL/L
AST SERPL W P-5'-P-CCNC: 43 U/L (ref 14–36)
BACTERIA,BACTU: ABNORMAL
BILIRUB SERPL-MCNC: 0.8 MG/DL (ref 0.2–1.3)
BILIRUB UR QL: NEGATIVE
BUN SERPL-MCNC: 30 MG/DL (ref 9–20)
BUN/CREATININE RATIO,BUCR: 27 RATIO
CALCIUM SERPL-MCNC: 9.6 MG/DL (ref 8.4–10.2)
CHLORIDE SERPL-SCNC: 103 MMOL/L (ref 98–107)
CHOL/HDL RATIO,CHHD: 3 RATIO (ref 0–4)
CHOLEST SERPL-MCNC: 138 MG/DL (ref 0–200)
CK SERPL-CCNC: 99 U/L (ref 30–135)
CLARITY: CLEAR
CO2 SERPL-SCNC: 25 MMOL/L (ref 22–32)
COLOR UR: ABNORMAL
CREAT SERPL-MCNC: 1.1 MG/DL (ref 0.8–1.5)
GLOBULIN,GLOB: 3.2
GLUCOSE 24H UR-MRATE: NEGATIVE G/(24.H)
GLUCOSE SERPL-MCNC: 112 MG/DL (ref 75–110)
HDLC SERPL-MCNC: 40 MG/DL (ref 35–130)
HGB UR QL STRIP: ABNORMAL
KETONES UR QL STRIP.AUTO: NEGATIVE
LDL/HDL RATIO,LDHD: 2 RATIO
LDLC SERPL CALC-MCNC: 73 MG/DL (ref 0–130)
LEUKOCYTE ESTERASE: NEGATIVE
NITRITE UR QL STRIP.AUTO: NEGATIVE
PH UR STRIP: 6 [PH] (ref 5–7)
POTASSIUM SERPL-SCNC: 4.7 MMOL/L (ref 3.6–5)
PROT SERPL-MCNC: 7.2 G/DL (ref 6.3–8.2)
PROT UR STRIP-MCNC: ABNORMAL MG/DL
RBC #/AREA URNS HPF: ABNORMAL #/HPF
SODIUM SERPL-SCNC: 141 MMOL/L (ref 137–145)
SP GR UR REFRACTOMETRY: 1.01 (ref 1–1.03)
TRIGL SERPL-MCNC: 124 MG/DL (ref 0–200)
UROBILINOGEN UR QL STRIP.AUTO: NEGATIVE
VLDLC SERPL CALC-MCNC: 25 MG/DL
WBC URNS QL MICRO: 0 #/HPF

## 2019-12-11 RX ORDER — FLUOROURACIL 50 MG/G
CREAM TOPICAL
COMMUNITY
Start: 2019-11-07 | End: 2021-05-06 | Stop reason: ALTCHOICE

## 2019-12-11 NOTE — PROGRESS NOTES
Love Nguyen  Identified pt with two pt identifiers(name and ). Chief Complaint   Patient presents with   Amelia Carrasco Annual Wellness Visit       1. Have you been to the ER, urgent care clinic since your last visit? Hospitalized since your last visit? no    2. Have you seen or consulted any other health care providers outside of the [address] [place] [address] since your last visit? Include any pap smears or colon screening. Has seen Dr. Lu De Leon, urologist; ENT, Dr. Ivan Villagomez and his Dermatologist.      Health Maintenance Topics with due status: Overdue       Topic Date Due    DTaP/Tdap/Td series 10/17/1945    MEDICARE YEARLY EXAM 2019     Health Maintenance Topics with due status: Not Due       Topic Last Completion Date    GLAUCOMA SCREENING Q2Y 10/16/2018     Health Maintenance Topics with due status: Completed       Topic Last Completion Date    Pneumococcal 65+ years 2015    Influenza Age 5 to Adult 10/18/2019           Medication reconciliation up to date and corrected with patient at this time. Today's provider has been notified of reason for visit, vitals and flowsheets obtained on patients. Reviewed record in preparation for visit, huddled with provider and have obtained necessary documentation.         Wt Readings from Last 3 Encounters:   19 217 lb 3.2 oz (98.5 kg)   10/18/19 216 lb 3.2 oz (98.1 kg)   19 216 lb 6.4 oz (98.2 kg)     Temp Readings from Last 3 Encounters:   19 97.7 °F (36.5 °C)   10/18/19 98.2 °F (36.8 °C)   19 97.7 °F (36.5 °C) (Oral)     BP Readings from Last 3 Encounters:   19 156/76   10/18/19 118/70   19 138/59     Pulse Readings from Last 3 Encounters:   19 (!) 52   10/18/19 (!) 53   19 (!) 50     Vitals:    19 0835   BP: 156/76   Pulse: (!) 52   Resp: 18   Temp: 97.7 °F (36.5 °C)   SpO2: 93%   Weight: 217 lb 3.2 oz (98.5 kg)   Height: 6' 2\" (1.88 m)   PainSc:   0 - No pain         Learning Assessment:  :     Learning Assessment 9/11/2017   PRIMARY LEARNER Patient   HIGHEST LEVEL OF EDUCATION - PRIMARY LEARNER  4 YEARS OF COLLEGE   PRIMARY LANGUAGE ENGLISH   LEARNER PREFERENCE PRIMARY LISTENING   ANSWERED BY patient   RELATIONSHIP SELF       Depression Screening:  :     3 most recent PHQ Screens 9/3/2019   Little interest or pleasure in doing things Not at all   Feeling down, depressed, irritable, or hopeless Not at all   Total Score PHQ 2 0       No flowsheet data found. Fall Risk Assessment:  :     Fall Risk Assessment, last 12 mths 9/3/2019   Able to walk? Yes   Fall in past 12 months? No       Abuse Screening:  :     Abuse Screening Questionnaire 5/17/2019 4/19/2018 9/11/2017   Do you ever feel afraid of your partner? N N N   Are you in a relationship with someone who physically or mentally threatens you? N N N   Is it safe for you to go home?  Y Y Y       ADL Screening:  :     ADL Assessment 12/11/2019   Feeding yourself No Help Needed   Getting from bed to chair No Help Needed   Getting dressed No Help Needed   Bathing or showering No Help Needed   Walk across the room (includes cane/walker) No Help Needed   Using the telphone No Help Needed   Taking your medications No Help Needed   Preparing meals No Help Needed   Managing money (expenses/bills) No Help Needed   Moderately strenuous housework (laundry) No Help Needed   Shopping for personal items (toiletries/medicines) No Help Needed   Shopping for groceries No Help Needed   Driving No Help Needed   Climbing a flight of stairs No Help Needed   Getting to places beyond walking distances No Help Needed

## 2019-12-11 NOTE — PATIENT INSTRUCTIONS
Arthritis: Care Instructions Your Care Instructions Arthritis, also called osteoarthritis, is a breakdown of the cartilage that cushions your joints. When the cartilage wears down, your bones rub against each other. This causes pain and stiffness. Many people have some arthritis as they age. Arthritis most often affects the joints of the spine, hands, hips, knees, or feet. You can take simple measures to protect your joints, ease your pain, and help you stay active. Follow-up care is a key part of your treatment and safety. Be sure to make and go to all appointments, and call your doctor if you are having problems. It's also a good idea to know your test results and keep a list of the medicines you take. How can you care for yourself at home? · Stay at a healthy weight. Being overweight puts extra strain on your joints. · Talk to your doctor or physical therapist about exercises that will help ease joint pain. ? Stretch. You may enjoy gentle forms of yoga to help keep your joints and muscles flexible. ? Walk instead of jog. Other types of exercise that are less stressful on the joints include riding a bicycle, swimming, kary chi, or water exercise. ? Lift weights. Strong muscles help reduce stress on your joints. Stronger thigh muscles, for example, take some of the stress off of the knees and hips. Learn the right way to lift weights so you do not make joint pain worse. · Take your medicines exactly as prescribed. Call your doctor if you think you are having a problem with your medicine. · Take pain medicines exactly as directed. ? If the doctor gave you a prescription medicine for pain, take it as prescribed. ? If you are not taking a prescription pain medicine, ask your doctor if you can take an over-the-counter medicine. · Use a cane, crutch, walker, or another device if you need help to get around. These can help rest your joints.  You also can use other things to make life easier, such as a higher toilet seat and padded handles on kitchen utensils. · Do not sit in low chairs, which can make it hard to get up. · Put heat or cold on your sore joints as needed. Use whichever helps you most. You also can take turns with hot and cold packs. ? Apply heat 2 or 3 times a day for 20 to 30 minutesusing a heating pad, hot shower, or hot packto relieve pain and stiffness. ? Put ice or a cold pack on your sore joint for 10 to 20 minutes at a time. Put a thin cloth between the ice and your skin. When should you call for help? Call your doctor now or seek immediate medical care if: 
  · You have sudden swelling, warmth, or pain in any joint.  
  · You have joint pain and a fever or rash.  
  · You have such bad pain that you cannot use a joint.  
 Watch closely for changes in your health, and be sure to contact your doctor if: 
  · You have mild joint symptoms that continue even with more than 6 weeks of care at home.  
  · You have stomach pain or other problems with your medicine. Where can you learn more? Go to http://krishna-manpreet.info/. Enter J595 in the search box to learn more about \"Arthritis: Care Instructions. \" Current as of: April 1, 2019 Content Version: 12.2 © 0564-6657 [employer]. Care instructions adapted under license by [employer] (which disclaims liability or warranty for this information). If you have questions about a medical condition or this instruction, always ask your healthcare professional. [place] [address] any warranty or liability for your use of this information.

## 2019-12-12 LAB
BASOPHILS # BLD AUTO: 0.1 X10E3/UL (ref 0–0.2)
BASOPHILS NFR BLD AUTO: 2 %
EOSINOPHIL # BLD AUTO: 0.3 X10E3/UL (ref 0–0.4)
EOSINOPHIL NFR BLD AUTO: 7 %
ERYTHROCYTE [DISTWIDTH] IN BLOOD BY AUTOMATED COUNT: 13 % (ref 12.3–15.4)
HCT VFR BLD AUTO: 39.7 % (ref 37.5–51)
HGB BLD-MCNC: 14 G/DL (ref 13–17.7)
IMM GRANULOCYTES # BLD AUTO: 0 X10E3/UL (ref 0–0.1)
IMM GRANULOCYTES NFR BLD AUTO: 0 %
LYMPHOCYTES # BLD AUTO: 1.4 X10E3/UL (ref 0.7–3.1)
LYMPHOCYTES NFR BLD AUTO: 34 %
MCH RBC QN AUTO: 33.7 PG (ref 26.6–33)
MCHC RBC AUTO-ENTMCNC: 35.3 G/DL (ref 31.5–35.7)
MCV RBC AUTO: 95 FL (ref 79–97)
MONOCYTES # BLD AUTO: 0.5 X10E3/UL (ref 0.1–0.9)
MONOCYTES NFR BLD AUTO: 13 %
NEUTROPHILS # BLD AUTO: 1.7 X10E3/UL (ref 1.4–7)
NEUTROPHILS NFR BLD AUTO: 44 %
PLATELET # BLD AUTO: 177 X10E3/UL (ref 150–450)
RBC # BLD AUTO: 4.16 X10E6/UL (ref 4.14–5.8)
T4 FREE SERPL-MCNC: 0.86 NG/DL (ref 0.58–2.3)
TSH SERPL DL<=0.05 MIU/L-ACNC: 3.28 UIU/ML (ref 0.34–5.6)
WBC # BLD AUTO: 3.9 X10E3/UL (ref 3.4–10.8)

## 2019-12-13 LAB — BACTERIA UR CULT: ABNORMAL

## 2019-12-13 RX ORDER — NITROFURANTOIN 25; 75 MG/1; MG/1
100 CAPSULE ORAL 2 TIMES DAILY
Qty: 20 CAP | Refills: 0 | OUTPATIENT
Start: 2019-12-13 | End: 2020-06-30 | Stop reason: ALTCHOICE

## 2019-12-13 NOTE — PROGRESS NOTES
Labs okay except for urinary tract infection should treat with Macrobid 100 twice daily for 10 days and get a follow-up urine

## 2019-12-13 NOTE — PROGRESS NOTES
Called and spoke to patient  Two pt identifiers confirmed  Informed patient per Dr. Mallory Patel that labs are ok but he does have a uti. Per Dr. Mallory Patel treat with macrobid 100mg twice daily for 10 days. Pt will get follow up urine 3-5 days after finishing antibiotics. Rx was called into [facility] on [address]. Patient verbalized understanding of information discussed  with no further questions at this time.

## 2019-12-13 NOTE — TELEPHONE ENCOUNTER
Per Dr. Pfeiffer Cuff pt has uti and treat with macrobid 100mg twice daily for 10 days. Rx was called into [facility] on [address].

## 2019-12-27 ENCOUNTER — LAB ONLY (OUTPATIENT)
Dept: INTERNAL MEDICINE CLINIC | Age: 84
End: 2019-12-27

## 2019-12-27 DIAGNOSIS — N39.0 URINARY TRACT INFECTION WITHOUT HEMATURIA, SITE UNSPECIFIED: Primary | ICD-10-CM

## 2019-12-27 LAB
BACTERIA,BACTU: ABNORMAL
BILIRUB UR QL: NEGATIVE
CLARITY: CLEAR
COLOR UR: ABNORMAL
GLUCOSE 24H UR-MRATE: NEGATIVE G/(24.H)
HGB UR QL STRIP: ABNORMAL
KETONES UR QL STRIP.AUTO: NEGATIVE
LEUKOCYTE ESTERASE: NEGATIVE
NITRITE UR QL STRIP.AUTO: NEGATIVE
PH UR STRIP: 6 [PH] (ref 5–7)
PROT UR STRIP-MCNC: ABNORMAL MG/DL
RBC #/AREA URNS HPF: ABNORMAL #/HPF
SP GR UR REFRACTOMETRY: 1.02 (ref 1–1.03)
UROBILINOGEN UR QL STRIP.AUTO: NEGATIVE
WBC URNS QL MICRO: ABNORMAL #/HPF

## 2019-12-29 LAB — BACTERIA UR CULT: NO GROWTH

## 2019-12-31 NOTE — PROGRESS NOTES
F/U urine culture neg but still with RBC s in UA so Urology appt. Discussed with wife. States her  just saw Dr. Toni Rizvi and got a \"good check up. \"  Wants us to fax a copy to Dr. Toni Rizvi and she will call him regarding necessary f/up.

## 2020-01-09 PROBLEM — Z00.00 MEDICARE ANNUAL WELLNESS VISIT, SUBSEQUENT: Status: RESOLVED | Noted: 2018-01-05 | Resolved: 2020-01-09

## 2020-01-21 RX ORDER — CYCLOSPORINE 0.5 MG/ML
EMULSION OPHTHALMIC
Qty: 1 EACH | Refills: 5 | Status: SHIPPED | OUTPATIENT
Start: 2020-01-21 | End: 2021-02-23

## 2020-01-29 RX ORDER — TELMISARTAN AND HYDROCHLORTHIAZIDE 80; 25 MG/1; MG/1
TABLET ORAL
Qty: 30 TAB | Refills: 11 | Status: SHIPPED | OUTPATIENT
Start: 2020-01-29 | End: 2020-04-30

## 2020-01-29 NOTE — TELEPHONE ENCOUNTER
RX refill request from the patient/pharmacy.  Patient last seen 12- with labs, and next appt. scheduled for 03-  Requested Prescriptions     Pending Prescriptions Disp Refills    telmisartan-hydroCHLOROthiazide (MICARDIS HCT) 80-25 mg per tablet [Pharmacy Med Name: TELMISARTAN-HCTZ 80-25 MG TAB] 30 Tab 11     Sig: TAKE ONE TABLET BY MOUTH DAILY

## 2020-03-30 PROBLEM — C61 PROSTATE CA (HCC): Status: RESOLVED | Noted: 2017-08-19 | Resolved: 2020-03-30

## 2020-03-30 PROBLEM — Z85.46 HISTORY OF PROSTATE CANCER: Status: ACTIVE | Noted: 2020-03-30

## 2020-04-14 RX ORDER — ALLOPURINOL 100 MG/1
TABLET ORAL
Qty: 30 TAB | Refills: 5 | Status: SHIPPED | OUTPATIENT
Start: 2020-04-14 | End: 2020-10-15

## 2020-04-14 NOTE — TELEPHONE ENCOUNTER
RX refill request from the patient/pharmacy. Patient last seen 12- with labs, and next appt. scheduled for 05-  Requested Prescriptions     Pending Prescriptions Disp Refills    allopurinoL (ZYLOPRIM) 100 mg tablet [Pharmacy Med Name: ALLOPURINOL 100 MG TABLET] 30 Tab 5     Sig: TAKE ONE TABLET BY MOUTH DAILY   .

## 2020-04-23 RX ORDER — NEBIVOLOL 5 MG/1
TABLET ORAL
Qty: 30 TAB | Refills: 2 | Status: SHIPPED | OUTPATIENT
Start: 2020-04-23 | End: 2020-07-29

## 2020-04-23 NOTE — TELEPHONE ENCOUNTER
PCP: Melvin Mitchell MD    Last appt: 12/11/2019  Future Appointments   Date Time Provider Maximino Araiza   5/5/2020 10:40 AM Melvin Mitchell MD [facility]       Requested Prescriptions     Pending Prescriptions Disp Refills    nebivoloL (Bystolic) 5 mg tablet 30 Tab 2     Sig: TAKE ONE TABLET BY MOUTH DAILY       Prior labs and Blood pressures:  BP Readings from Last 3 Encounters:   12/11/19 156/76   10/18/19 118/70   09/03/19 138/59     Lab Results   Component Value Date/Time    Sodium 141 12/11/2019 09:15 AM    Potassium 4.7 12/11/2019 09:15 AM    Chloride 103 12/11/2019 09:15 AM    CO2 25.0 12/11/2019 09:15 AM    Anion gap 13 12/11/2019 09:15 AM    Glucose 112 (H) 12/11/2019 09:15 AM    BUN 30.0 (H) 12/11/2019 09:15 AM    Creatinine 1.1 12/11/2019 09:15 AM    BUN/Creatinine ratio 27 12/11/2019 09:15 AM    GFR est AA >60 12/11/2019 09:15 AM    GFR est non-AA >60 12/11/2019 09:15 AM    Calcium 9.6 12/11/2019 09:15 AM     Lab Results   Component Value Date/Time    Hemoglobin A1c 5.7 03/09/2009 02:25 AM     Lab Results   Component Value Date/Time    Cholesterol, total 138 12/11/2019 09:15 AM    Cholesterol (POC) 108.0 04/19/2018 09:15 AM    HDL Cholesterol 40 12/11/2019 09:15 AM    HDL Cholesterol (POC) 41.0 04/19/2018 09:15 AM    LDL Cholesterol (POC) 50.4 04/19/2018 09:15 AM    LDL, calculated 73 12/11/2019 09:15 AM    VLDL, calculated 32 02/08/2019 09:38 AM    VLDL 25 12/11/2019 09:15 AM    Triglyceride 124 12/11/2019 09:15 AM    Triglycerides (POC) 83.0 04/19/2018 09:15 AM    CHOL/HDL Ratio 3 12/11/2019 09:15 AM     No results found for: Aleida Massey [id number]    Lab Results   Component Value Date/Time    TSH 4.250 11/07/2018 11:13 AM    TSH, 3rd generation 3.28 12/11/2019 09:15 AM

## 2020-04-30 NOTE — TELEPHONE ENCOUNTER
PCP: Ghislaine Charlton MD    Last appt: 12/11/2019  Future Appointments   Date Time Provider Maximino Araiza   5/5/2020 10:40 AM Ghislaine Charlton MD [address]       Requested Prescriptions     Pending Prescriptions Disp Refills    telmisartan-hydroCHLOROthiazide (MICARDIS HCT) 80-25 mg per tablet [Pharmacy Med Name: Dheeraj Drape 80-25 MG TAB] 90 Tab 0     Sig: TAKE ONE TABLET BY MOUTH DAILY

## 2020-05-01 RX ORDER — TELMISARTAN AND HYDROCHLORTHIAZIDE 80; 25 MG/1; MG/1
TABLET ORAL
Qty: 90 TAB | Refills: 0 | Status: SHIPPED | OUTPATIENT
Start: 2020-05-01 | End: 2020-07-29

## 2020-06-02 DIAGNOSIS — Z86.73 HISTORY OF CVA (CEREBROVASCULAR ACCIDENT): ICD-10-CM

## 2020-06-02 RX ORDER — CLOPIDOGREL BISULFATE 75 MG/1
TABLET ORAL
Qty: 90 TAB | Refills: 2 | Status: SHIPPED | OUTPATIENT
Start: 2020-06-02 | End: 2021-03-08

## 2020-06-02 RX ORDER — DOXYCYCLINE 100 MG/1
CAPSULE ORAL
Qty: 90 CAP | Refills: 2 | Status: SHIPPED | OUTPATIENT
Start: 2020-06-02 | End: 2021-10-12

## 2020-06-29 RX ORDER — AMLODIPINE BESYLATE 5 MG/1
TABLET ORAL
Qty: 90 TAB | Refills: 3 | Status: SHIPPED | OUTPATIENT
Start: 2020-06-29 | End: 2020-09-30 | Stop reason: ALTCHOICE

## 2020-06-29 NOTE — PROGRESS NOTES
Chief Complaint   Patient presents with    Hypertension     3 month f/up but overdue    Chronic Kidney Disease    Benign Prostatic Hypertrophy    Cholesterol Problem       SUBJECTIVE:    Thu Knott is a 80 y.o. male returns in follow-up for medical problems to include hypertension, prior CVA, hyperlipidemia, DJD, CKD stage III, gout and other multiple medical problems. He is taking his medications and trying to follow his diet and try and remain physically active. He currently denies any chest pain, shortness of breath, palpitations, PND, orthopnea or other cardiorespiratory complaints. There are no GI or  complaints. He has no headaches, dizziness or neurologic complaints. He does occasionally some burning in his legs after going up stairs but is only very rarely he goes up stairs most of time with no difficulty whatsoever and has no burning in his legs or difficulty when he is walking on a regular basis. He does occasionally get some back pain when he sleeping but changing positions seems to help that. He has no other current arthritic complaints. There are no other complaints on complete review of systems.       Current Outpatient Medications   Medication Sig Dispense Refill    amLODIPine (NORVASC) 5 mg tablet TAKE ONE TABLET BY MOUTH DAILY 90 Tab 3    doxycycline (VIBRAMYCIN) 100 mg capsule TAKE ONE CAPSULE BY MOUTH DAILY 90 Cap 2    clopidogreL (PLAVIX) 75 mg tab TAKE ONE TABLET BY MOUTH DAILY 90 Tab 2    telmisartan-hydroCHLOROthiazide (MICARDIS HCT) 80-25 mg per tablet TAKE ONE TABLET BY MOUTH DAILY 90 Tab 0    nebivoloL (Bystolic) 5 mg tablet TAKE ONE TABLET BY MOUTH DAILY 30 Tab 2    allopurinoL (ZYLOPRIM) 100 mg tablet TAKE ONE TABLET BY MOUTH DAILY 30 Tab 5    cycloSPORINE (RESTASIS) 0.05 % dpet INSTILL 2 DROPS IN BOTH EYES DAILY 1 Each 5    fluorouracil (EFUDEX) 5 % chemo cream       bicalutamide (CASODEX) 50 mg tablet       diclofenac EC (VOLTAREN) 75 mg EC tablet TAKE ONE TABLET BY MOUTH TWICE A  Tab 2    doxycycline (MONODOX) 100 mg capsule Take 100 mg by mouth two (2) times a day.  clopidogrel (PLAVIX) 75 mg tab Take  by mouth.  diclofenac EC (VOLTAREN) 75 mg EC tablet Take  by mouth.  omega-3 fatty acids 1,000 mg cap Take  by mouth.  aspirin (ASPIRIN) 325 mg tablet Take 325 mg by mouth daily.  Cetirizine (ZYRTEC) 10 mg cap Take  by mouth.  silodosin (RAPAFLO) 8 mg capsule Take 8 mg by mouth daily (with breakfast).        Past Medical History:   Diagnosis Date    Adverse effect of anesthesia     very bad gag reflex    Allergic rhinitis 8/19/2017    BPH (benign prostatic hypertrophy) 8/19/2017    Cancer (HCC)     basal cell carcinoma right ear    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    CKD (chronic kidney disease), stage III ([address] 8/19/2017    CVA (cerebrovascular accident) [facility]) 2009    DJD (degenerative joint disease) 8/19/2017    Gout     Hyperlipidemia 8/19/2017    Hypertension     Hypertension with renal disease 8/19/2017    Meniere disease 8/19/2017    Nausea & vomiting     On statin therapy 8/19/2017    PAC (premature atrial contraction) 8/19/2017    Palpitation 8/19/2017    Prostate CA ([address] 8/19/2017    Pseudophakia of both eyes 10/16/2018    Rosacea 8/19/2017    Testosterone deficiency 8/19/2017     Past Surgical History:   Procedure Laterality Date    ABDOMEN SURGERY PROC UNLISTED      bilateral inguinal hernia repair    COLORECTAL SCRN; HI RISK IND  5/3/2016         HX HEENT  1970    tonsillectomy    HX HEENT  1980    basal cell carcinoma right ear    HX OTHER SURGICAL Right 01/21/2018    EXCISION RIGHT NECK MASS     HX PROSTATECTOMY      radiation only    HI COLONOSCOPY FLX DX W/COLLJ SPEC WHEN PFRMD  1/19/2011          Allergies   Allergen Reactions    Sulfanilamide Rash    Sulfa (Sulfonamide Antibiotics) Rash     Very mild rash several years ago       REVIEW OF SYSTEMS:  General: negative for - chills or fever, or weight loss or gain  ENT: negative for - headaches, nasal congestion or tinnitus  Eyes: no blurred or visual changes  Neck: No stiffness or swollen nodes  Respiratory: negative for - cough, hemoptysis, shortness of breath or wheezing  Cardiovascular : negative for - chest pain, edema, palpitations or shortness of breath  Gastrointestinal: negative for - abdominal pain, blood in stools, heartburn or nausea/vomiting  Genito-Urinary: no dysuria, trouble voiding, or hematuria  Musculoskeletal: negative for - gait disturbance, joint pain, joint stiffness or joint swelling. Positive for occasional arthritic back pains and occasional burning in his legs but not on a regular basis  Neurological: no TIA or stroke symptoms  Hematologic: no bruises, no bleeding  Lymphatic: no swollen glands  Integument: no lumps, mole changes, nail changes or rash  Endocrine:no malaise/lethargy poly uria or polydipsia or unexpected weight changes        Social History     Socioeconomic History    Marital status:      Spouse name: Not on file    Number of children: Not on file    Years of education: Not on file    Highest education level: Not on file   Tobacco Use    Smoking status: Never Smoker    Smokeless tobacco: Never Used   Substance and Sexual Activity    Alcohol use: No    Drug use: No    Sexual activity: Never     Family History   Problem Relation Age of Onset    Cancer Mother         uterine, cervical    Cancer Father         colon ca       OBJECTIVE:     Visit Vitals  /74   Pulse (!) 49   Temp 98.1 °F (36.7 °C)   Resp 18   Ht 6' 2\" (1.88 m)   Wt 217 lb (98.4 kg)   SpO2 96%   BMI 27.86 kg/m²     CONSTITUTIONAL:   well nourished, appears age appropriate  EYES: sclera anicteric, PERRL, EOMI  ENMT:nares clear, moist mucous membranes, pharynx clear  NECK: supple.  Thyroid normal, No JVD or bruits  RESPIRATORY: Chest: clear to ascultation and percussion, normal inspiratory effort  CARDIOVASCULAR: Heart: regular rate and rhythm no murmurs, rubs or gallops, PMI not displaced, No thrills, no peripheral edema  GASTROINTESTINAL: Abdomen: non distended, soft, non tender, bowel sounds normal  HEMATOLOGIC: no purpura, petechiae or bruising  LYMPHATIC: No lymph node enlargemant  MUSCULOSKELETAL: Extremities: no active synovitis, pulse 1+   INTEGUMENT: No unusual rashes or suspicious skin lesions noted. Nails appear normal.  PERIPHERAL VASCULAR: normal pulses femoral, PT and DP  NEUROLOGIC: non-focal exam, A & O X 3  PSYCHIATRIC:, appropriate affect     ASSESSMENT:   1. Hypertension with renal disease    2. Mixed hyperlipidemia    3. Cerebrovascular accident (CVA), unspecified mechanism ([address]    4. CKD (chronic kidney disease), stage III ([address]    5. Gout, unspecified cause, unspecified chronicity, unspecified site    6. Primary osteoarthritis involving multiple joints      Impression  1. Hypertension that is controlled so continue current therapy reviewed with him. 2.  Hyperlipidemia prior lab reviewed and repeat status pending and I will adjust if needed. 3.  Prior CVA continue aspirin daily  4. CKD stage III repeat status pending  5. Gout that is stable  6. DJD stable with intermittent low back discomfort at this point no change in treatment will continue current. I will call with lab and make further recommendations or adjustments if necessary. Follow-up in 3 months. PLAN:  .  Orders Placed This Encounter    METABOLIC PANEL, COMPREHENSIVE ([facility])    LIPID PANEL ([facility])    CK ([facility])         ATTENTION:   This medical record was transcribed using an electronic medical records system. Although proofread, it may and can contain electronic and spelling errors. Other human spelling and other errors may be present. Corrections may be executed at a later time. Please feel free to contact us for any clarifications as needed.       Follow-up and Dispositions    · Return in about 3 months (around 9/30/2020). No results found for any visits on 06/30/20. Saranya Ram MD    The patient verbalized understanding of the problems and plans as explained.

## 2020-06-29 NOTE — TELEPHONE ENCOUNTER
RX refill request from the patient/pharmacy. Patient last seen 12- with labs, and next appt. scheduled for 06-  Requested Prescriptions     Pending Prescriptions Disp Refills    amLODIPine (NORVASC) 5 mg tablet [Pharmacy Med Name: amLODIPine BESYLATE 5 MG TAB] 90 Tab 3     Sig: TAKE ONE TABLET BY MOUTH DAILY   .

## 2020-06-30 ENCOUNTER — OFFICE VISIT (OUTPATIENT)
Dept: INTERNAL MEDICINE CLINIC | Age: 85
End: 2020-06-30

## 2020-06-30 VITALS
SYSTOLIC BLOOD PRESSURE: 138 MMHG | BODY MASS INDEX: 27.85 KG/M2 | TEMPERATURE: 98.1 F | WEIGHT: 217 LBS | HEART RATE: 49 BPM | OXYGEN SATURATION: 96 % | HEIGHT: 74 IN | RESPIRATION RATE: 18 BRPM | DIASTOLIC BLOOD PRESSURE: 74 MMHG

## 2020-06-30 DIAGNOSIS — M15.9 PRIMARY OSTEOARTHRITIS INVOLVING MULTIPLE JOINTS: ICD-10-CM

## 2020-06-30 DIAGNOSIS — E78.2 MIXED HYPERLIPIDEMIA: ICD-10-CM

## 2020-06-30 DIAGNOSIS — N18.30 CKD (CHRONIC KIDNEY DISEASE), STAGE III (HCC): ICD-10-CM

## 2020-06-30 DIAGNOSIS — M10.9 GOUT, UNSPECIFIED CAUSE, UNSPECIFIED CHRONICITY, UNSPECIFIED SITE: ICD-10-CM

## 2020-06-30 DIAGNOSIS — I12.9 HYPERTENSION WITH RENAL DISEASE: Primary | ICD-10-CM

## 2020-06-30 DIAGNOSIS — I63.9 CEREBROVASCULAR ACCIDENT (CVA), UNSPECIFIED MECHANISM (HCC): ICD-10-CM

## 2020-06-30 LAB
A-G RATIO,AGRAT: 1.5 RATIO
ALBUMIN SERPL-MCNC: 4.4 G/DL (ref 3.9–5.4)
ALP SERPL-CCNC: 110 U/L (ref 38–126)
ALT SERPL-CCNC: 40 U/L (ref 0–50)
ANION GAP SERPL CALC-SCNC: 7 MMOL/L
AST SERPL W P-5'-P-CCNC: 43 U/L (ref 14–36)
BILIRUB SERPL-MCNC: 0.8 MG/DL (ref 0.2–1.3)
BUN SERPL-MCNC: 37 MG/DL (ref 9–20)
BUN/CREATININE RATIO,BUCR: 28 RATIO
CALCIUM SERPL-MCNC: 9.6 MG/DL (ref 8.4–10.2)
CHLORIDE SERPL-SCNC: 104 MMOL/L (ref 98–107)
CHOL/HDL RATIO,CHHD: 4 RATIO (ref 0–4)
CHOLEST SERPL-MCNC: 163 MG/DL (ref 0–200)
CK SERPL-CCNC: 93 U/L (ref 30–135)
CO2 SERPL-SCNC: 27 MMOL/L (ref 22–32)
CREAT SERPL-MCNC: 1.3 MG/DL (ref 0.8–1.5)
GLOBULIN,GLOB: 3
GLUCOSE SERPL-MCNC: 101 MG/DL (ref 75–110)
HDLC SERPL-MCNC: 42 MG/DL (ref 35–130)
LDL/HDL RATIO,LDHD: 2 RATIO
LDLC SERPL CALC-MCNC: 99 MG/DL (ref 0–130)
POTASSIUM SERPL-SCNC: 5 MMOL/L (ref 3.6–5)
PROT SERPL-MCNC: 7.4 G/DL (ref 6.3–8.2)
SODIUM SERPL-SCNC: 138 MMOL/L (ref 137–145)
TRIGL SERPL-MCNC: 108 MG/DL (ref 0–200)
VLDLC SERPL CALC-MCNC: 22 MG/DL

## 2020-06-30 NOTE — PROGRESS NOTES
Chief Complaint   Patient presents with    Hypertension     3 month f/up but overdue    Chronic Kidney Disease    Benign Prostatic Hypertrophy    Cholesterol Problem     1. Have you been to the ER, urgent care clinic since your last visit? Hospitalized since your last visit? Recently saw urologist and ENT. 2. Have you seen or consulted any other health care providers outside of the [address] [place] [address] since your last visit? Include any pap smears or colon screening.  No

## 2020-06-30 NOTE — PATIENT INSTRUCTIONS
Allergies: Care Instructions Your Care Instructions Allergies occur when your body's defense system (immune system) overreacts to certain substances. The immune system treats a harmless substance as if it were a harmful germ or virus. Many things can cause this overreaction, including pollens, medicine, food, dust, animal dander, and mold. Allergies can be mild or severe. Mild allergies can be managed with home treatment. But medicine may be needed to prevent problems. Managing your allergies is an important part of staying healthy. Your doctor may suggest that you have allergy testing to help find out what is causing your allergies. When you know what things trigger your symptoms, you can avoid them. This can prevent allergy symptoms and other health problems. For severe allergies that cause reactions that affect your whole body (anaphylactic reactions), your doctor may prescribe a shot of epinephrine to carry with you in case you have a severe reaction. Learn how to give yourself the shot and keep it with you at all times. Make sure it is not . Follow-up care is a key part of your treatment and safety. Be sure to make and go to all appointments, and call your doctor if you are having problems. It's also a good idea to know your test results and keep a list of the medicines you take. How can you care for yourself at home? · If you have been told by your doctor that dust or dust mites are causing your allergy, decrease the dust around your bed: 
? Wash sheets, pillowcases, and other bedding in hot water every week. ? Use dust-proof covers for pillows, duvets, and mattresses. Avoid plastic covers because they tear easily and do not \"breathe. \" Wash as instructed on the label. ? Do not use any blankets and pillows that you do not need. ? Use blankets that you can wash in your washing machine. ? Consider removing drapes and carpets, which attract and hold dust, from your bedroom. · If you are allergic to house dust and mites, do not use home humidifiers. Your doctor can suggest ways you can control dust and mites. · Look for signs of cockroaches. Cockroaches cause allergic reactions. Use cockroach baits to get rid of them. Then, clean your home well. Cockroaches like areas where grocery bags, newspapers, empty bottles, or cardboard boxes are stored. Do not keep these inside your home, and keep trash and food containers sealed. Seal off any spots where cockroaches might enter your home. · If you are allergic to mold, get rid of furniture, rugs, and drapes that smell musty. Check for mold in the bathroom. · If you are allergic to outdoor pollen or mold spores, use air-conditioning. Change or clean all filters every month. Keep windows closed. · If you are allergic to pollen, stay inside when pollen counts are high. Use a vacuum  with a HEPA filter or a double-thickness filter at least two times each week. · Stay inside when air pollution is bad. Avoid paint fumes, perfumes, and other strong odors. · Avoid conditions that make your allergies worse. Stay away from smoke. Do not smoke or let anyone else smoke in your house. Do not use fireplaces or wood-burning stoves. · If you are allergic to your pets, change the air filter in your furnace every month. Use high-efficiency filters. · If you are allergic to pet dander, keep pets outside or out of your bedroom. Old carpet and cloth furniture can hold a lot of animal dander. You may need to replace them. When should you call for help? Give an epinephrine shot if: 
· You think you are having a severe allergic reaction. · You have symptoms in more than one body area, such as mild nausea and an itchy mouth. After giving an epinephrine shot call 911, even if you feel better. [id number] if: 
· You have symptoms of a severe allergic reaction. These may include: 
? Sudden raised, red areas (hives) all over your body. ? Swelling of the throat, mouth, lips, or tongue. ? Trouble breathing. ? Passing out (losing consciousness). Or you may feel very lightheaded or suddenly feel weak, confused, or restless. · You have been given an epinephrine shot, even if you feel better. Call your doctor now or seek immediate medical care if: 
· You have symptoms of an allergic reaction, such as: ? A rash or hives (raised, red areas on the skin). ? Itching. ? Swelling. ? Belly pain, nausea, or vomiting. Watch closely for changes in your health, and be sure to contact your doctor if: 
· You do not get better as expected. Where can you learn more? Go to http://krishna-manpreet.info/ Enter C118 in the search box to learn more about \"Allergies: Care Instructions. \" Current as of: October 7, 2019               Content Version: 12.5 © 2937-6604 [employer]. Care instructions adapted under license by [employer] (which disclaims liability or warranty for this information). If you have questions about a medical condition or this instruction, always ask your healthcare professional. [place] [address] any warranty or liability for your use of this information.

## 2020-07-29 RX ORDER — TELMISARTAN AND HYDROCHLORTHIAZIDE 80; 25 MG/1; MG/1
TABLET ORAL
Qty: 30 TAB | Refills: 0 | Status: SHIPPED | OUTPATIENT
Start: 2020-07-29 | End: 2020-08-31

## 2020-07-29 RX ORDER — NEBIVOLOL 5 MG/1
TABLET ORAL
Qty: 30 TAB | Refills: 1 | Status: SHIPPED | OUTPATIENT
Start: 2020-07-29 | End: 2020-09-25

## 2020-07-29 NOTE — TELEPHONE ENCOUNTER
PCP: Carrie Vicente MD    Last appt: 6/30/2020  Future Appointments   Date Time Provider Maximino Araiza   9/30/2020  8:40 AM Carrie Vicente MD [facility]       Requested Prescriptions     Pending Prescriptions Disp Refills    nebivoloL (Bystolic) 5 mg tablet [Pharmacy Med Name: BYSTOLIC 5 MG TABLET] 30 Tab 1     Sig: TAKE ONE TABLET BY MOUTH DAILY    telmisartan-hydroCHLOROthiazide (MICARDIS HCT) 80-25 mg per tablet [Pharmacy Med Name: Saima Soham 80-25 MG TAB] 30 Tab 0     Sig: TAKE ONE TABLET BY MOUTH DAILY       Prior labs and Blood pressures:  BP Readings from Last 3 Encounters:   06/30/20 138/74   12/11/19 156/76   10/18/19 118/70     Lab Results   Component Value Date/Time    Sodium 138 06/30/2020 12:02 PM    Potassium 5.0 06/30/2020 12:02 PM    Chloride 104 06/30/2020 12:02 PM    CO2 27.0 06/30/2020 12:02 PM    Anion gap 7 06/30/2020 12:02 PM    Glucose 101 06/30/2020 12:02 PM    BUN 37.0 (H) 06/30/2020 12:02 PM    Creatinine 1.3 06/30/2020 12:02 PM    BUN/Creatinine ratio 28 06/30/2020 12:02 PM    GFR est AA >60 06/30/2020 12:02 PM    GFR est non-AA 52 06/30/2020 12:02 PM    Calcium 9.6 06/30/2020 12:02 PM     Lab Results   Component Value Date/Time    Hemoglobin A1c 5.7 03/09/2009 02:25 AM     Lab Results   Component Value Date/Time    Cholesterol, total 163 06/30/2020 12:02 PM    Cholesterol (POC) 108.0 04/19/2018 09:15 AM    HDL Cholesterol 42 06/30/2020 12:02 PM    HDL Cholesterol (POC) 41.0 04/19/2018 09:15 AM    LDL Cholesterol (POC) 50.4 04/19/2018 09:15 AM    LDL, calculated 99 06/30/2020 12:02 PM    VLDL, calculated 32 02/08/2019 09:38 AM    VLDL 22 06/30/2020 12:02 PM    Triglyceride 108 06/30/2020 12:02 PM    Triglycerides (POC) 83.0 04/19/2018 09:15 AM    CHOL/HDL Ratio 4 06/30/2020 12:02 PM     No results found for: GRIS Nava    Lab Results   Component Value Date/Time    TSH 4.250 11/07/2018 11:13 AM    TSH, 3rd generation 3.28 12/11/2019 09:15 AM

## 2020-08-31 RX ORDER — TELMISARTAN AND HYDROCHLORTHIAZIDE 80; 25 MG/1; MG/1
TABLET ORAL
Qty: 90 TAB | Refills: 3 | Status: SHIPPED | OUTPATIENT
Start: 2020-08-31 | End: 2021-03-10 | Stop reason: SINTOL

## 2020-08-31 NOTE — TELEPHONE ENCOUNTER
RX refill request from the patient/pharmacy. Patient last seen 06- with labs, and next appt. scheduled for 09-  Requested Prescriptions     Pending Prescriptions Disp Refills    telmisartan-hydroCHLOROthiazide (MICARDIS HCT) 80-25 mg per tablet [Pharmacy Med Name: Lakshmi Em 80-25 MG TAB] 90 Tab 3     Sig: TAKE ONE TABLET BY MOUTH DAILY   .

## 2020-09-25 NOTE — TELEPHONE ENCOUNTER
RX refill request from the patient/pharmacy. Patient last seen 06-3-2020 with labs, and next appt. scheduled for 09-  Requested Prescriptions     Pending Prescriptions Disp Refills    nebivoloL (Bystolic) 5 mg tablet 30 Tab 5     Sig: TAKE ONE TABLET BY MOUTH DAILY   .

## 2020-09-26 RX ORDER — NEBIVOLOL 5 MG/1
TABLET ORAL
Qty: 30 TAB | Refills: 5 | Status: SHIPPED | OUTPATIENT
Start: 2020-09-26 | End: 2021-03-24

## 2020-09-29 NOTE — PROGRESS NOTES
Chief Complaint   Patient presents with    Hypertension     3 month f/up    Chronic Kidney Disease    Cholesterol Problem    Benign Prostatic Hypertrophy    Dizziness    Hip Pain     left       SUBJECTIVE:    Denice Giles is a 80 y.o. male who returns in follow-up of his medical problems include hypertension, hyperlipidemia, history of PACs, prior CVA, history of syncope, allergic rhinitis, DJD, CKD stage III and other medical problems. He is noting some problem where he has had some left hip pain and it did seem at one point to go across his back into his right hip but that is getting better about taking some Tylenol arthritis he does not feel like it needs to be addressed further at the present time. He also is noting some problems with some dizziness and feeling faint sensation but it always occurs when he standing and sometimes gets bad enough that he feels like a fall but never does and never has to sit down. He denies any associated palpitations, chest pain, shortness of breath, PND, orthopnea or other cardiac or respiratory complaints. He does monitor his blood pressure regular at home and brought in a list of his blood pressures over the past 3 weeks that have ranged from a low of 121/53 to a high of 152/54. He denies any headaches and has no dizziness other than that described above. There are no other neurologic complaints. He denies any GI or  complaints he denies any arthritic complaints other than the hip and back as described above. He has no other complaints on complete review of systems.       Current Outpatient Medications   Medication Sig Dispense Refill    nebivoloL (Bystolic) 5 mg tablet TAKE ONE TABLET BY MOUTH DAILY 30 Tab 5    telmisartan-hydroCHLOROthiazide (MICARDIS HCT) 80-25 mg per tablet TAKE ONE TABLET BY MOUTH DAILY 90 Tab 3    doxycycline (VIBRAMYCIN) 100 mg capsule TAKE ONE CAPSULE BY MOUTH DAILY 90 Cap 2    clopidogreL (PLAVIX) 75 mg tab TAKE ONE TABLET BY MOUTH DAILY 90 Tab 2    allopurinoL (ZYLOPRIM) 100 mg tablet TAKE ONE TABLET BY MOUTH DAILY 30 Tab 5    cycloSPORINE (RESTASIS) 0.05 % dpet INSTILL 2 DROPS IN BOTH EYES DAILY 1 Each 5    fluorouracil (EFUDEX) 5 % chemo cream       bicalutamide (CASODEX) 50 mg tablet       diclofenac EC (VOLTAREN) 75 mg EC tablet TAKE ONE TABLET BY MOUTH TWICE A  Tab 2    doxycycline (MONODOX) 100 mg capsule Take 100 mg by mouth two (2) times a day.  clopidogrel (PLAVIX) 75 mg tab Take  by mouth.  diclofenac EC (VOLTAREN) 75 mg EC tablet Take  by mouth.  omega-3 fatty acids 1,000 mg cap Take  by mouth.  aspirin (ASPIRIN) 325 mg tablet Take 325 mg by mouth daily.  Cetirizine (ZYRTEC) 10 mg cap Take  by mouth.  silodosin (RAPAFLO) 8 mg capsule Take 8 mg by mouth daily (with breakfast).        Past Medical History:   Diagnosis Date    Adverse effect of anesthesia     very bad gag reflex    Allergic rhinitis 8/19/2017    BPH (benign prostatic hypertrophy) 8/19/2017    Cancer (HCC)     basal cell carcinoma right ear    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    CKD (chronic kidney disease), stage III ([address] 8/19/2017    CVA (cerebrovascular accident) ([address] 2009    DJD (degenerative joint disease) 8/19/2017    Gout     Hyperlipidemia 8/19/2017    Hypertension     Hypertension with renal disease 8/19/2017    Meniere disease 8/19/2017    Nausea & vomiting     On statin therapy 8/19/2017    PAC (premature atrial contraction) 8/19/2017    Palpitation 8/19/2017    Prostate CA ([address] 8/19/2017    Pseudophakia of both eyes 10/16/2018    Rosacea 8/19/2017    Testosterone deficiency 8/19/2017     Past Surgical History:   Procedure Laterality Date    ABDOMEN SURGERY PROC UNLISTED      bilateral inguinal hernia repair    COLORECTAL SCRN; HI RISK IND  5/3/2016         HX HEENT  1970    tonsillectomy    HX HEENT  1980    basal cell carcinoma right ear    HX OTHER SURGICAL Right 01/21/2018    EXCISION RIGHT NECK MASS     HX PROSTATECTOMY      radiation only    IN COLONOSCOPY FLX DX W/COLLJ SPEC WHEN PFRMD  1/19/2011          Allergies   Allergen Reactions    Sulfanilamide Rash    Sulfa (Sulfonamide Antibiotics) Rash     Very mild rash several years ago       REVIEW OF SYSTEMS:  General: negative for - chills or fever, or weight loss or gain  ENT: negative for - headaches, nasal congestion or tinnitus  Eyes: no blurred or visual changes  Neck: No stiffness or swollen nodes  Respiratory: negative for - cough, hemoptysis, shortness of breath or wheezing  Cardiovascular : negative for - chest pain, edema, palpitations or shortness of breath  Gastrointestinal: negative for - abdominal pain, blood in stools, heartburn or nausea/vomiting  Genito-Urinary: no dysuria, trouble voiding, or hematuria  Musculoskeletal: negative for - gait disturbance, joint pain, joint stiffness or joint swelling except hip and back as noted  Neurological: no TIA or stroke symptoms noted intermittent dizzy faint feeling  Hematologic: no bruises, no bleeding  Lymphatic: no swollen glands  Integument: no lumps, mole changes, nail changes or rash  Endocrine:no malaise/lethargy poly uria or polydipsia or unexpected weight changes        Social History     Socioeconomic History    Marital status:      Spouse name: Not on file    Number of children: Not on file    Years of education: Not on file    Highest education level: Not on file   Tobacco Use    Smoking status: Never Smoker    Smokeless tobacco: Never Used   Substance and Sexual Activity    Alcohol use: No    Drug use: No    Sexual activity: Never     Family History   Problem Relation Age of Onset    Cancer Mother         uterine, cervical    Cancer Father         colon ca       OBJECTIVE:     Visit Vitals  /76   Pulse (!) 46   Temp 97.8 °F (36.6 °C)   Resp 16   Ht 6' 2\" (1.88 m)   Wt 220 lb 4.8 oz (99.9 kg)   SpO2 96%   BMI 28.28 kg/m² CONSTITUTIONAL:   well nourished, appears age appropriate  EYES: sclera anicteric, PERRL, EOMI  ENMT:nares clear, moist mucous membranes, pharynx clear  NECK: supple. Thyroid normal, No JVD or bruits  RESPIRATORY: Chest: clear to ascultation and percussion, normal inspiratory effort  CARDIOVASCULAR: Heart: regular rate and rhythm no murmurs, rubs or gallops, PMI not displaced, No thrills, no peripheral edema  GASTROINTESTINAL: Abdomen: non distended, soft, non tender, bowel sounds normal  HEMATOLOGIC: no purpura, petechiae or bruising  LYMPHATIC: No lymph node enlargemant  MUSCULOSKELETAL: Extremities: no active synovitis, pulse 1+   INTEGUMENT: No unusual rashes or suspicious skin lesions noted. Nails appear normal.  PERIPHERAL VASCULAR: normal pulses femoral, PT and DP  NEUROLOGIC: non-focal exam, A & O X 3  PSYCHIATRIC:, appropriate affect     ASSESSMENT:   1. Hypertension with renal disease    2. Mixed hyperlipidemia    3. Cerebrovascular accident (CVA), unspecified mechanism ([address]    4. CKD (chronic kidney disease), stage III ([address]    5. Gout, unspecified cause, unspecified chronicity, unspecified site    6. Primary osteoarthritis involving multiple joints    7. Non-seasonal allergic rhinitis due to other allergic trigger    8. Needs flu shot      Pression  1. Hypertension he did clearly has a labile component as noted blood pressures from home have ranged from 121/53 up as high as 152/54 and he brought in a readout which I reviewed. I checked his blood pressure here sitting by the nurse was 164/70 repeat by me 138/76 I then had him stand for blood pressure and 3 minutes his systolic was 551/73. He was not feeling dizzy at that time. I think based upon the above looking to stop his amlodipine because I think there is a mild orthostatic component to his blood pressure started on edema and risk of falling and 80years old.   2.  Hyperlipidemia prior lab reviewed and repeat status pending and I will adjust if needed. 3.  Prior CVA continue aspirin daily  4. CKD stage III repeat status pending  5. Gout stable  6   DJD we did discuss x-ray and hips and back and he does not want to proceed with that at the present  7. Allergic rhinitis stable  Flu shot given  I will recheck a myself again in 3 months or sooner should there be a problem. I will call with lab results in the interim. PLAN:  .  Orders Placed This Encounter    Influenza Vaccine, QUAD, 65 Yrs +  IM  (Fluad 63322 )    METABOLIC PANEL, COMPREHENSIVE ([facility])    LIPID PANEL ([facility])    CK ([facility])         ATTENTION:   This medical record was transcribed using an electronic medical records system. Although proofread, it may and can contain electronic and spelling errors. Other human spelling and other errors may be present. Corrections may be executed at a later time. Please feel free to contact us for any clarifications as needed. Follow-up and Dispositions    · Return in about 3 months (around 12/30/2020). No results found for any visits on 09/30/20. Poornima Marcus MD    The patient verbalized understanding of the problems and plans as explained.

## 2020-09-30 ENCOUNTER — OFFICE VISIT (OUTPATIENT)
Dept: INTERNAL MEDICINE CLINIC | Age: 85
End: 2020-09-30
Payer: MEDICARE

## 2020-09-30 VITALS
TEMPERATURE: 97.8 F | HEIGHT: 74 IN | HEART RATE: 46 BPM | RESPIRATION RATE: 16 BRPM | OXYGEN SATURATION: 96 % | SYSTOLIC BLOOD PRESSURE: 138 MMHG | WEIGHT: 220.3 LBS | DIASTOLIC BLOOD PRESSURE: 76 MMHG | BODY MASS INDEX: 28.27 KG/M2

## 2020-09-30 DIAGNOSIS — M10.9 GOUT, UNSPECIFIED CAUSE, UNSPECIFIED CHRONICITY, UNSPECIFIED SITE: ICD-10-CM

## 2020-09-30 DIAGNOSIS — M15.9 PRIMARY OSTEOARTHRITIS INVOLVING MULTIPLE JOINTS: ICD-10-CM

## 2020-09-30 DIAGNOSIS — N18.30 CKD (CHRONIC KIDNEY DISEASE), STAGE III (HCC): ICD-10-CM

## 2020-09-30 DIAGNOSIS — E78.2 MIXED HYPERLIPIDEMIA: ICD-10-CM

## 2020-09-30 DIAGNOSIS — I12.9 HYPERTENSION WITH RENAL DISEASE: Primary | ICD-10-CM

## 2020-09-30 DIAGNOSIS — Z23 NEEDS FLU SHOT: ICD-10-CM

## 2020-09-30 DIAGNOSIS — J30.89 NON-SEASONAL ALLERGIC RHINITIS DUE TO OTHER ALLERGIC TRIGGER: ICD-10-CM

## 2020-09-30 DIAGNOSIS — I63.9 CEREBROVASCULAR ACCIDENT (CVA), UNSPECIFIED MECHANISM (HCC): ICD-10-CM

## 2020-09-30 LAB
A-G RATIO,AGRAT: 1.4 RATIO
ALBUMIN SERPL-MCNC: 4.3 G/DL (ref 3.9–5.4)
ALP SERPL-CCNC: 106 U/L (ref 38–126)
ALT SERPL-CCNC: 35 U/L (ref 0–50)
ANION GAP SERPL CALC-SCNC: 10 MMOL/L
AST SERPL W P-5'-P-CCNC: 40 U/L (ref 14–36)
BILIRUB SERPL-MCNC: 0.7 MG/DL (ref 0.2–1.3)
BUN SERPL-MCNC: 27 MG/DL (ref 9–20)
BUN/CREATININE RATIO,BUCR: 21 RATIO
CALCIUM SERPL-MCNC: 9.2 MG/DL (ref 8.4–10.2)
CHLORIDE SERPL-SCNC: 106 MMOL/L (ref 98–107)
CHOL/HDL RATIO,CHHD: 4 RATIO (ref 0–4)
CHOLEST SERPL-MCNC: 166 MG/DL (ref 0–200)
CK SERPL-CCNC: 87 U/L (ref 30–135)
CO2 SERPL-SCNC: 26 MMOL/L (ref 22–32)
CREAT SERPL-MCNC: 1.3 MG/DL (ref 0.8–1.5)
GLOBULIN,GLOB: 3.1
GLUCOSE SERPL-MCNC: 112 MG/DL (ref 75–110)
HDLC SERPL-MCNC: 45 MG/DL (ref 35–130)
LDL/HDL RATIO,LDHD: 2 RATIO
LDLC SERPL CALC-MCNC: 94 MG/DL (ref 0–130)
POTASSIUM SERPL-SCNC: 4.9 MMOL/L (ref 3.6–5)
PROT SERPL-MCNC: 7.4 G/DL (ref 6.3–8.2)
SODIUM SERPL-SCNC: 142 MMOL/L (ref 137–145)
TRIGL SERPL-MCNC: 136 MG/DL (ref 0–200)
VLDLC SERPL CALC-MCNC: 27 MG/DL

## 2020-09-30 PROCEDURE — 1101F PT FALLS ASSESS-DOCD LE1/YR: CPT | Performed by: INTERNAL MEDICINE

## 2020-09-30 PROCEDURE — G0008 ADMIN INFLUENZA VIRUS VAC: HCPCS | Performed by: INTERNAL MEDICINE

## 2020-09-30 PROCEDURE — G8427 DOCREV CUR MEDS BY ELIG CLIN: HCPCS | Performed by: INTERNAL MEDICINE

## 2020-09-30 PROCEDURE — 90694 VACC AIIV4 NO PRSRV 0.5ML IM: CPT

## 2020-09-30 PROCEDURE — G8536 NO DOC ELDER MAL SCRN: HCPCS | Performed by: INTERNAL MEDICINE

## 2020-09-30 PROCEDURE — 80061 LIPID PANEL: CPT | Performed by: INTERNAL MEDICINE

## 2020-09-30 PROCEDURE — 80053 COMPREHEN METABOLIC PANEL: CPT | Performed by: INTERNAL MEDICINE

## 2020-09-30 PROCEDURE — G8510 SCR DEP NEG, NO PLAN REQD: HCPCS | Performed by: INTERNAL MEDICINE

## 2020-09-30 PROCEDURE — 82550 ASSAY OF CK (CPK): CPT | Performed by: INTERNAL MEDICINE

## 2020-09-30 PROCEDURE — 99214 OFFICE O/P EST MOD 30 MIN: CPT | Performed by: INTERNAL MEDICINE

## 2020-09-30 NOTE — PROGRESS NOTES
After obtaining consent and per verbal order from Dr. Gita Remy, patient received influenza vaccine given by Luis Antonio and verified by Mariola Houser. Fluad Influenza 0.5ml was given IM in right deltoid. Patient tolerated injection and was observed for 10 minutes post injection. VIS was given.

## 2020-09-30 NOTE — PROGRESS NOTES
Chief Complaint   Patient presents with    Hypertension     3 month f/up    Chronic Kidney Disease    Cholesterol Problem    Benign Prostatic Hypertrophy    Dizziness    Hip Pain     left     1. Have you been to the ER, urgent care clinic since your last visit? Hospitalized since your last visit? Saw Dermatologist, Urologist and his ENT. 2. Have you seen or consulted any other health care providers outside of the [address] [place] [address] since your last visit? Include any pap smears or colon screening.  No

## 2020-09-30 NOTE — PATIENT INSTRUCTIONS
Allergies: Care Instructions Your Care Instructions Allergies occur when your body's defense system (immune system) overreacts to certain substances. The immune system treats a harmless substance as if it were a harmful germ or virus. Many things can make this happen. These include pollens, medicine, food, dust, animal dander, and mold. Allergies can be mild or severe. Mild allergies can be managed with home treatment. But medicine may be needed to prevent problems. Managing your allergies is an important part of staying healthy. Your doctor may suggest that you have allergy testing to help find out what is causing your allergies. Severe allergies can cause reactions that affect your whole body (anaphylactic reactions). Your doctor may prescribe a shot of epinephrine to carry with you in case you have a severe reaction. Learn how to give yourself the shot and keep it with you at all times. Make sure it is not . Follow-up care is a key part of your treatment and safety. Be sure to make and go to all appointments, and call your doctor if you are having problems. It's also a good idea to know your test results and keep a list of the medicines you take. How can you care for yourself at home? · If you have been told by your doctor that dust or dust mites are causing your allergy, decrease the dust around your bed: 
? Wash sheets, pillowcases, and other bedding in hot water every week. ? Use dust-proof covers for pillows, duvets, and mattresses. Avoid plastic covers because they tear easily and do not \"breathe. \" Wash as instructed on the label. ? Do not use any blankets and pillows that you do not need. ? Use blankets that you can wash in your washing machine. ? Consider removing drapes and carpets, which attract and hold dust, from your bedroom. · If you are allergic to house dust and mites, do not use home humidifiers. Your doctor can suggest ways you can control dust and mites. · Look for signs of cockroaches. Cockroaches cause allergic reactions. Use cockroach baits to get rid of them. Then, clean your home well. Cockroaches like areas where grocery bags, newspapers, empty bottles, or cardboard boxes are stored. Do not keep these inside your home, and keep trash and food containers sealed. Seal off any spots where cockroaches might enter your home. · If you are allergic to mold, get rid of furniture, rugs, and drapes that smell musty. Check for mold in the bathroom. · If you are allergic to outdoor pollen or mold spores, use air-conditioning. Change or clean all filters every month. Keep windows closed. · If you are allergic to pollen, stay inside when pollen counts are high. Use a vacuum  with a HEPA filter or a double-thickness filter at least two times each week. · Stay inside when air pollution is bad. Avoid paint fumes, perfumes, and other strong odors. · Avoid conditions that make your allergies worse. Stay away from smoke. Do not smoke or let anyone else smoke in your house. Do not use fireplaces or wood-burning stoves. · If you are allergic to your pets, change the air filter in your furnace every month. Use high-efficiency filters. · If you are allergic to pet dander, keep pets outside or out of your bedroom. Old carpet and cloth furniture can hold a lot of animal dander. You may need to replace them. When should you call for help? Give an epinephrine shot if: 
  · You think you are having a severe allergic reaction.  
  · You have symptoms in more than one body area, such as mild nausea and an itchy mouth. After giving an epinephrine shot call 911, even if you feel better. Call 911 if: 
  · You have symptoms of a severe allergic reaction. These may include: 
? Sudden raised, red areas (hives) all over your body. ? Swelling of the throat, mouth, lips, or tongue. ? Trouble breathing. ? Passing out (losing consciousness).  Or you may feel very lightheaded or suddenly feel weak, confused, or restless.  
  · You have been given an epinephrine shot, even if you feel better. Call your doctor now or seek immediate medical care if: 
  · You have symptoms of an allergic reaction, such as: ? A rash or hives (raised, red areas on the skin). ? Itching. ? Swelling. ? Belly pain, nausea, or vomiting. Watch closely for changes in your health, and be sure to contact your doctor if: 
  · You do not get better as expected. Where can you learn more? Go to http://krishna-manpreet.info/ Enter M630 in the search box to learn more about \"Allergies: Care Instructions. \" Current as of: June 29, 2020               Content Version: 12.6 © 5737-8758 [employer]. Care instructions adapted under license by [employer] (which disclaims liability or warranty for this information). If you have questions about a medical condition or this instruction, always ask your healthcare professional. [address] any warranty or liability for your use of this information.

## 2020-10-15 RX ORDER — ALLOPURINOL 100 MG/1
TABLET ORAL
Qty: 30 TAB | Refills: 4 | Status: SHIPPED | OUTPATIENT
Start: 2020-10-15 | End: 2021-03-24

## 2020-10-15 NOTE — TELEPHONE ENCOUNTER
PCP: Misha Cox MD    Last appt: 6/30/2020  Future Appointments   Date Time Provider Maximino Araiza   12/8/2020 10:30 AM Misha Cox MD [facility]       Requested Prescriptions     Pending Prescriptions Disp Refills    allopurinoL (ZYLOPRIM) 100 mg tablet [Pharmacy Med Name: ALLOPURINOL 100 MG TABLET] 30 Tab 4     Sig: TAKE ONE TABLET BY MOUTH DAILY

## 2020-12-07 NOTE — PROGRESS NOTES
This is a Subsequent Medicare Annual Wellness Visit providing Personalized Prevention Plan Services (PPPS) (Performed 12 months after initial AWV and PPPS )    I have reviewed the patient's medical history in detail and updated the computerized patient record. He returns today accompanied by his wife is Medicare subsequent annual wellness examination and screening questionnaire. He is also in follow-up of his multiple medical problems include labile hypertension, hyperlipidemia, prior CVA, history of PACs, allergic rhinitis, BPH, subsequent prostate cancer, CKD stage III, history of gout and other multiple medical problems. He is taking his medications and trying to follow his diet and remain physically active. He currently denies any chest pain, shortness of breath, palpitations, PND, orthopnea or other cardiac or respiratory complaints. He notes no GI or  complaints. He notes no headaches, dizziness or neurologic complaints. There are no current active arthritic complaints and no other complaints on complete review of systems.     History     Past Medical History:   Diagnosis Date    Adverse effect of anesthesia     very bad gag reflex    Allergic rhinitis 8/19/2017    BPH (benign prostatic hypertrophy) 8/19/2017    Cancer (HCC)     basal cell carcinoma right ear    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    CKD (chronic kidney disease), stage III 8/19/2017    CV[facility]    DJD (degenerative joint disease) 8/19/2017    Gout     Hyperlipidemia 8/19/2017    Hypertension     Hypertension with renal disease 8/19/2017    Meniere disease 8/19/2017    Nausea & vomiting     On statin therapy 8/19/2017    PAC (premature atrial contraction) 8/19/2017    Palpitation 8/19/2017    Prostate CA ([address] 8/19/2017    Pseudophakia of both eyes 10/16/2018    Rosacea 8/19/2017    Testosterone deficiency 8/19/2017      Past Surgical History:   Procedure Laterality Date  ABDOMEN SURGERY PROC UNLISTED      bilateral inguinal hernia repair    COLORECTAL SCRN; HI RISK IND  5/3/2016         HX HEENT  1970    tonsillectomy    HX HEENT  1980    basal cell carcinoma right ear    HX OTHER SURGICAL Right 01/21/2018    EXCISION RIGHT NECK MASS     HX PROSTATECTOMY      radiation only    NC COLONOSCOPY FLX DX W/COLLJ SPEC WHEN PFRMD  1/19/2011          Social History     Tobacco Use    Smoking status: Never Smoker    Smokeless tobacco: Never Used   Substance Use Topics    Alcohol use: No    Drug use: No     Current Outpatient Medications   Medication Sig Dispense Refill    allopurinoL (ZYLOPRIM) 100 mg tablet TAKE ONE TABLET BY MOUTH DAILY 30 Tab 4    nebivoloL (Bystolic) 5 mg tablet TAKE ONE TABLET BY MOUTH DAILY 30 Tab 5    telmisartan-hydroCHLOROthiazide (MICARDIS HCT) 80-25 mg per tablet TAKE ONE TABLET BY MOUTH DAILY 90 Tab 3    doxycycline (VIBRAMYCIN) 100 mg capsule TAKE ONE CAPSULE BY MOUTH DAILY 90 Cap 2    clopidogreL (PLAVIX) 75 mg tab TAKE ONE TABLET BY MOUTH DAILY 90 Tab 2    cycloSPORINE (RESTASIS) 0.05 % dpet INSTILL 2 DROPS IN BOTH EYES DAILY 1 Each 5    fluorouracil (EFUDEX) 5 % chemo cream       bicalutamide (CASODEX) 50 mg tablet       diclofenac EC (VOLTAREN) 75 mg EC tablet TAKE ONE TABLET BY MOUTH TWICE A  Tab 2    doxycycline (MONODOX) 100 mg capsule Take 100 mg by mouth two (2) times a day.  clopidogrel (PLAVIX) 75 mg tab Take  by mouth.  diclofenac EC (VOLTAREN) 75 mg EC tablet Take  by mouth.  omega-3 fatty acids 1,000 mg cap Take  by mouth.  aspirin (ASPIRIN) 325 mg tablet Take 325 mg by mouth daily.  Cetirizine (ZYRTEC) 10 mg cap Take  by mouth.  silodosin (RAPAFLO) 8 mg capsule Take 8 mg by mouth daily (with breakfast).        Allergies   Allergen Reactions    Sulfanilamide Rash    Sulfa (Sulfonamide Antibiotics) Rash     Very mild rash several years ago     Family History   Problem Relation Age of Onset  Cancer Mother         uterine, cervical    Cancer Father         colon ca       Patient Active Problem List    Diagnosis    Hypertension with renal disease    Mixed hyperlipidemia    Gout    Primary osteoarthritis involving multiple joints    CVA [place]cerebrovascular accident) ([place])    CKD (chronic kidney disease), stage III    Rosacea    PAC (premature atrial contraction)    Non-seasonal allergic rhinitis    History of prostate cancer    Alcohol screening    Syncope    Medicare annual wellness visit, subsequent    Lymph node enlargement    Meniere disease    Testosterone deficiency    Palpitation    Benign prostatic hyperplasia       Patient Care Team:  Naseem Hernandez MD as PCP - General (Internal Medicine)  Naseem Hernandez MD as PCP - [facility] Provider    Depression Risk Factor Screening:     3 most recent PHQ Screens 12/8/2020   Little interest or pleasure in doing things Not at all   Feeling down, depressed, irritable, or hopeless Not at all   Total Score PHQ 2 0     Alcohol Risk Factor Screening: You do not drink alcohol or very rarely. Functional Ability and Level of Safety:     Fall Risk     Fall Risk Assessment, last 12 mths 12/8/2020   Able to walk? Yes   Fall in past 12 months? No       Hearing Loss   mild    Activities of Daily Living   Self-care.    ADL Assessment 12/8/2020   Feeding yourself No Help Needed   Getting from bed to chair No Help Needed   Getting dressed No Help Needed   Bathing or showering No Help Needed   Walk across the room (includes cane/walker) No Help Needed   Using the telphone No Help Needed   Taking your medications No Help Needed   Preparing meals No Help Needed   Managing money (expenses/bills) No Help Needed   Moderately strenuous housework (laundry) No Help Needed   Shopping for personal items (toiletries/medicines) No Help Needed   Shopping for groceries No Help Needed   Driving No Help Needed   Climbing a flight of stairs No Help Needed Getting to places beyond walking distances No Help Needed       Abuse Screen   Patient is not abused    Social History     Social History Narrative    Not on file       Review of Systems      ROS:    Constitutional: He denies fevers, weight loss, sweats. Eyes: No blurred or double vision. ENT: No difficulty with swallowing, taste, speech or smell. Neck: no stiffness or swelling  Respiratory: No cough wheezing or shortness of breath. Cardiovascular: Denies chest pain, palpitations, unexplained indigestion or syncope. Gastrointestinal:  No changes in bowel movements, no abdominal pain, no bloating. Genitourinary:  He denies frequency, nocturia or stranguria. Extremities: No joint pain, stiffness or swelling. Neurological:  No numbness, tingling, burring paresthesias or loss of motor strength. No syncope, dizziness or frequent headache  Lymphatic: no adenopathy noted  Hematologic: no easy bruising or bleeding gums  Skin:  No recent rashes or mole changes. Psychiatric/Behavioral:  Negative for depression. Physical Examination     Evaluation of Cognitive Function:  Mood/affect:  happy  Appearance: age appropriate  Family member/caregiver input: wife    Visit Vitals  BP (!) 138/58   Pulse (!) 48   Temp 97.9 °F (36.6 °C)   Resp 18   Ht 6' 2\" (1.88 m)   Wt 221 lb 9.6 oz (100.5 kg)   SpO2 96%   BMI 28.45 kg/m²     Vitals:    12/08/20 1123 12/08/20 1144   BP: (!) 162/72 (!) 138/58   Pulse: (!) 48    Resp: 18    Temp: 97.9 °F (36.6 °C)    SpO2: 96%    Weight: 221 lb 9.6 oz (100.5 kg)    Height: 6' 2\" (1.88 m)    PainSc:   0 - No pain         PHYSICAL EXAM:    General appearance - alert, well appearing, and in no distress  Mental status - alert, oriented to person, place, and time  HEENT:  Ears - bilateral TM's and external ear canals clear  Eyes - pupillary responses were normal.  Extraocular muscle function intact. Lids and conjunctiva not injected. Fundoscopic exam revealed sharp disc margins. eye movements intact  Pharynx- clear with teeth in good repair. No masses were noted  Neck - supple without thyromegaly or burit. No JVD noted  Lungs - clear to auscultation and percussion  Cardiac- normal rate, regular rhythm without murmurs. PMI not displaced. No gallop, rub or click  Abdomen - flat, soft, non-tender without palpable organomegaly or mass. No pulsatile mass was felt, and not bruit was heard. Bowel sounds were active  : Circumcised, Testes descended w/o masses  Rectal: normal sphincter tone, prostate normal, no masses, stool brown and hemacult negative  Extremities -  no clubbing cyanosis or edema  Lymphatics - no palpable lymphadenopathy, no hepatosplenomegaly  Hematologic: no petechiae or purpura  Peripheral vascular -Femoral, Dorsalis pedis and posterior tibial pulses felt without difficulty  Skin - no rash or unusual mole change noted  Neurological - Cranial nerves II-XII grossly intact. Motor strength 5/5. DTR's 2+ and symmetric. Station and gait normal  Back exam - full range of motion, no tenderness, palpable spasm or pain on motion  Musculoskeletal - no joint tenderness, deformity or swelling        Results for orders placed or performed in visit on 80/59/01   METABOLIC PANEL, COMPREHENSIVE   Result Value Ref Range    Glucose 112 (H) 75 - 110 mg/dL    BUN 27.0 (H) 9.0 - 20.0 mg/dL    Creatinine 1.3 0.8 - 1.5 mg/dL    Sodium 142 137 - 145 mmol/L    Potassium 4.9 3.6 - 5.0 mmol/L    Chloride 106 98 - 107 mmol/L    CO2 26.0 22.0 - 32.0 mmol/L    Calcium 9.2 8.4 - 10.2 mg/dl    Protein, total 7.4 6.3 - 8.2 g/dL    Albumin 4.3 3.9 - 5.4 g/dL    ALT (SGPT) 35 0 - 50 U/L    AST (SGOT) 40.0 (H) 14.0 - 36.0 U/L    Alk.  phosphatase 106 38 - 126 U/L    Bilirubin, total 0.7 0.2 - 1.3 mg/dL    BUN/Creatinine ratio 21 Ratio    GFR est AA >60 mL/min/1.73m2    GFR est non-AA 52 <60 mL/min/1.73m2    Globulin 3.10     A-G Ratio 1.4 Ratio    Anion gap 10 mmol/L   LIPID PANEL   Result Value Ref Range Cholesterol, total 166 0 - 200 mg/dL    Triglyceride 136 0 - 200 mg/dL    HDL Cholesterol 45 35 - 130 mg/dL    VLDL 27 mg/dL    LDL, calculated 94 0 - 130 mg/dL    CHOL/HDL Ratio 4 0 - 4 Ratio    LDL/HDL Ratio 2 Ratio   CK   Result Value Ref Range    CK 87.00 30.00 - 135.00 U/L       Advice/Referrals/Counseling   Education and counseling provided:  Are appropriate based on today's review and evaluation  End-of-Life planning (with patient's consent)  Pneumococcal Vaccine  Influenza Vaccine  Colorectal cancer screening tests      Assessment/Plan     ASSESSMENT:   1. Hypertension with renal disease    2. Mixed hyperlipidemia    3. Cerebrovascular accident (CVA), unspecified mechanism ([facility] [address]    4. Stage 3 chronic kidney disease, unspecified whether stage 3a or 3b CKD    5. Primary osteoarthritis involving multiple joints    6. Gout, unspecified cause, unspecified chronicity, unspecified site    7. PAC (premature atrial contraction)    8. Rosacea    9. Non-seasonal allergic rhinitis due to other allergic trigger    10. Benign prostatic hyperplasia without lower urinary tract symptoms    11. History of prostate cancer    12. Alcohol screening    13. Medicare annual wellness visit, subsequent      Impression  1. Hypertension with labile component he brought in a list of blood pressures over the past month and they have all essentially been within normal limits except for minimal elevation occasionally of the systolic. Continue current treatment. 2.  Hyperlipidemia prior lab reviewed and repeat status pending and will adjust if needed. 3.  Prior CVA continue aspirin daily  4. CKD stage III repeat status pending  5. DJD that is stable  6. Gout currently stable  7. PACs currently asymptomatic  8. Rosacea stable  9. Allergic rhinitis stable  10. BPH prior to prostate cancer currently having no problems passing his urine. No evidence recurrence of the prostate cancer  11.   Annual alcohol screening is done and he currently does not drink alcohol at the present time. I did discuss males more than 2 drinks per day with increased cardiovascular risk and increased risk of liver disease and other GI effects. Medicare subsequent annual wellness examination screening questionnaire is completed today. The results were reviewed with he and his wife and their questions were answered. Lifestyle recommendations modifications discussed and made. I will call with lab results and make further recommendations or adjustments if necessary. Follow-up in 3 months or sooner if there is a problem. PLAN:  .  Orders Placed This Encounter    CBC WITH AUTOMATED DIFF ([facility])    METABOLIC PANEL, COMPREHENSIVE ([facility])    LIPID PANEL ([facility])    CK ([facility])    PROSTATE SPECIFIC AG ([facility])    T4, FREE ([facility])    TSH 3RD GENERATION ([facility])    URINALYSIS W/O MICRO ([facility])         ATTENTION:   This medical record was transcribed using an electronic medical records system. Although proofread, it may and can contain electronic and spelling errors. Other human spelling and other errors may be present. Corrections may be executed at a later time. Please feel free to contact us for any clarifications as needed. Follow-up and Dispositions    · Return in about 3 months (around 3/8/2021). Kumar Quintana MD    Recommended healthy diet low in carbohydrates, fats, sodium and cholesterol. Recommended regular cardiovascular exercise 3-6 times per week for 30-60 minutes daily.       Current Outpatient Medications   Medication Sig Dispense Refill    allopurinoL (ZYLOPRIM) 100 mg tablet TAKE ONE TABLET BY MOUTH DAILY 30 Tab 4    nebivoloL (Bystolic) 5 mg tablet TAKE ONE TABLET BY MOUTH DAILY 30 Tab 5    telmisartan-hydroCHLOROthiazide (MICARDIS HCT) 80-25 mg per tablet TAKE ONE TABLET BY MOUTH DAILY 90 Tab 3    doxycycline (VIBRAMYCIN) 100 mg capsule TAKE ONE CAPSULE BY MOUTH DAILY 90 Cap 2    clopidogreL (PLAVIX) 75 mg tab TAKE ONE TABLET BY MOUTH DAILY 90 Tab 2    cycloSPORINE (RESTASIS) 0.05 % dpet INSTILL 2 DROPS IN BOTH EYES DAILY 1 Each 5    fluorouracil (EFUDEX) 5 % chemo cream       bicalutamide (CASODEX) 50 mg tablet       diclofenac EC (VOLTAREN) 75 mg EC tablet TAKE ONE TABLET BY MOUTH TWICE A  Tab 2    doxycycline (MONODOX) 100 mg capsule Take 100 mg by mouth two (2) times a day.  clopidogrel (PLAVIX) 75 mg tab Take  by mouth.  diclofenac EC (VOLTAREN) 75 mg EC tablet Take  by mouth.  omega-3 fatty acids 1,000 mg cap Take  by mouth.  aspirin (ASPIRIN) 325 mg tablet Take 325 mg by mouth daily.  Cetirizine (ZYRTEC) 10 mg cap Take  by mouth.  silodosin (RAPAFLO) 8 mg capsule Take 8 mg by mouth daily (with breakfast). No results found for any visits on 12/08/20. Verbal and written instructions (see AVS) provided. Patient expresses understanding of diagnosis and treatment plan.     Dejuan Armendariz MD

## 2020-12-08 ENCOUNTER — OFFICE VISIT (OUTPATIENT)
Dept: INTERNAL MEDICINE CLINIC | Age: 85
End: 2020-12-08
Payer: MEDICARE

## 2020-12-08 VITALS
SYSTOLIC BLOOD PRESSURE: 138 MMHG | TEMPERATURE: 97.9 F | HEART RATE: 48 BPM | HEIGHT: 74 IN | OXYGEN SATURATION: 96 % | DIASTOLIC BLOOD PRESSURE: 58 MMHG | BODY MASS INDEX: 28.44 KG/M2 | WEIGHT: 221.6 LBS | RESPIRATION RATE: 18 BRPM

## 2020-12-08 DIAGNOSIS — N18.30 STAGE 3 CHRONIC KIDNEY DISEASE, UNSPECIFIED WHETHER STAGE 3A OR 3B CKD (HCC): ICD-10-CM

## 2020-12-08 DIAGNOSIS — M15.9 PRIMARY OSTEOARTHRITIS INVOLVING MULTIPLE JOINTS: ICD-10-CM

## 2020-12-08 DIAGNOSIS — Z00.00 MEDICARE ANNUAL WELLNESS VISIT, SUBSEQUENT: ICD-10-CM

## 2020-12-08 DIAGNOSIS — I49.1 PAC (PREMATURE ATRIAL CONTRACTION): ICD-10-CM

## 2020-12-08 DIAGNOSIS — L71.9 ROSACEA: ICD-10-CM

## 2020-12-08 DIAGNOSIS — M10.9 GOUT, UNSPECIFIED CAUSE, UNSPECIFIED CHRONICITY, UNSPECIFIED SITE: ICD-10-CM

## 2020-12-08 DIAGNOSIS — N40.0 BENIGN PROSTATIC HYPERPLASIA WITHOUT LOWER URINARY TRACT SYMPTOMS: ICD-10-CM

## 2020-12-08 DIAGNOSIS — Z85.46 HISTORY OF PROSTATE CANCER: ICD-10-CM

## 2020-12-08 DIAGNOSIS — J30.89 NON-SEASONAL ALLERGIC RHINITIS DUE TO OTHER ALLERGIC TRIGGER: ICD-10-CM

## 2020-12-08 DIAGNOSIS — I63.9 CEREBROVASCULAR ACCIDENT (CVA), UNSPECIFIED MECHANISM (HCC): ICD-10-CM

## 2020-12-08 DIAGNOSIS — I12.9 HYPERTENSION WITH RENAL DISEASE: Primary | ICD-10-CM

## 2020-12-08 DIAGNOSIS — E78.2 MIXED HYPERLIPIDEMIA: ICD-10-CM

## 2020-12-08 DIAGNOSIS — Z13.39 ALCOHOL SCREENING: ICD-10-CM

## 2020-12-08 LAB
A-G RATIO,AGRAT: 1.4 RATIO
ALBUMIN SERPL-MCNC: 4.3 G/DL (ref 3.9–5.4)
ALP SERPL-CCNC: 107 U/L (ref 38–126)
ALT SERPL-CCNC: 29 U/L (ref 0–50)
ANION GAP SERPL CALC-SCNC: 10 MMOL/L
AST SERPL W P-5'-P-CCNC: 35 U/L (ref 14–36)
BILIRUB SERPL-MCNC: 0.7 MG/DL (ref 0.2–1.3)
BILIRUB UR QL: NEGATIVE
BUN SERPL-MCNC: 29 MG/DL (ref 9–20)
BUN/CREATININE RATIO,BUCR: 24 RATIO
CALCIUM SERPL-MCNC: 9.7 MG/DL (ref 8.4–10.2)
CHLORIDE SERPL-SCNC: 106 MMOL/L (ref 98–107)
CHOL/HDL RATIO,CHHD: 4 RATIO (ref 0–4)
CHOLEST SERPL-MCNC: 165 MG/DL (ref 0–200)
CK SERPL-CCNC: 84 U/L (ref 30–135)
CLARITY: CLEAR
CO2 SERPL-SCNC: 27 MMOL/L (ref 22–32)
COLOR UR: NORMAL
CREAT SERPL-MCNC: 1.2 MG/DL (ref 0.8–1.5)
ERYTHROCYTE [DISTWIDTH] IN BLOOD BY AUTOMATED COUNT: 12.7 %
GLOBULIN,GLOB: 3.1
GLUCOSE 24H UR-MRATE: NEGATIVE G/(24.H)
GLUCOSE SERPL-MCNC: 105 MG/DL (ref 75–110)
HCT VFR BLD AUTO: 44.8 % (ref 37–51)
HDLC SERPL-MCNC: 41 MG/DL (ref 35–130)
HGB BLD-MCNC: 14.1 G/DL (ref 12–18)
HGB UR QL STRIP: NEGATIVE
KETONES UR QL STRIP.AUTO: NEGATIVE
LDL/HDL RATIO,LDHD: 2 RATIO
LDLC SERPL CALC-MCNC: 98 MG/DL (ref 0–130)
LEUKOCYTE ESTERASE: NEGATIVE
LYMPHOCYTES ABSOLUTE: 1.9 K/UL (ref 0.6–4.1)
LYMPHOCYTES NFR BLD: 36.4 % (ref 10–58.5)
MCH RBC QN AUTO: 33.1 PG (ref 26–32)
MCHC RBC AUTO-ENTMCNC: 31.5 G/DL (ref 30–36)
MCV RBC AUTO: 105.1 FL (ref 80–97)
MONOCYTES ABS-DIF,2141: 0.5 K/UL (ref 0–1.8)
MONOCYTES NFR BLD: 9.5 % (ref 0.1–24)
NEUTROPHILS # BLD: 54.1 % (ref 37–92)
NEUTROPHILS ABS,2156: 2.8 K/UL (ref 2–7.8)
NITRITE UR QL STRIP.AUTO: NEGATIVE
PH UR STRIP: 6 [PH] (ref 5–7)
PLATELET # BLD AUTO: 185 K/UL (ref 140–440)
POTASSIUM SERPL-SCNC: 4.6 MMOL/L (ref 3.6–5)
PROT SERPL-MCNC: 7.4 G/DL (ref 6.3–8.2)
PROT UR STRIP-MCNC: NEGATIVE MG/DL
PSA, TEST22: 0.3 NG/ML (ref 0–4)
RBC # BLD AUTO: 4.26 M/UL (ref 4.2–6.3)
SODIUM SERPL-SCNC: 143 MMOL/L (ref 137–145)
SP GR UR REFRACTOMETRY: 1.02 (ref 1–1.03)
T4 FREE SERPL-MCNC: 0.95 NG/DL (ref 0.58–2.3)
TRIGL SERPL-MCNC: 129 MG/DL (ref 0–200)
TSH SERPL DL<=0.05 MIU/L-ACNC: 2.77 UIU/ML (ref 0.34–5.6)
UROBILINOGEN UR QL STRIP.AUTO: NEGATIVE
VLDLC SERPL CALC-MCNC: 26 MG/DL
WBC # BLD AUTO: 5.1 K/UL (ref 4.1–10.9)

## 2020-12-08 PROCEDURE — 82550 ASSAY OF CK (CPK): CPT | Performed by: INTERNAL MEDICINE

## 2020-12-08 PROCEDURE — G0439 PPPS, SUBSEQ VISIT: HCPCS | Performed by: INTERNAL MEDICINE

## 2020-12-08 PROCEDURE — 80053 COMPREHEN METABOLIC PANEL: CPT | Performed by: INTERNAL MEDICINE

## 2020-12-08 PROCEDURE — 1101F PT FALLS ASSESS-DOCD LE1/YR: CPT | Performed by: INTERNAL MEDICINE

## 2020-12-08 PROCEDURE — 85025 COMPLETE CBC W/AUTO DIFF WBC: CPT | Performed by: INTERNAL MEDICINE

## 2020-12-08 PROCEDURE — 84443 ASSAY THYROID STIM HORMONE: CPT | Performed by: INTERNAL MEDICINE

## 2020-12-08 PROCEDURE — 84439 ASSAY OF FREE THYROXINE: CPT | Performed by: INTERNAL MEDICINE

## 2020-12-08 PROCEDURE — G8427 DOCREV CUR MEDS BY ELIG CLIN: HCPCS | Performed by: INTERNAL MEDICINE

## 2020-12-08 PROCEDURE — G8510 SCR DEP NEG, NO PLAN REQD: HCPCS | Performed by: INTERNAL MEDICINE

## 2020-12-08 PROCEDURE — G8419 CALC BMI OUT NRM PARAM NOF/U: HCPCS | Performed by: INTERNAL MEDICINE

## 2020-12-08 PROCEDURE — G8536 NO DOC ELDER MAL SCRN: HCPCS | Performed by: INTERNAL MEDICINE

## 2020-12-08 PROCEDURE — 99214 OFFICE O/P EST MOD 30 MIN: CPT | Performed by: INTERNAL MEDICINE

## 2020-12-08 PROCEDURE — 80061 LIPID PANEL: CPT | Performed by: INTERNAL MEDICINE

## 2020-12-08 PROCEDURE — 81003 URINALYSIS AUTO W/O SCOPE: CPT | Performed by: INTERNAL MEDICINE

## 2020-12-08 PROCEDURE — 84153 ASSAY OF PSA TOTAL: CPT | Performed by: INTERNAL MEDICINE

## 2020-12-08 NOTE — PATIENT INSTRUCTIONS
Allergies: Care Instructions  Your Care Instructions     Allergies occur when your body's defense system (immune system) overreacts to certain substances. The immune system treats a harmless substance as if it were a harmful germ or virus. Many things can make this happen. These include pollens, medicine, food, dust, animal dander, and mold. Allergies can be mild or severe. Mild allergies can be managed with home treatment. But medicine may be needed to prevent problems. Managing your allergies is an important part of staying healthy. Your doctor may suggest that you have allergy testing to help find out what is causing your allergies. Severe allergies can cause reactions that affect your whole body (anaphylactic reactions). Your doctor may prescribe a shot of epinephrine to carry with you in case you have a severe reaction. Learn how to give yourself the shot and keep it with you at all times. Make sure it is not . Follow-up care is a key part of your treatment and safety. Be sure to make and go to all appointments, and call your doctor if you are having problems. It's also a good idea to know your test results and keep a list of the medicines you take. How can you care for yourself at home? · If you have been told by your doctor that dust or dust mites are causing your allergy, decrease the dust around your bed:  ? Wash sheets, pillowcases, and other bedding in hot water every week. ? Use dust-proof covers for pillows, duvets, and mattresses. Avoid plastic covers because they tear easily and do not \"breathe. \" Wash as instructed on the label. ? Do not use any blankets and pillows that you do not need. ? Use blankets that you can wash in your washing machine. ? Consider removing drapes and carpets, which attract and hold dust, from your bedroom. · If you are allergic to house dust and mites, do not use home humidifiers. Your doctor can suggest ways you can control dust and mites.   · Look for signs of cockroaches. Cockroaches cause allergic reactions. Use cockroach baits to get rid of them. Then, clean your home well. Cockroaches like areas where grocery bags, newspapers, empty bottles, or cardboard boxes are stored. Do not keep these inside your home, and keep trash and food containers sealed. Seal off any spots where cockroaches might enter your home. · If you are allergic to mold, get rid of furniture, rugs, and drapes that smell musty. Check for mold in the bathroom. · If you are allergic to outdoor pollen or mold spores, use air-conditioning. Change or clean all filters every month. Keep windows closed. · If you are allergic to pollen, stay inside when pollen counts are high. Use a vacuum  with a HEPA filter or a double-thickness filter at least two times each week. · Stay inside when air pollution is bad. Avoid paint fumes, perfumes, and other strong odors. · Avoid conditions that make your allergies worse. Stay away from smoke. Do not smoke or let anyone else smoke in your house. Do not use fireplaces or wood-burning stoves. · If you are allergic to your pets, change the air filter in your furnace every month. Use high-efficiency filters. · If you are allergic to pet dander, keep pets outside or out of your bedroom. Old carpet and cloth furniture can hold a lot of animal dander. You may need to replace them. When should you call for help? Give an epinephrine shot if:    · You think you are having a severe allergic reaction.     · You have symptoms in more than one body area, such as mild nausea and an itchy mouth. After giving an epinephrine shot call 911, even if you feel better. Call 911 if:    · You have symptoms of a severe allergic reaction. These may include:  ? Sudden raised, red areas (hives) all over your body. ? Swelling of the throat, mouth, lips, or tongue. ? Trouble breathing. ? Passing out (losing consciousness).  Or you may feel very lightheaded or suddenly feel weak, confused, or restless.     · You have been given an epinephrine shot, even if you feel better. Call your doctor now or seek immediate medical care if:    · You have symptoms of an allergic reaction, such as:  ? A rash or hives (raised, red areas on the skin). ? Itching. ? Swelling. ? Belly pain, nausea, or vomiting. Watch closely for changes in your health, and be sure to contact your doctor if:    · You do not get better as expected. Where can you learn more? Go to http://www.[facility].com/  Enter W171 in the search box to learn more about \"Allergies: Care Instructions. \"  Current as of: June 29, 2020               Content Version: 12.6  © 6628-8296 [employer]. Care instructions adapted under license by [employer] (which disclaims liability or warranty for this information). If you have questions about a medical condition or this instruction, always ask your healthcare professional. [place] [address] any warranty or liability for your use of this information.

## 2020-12-08 NOTE — PROGRESS NOTES
Blas Peres  Identified pt with two pt identifiers(name and ). Chief Complaint   Patient presents with   [facility] Annual Wellness Visit       1. Have you been to the ER, urgent care clinic since your last visit? Hospitalized since your last visit? no    2. Have you seen or consulted any other health care providers outside of the [address] [place] [address] since your last visit? Include any pap smears or colon screening. no      Health Maintenance Topics with due status: Overdue       Topic Date Due    DTaP/Tdap/Td series 10/17/1955    Shingrix Vaccine Age 50> 10/17/1984    GLAUCOMA SCREENING Q2Y 10/16/2020     Health Maintenance Topics with due status: Due Soon       Topic Date Due    Medicare Yearly Exam 2020     Health Maintenance Topics with due status: Completed       Topic Last Completion Date    Pneumococcal 65+ years 2015    Flu Vaccine 2020           Medication reconciliation up to date and corrected with patient at this time. Today's provider has been notified of reason for visit, vitals and flowsheets obtained on patients. Reviewed record in preparation for visit, huddled with provider and have obtained necessary documentation. Wt Readings from Last 3 Encounters:   20 220 lb 4.8 oz (99.9 kg)   20 217 lb (98.4 kg)   19 217 lb 3.2 oz (98.5 kg)     Temp Readings from Last 3 Encounters:   20 97.8 °F (36.6 °C)   20 98.1 °F (36.7 °C)   19 97.7 °F (36.5 °C)     BP Readings from Last 3 Encounters:   20 138/76   20 138/74   19 156/76     Pulse Readings from Last 3 Encounters:   20 (!) 46   20 (!) 49   19 (!) 52     There were no vitals filed for this visit.       Learning Assessment:  :     Learning Assessment 2017   PRIMARY LEARNER Patient   HIGHEST LEVEL OF EDUCATION - PRIMARY LEARNER  4 YEARS OF COLLEGE   PRIMARY LANGUAGE ENGLISH   LEARNER PREFERENCE PRIMARY LISTENING   ANSWERED BY patient   RELATIONSHIP SELF       Depression Screening:  :     3 most recent PHQ Screens 12/8/2020   Little interest or pleasure in doing things Not at all   Feeling down, depressed, irritable, or hopeless Not at all   Total Score PHQ 2 0       No flowsheet data found. Fall Risk Assessment:  :     Fall Risk Assessment, last 12 mths 12/8/2020   Able to walk? Yes   Fall in past 12 months? No       Abuse Screening:  :     Abuse Screening Questionnaire 12/8/2020 5/17/2019 4/19/2018 9/11/2017   Do you ever feel afraid of your partner? N N N N   Are you in a relationship with someone who physically or mentally threatens you? N N N N   Is it safe for you to go home?  Y Y Y Y       ADL Screening:  :     ADL Assessment 12/8/2020   Feeding yourself No Help Needed   Getting from bed to chair No Help Needed   Getting dressed No Help Needed   Bathing or showering No Help Needed   Walk across the room (includes cane/walker) No Help Needed   Using the telphone No Help Needed   Taking your medications No Help Needed   Preparing meals No Help Needed   Managing money (expenses/bills) No Help Needed   Moderately strenuous housework (laundry) No Help Needed   Shopping for personal items (toiletries/medicines) No Help Needed   Shopping for groceries No Help Needed   Driving No Help Needed   Climbing a flight of stairs No Help Needed   Getting to places beyond walking distances No Help Needed

## 2020-12-21 ENCOUNTER — TRANSCRIBE ORDER (OUTPATIENT)
Dept: SCHEDULING | Age: 85
End: 2020-12-21

## 2021-01-05 ENCOUNTER — OFFICE VISIT (OUTPATIENT)
Dept: URGENT CARE | Age: 86
End: 2021-01-05
Payer: MEDICARE

## 2021-01-05 VITALS — RESPIRATION RATE: 18 BRPM | OXYGEN SATURATION: 98 % | TEMPERATURE: 98 F | HEART RATE: 56 BPM

## 2021-01-05 DIAGNOSIS — Z20.822 SUSPECTED COVID-19 VIRUS INFECTION: Primary | ICD-10-CM

## 2021-01-05 PROCEDURE — 1101F PT FALLS ASSESS-DOCD LE1/YR: CPT | Performed by: NURSE PRACTITIONER

## 2021-01-05 PROCEDURE — G8419 CALC BMI OUT NRM PARAM NOF/U: HCPCS | Performed by: NURSE PRACTITIONER

## 2021-01-05 PROCEDURE — 99202 OFFICE O/P NEW SF 15 MIN: CPT | Performed by: NURSE PRACTITIONER

## 2021-01-05 PROCEDURE — G8432 DEP SCR NOT DOC, RNG: HCPCS | Performed by: NURSE PRACTITIONER

## 2021-01-05 PROCEDURE — G8427 DOCREV CUR MEDS BY ELIG CLIN: HCPCS | Performed by: NURSE PRACTITIONER

## 2021-01-05 PROCEDURE — G8536 NO DOC ELDER MAL SCRN: HCPCS | Performed by: NURSE PRACTITIONER

## 2021-01-05 NOTE — PROGRESS NOTES
Subjective: (As above and below)       This patient was seen in [facility] at [address] [place] [address] Urgent Care while outdoors at their vehicle due to COVID-19 pandemic with PPE and focused examination in order to decrease community viral transmission. Chief Complaint   Patient presents with    Concern For BXAJI-50 (Coronavirus)     Pt reports exposure to COVID 19, reports cough, congestion, fatigue, loss of taste and smell since Thursday     Karthik Schuster is a 80 y.o. male who presents for evaluation of : possible covid 19 loss of smell, taste, body aches and nasal congestion without significant cough. Symptom onset 3-4 days ago . Preceding illness: none. No other identified aggravating or alleviating factors. Symptoms are constant and overall unchanged. Promotes no decrease in PO intake of fluids. Denies: severe lethargy, SOB, syncope/near syncope, vomiting/diarrhea, chest pain, chest pain with breathing, labored breathing, severe headache, non-intractable fevers . Recent travel: no  Known Exposure to COVID-19: YES  Known flu or strep contact: no       Review of Systems - negative except as listed above    Reviewed PmHx, RxHx, FmHx, SocHx, AllgHx and updated in chart.   Family History   Problem Relation Age of Onset    Cancer Mother         uterine, cervical    Cancer Father         colon ca       Past Medical History:   Diagnosis Date    Adverse effect of anesthesia     very bad gag reflex    Allergic rhinitis 8/19/2017    BPH (benign prostatic hypertrophy) 8/19/2017    Cancer ([address]     basal cell carcinoma right ear    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    CKD (chronic kidney disease), stage III 8/19/2017    CVA (cerebrovascular accident) ([address] 2009    DJD (degenerative joint disease) 8/19/2017    Gout     Hyperlipidemia 8/19/2017    Hypertension     Hypertension with renal disease 8/19/2017    Meniere disease 8/19/2017    Nausea & vomiting     On statin therapy 8/19/2017    PAC (premature atrial contraction) 8/19/2017    Palpitation 8/19/2017    Prostate CA ([address] 8/19/2017    Pseudophakia of both eyes 10/16/2018    Rosacea 8/19/2017    Testosterone deficiency 8/19/2017      Social History     Socioeconomic History    Marital status:      Spouse name: Not on file    Number of children: Not on file    Years of education: Not on file    Highest education level: Not on file   Tobacco Use    Smoking status: Never Smoker    Smokeless tobacco: Never Used   Substance and Sexual Activity    Alcohol use: No    Drug use: No    Sexual activity: Never          Current Outpatient Medications   Medication Sig    allopurinoL (ZYLOPRIM) 100 mg tablet TAKE ONE TABLET BY MOUTH DAILY    nebivoloL (Bystolic) 5 mg tablet TAKE ONE TABLET BY MOUTH DAILY    telmisartan-hydroCHLOROthiazide (MICARDIS HCT) 80-25 mg per tablet TAKE ONE TABLET BY MOUTH DAILY    doxycycline (VIBRAMYCIN) 100 mg capsule TAKE ONE CAPSULE BY MOUTH DAILY    clopidogreL (PLAVIX) 75 mg tab TAKE ONE TABLET BY MOUTH DAILY    cycloSPORINE (RESTASIS) 0.05 % dpet INSTILL 2 DROPS IN BOTH EYES DAILY    fluorouracil (EFUDEX) 5 % chemo cream     bicalutamide (CASODEX) 50 mg tablet     diclofenac EC (VOLTAREN) 75 mg EC tablet TAKE ONE TABLET BY MOUTH TWICE A DAY    doxycycline (MONODOX) 100 mg capsule Take 100 mg by mouth two (2) times a day.  clopidogrel (PLAVIX) 75 mg tab Take  by mouth.  diclofenac EC (VOLTAREN) 75 mg EC tablet Take  by mouth.  omega-3 fatty acids 1,000 mg cap Take  by mouth.  aspirin (ASPIRIN) 325 mg tablet Take 325 mg by mouth daily.  Cetirizine (ZYRTEC) 10 mg cap Take  by mouth.  silodosin (RAPAFLO) 8 mg capsule Take 8 mg by mouth daily (with breakfast). No current facility-administered medications for this visit.         Objective:     Vitals:    01/05/21 1729   Pulse: (!) 56   Resp: 18   Temp: 98 °F (36.7 °C)   SpO2: 98%       Physical Exam  General appearance  appears well hydrated and does not appear toxic, no acute distress  Eyes - EOMs intact. Non injected. No scleral icterus   Ears - no external swelling  Nose - nasal congestion. No purulent drainage  Mouth - OP clear without swelling, exudate or lesion. Mucus membranes moist. Uvula midline. Neck/Lymphatics  trachea midline, full AROM  Chest - Normal breathing effort no wheeze rales, rhonchi or diminishments bilaterally. Heart - RRR, no murmurs  Skin - no observable rashes or pallor  Neurologic- alert and oriented x 3  Psychiatric- normal mood, behavior and though content. Assessment/ Plan:     1. Suspected COVID-19 virus infection    - NOVEL CORONAVIRUS (COVID-19)      No evidence suggesting complication of illness at this time. Will discharge home with close monitoring and follow up. To follow CDC isolation/quarantine guidelines  Supportive home care for mild symptoms advised- maintain adequate fluid intake, lozenges, over the counter Tylenol (for fever, aches, pains, chills), deep breathing exercises  Recommendation for self isolation/quarantine based on current CDC guidelines        Follow up: We have reviewed worsening/concerning signs and symptoms that may warrant seeking immediate care in the ED setting. For other non-severe changes, non-improvement or questions, patient aware to contact PCP office or consider a virtual online medical consultation.         Lopez Koenig NP

## 2021-01-06 PROBLEM — Z00.00 MEDICARE ANNUAL WELLNESS VISIT, SUBSEQUENT: Status: RESOLVED | Noted: 2018-01-05 | Resolved: 2021-01-06

## 2021-01-08 LAB — SARS-COV-2, NAA: DETECTED

## 2021-01-08 NOTE — PROGRESS NOTES
I notified patient's wife (pt gave permission for me to talk to her) of positive COVID-19 result. Reports has slight fatigue otherwise feels fine. Denies SOB. Eating/drinking well. Advised to quarantine x 14 days from sx onset. Encouraged ambulation and deep breathing exercises. ER if SOB, lethargy.

## 2021-01-26 RX ORDER — DICLOFENAC SODIUM 75 MG/1
TABLET, DELAYED RELEASE ORAL
Qty: 180 TAB | Refills: 1 | Status: SHIPPED | OUTPATIENT
Start: 2021-01-26 | End: 2021-12-28

## 2021-01-26 NOTE — TELEPHONE ENCOUNTER
RX refill request from the patient/pharmacy. Patient last seen 01- with labs, and next appt. scheduled for 03-. Requested Prescriptions     Pending Prescriptions Disp Refills    diclofenac EC (VOLTAREN) 75 mg EC tablet [Pharmacy Med Name: DICLOFENAC SOD DR 75 MG TAB] 180 Tab 1     Sig: TAKE ONE TABLET BY MOUTH TWICE A DAY     .

## 2021-01-27 ENCOUNTER — TRANSCRIBE ORDER (OUTPATIENT)
Dept: SCHEDULING | Age: 86
End: 2021-01-27

## 2021-01-28 ENCOUNTER — TRANSCRIBE ORDER (OUTPATIENT)
Dept: SCHEDULING | Age: 86
End: 2021-01-28

## 2021-01-28 DIAGNOSIS — R22.1 NECK MASS: ICD-10-CM

## 2021-01-28 DIAGNOSIS — R22.0 INTRACRANIAL SWELLING: Primary | ICD-10-CM

## 2021-01-29 ENCOUNTER — HOSPITAL ENCOUNTER (OUTPATIENT)
Dept: ULTRASOUND IMAGING | Age: 86
Discharge: HOME | End: 2021-01-29
Attending: OTOLARYNGOLOGY
Payer: MEDICARE

## 2021-01-29 DIAGNOSIS — R22.0 INTRACRANIAL SWELLING: ICD-10-CM

## 2021-01-29 DIAGNOSIS — R22.1 NECK MASS: ICD-10-CM

## 2021-01-29 PROCEDURE — 76536 US EXAM OF HEAD AND NECK: CPT

## 2021-02-01 ENCOUNTER — TRANSCRIBE ORDER (OUTPATIENT)
Dept: SCHEDULING | Age: 86
End: 2021-02-01

## 2021-02-01 DIAGNOSIS — R22.1 NECK MASS: Primary | ICD-10-CM

## 2021-02-05 ENCOUNTER — HOSPITAL ENCOUNTER (OUTPATIENT)
Dept: CT IMAGING | Age: 86
Discharge: HOME | End: 2021-02-05
Attending: OTOLARYNGOLOGY
Payer: MEDICARE

## 2021-02-05 DIAGNOSIS — R22.1 NECK MASS: ICD-10-CM

## 2021-02-05 PROCEDURE — 70491 CT SOFT TISSUE NECK W/DYE: CPT

## 2021-02-05 PROCEDURE — 74011000636 HC RX REV CODE- 636: Performed by: OTOLARYNGOLOGY

## 2021-02-05 RX ADMIN — IOPAMIDOL 100 ML: 755 INJECTION, SOLUTION INTRAVENOUS at 17:47

## 2021-02-23 NOTE — TELEPHONE ENCOUNTER
PCP: Leidy Lu MD    Last appt: 6/30/2020  Future Appointments   Date Time Provider Maximino Araiza   3/10/2021  9:10 AM Leidy Lu MD [facility]     Last refilled:1/21/20      Requested Prescriptions     Pending Prescriptions Disp Refills    cycloSPORINE (Restasis) 0.05 % dpet [Pharmacy Med Name: RESTASIS 0.05% EYE EMULSION - 60 VL] 1 Each 4     Sig: INSTILL TWO DROPS DAILY IN [facility] EYE

## 2021-02-24 RX ORDER — CYCLOSPORINE 0.5 MG/ML
EMULSION OPHTHALMIC
Qty: 1 EACH | Refills: 4 | Status: SHIPPED | OUTPATIENT
Start: 2021-02-24 | End: 2022-02-22

## 2021-03-04 DIAGNOSIS — Z86.73 HISTORY OF CVA (CEREBROVASCULAR ACCIDENT): ICD-10-CM

## 2021-03-08 RX ORDER — CLOPIDOGREL BISULFATE 75 MG/1
TABLET ORAL
Qty: 90 TAB | Refills: 1 | Status: SHIPPED | OUTPATIENT
Start: 2021-03-08 | End: 2021-08-25

## 2021-03-08 NOTE — TELEPHONE ENCOUNTER
PCP: Chuck Menjivar MD    Last appt: 6/30/2020  Future Appointments   Date Time Provider Maximino Araiza   3/10/2021  9:10 AM Chuck Menjivar MD [facility]       Last refilled:6/2/20    Requested Prescriptions     Pending Prescriptions Disp Refills    clopidogreL (PLAVIX) 75 mg tab [Pharmacy Med Name: CLOPIDOGREL 75 MG TABLET] 90 Tab 1     Sig: TAKE ONE TABLET BY MOUTH DAILY

## 2021-03-10 ENCOUNTER — OFFICE VISIT (OUTPATIENT)
Dept: INTERNAL MEDICINE CLINIC | Age: 86
End: 2021-03-10
Payer: MEDICARE

## 2021-03-10 VITALS
RESPIRATION RATE: 17 BRPM | SYSTOLIC BLOOD PRESSURE: 138 MMHG | HEIGHT: 74 IN | WEIGHT: 223.3 LBS | TEMPERATURE: 97.9 F | BODY MASS INDEX: 28.66 KG/M2 | HEART RATE: 53 BPM | OXYGEN SATURATION: 97 % | DIASTOLIC BLOOD PRESSURE: 76 MMHG

## 2021-03-10 DIAGNOSIS — I63.9 CEREBROVASCULAR ACCIDENT (CVA), UNSPECIFIED MECHANISM (HCC): ICD-10-CM

## 2021-03-10 DIAGNOSIS — M15.9 PRIMARY OSTEOARTHRITIS INVOLVING MULTIPLE JOINTS: ICD-10-CM

## 2021-03-10 DIAGNOSIS — E78.2 MIXED HYPERLIPIDEMIA: ICD-10-CM

## 2021-03-10 DIAGNOSIS — M10.9 GOUT, UNSPECIFIED CAUSE, UNSPECIFIED CHRONICITY, UNSPECIFIED SITE: ICD-10-CM

## 2021-03-10 DIAGNOSIS — N18.30 STAGE 3 CHRONIC KIDNEY DISEASE, UNSPECIFIED WHETHER STAGE 3A OR 3B CKD (HCC): ICD-10-CM

## 2021-03-10 DIAGNOSIS — I12.9 HYPERTENSION WITH RENAL DISEASE: Primary | ICD-10-CM

## 2021-03-10 PROCEDURE — 82550 ASSAY OF CK (CPK): CPT | Performed by: INTERNAL MEDICINE

## 2021-03-10 PROCEDURE — G8536 NO DOC ELDER MAL SCRN: HCPCS | Performed by: INTERNAL MEDICINE

## 2021-03-10 PROCEDURE — 80061 LIPID PANEL: CPT | Performed by: INTERNAL MEDICINE

## 2021-03-10 PROCEDURE — G8427 DOCREV CUR MEDS BY ELIG CLIN: HCPCS | Performed by: INTERNAL MEDICINE

## 2021-03-10 PROCEDURE — 99214 OFFICE O/P EST MOD 30 MIN: CPT | Performed by: INTERNAL MEDICINE

## 2021-03-10 PROCEDURE — G8510 SCR DEP NEG, NO PLAN REQD: HCPCS | Performed by: INTERNAL MEDICINE

## 2021-03-10 PROCEDURE — 1101F PT FALLS ASSESS-DOCD LE1/YR: CPT | Performed by: INTERNAL MEDICINE

## 2021-03-10 PROCEDURE — 80053 COMPREHEN METABOLIC PANEL: CPT | Performed by: INTERNAL MEDICINE

## 2021-03-10 PROCEDURE — G8419 CALC BMI OUT NRM PARAM NOF/U: HCPCS | Performed by: INTERNAL MEDICINE

## 2021-03-10 RX ORDER — TELMISARTAN 80 MG/1
80 TABLET ORAL DAILY
Qty: 30 TAB | Status: SHIPPED | OUTPATIENT
Start: 2021-03-10 | End: 2021-05-06 | Stop reason: ALTCHOICE

## 2021-03-10 RX ORDER — AMLODIPINE BESYLATE 5 MG/1
10 TABLET ORAL DAILY
COMMUNITY
Start: 2020-12-30 | End: 2021-06-23

## 2021-03-10 NOTE — PROGRESS NOTES
Chief Complaint   Patient presents with    Hypertension     3 month follow up    Cholesterol Problem    Benign Prostatic Hypertrophy     Visit Vitals  BP (!) 150/82 (BP 1 Location: Left upper arm, BP Patient Position: Sitting, BP Cuff Size: Adult)   Pulse (!) 53   Temp 97.9 °F (36.6 °C) (Temporal)   Resp 17   Ht 6' 2\" (1.88 m)   Wt 223 lb 4.8 oz (101.3 kg)   SpO2 97%   BMI 28.67 kg/m²     1. Have you been to the ER, urgent care clinic since your last visit? Hospitalized since your last visit? No    2. Have you seen or consulted any other health care providers outside of the [address] [place] [address] since your last visit? Include any pap smears or colon screening.  No

## 2021-03-10 NOTE — PROGRESS NOTES
Chief Complaint   Patient presents with    Hypertension     3 month follow up    Cholesterol Problem    Benign Prostatic Hypertrophy       SUBJECTIVE:    Shannon Lantigua is a 80 y.o. male turns in follow-up of his medical problems include hypertension, hyperlipidemia, prior CVA, BPH, DJD and other medical problems. He is taking his medications and trying to follow his diet and try and remain physically active. He is noting that sometimes he seems to get a little lightheaded when he goes outside and does some work now in the warm days and he notes that he goes in and checks his blood pressure and his blood pressure seems to be low so he thinks he needs his medicine decreased. He denies any chest pain, shortness of breath, palpitations, PND, orthopnea or other cardiac or respiratory complaints. He notes no current GI or  complaints. He notes no headaches or dizziness except for the dizziness and lightheadedness on the warm days when he is working outside. He notes no current change of his chronic arthritic complaints and no other complaints on complete review of systems. Current Outpatient Medications   Medication Sig Dispense Refill    amLODIPine (NORVASC) 5 mg tablet 10 mg daily.  telmisartan (MICARDIS) 80 mg tablet Take 1 Tab by mouth daily. 30 Tab prn    clopidogreL (PLAVIX) 75 mg tab TAKE ONE TABLET BY MOUTH DAILY 90 Tab 1    cycloSPORINE (Restasis) 0.05 % dpet INSTILL TWO DROPS DAILY IN EACH EYE 1 Each 4    diclofenac EC (VOLTAREN) 75 mg EC tablet TAKE ONE TABLET BY MOUTH TWICE A  Tab 1    allopurinoL (ZYLOPRIM) 100 mg tablet TAKE ONE TABLET BY MOUTH DAILY 30 Tab 4    nebivoloL (Bystolic) 5 mg tablet TAKE ONE TABLET BY MOUTH DAILY 30 Tab 5    doxycycline (VIBRAMYCIN) 100 mg capsule TAKE ONE CAPSULE BY MOUTH DAILY 90 Cap 2    fluorouracil (EFUDEX) 5 % chemo cream       bicalutamide (CASODEX) 50 mg tablet       omega-3 fatty acids 1,000 mg cap Take  by mouth.       aspirin (ASPIRIN) 325 mg tablet Take 325 mg by mouth daily.  Cetirizine (ZYRTEC) 10 mg cap Take  by mouth.  silodosin (RAPAFLO) 8 mg capsule Take 8 mg by mouth daily (with breakfast).        Past Medical History:   Diagnosis Date    Adverse effect of anesthesia     very bad gag reflex    Allergic rhinitis 8/19/2017    BPH (benign prostatic hypertrophy) 8/19/2017    Cancer (HCC)     basal cell carcinoma right ear    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    CKD (chronic kidney disease), stage III 8/19/2017    [facility]A [facility]bro[facility]) [facility] 2009    DJD (degenerative joint disease) 8/19/2017    Gout     Hyperlipidemia 8/19/2017    Hypertension     Hypertension with renal disease 8/19/2017    Meniere disease 8/19/2017    Nausea & vomiting     On statin therapy 8/19/2017    PAC (premature atrial contraction) 8/19/2017    Palpitation 8/19/2017    Prostate CA ([facility] 8/19/2017    Pseudophakia of both eyes 10/16/2018    Rosacea 8/19/2017    Testosterone deficiency 8/19/2017     Past Surgical History:   Procedure Laterality Date    COLORECTAL SCRN; HI RISK IND  5/3/2016         HX HEENT  1970    tonsillectomy    HX HEENT  1980    basal cell carcinoma right ear    HX OTHER SURGICAL Right 01/21/2018    EXCISION RIGHT NECK MASS     HX PROSTATECTOMY      radiation only    RI ABDOMEN SURGERY PROC UNLISTED      bilateral inguinal hernia repair    RI COLONOSCOPY FLX DX W/COLLJ SPEC WHEN PFRMD  1/19/2011          Allergies   Allergen Reactions    Sulfanilamide Rash    Sulfa (Sulfonamide Antibiotics) Rash     Very mild rash several years ago       REVIEW OF SYSTEMS:  General: negative for - chills or fever, or weight loss or gain  ENT: negative for - headaches, nasal congestion or tinnitus  Eyes: no blurred or visual changes  Neck: No stiffness or swollen nodes  Respiratory: negative for - cough, hemoptysis, shortness of breath or wheezing  Cardiovascular : negative for - chest pain, edema, palpitations or shortness of breath  Gastrointestinal: negative for - abdominal pain, blood in stools, heartburn or nausea/vomiting  Genito-Urinary: no dysuria, trouble voiding, or hematuria  Musculoskeletal: negative for - gait disturbance, joint pain, joint stiffness or joint swelling  Neurological: no TIA or stroke symptoms  Hematologic: no bruises, no bleeding  Lymphatic: no swollen glands  Integument: no lumps, mole changes, nail changes or rash  Endocrine:no malaise/lethargy poly uria or polydipsia or unexpected weight changes        Social History     Socioeconomic History    Marital status:      Spouse name: Not on file    Number of children: Not on file    Years of education: Not on file    Highest education level: Not on file   Tobacco Use    Smoking status: Never Smoker    Smokeless tobacco: Never Used   Substance and Sexual Activity    Alcohol use: No    Drug use: No    Sexual activity: Never     Family History   Problem Relation Age of Onset    Cancer Mother         uterine, cervical    Cancer Father         colon ca       OBJECTIVE:     Visit Vitals  /76   Pulse (!) 53   Temp 97.9 °F (36.6 °C) (Temporal)   Resp 17   Ht 6' 2\" (1.88 m)   Wt 223 lb 4.8 oz (101.3 kg)   SpO2 97%   BMI 28.67 kg/m²     CONSTITUTIONAL:   well nourished, appears age appropriate  EYES: sclera anicteric, PERRL, EOMI  ENMT:nares clear, moist mucous membranes, pharynx clear  NECK: supple.  Thyroid normal, No JVD or bruits  RESPIRATORY: Chest: clear to ascultation and percussion, normal inspiratory effort  CARDIOVASCULAR: Heart: regular rate and rhythm no murmurs, rubs or gallops, PMI not displaced, No thrills, no peripheral edema  GASTROINTESTINAL: Abdomen: non distended, soft, non tender, bowel sounds normal  HEMATOLOGIC: no purpura, petechiae or bruising  LYMPHATIC: No lymph node enlargemant  MUSCULOSKELETAL: Extremities: no active synovitis, pulse 1+   INTEGUMENT: No unusual rashes or suspicious skin lesions noted. Nails appear normal.  PERIPHERAL VASCULAR: normal pulses femoral, PT and DP  NEUROLOGIC: non-focal exam, A & O X 3  PSYCHIATRIC:, appropriate affect     ASSESSMENT:   1. Hypertension with renal disease    2. Mixed hyperlipidemia    3. Primary osteoarthritis involving multiple joints    4. Cerebrovascular accident (CVA), unspecified mechanism ([address]    5. Stage 3 chronic kidney disease, unspecified whether stage 3a or 3b CKD    6. Gout, unspecified cause, unspecified chronicity, unspecified site      Impression  1. Hypertension blood pressure well controlled by me I think he probably does have an orthostatic component on his warm days and blood pressures are all normal by his checks at home I will switch his telmisartan HCT to plain telmisartan 80 daily. 2.  Hyperlipidemia prior lab reviewed repeat status pending I will adjust if needed. 3. DJD that is stable takes diclofenac regularly  4. Prior CVA continue aspirin  5. CKD stage III repeat status pending  6. Gout that is currently stable  I will call with lab and make further recommendations or adjustments if necessary. Follow-up in 3 months or sooner should it be a problem. PLAN:  .  Orders Placed This Encounter    METABOLIC PANEL, COMPREHENSIVE ([facility])    LIPID PANEL ([facility])    [facility]    amLODIPine (NORVASC) 5 mg tablet    telmisartan (MICARDIS) 80 mg tablet         ATTENTION:   This medical record was transcribed using an electronic medical records system. Although proofread, it may and can contain electronic and spelling errors. Other human spelling and other errors may be present. Corrections may be executed at a later time. Please feel free to contact us for any clarifications as needed. No results found for any visits on 03/10/21. Jorge Vences MD    The patient verbalized understanding of the problems and plans as explained.

## 2021-03-11 ENCOUNTER — IMMUNIZATION (OUTPATIENT)
Dept: INTERNAL MEDICINE CLINIC | Age: 86
End: 2021-03-11
Payer: MEDICARE

## 2021-03-11 DIAGNOSIS — Z23 ENCOUNTER FOR IMMUNIZATION: Primary | ICD-10-CM

## 2021-03-11 LAB
A-G RATIO,AGRAT: 1.4 RATIO
ALBUMIN SERPL-MCNC: 4.2 G/DL (ref 3.9–5.4)
ALP SERPL-CCNC: 105 U/L (ref 38–126)
ALT SERPL-CCNC: 36 U/L (ref 0–50)
ANION GAP SERPL CALC-SCNC: 14 MMOL/L
AST SERPL W P-5'-P-CCNC: 43 U/L (ref 14–36)
BILIRUB SERPL-MCNC: 0.8 MG/DL (ref 0.2–1.3)
BUN SERPL-MCNC: 32 MG/DL (ref 9–20)
BUN/CREATININE RATIO,BUCR: 25 RATIO
CALCIUM SERPL-MCNC: 10 MG/DL (ref 8.4–10.2)
CHLORIDE SERPL-SCNC: 103 MMOL/L (ref 98–107)
CHOL/HDL RATIO,CHHD: 4 RATIO (ref 0–4)
CHOLEST SERPL-MCNC: 172 MG/DL (ref 0–200)
CK SERPL-CCNC: 141 U/L (ref 30–135)
CO2 SERPL-SCNC: 24 MMOL/L (ref 22–32)
CREAT SERPL-MCNC: 1.3 MG/DL (ref 0.8–1.5)
GLOBULIN,GLOB: 3
GLUCOSE SERPL-MCNC: 105 MG/DL (ref 75–110)
HDLC SERPL-MCNC: 43 MG/DL (ref 35–130)
LDL/HDL RATIO,LDHD: 2 RATIO
LDLC SERPL CALC-MCNC: 101 MG/DL (ref 0–130)
POTASSIUM SERPL-SCNC: 6.4 MMOL/L (ref 3.6–5)
PROT SERPL-MCNC: 7.2 G/DL (ref 6.3–8.2)
SODIUM SERPL-SCNC: 141 MMOL/L (ref 137–145)
TRIGL SERPL-MCNC: 142 MG/DL (ref 0–200)
VLDLC SERPL CALC-MCNC: 28 MG/DL

## 2021-03-11 PROCEDURE — 91300 COVID-19, MRNA, LNP-S, PF, 30MCG/0.3ML DOSE(PFIZER): CPT | Performed by: FAMILY MEDICINE

## 2021-03-11 PROCEDURE — 0001A COVID-19, MRNA, LNP-S, PF, 30MCG/0.3ML DOSE(PFIZER): CPT | Performed by: FAMILY MEDICINE

## 2021-03-16 NOTE — PROGRESS NOTES
Called and spoke to patient  Two pt identifiers confirmed  Informed patient per Dr. Joan Chanel potassium is high  Advised patient to have repeat cmp next week and appt was made  Patient confirmed he is not taking any additional potassium  Patient verbalized understanding of information discussed  with no further questions at this time.

## 2021-03-23 ENCOUNTER — LAB ONLY (OUTPATIENT)
Dept: INTERNAL MEDICINE CLINIC | Age: 86
End: 2021-03-23
Payer: MEDICARE

## 2021-03-23 DIAGNOSIS — R79.9 ABNORMAL BLOOD CHEMISTRY: Primary | ICD-10-CM

## 2021-03-23 LAB
A-G RATIO,AGRAT: 1.4 RATIO
ALBUMIN SERPL-MCNC: 4.1 G/DL (ref 3.9–5.4)
ALP SERPL-CCNC: 96 U/L (ref 38–126)
ALT SERPL-CCNC: 32 U/L (ref 0–50)
ANION GAP SERPL CALC-SCNC: 11 MMOL/L
AST SERPL W P-5'-P-CCNC: 38 U/L (ref 14–36)
BILIRUB SERPL-MCNC: 0.8 MG/DL (ref 0.2–1.3)
BUN SERPL-MCNC: 30 MG/DL (ref 9–20)
BUN/CREATININE RATIO,BUCR: 25 RATIO
CALCIUM SERPL-MCNC: 9.7 MG/DL (ref 8.4–10.2)
CHLORIDE SERPL-SCNC: 104 MMOL/L (ref 98–107)
CO2 SERPL-SCNC: 26 MMOL/L (ref 22–32)
CREAT SERPL-MCNC: 1.2 MG/DL (ref 0.8–1.5)
GLOBULIN,GLOB: 3
GLUCOSE SERPL-MCNC: 102 MG/DL (ref 75–110)
POTASSIUM SERPL-SCNC: 4.6 MMOL/L (ref 3.6–5)
PROT SERPL-MCNC: 7.1 G/DL (ref 6.3–8.2)
SODIUM SERPL-SCNC: 141 MMOL/L (ref 137–145)

## 2021-03-23 PROCEDURE — 80053 COMPREHEN METABOLIC PANEL: CPT | Performed by: INTERNAL MEDICINE

## 2021-03-24 RX ORDER — NEBIVOLOL 5 MG/1
TABLET ORAL
Qty: 30 TAB | Refills: 5 | Status: SHIPPED | OUTPATIENT
Start: 2021-03-24 | End: 2021-09-27

## 2021-03-24 RX ORDER — ALLOPURINOL 100 MG/1
TABLET ORAL
Qty: 30 TAB | Refills: 5 | Status: SHIPPED | OUTPATIENT
Start: 2021-03-24 | End: 2021-09-13

## 2021-03-24 NOTE — TELEPHONE ENCOUNTER
RX refill request from the patient/pharmacy. Patient last seen 03- with labs, and next appt. scheduled for 06-  Requested Prescriptions     Pending Prescriptions Disp Refills    nebivoloL (Bystolic) 5 mg tablet 30 Tab 5     Sig: TAKE ONE TABLET BY MOUTH DAILY    allopurinoL (ZYLOPRIM) 100 mg tablet [Pharmacy Med Name: ALLOPURINOL 100 MG TABLET] 30 Tab 5     Sig: TAKE ONE TABLET BY MOUTH DAILY   .

## 2021-03-25 NOTE — PROGRESS NOTES
Kidneys are stable and repeat potassium is normal so no changes needed. Letter generated to patient regarding.

## 2021-04-01 ENCOUNTER — IMMUNIZATION (OUTPATIENT)
Dept: INTERNAL MEDICINE CLINIC | Age: 86
End: 2021-04-01
Payer: MEDICARE

## 2021-04-01 DIAGNOSIS — Z23 ENCOUNTER FOR IMMUNIZATION: Primary | ICD-10-CM

## 2021-04-01 PROCEDURE — 0002A COVID-19, MRNA, LNP-S, PF, 30MCG/0.3ML DOSE(PFIZER): CPT | Performed by: FAMILY MEDICINE

## 2021-04-01 PROCEDURE — 91300 COVID-19, MRNA, LNP-S, PF, 30MCG/0.3ML DOSE(PFIZER): CPT | Performed by: FAMILY MEDICINE

## 2021-04-19 RX ORDER — TELMISARTAN AND HYDROCHLORTHIAZIDE 80; 25 MG/1; MG/1
1 TABLET ORAL DAILY
Qty: 30 TAB | Refills: 5 | Status: SHIPPED | OUTPATIENT
Start: 2021-04-19 | End: 2021-10-12

## 2021-05-06 ENCOUNTER — OFFICE VISIT (OUTPATIENT)
Dept: INTERNAL MEDICINE CLINIC | Age: 86
End: 2021-05-06
Payer: MEDICARE

## 2021-05-06 VITALS
RESPIRATION RATE: 16 BRPM | SYSTOLIC BLOOD PRESSURE: 148 MMHG | OXYGEN SATURATION: 98 % | WEIGHT: 216.8 LBS | TEMPERATURE: 97.7 F | HEIGHT: 74 IN | HEART RATE: 56 BPM | BODY MASS INDEX: 27.82 KG/M2 | DIASTOLIC BLOOD PRESSURE: 71 MMHG

## 2021-05-06 DIAGNOSIS — L03.211 CELLULITIS OF FACE: Primary | ICD-10-CM

## 2021-05-06 PROCEDURE — 1101F PT FALLS ASSESS-DOCD LE1/YR: CPT | Performed by: INTERNAL MEDICINE

## 2021-05-06 PROCEDURE — 99213 OFFICE O/P EST LOW 20 MIN: CPT | Performed by: INTERNAL MEDICINE

## 2021-05-06 PROCEDURE — G8419 CALC BMI OUT NRM PARAM NOF/U: HCPCS | Performed by: INTERNAL MEDICINE

## 2021-05-06 PROCEDURE — G8510 SCR DEP NEG, NO PLAN REQD: HCPCS | Performed by: INTERNAL MEDICINE

## 2021-05-06 PROCEDURE — G8536 NO DOC ELDER MAL SCRN: HCPCS | Performed by: INTERNAL MEDICINE

## 2021-05-06 PROCEDURE — G8427 DOCREV CUR MEDS BY ELIG CLIN: HCPCS | Performed by: INTERNAL MEDICINE

## 2021-05-06 RX ORDER — CEFUROXIME AXETIL 500 MG/1
500 TABLET ORAL 2 TIMES DAILY
Qty: 20 TAB | Refills: 0 | Status: SHIPPED | OUTPATIENT
Start: 2021-05-06 | End: 2021-06-11 | Stop reason: ALTCHOICE

## 2021-05-06 NOTE — PROGRESS NOTES
HIPAA verified by two patient identifiers. Sandra Morris is a 80 y.o. male    Chief Complaint   Patient presents with    Follow-up     swelling chin       Visit Vitals  BP (!) 148/71 (BP 1 Location: Right arm, BP Patient Position: Sitting, BP Cuff Size: Large adult)   Pulse (!) 56   Temp 97.7 °F (36.5 °C) (Oral)   Resp 16   Ht 6' 2\" (1.88 m)   Wt 216 lb 12.8 oz (98.3 kg)   SpO2 98%   BMI 27.84 kg/m²       Pain Scale: 0 - No pain/10  Pain Location:       Health Maintenance Due   Topic Date Due    DTaP/Tdap/Td series (1 - Tdap) 10/17/1955    Shingrix Vaccine Age 50> (1 of 2) Never done         Coordination of Care Questionnaire:  :   1) Have you been to an emergency room, urgent care, or hospitalized since your last visit? If yes, where when, and reason for visit? no       2. Have seen or consulted any other health care provider since your last visit? If yes, where when, and reason for visit? NO      Patient is accompanied by self I have received verbal consent from Sandra Morris to discuss any/all medical information while they are present in the room.

## 2021-05-06 NOTE — PROGRESS NOTES
Subjective:   Bolivar Sanches is a 80 y.o. male      Chief Complaint   Patient presents with    Follow-up     swelling chin        History of present illness: He presents complaining of some soreness and swelling of his chin that has been present for couple days and seem to get worse last night. He notes no fevers or chills. He has noted no other complaints.     Patient Active Problem List   Diagnosis Code    Meniere disease H81.09    Testosterone deficiency E34.9    Rosacea L71.9    Palpitation R00.2    PAC (premature atrial contraction) I49.1    Hypertension with renal disease I12.9    Mixed hyperlipidemia E78.2    Gout M10.9    Primary osteoarthritis involving multiple joints M89.49    CVA (cerebrovascular accident) ([address] I63.9    CKD (chronic kidney disease), stage III ([address] N18.30    Benign prostatic hyperplasia N40.0    Non-seasonal allergic rhinitis J30.89    Lymph node enlargement R59.9    Syncope R55    Alcohol screening Z13.39    History of prostate cancer Z85.46    Cellulitis of face L03.211      Past Medical History:   Diagnosis Date    Adverse effect of anesthesia     very bad gag reflex    Allergic rhinitis 8/19/2017    BPH (benign prostatic hypertrophy) 8/19/2017    Cancer (HCC)     basal cell carcinoma right ear    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    CKD (chronic kidney disease), stage III ([address] 8/19/2017    CVA (cerebrovascular accident) ([address] 2009    DJD (degenerative joint disease) 8/19/2017    Gout     Hyperlipidemia 8/19/2017    Hypertension     Hypertension with renal disease 8/19/2017    Meniere disease 8/19/2017    Nausea & vomiting     On statin therapy 8/19/2017    PAC (premature atrial contraction) 8/19/2017    Palpitation 8/19/2017    Prostate CA ([address] 8/19/2017    Pseudophakia of both eyes 10/16/2018    Rosacea 8/19/2017    Testosterone deficiency 8/19/2017      Allergies   Allergen Reactions    Sulfanilamide Rash    Sulfa (Sulfonamide Antibiotics) Rash     Very mild rash several years ago      Family History   Problem Relation Age of Onset    Cancer Mother         uterine, cervical    Cancer Father         colon ca      Social History     Socioeconomic History    Marital status:      Spouse name: Not on file    Number of children: Not on file    Years of education: Not on file    Highest education level: Not on file   Occupational History    Not on file   Social Needs    Financial resource strain: Not on file    Food insecurity     Worry: Not on file     Inability: Not on file    Transportation needs     Medical: Not on file     Non-medical: Not on file   Tobacco Use    Smoking status: Never Smoker    Smokeless tobacco: Never Used   Substance and Sexual Activity    Alcohol use: No    Drug use: No    Sexual activity: Never   Lifestyle    Physical activity     Days per week: Not on file     Minutes per session: Not on file    Stress: Not on file   Relationships    Social connections     Talks on phone: Not on file     Gets together: Not on file     Attends Voodoo service: Not on file     Active member of club or organization: Not on file     Attends meetings of clubs or organizations: Not on file     Relationship status: Not on file    Intimate partner violence     Fear of current or ex partner: Not on file     Emotionally abused: Not on file     Physically abused: Not on file     Forced sexual activity: Not on file   Other Topics Concern    Not on file   Social History Narrative    Not on file     Prior to Admission medications    Medication Sig Start Date End Date Taking? Authorizing Provider   cefUROXime (CEFTIN) 500 mg tablet Take 1 Tab by mouth two (2) times a day. 5/6/21  Yes Napoleon Cowart MD   telmisartan-hydroCHLOROthiazide (MICARDIS HCT) 80-25 mg per tablet Take 1 Tab by mouth daily.  4/19/21  Yes Napoleon Cowart MD   nebivoloL (Bystolic) 5 mg tablet TAKE ONE TABLET BY MOUTH DAILY 3/24/21  Yes Jaren Rivera MD   allopurinoL (ZYLOPRIM) 100 mg tablet TAKE ONE TABLET BY MOUTH DAILY 3/24/21  Yes Jaren Rivera MD   amLODIPine (NORVASC) 5 mg tablet 10 mg daily. 12/30/20  Yes Provider, Historical   clopidogreL (PLAVIX) 75 mg tab TAKE ONE TABLET BY MOUTH DAILY 3/8/21  Yes Jaren Rivera MD   cycloSPORINE (Restasis) 0.05 % dpet INSTILL TWO DROPS DAILY IN [facility] EYE 2/24/21  Yes Vikash Wang MD   diclofenac EC (VOLTAREN) 75 mg EC tablet TAKE ONE TABLET BY MOUTH TWICE A DAY 1/26/21  Yes Jaren Rivera MD   doxycycline (VIBRAMYCIN) 100 mg capsule TAKE ONE CAPSULE BY MOUTH DAILY 6/2/20  Yes Jaren Rivera MD   bicalutamide (CASODEX) 50 mg tablet  8/7/19  Yes Provider, Historical   omega-3 fatty acids 1,000 mg cap Take  by mouth. Yes Provider, Historical   aspirin (ASPIRIN) 325 mg tablet Take 325 mg by mouth daily. Yes Provider, Historical   Cetirizine (ZYRTEC) 10 mg cap Take  by mouth. Yes Provider, Historical   silodosin (RAPAFLO) 8 mg capsule Take 8 mg by mouth daily (with breakfast). Yes Provider, Historical        Review of Systems              Constitutional:  He denies fever, weight loss, sweats or fatigue. EYES: No blurred or double vision,               ENT: no nasal congestion, no headache or dizziness. No difficulty with               swallowing, taste, speech or smell. Respiratory:  No cough, wheezing or shortness of breath. No sputum production. Cardiac:  Denies chest pain, palpitations, unexplained indigestion, syncope, edema, PND or orthopnea. GI:  No changes in bowel movements, no abdominal pain, no bloating, anorexia, nausea, vomiting or heartburn. :  No frequency or dysuria. Denies incontinence or sexual dysfunction. Extremities:  No joint pain, stiffness or swelling  Back:.no pain or soreness  Skin:  No recent rashes or mole changes.   Swelling and soreness of chin  Neurological:  No numbness, tingling, burning paresthesias or loss of motor strength. No syncope, dizziness, frequent headaches or memory loss. Hematologic:  No easy bruising  Lymphatic: No lymph node enlargement    Objective:     Vitals:    05/06/21 1113   BP: (!) 148/71   Pulse: (!) 56   Resp: 16   Temp: 97.7 °F (36.5 °C)   TempSrc: Oral   SpO2: 98%   Weight: 216 lb 12.8 oz (98.3 kg)   Height: 6' 2\" (1.88 m)   PainSc:   0 - No pain       Body mass index is 27.84 kg/m². Physical Examination:              General Appearance:  Well-developed, well-nourished, no acute distress. HEENT:      Ears:  The TMs and ear canals were clear. Eyes:  The pupillary responses were normal.  Extraocular muscle function intact. Lids and conjunctiva not injected. Funduscopic exam revealed sharp disc margins. Nares: Clear w/o edema or erythema  Pharynx:  Clear with teeth in good repair. No masses were noted. Neck:  Supple without thyromegaly or adenopathy. No JVD noted. No carotid                bruits. Lungs:  Clear to auscultation and percussion. Cardiac:  Regular rate and rhythm without murmur. PMI not displaced. No gallop, rub or click. Abdominal: Soft, non-tender, no hepata-spleenomegally or masses  Extremities:  No clubbing, cyanosis or edema. Skin:  No rash or unusual mole changes noted. Cellulitis changes to the left and midline no drainable collection of fluid no submandibular or cervical adenopathy  Lymph Nodes:  None felt in the cervical, supraclavicular, axillary or inguinal region. Neurological: . DTRs 2+ and symmetric. Station and gait normal.   Hematologic:   No purpura or petechiae        Assessment/Plan:         1. Cellulitis of face        Impressions/Plan:  Impression  1. Cellulitis of the face have treated him with Ceftin 500 twice daily for 10 days  Recheck if not resolved otherwise per previous schedule.     Orders Placed This Encounter    cefUROXime (CEFTIN) 500 mg tablet       Follow-up and Dispositions    · Return At prior scheduled appointment. No results found for any visits on 05/06/21. Sanaz Moore MD    The patient was given after the visit summary the patient verbalized an understanding of the plans and problems as explained.

## 2021-05-06 NOTE — PATIENT INSTRUCTIONS

## 2021-05-10 ENCOUNTER — OFFICE VISIT (OUTPATIENT)
Dept: INTERNAL MEDICINE CLINIC | Age: 86
End: 2021-05-10
Payer: MEDICARE

## 2021-05-10 VITALS
WEIGHT: 216.6 LBS | BODY MASS INDEX: 27.8 KG/M2 | HEART RATE: 60 BPM | RESPIRATION RATE: 18 BRPM | OXYGEN SATURATION: 96 % | DIASTOLIC BLOOD PRESSURE: 74 MMHG | HEIGHT: 74 IN | SYSTOLIC BLOOD PRESSURE: 138 MMHG | TEMPERATURE: 98.2 F

## 2021-05-10 DIAGNOSIS — L03.211 CELLULITIS OF FACE: Primary | ICD-10-CM

## 2021-05-10 PROCEDURE — G8427 DOCREV CUR MEDS BY ELIG CLIN: HCPCS | Performed by: INTERNAL MEDICINE

## 2021-05-10 PROCEDURE — 99213 OFFICE O/P EST LOW 20 MIN: CPT | Performed by: INTERNAL MEDICINE

## 2021-05-10 PROCEDURE — G8419 CALC BMI OUT NRM PARAM NOF/U: HCPCS | Performed by: INTERNAL MEDICINE

## 2021-05-10 PROCEDURE — G8510 SCR DEP NEG, NO PLAN REQD: HCPCS | Performed by: INTERNAL MEDICINE

## 2021-05-10 PROCEDURE — G8536 NO DOC ELDER MAL SCRN: HCPCS | Performed by: INTERNAL MEDICINE

## 2021-05-10 PROCEDURE — 1101F PT FALLS ASSESS-DOCD LE1/YR: CPT | Performed by: INTERNAL MEDICINE

## 2021-05-10 RX ORDER — CLINDAMYCIN HYDROCHLORIDE 300 MG/1
300 CAPSULE ORAL 3 TIMES DAILY
Qty: 30 CAP | Refills: 0 | Status: SHIPPED | OUTPATIENT
Start: 2021-05-10 | End: 2021-06-11 | Stop reason: ALTCHOICE

## 2021-05-10 RX ORDER — TRAMADOL HYDROCHLORIDE 50 MG/1
50 TABLET ORAL
Qty: 20 TAB | Refills: 0 | Status: SHIPPED | OUTPATIENT
Start: 2021-05-10 | End: 2021-05-15

## 2021-05-10 NOTE — PROGRESS NOTES
HIPAA verified by two patient identifiers. Chelsea Veloz is a 80 y.o. male    Chief Complaint   Patient presents with    Cyst     ingrown hair on chin       Visit Vitals  BP (!) 146/66 (BP 1 Location: Left upper arm, BP Patient Position: Sitting, BP Cuff Size: Large adult)   Pulse 60   Temp 98.2 °F (36.8 °C) (Oral)   Resp 18   Ht 6' 2\" (1.88 m)   Wt 216 lb 9.6 oz (98.2 kg)   SpO2 96%   BMI 27.81 kg/m²       Pain Scale: 6/10  Pain Location: Face      Health Maintenance Due   Topic Date Due    DTaP/Tdap/Td series (1 - Tdap) 10/17/1955    Shingrix Vaccine Age 50> (1 of 2) Never done         Coordination of Care Questionnaire:  :   1) Have you been to an emergency room, urgent care, or hospitalized since your last visit? If yes, where when, and reason for visit? no       2. Have seen or consulted any other health care provider since your last visit? If yes, where when, and reason for visit? NO      Patient is accompanied by self I have received verbal consent from Chelsea Veloz to discuss any/all medical information while they are present in the room.

## 2021-05-10 NOTE — PROGRESS NOTES
Subjective:   Gem Ibarra is a 80 y.o. male      Chief Complaint   Patient presents with    Cyst     ingrown hair on chin        History of present illness: He was seen by me on May 6 for swelling and tenderness over the left side of his chin lower region that seem consistent with a cellulitis probably from infected hair or sweat gland edema started on Ceftin he returns today noting the swelling has gone a little higher and become more painful at night. He has been taking the Ceftin without difficulty. He notes no area of drainage.     Patient Active Problem List   Diagnosis Code    Meniere disease H81.09    Testosterone deficiency E34.9    Rosacea L71.9    Palpitation R00.2    PAC (premature atrial contraction) I49.1    Hypertension with renal disease I12.9    Mixed hyperlipidemia E78.2    Gout M10.9    Primary osteoarthritis involving multiple joints M89.49    CVA (cerebrovascular accident) ([address] I63.9    CKD (chronic kidney disease), stage III ([address] N18.30    Benign prostatic hyperplasia N40.0    Non-seasonal allergic rhinitis J30.89    Lymph node enlargement R59.9    Syncope R55    Alcohol screening Z13.39    History of prostate cancer Z85.46    Cellulitis of face L03.211      Past Medical History:   Diagnosis Date    Adverse effect of anesthesia     very bad gag reflex    Allergic rhinitis 8/19/2017    BPH (benign prostatic hypertrophy) 8/19/2017    Cancer (HCC)     basal cell carcinoma right ear    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    CKD (chronic kidney disease), stage III ([address] 8/19/2017    CVA (cerebrovascular accident) ([address] 2009    DJD (degenerative joint disease) 8/19/2017    Gout     Hyperlipidemia 8/19/2017    Hypertension     Hypertension with renal disease 8/19/2017    Meniere disease 8/19/2017    Nausea & vomiting     On statin therapy 8/19/2017    PAC (premature atrial contraction) 8/19/2017    Palpitation 8/19/2017    Prostate CA ([address] 8/19/2017    Pseudophakia of both eyes 10/16/2018    Rosacea 8/19/2017    Testosterone deficiency 8/19/2017      Allergies   Allergen Reactions    Sulfanilamide Rash    Sulfa (Sulfonamide Antibiotics) Rash     Very mild rash several years ago      Family History   Problem Relation Age of Onset    Cancer Mother         uterine, cervical    Cancer Father         colon ca      Social History     Socioeconomic History    Marital status:      Spouse name: Not on file    Number of children: Not on file    Years of education: Not on file    Highest education level: Not on file   Occupational History    Not on file   Social Needs    Financial resource strain: Not on file    Food insecurity     Worry: Not on file     Inability: Not on file    Transportation needs     Medical: Not on file     Non-medical: Not on file   Tobacco Use    Smoking status: Never Smoker    Smokeless tobacco: Never Used   Substance and Sexual Activity    Alcohol use: No    Drug use: No    Sexual activity: Never   Lifestyle    Physical activity     Days per week: Not on file     Minutes per session: Not on file    Stress: Not on file   Relationships    Social connections     Talks on phone: Not on file     Gets together: Not on file     Attends Druze service: Not on file     Active member of club or organization: Not on file     Attends meetings of clubs or organizations: Not on file     Relationship status: Not on file    Intimate partner violence     Fear of current or ex partner: Not on file     Emotionally abused: Not on file     Physically abused: Not on file     Forced sexual activity: Not on file   Other Topics Concern    Not on file   Social History Narrative    Not on file     Prior to Admission medications    Medication Sig Start Date End Date Taking? Authorizing Provider   clindamycin (CLEOCIN) 300 mg capsule Take 1 Cap by mouth three (3) times daily.  5/10/21  Yes Lesly Cantu MD   traMADoL (ULTRAM) 50 mg tablet Take 1 Tab by mouth every six (6) hours as needed for Pain for up to 5 days. Max Daily Amount: 200 mg. 5/10/21 5/15/21 Yes Desirae Cho MD   cefUROXime (CEFTIN) 500 mg tablet Take 1 Tab by mouth two (2) times a day. 5/6/21  Yes Desirae Cho MD   telmisartan-hydroCHLOROthiazide (MICARDIS HCT) 80-25 mg per tablet Take 1 Tab by mouth daily. 4/19/21  Yes Desirae Cho MD   nebivoloL (Bystolic) 5 mg tablet TAKE ONE TABLET BY MOUTH DAILY 3/24/21  Yes Desirae Cho MD   allopurinoL (ZYLOPRIM) 100 mg tablet TAKE ONE TABLET BY MOUTH DAILY 3/24/21  Yes Desirae Cho MD   amLODIPine (NORVASC) 5 mg tablet 10 mg daily. 12/30/20  Yes Provider, Historical   clopidogreL (PLAVIX) 75 mg tab TAKE ONE TABLET BY MOUTH DAILY 3/8/21  Yes Desirae Cho MD   cycloSPORINE (Restasis) 0.05 % dpet INSTILL TWO DROPS DAILY IN [facility] EYE 2/24/21  Yes Kit Sung MD   diclofenac EC (VOLTAREN) 75 mg EC tablet TAKE ONE TABLET BY MOUTH TWICE A DAY 1/26/21  Yes Desirae Cho MD   doxycycline (VIBRAMYCIN) 100 mg capsule TAKE ONE CAPSULE BY MOUTH DAILY 6/2/20  Yes Desirae Cho MD   bicalutamide (CASODEX) 50 mg tablet  8/7/19  Yes Provider, Historical   omega-3 fatty acids 1,000 mg cap Take  by mouth. Yes Provider, Historical   aspirin (ASPIRIN) 325 mg tablet Take 325 mg by mouth daily. Yes Provider, Historical   Cetirizine (ZYRTEC) 10 mg cap Take  by mouth. Yes Provider, Historical   silodosin (RAPAFLO) 8 mg capsule Take 8 mg by mouth daily (with breakfast). Yes Provider, Historical        Review of Systems              Constitutional:  He denies fever, weight loss, sweats or fatigue. EYES: No blurred or double vision,               ENT: no nasal congestion, no headache or dizziness. No difficulty with               swallowing, taste, speech or smell. Respiratory:  No cough, wheezing or shortness of breath. No sputum production.   Cardiac:  Denies chest pain, palpitations, unexplained indigestion, syncope, edema, PND or orthopnea. GI:  No changes in bowel movements, no abdominal pain, no bloating, anorexia, nausea, vomiting or heartburn. :  No frequency or dysuria. Denies incontinence or sexual dysfunction. Extremities:  No joint pain, stiffness or swelling  Back:.no pain or soreness  Skin:  No recent rashes or mole changes. Increased soft tissue swelling and pain left side of his chin  Neurological:  No numbness, tingling, burning paresthesias or loss of motor strength. No syncope, dizziness, frequent headaches or memory loss. Hematologic:  No easy bruising  Lymphatic: No lymph node enlargement    Objective:     Vitals:    05/10/21 1612 05/10/21 1646   BP: (!) 146/66 138/74   Pulse: 60    Resp: 18    Temp: 98.2 °F (36.8 °C)    TempSrc: Oral    SpO2: 96%    Weight: 216 lb 9.6 oz (98.2 kg)    Height: 6' 2\" (1.88 m)    PainSc:   6    PainLoc: Face        Body mass index is 27.81 kg/m². Physical Examination:              General Appearance:  Well-developed, well-nourished, no acute distress. HEENT:      Ears:  The TMs and ear canals were clear. Eyes:  The pupillary responses were normal.  Extraocular muscle function intact. Lids and conjunctiva not injected. Funduscopic exam revealed sharp disc margins. Nares: Clear w/o edema or erythema  Pharynx:  Clear with teeth in good repair. No masses were noted. Neck:  Supple without thyromegaly or adenopathy. No JVD noted. No carotid                bruits. Lungs:  Clear to auscultation and percussion. Cardiac:  Regular rate and rhythm without murmur. PMI not displaced. No gallop, rub or click. Abdominal: Soft, non-tender, no hepata-spleenomegally or masses  Extremities:  No clubbing, cyanosis or edema. Skin:  No rash or unusual mole changes noted.   Left side of his chin has an indurated area slightly higher than what it was before just over the outside of his mouth but not involving the lip itself. I do not detect any area of fluctuance or drainable area. I do not detect any abnormality in the oral cavity. Lymph Nodes:  None felt in the cervical, supraclavicular, axillary or inguinal region. Neurological: . DTRs 2+ and symmetric. Station and gait normal.   Hematologic:   No purpura or petechiae        Assessment/Plan:         1. Cellulitis of face        Impressions/Plan:  Impression  1. Cellulitis not responding to Ceftin so I will switch him to clindamycin 300 mg 3 times daily and continue warm compresses. I did give him some tramadol for pain. #20 given if this is not effective then we will probably have to have him see a surgeon to see if they can find a drainable but I do not detect a drainable loculation of fluid. Orders Placed This Encounter    clindamycin (CLEOCIN) 300 mg capsule    traMADoL (ULTRAM) 50 mg tablet       Follow-up and Dispositions    · Return Prior scheduled appointment. No results found for any visits on 05/10/21. Rocio Buckley MD    The patient was given after the visit summary the patient verbalized an understanding of the plans and problems as explained.

## 2021-05-10 NOTE — PATIENT INSTRUCTIONS

## 2021-06-11 ENCOUNTER — OFFICE VISIT (OUTPATIENT)
Dept: INTERNAL MEDICINE CLINIC | Age: 86
End: 2021-06-11
Payer: MEDICARE

## 2021-06-11 VITALS
DIASTOLIC BLOOD PRESSURE: 78 MMHG | HEART RATE: 50 BPM | TEMPERATURE: 97.9 F | OXYGEN SATURATION: 94 % | SYSTOLIC BLOOD PRESSURE: 138 MMHG | RESPIRATION RATE: 18 BRPM | WEIGHT: 215.2 LBS | HEIGHT: 74 IN | BODY MASS INDEX: 27.62 KG/M2

## 2021-06-11 DIAGNOSIS — I63.9 CEREBROVASCULAR ACCIDENT (CVA), UNSPECIFIED MECHANISM (HCC): ICD-10-CM

## 2021-06-11 DIAGNOSIS — N18.30 STAGE 3 CHRONIC KIDNEY DISEASE, UNSPECIFIED WHETHER STAGE 3A OR 3B CKD (HCC): ICD-10-CM

## 2021-06-11 DIAGNOSIS — M15.9 PRIMARY OSTEOARTHRITIS INVOLVING MULTIPLE JOINTS: ICD-10-CM

## 2021-06-11 DIAGNOSIS — I12.9 HYPERTENSION WITH RENAL DISEASE: Primary | ICD-10-CM

## 2021-06-11 DIAGNOSIS — M10.9 GOUT, UNSPECIFIED CAUSE, UNSPECIFIED CHRONICITY, UNSPECIFIED SITE: ICD-10-CM

## 2021-06-11 DIAGNOSIS — E78.2 MIXED HYPERLIPIDEMIA: ICD-10-CM

## 2021-06-11 DIAGNOSIS — J30.89 NON-SEASONAL ALLERGIC RHINITIS DUE TO OTHER ALLERGIC TRIGGER: ICD-10-CM

## 2021-06-11 LAB
ALBUMIN SERPL-MCNC: 4 G/DL (ref 3.5–5)
ALBUMIN/GLOB SERPL: 1.3 {RATIO} (ref 1.1–2.2)
ALP SERPL-CCNC: 109 U/L (ref 45–117)
ALT SERPL-CCNC: 34 U/L (ref 12–78)
ANION GAP SERPL CALC-SCNC: 5 MMOL/L (ref 5–15)
AST SERPL-CCNC: 29 U/L (ref 15–37)
BILIRUB SERPL-MCNC: 0.5 MG/DL (ref 0.2–1)
BUN SERPL-MCNC: 32 MG/DL (ref 6–20)
BUN/CREAT SERPL: 25 (ref 12–20)
CALCIUM SERPL-MCNC: 9.4 MG/DL (ref 8.5–10.1)
CHLORIDE SERPL-SCNC: 106 MMOL/L (ref 97–108)
CHOLEST SERPL-MCNC: 176 MG/DL
CK SERPL-CCNC: 107 U/L (ref 39–308)
CO2 SERPL-SCNC: 27 MMOL/L (ref 21–32)
CREAT SERPL-MCNC: 1.3 MG/DL (ref 0.7–1.3)
GLOBULIN SER CALC-MCNC: 3.2 G/DL (ref 2–4)
GLUCOSE SERPL-MCNC: 95 MG/DL (ref 65–100)
HDLC SERPL-MCNC: 42 MG/DL
HDLC SERPL: 4.2 {RATIO} (ref 0–5)
LDLC SERPL CALC-MCNC: 116.4 MG/DL (ref 0–100)
POTASSIUM SERPL-SCNC: 4.5 MMOL/L (ref 3.5–5.1)
PROT SERPL-MCNC: 7.2 G/DL (ref 6.4–8.2)
SODIUM SERPL-SCNC: 138 MMOL/L (ref 136–145)
TRIGL SERPL-MCNC: 88 MG/DL (ref ?–150)
VLDLC SERPL CALC-MCNC: 17.6 MG/DL

## 2021-06-11 PROCEDURE — G8510 SCR DEP NEG, NO PLAN REQD: HCPCS | Performed by: INTERNAL MEDICINE

## 2021-06-11 PROCEDURE — 99214 OFFICE O/P EST MOD 30 MIN: CPT | Performed by: INTERNAL MEDICINE

## 2021-06-11 PROCEDURE — G8536 NO DOC ELDER MAL SCRN: HCPCS | Performed by: INTERNAL MEDICINE

## 2021-06-11 PROCEDURE — G8419 CALC BMI OUT NRM PARAM NOF/U: HCPCS | Performed by: INTERNAL MEDICINE

## 2021-06-11 PROCEDURE — G8427 DOCREV CUR MEDS BY ELIG CLIN: HCPCS | Performed by: INTERNAL MEDICINE

## 2021-06-11 PROCEDURE — 1101F PT FALLS ASSESS-DOCD LE1/YR: CPT | Performed by: INTERNAL MEDICINE

## 2021-06-11 NOTE — PROGRESS NOTES
Chief Complaint   Patient presents with    Hypertension     3 month    Chronic Kidney Disease       SUBJECTIVE:    Marcie Salamanca is a 80 y.o. male who returns in follow-up for his medical problems include hypertension, hyperlipidemia, prior CVA, CKD stage III, allergic rhinitis, and other medical problems. 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 Office. He notes no further problems there. He currently denies any chest pain, shortness of breath, palpitations, PND, orthopnea or other cardiac or respiratory complaints. He notes no GI or  complaints. He notes no headaches, dizziness or neurologic complaints. There are no current active arthritic complaints and there are no other complaints on complete review of systems. Current Outpatient Medications   Medication Sig Dispense Refill    telmisartan-hydroCHLOROthiazide (MICARDIS HCT) 80-25 mg per tablet Take 1 Tab by mouth daily. 30 Tab 5    nebivoloL (Bystolic) 5 mg tablet TAKE ONE TABLET BY MOUTH DAILY 30 Tab 5    allopurinoL (ZYLOPRIM) 100 mg tablet TAKE ONE TABLET BY MOUTH DAILY 30 Tab 5    amLODIPine (NORVASC) 5 mg tablet 10 mg daily.  clopidogreL (PLAVIX) 75 mg tab TAKE ONE TABLET BY MOUTH DAILY 90 Tab 1    cycloSPORINE (Restasis) 0.05 % dpet INSTILL TWO DROPS DAILY IN EACH EYE 1 Each 4    diclofenac EC (VOLTAREN) 75 mg EC tablet TAKE ONE TABLET BY MOUTH TWICE A  Tab 1    doxycycline (VIBRAMYCIN) 100 mg capsule TAKE ONE CAPSULE BY MOUTH DAILY 90 Cap 2    bicalutamide (CASODEX) 50 mg tablet       omega-3 fatty acids 1,000 mg cap Take  by mouth.  aspirin (ASPIRIN) 325 mg tablet Take 325 mg by mouth daily.  Cetirizine (ZYRTEC) 10 mg cap Take  by mouth.  silodosin (RAPAFLO) 8 mg capsule Take 8 mg by mouth daily (with breakfast).        Past Medical History:   Diagnosis Date    Adverse effect of anesthesia     very bad gag reflex    Allergic rhinitis 8/19/2017    BPH (benign prostatic hypertrophy) 8/19/2017    Cancer (HCC)     basal cell carcinoma right ear    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    CKD (chronic kidney disease), stage III ([address] 8/19/2017    CVA (cerebrovascular accident) ([address] 2009    DJD (degenerative joint disease) 8/19/2017    Gout     Hyperlipidemia 8/19/2017    Hypertension     Hypertension with renal disease 8/19/2017    Meniere disease 8/19/2017    Nausea & vomiting     On statin therapy 8/19/2017    PAC (premature atrial contraction) 8/19/2017    Palpitation 8/19/2017    Prostate CA ([address] 8/19/2017    Pseudophakia of both eyes 10/16/2018    Rosacea 8/19/2017    Testosterone deficiency 8/19/2017     Past Surgical History:   Procedure Laterality Date    COLORECTAL SCRN; HI RISK IND  5/3/2016         HX HEENT  1970    tonsillectomy    HX HEENT  1980    basal cell carcinoma right ear    HX OTHER SURGICAL Right 01/21/2018    EXCISION RIGHT NECK MASS     HX PROSTATECTOMY      radiation only    MA ABDOMEN SURGERY PROC UNLISTED      bilateral inguinal hernia repair    MA COLONOSCOPY FLX DX W/COLLJ SPEC WHEN PFRMD  1/19/2011          Allergies   Allergen Reactions    Sulfanilamide Rash    Sulfa (Sulfonamide Antibiotics) Rash     Very mild rash several years ago       REVIEW OF SYSTEMS:  General: negative for - chills or fever, or weight loss or gain  ENT: negative for - headaches, nasal congestion or tinnitus  Eyes: no blurred or visual changes  Neck: No stiffness or swollen nodes  Respiratory: negative for - cough, hemoptysis, shortness of breath or wheezing  Cardiovascular : negative for - chest pain, edema, palpitations or shortness of breath  Gastrointestinal: negative for - abdominal pain, blood in stools, heartburn or nausea/vomiting  Genito-Urinary: no dysuria, trouble voiding, or hematuria  Musculoskeletal: negative for - gait disturbance, joint pain, joint stiffness or joint swelling  Neurological: no TIA or stroke symptoms  Hematologic: no bruises, no bleeding  Lymphatic: no swollen glands  Integument: no lumps, mole changes, nail changes or rash  Endocrine:no malaise/lethargy poly uria or polydipsia or unexpected weight changes        Social History     Socioeconomic History    Marital status:      Spouse name: Not on file    Number of children: Not on file    Years of education: Not on file    Highest education level: Not on file   Tobacco Use    Smoking status: Never Smoker    Smokeless tobacco: Never Used   Vaping Use    Vaping Use: Never used   Substance and Sexual Activity    Alcohol use: No    Drug use: No    Sexual activity: Never     Social Determinants of Health     Financial Resource Strain:     Difficulty of Paying Living Expenses:    Food Insecurity:     Worried About Running Out of Food in the Last Year:     Ran Out of Food in the Last Year:    Transportation Needs:     Lack of Transportation (Medical):  Lack of Transportation (Non-Medical):    Physical Activity:     Days of Exercise per Week:     Minutes of Exercise per Session:    Stress:     Feeling of Stress :    Social Connections:     Frequency of Communication with Friends and Family:     Frequency of Social Gatherings with Friends and Family:     Attends Je[employer] witness Services:     Active Member of Clubs or Organizations:     Attends Club or Organization Meetings:     Marital Status:      Family History   Problem Relation Age of Onset    Cancer Mother         uterine, cervical    Cancer Father         colon ca       OBJECTIVE:     Visit Vitals  /78   Pulse (!) 50   Temp 97.9 °F (36.6 °C) (Oral)   Resp 18   Ht 6' 2\" (1.88 m)   Wt 215 lb 3.2 oz (97.6 kg)   SpO2 94%   BMI 27.63 kg/m²     CONSTITUTIONAL:   well nourished, appears age appropriate  EYES: sclera anicteric, PERRL, EOMI  ENMT:nares clear, moist mucous membranes, pharynx clear  NECK: supple.  Thyroid normal, No JVD or bruits  RESPIRATORY: Chest: clear to ascultation and percussion, normal inspiratory effort  CARDIOVASCULAR: Heart: regular rate and rhythm no murmurs, rubs or gallops, PMI not displaced, No thrills, no peripheral edema  GASTROINTESTINAL: Abdomen: non distended, soft, non tender, bowel sounds normal  HEMATOLOGIC: no purpura, petechiae or bruising  LYMPHATIC: No lymph node enlargemant  MUSCULOSKELETAL: Extremities: no active synovitis, pulse 1+   INTEGUMENT: No unusual rashes or suspicious skin lesions noted. Nails appear normal.  PERIPHERAL VASCULAR: normal pulses femoral, PT and DP  NEUROLOGIC: non-focal exam, A & O X 3  PSYCHIATRIC:, appropriate affect     ASSESSMENT:   1. Hypertension with renal disease    2. Mixed hyperlipidemia    3. Cerebrovascular accident (CVA), unspecified mechanism ([address]    4. Stage 3 chronic kidney disease, unspecified whether stage 3a or 3b CKD ([facility]    5. Gout, unspecified cause, unspecified chronicity, unspecified site    6. Primary osteoarthritis involving multiple joints    7. Non-seasonal allergic rhinitis due to other allergic trigger      Impression  1. Hypertension that is controlled so continue current therapy reviewed with him. 2.  Hyperlipidemia prior lab reviewed repeat status pending I will adjust if needed. 3.  Prior CVA continue aspirin daily  4. CKD stage III repeat status pending  5. Gout no recent flares  6. DJD chronic but stable  7. Allergic rhinitis stable  I will call with lab and make further recommendations or adjustments if necessary. Follow-up in 3 months or sooner should there be a problem. PLAN:  .  Orders Placed This Encounter    METABOLIC PANEL, COMPREHENSIVE    LIPID PANEL    CK         ATTENTION:   This medical record was transcribed using an electronic medical records system. Although proofread, it may and can contain electronic and spelling errors. Other human spelling and other errors may be present. Corrections may be executed at a later time.   Please feel free to contact us for any clarifications as needed. Follow-up and Dispositions    · Return in about 3 months (around 9/11/2021). No results found for any visits on 06/11/21. oHng Mae MD    The patient verbalized understanding of the problems and plans as explained.

## 2021-06-11 NOTE — PATIENT INSTRUCTIONS
Allergies: Care Instructions Your Care Instructions Allergies occur when your body's defense system (immune system) overreacts to certain substances. The immune system treats a harmless substance as if it were a harmful germ or virus. Many things can make this happen. These include pollens, medicine, food, dust, animal dander, and mold. Allergies can be mild or severe. Mild allergies can be managed with home treatment. But medicine may be needed to prevent problems. Managing your allergies is an important part of staying healthy. Your doctor may suggest that you have allergy testing to help find out what is causing your allergies. Severe allergies can cause reactions that affect your whole body (anaphylactic reactions). Your doctor may prescribe a shot of epinephrine to carry with you in case you have a severe reaction. Learn how to give yourself the shot and keep it with you at all times. Make sure it is not . Follow-up care is a key part of your treatment and safety. Be sure to make and go to all appointments, and call your doctor if you are having problems. It's also a good idea to know your test results and keep a list of the medicines you take. How can you care for yourself at home? · If you have been told by your doctor that dust or dust mites are causing your allergy, decrease the dust around your bed: 
? Wash sheets, pillowcases, and other bedding in hot water every week. ? Use dust-proof covers for pillows, duvets, and mattresses. Avoid plastic covers because they tear easily and do not \"breathe. \" Wash as instructed on the label. ? Do not use any blankets and pillows that you do not need. ? Use blankets that you can wash in your washing machine. ? Consider removing drapes and carpets, which attract and hold dust, from your bedroom. · If you are allergic to house dust and mites, do not use home humidifiers. Your doctor can suggest ways you can control dust and mites.  
· Look for signs of cockroaches. Cockroaches cause allergic reactions. Use cockroach baits to get rid of them. Then, clean your home well. Cockroaches like areas where grocery bags, newspapers, empty bottles, or cardboard boxes are stored. Do not keep these inside your home, and keep trash and food containers sealed. Seal off any spots where cockroaches might enter your home. · If you are allergic to mold, get rid of furniture, rugs, and drapes that smell musty. Check for mold in the bathroom. · If you are allergic to outdoor pollen or mold spores, use air-conditioning. Change or clean all filters every month. Keep windows closed. · If you are allergic to pollen, stay inside when pollen counts are high. Use a vacuum  with a HEPA filter or a double-thickness filter at least two times each week. · Stay inside when air pollution is bad. Avoid paint fumes, perfumes, and other strong odors. · Avoid conditions that make your allergies worse. Stay away from smoke. Do not smoke or let anyone else smoke in your house. Do not use fireplaces or wood-burning stoves. · If you are allergic to your pets, change the air filter in your furnace every month. Use high-efficiency filters. · If you are allergic to pet dander, keep pets outside or out of your bedroom. Old carpet and cloth furniture can hold a lot of animal dander. You may need to replace them. When should you call for help? Give an epinephrine shot if: 
  · You think you are having a severe allergic reaction.  
  · You have symptoms in more than one body area, such as mild nausea and an itchy mouth. After giving an epinephrine shot call 911, even if you feel better. Call 911 if: 
  · You have symptoms of a severe allergic reaction. These may include: 
? Sudden raised, red areas (hives) all over your body. ? Swelling of the throat, mouth, lips, or tongue. ? Trouble breathing. ? Passing out (losing consciousness).  Or you may feel very lightheaded or suddenly feel weak, confused, or restless.  
  · You have been given an epinephrine shot, even if you feel better. Call your doctor now or seek immediate medical care if: 
  · You have symptoms of an allergic reaction, such as: ? A rash or hives (raised, red areas on the skin). ? Itching. ? Swelling. ? Belly pain, nausea, or vomiting. Watch closely for changes in your health, and be sure to contact your doctor if: 
  · You do not get better as expected. Where can you learn more? Go to http://www.gray.com/ Enter J658 in the search box to learn more about \"Allergies: Care Instructions. \" Current as of: November 6, 2020               Content Version: 12.8 © 2006-2021 [employer], [employer]. Care instructions adapted under license by [employer] (which disclaims liability or warranty for this information). If you have questions about a medical condition or this instruction, always ask your healthcare professional. [address] any warranty or liability for your use of this information.

## 2021-06-11 NOTE — PROGRESS NOTES
HIPAA verified by two patient identifiers. Gem Ibarra is a 80 y.o. male    Chief Complaint   Patient presents with    Hypertension     3 month    Chronic Kidney Disease       Visit Vitals  BP (!) 159/69 (BP 1 Location: Left upper arm, BP Patient Position: Sitting, BP Cuff Size: Adult)   Pulse (!) 50   Temp 97.9 °F (36.6 °C) (Oral)   Resp 18   Ht 6' 2\" (1.88 m)   Wt 215 lb 3.2 oz (97.6 kg)   SpO2 94%   BMI 27.63 kg/m²       Pain Scale: 0 - No pain/10  Pain Location:       Health Maintenance Due   Topic Date Due    DTaP/Tdap/Td series (1 - Tdap) 10/17/1955    Shingrix Vaccine Age 50> (1 of 2) Never done         Coordination of Care Questionnaire:  :   1) Have you been to an emergency room, urgent care, or hospitalized since your last visit? If yes, where when, and reason for visit? no       2. Have seen or consulted any other health care provider since your last visit? If yes, where when, and reason for visit? NO      Patient is accompanied by self I have received verbal consent from Gem Ibarra to discuss any/all medical information while they are present in the room.

## 2021-06-23 RX ORDER — AMLODIPINE BESYLATE 5 MG/1
TABLET ORAL
Qty: 90 TABLET | Refills: 3 | Status: SHIPPED | OUTPATIENT
Start: 2021-06-23 | End: 2022-03-30 | Stop reason: SDUPTHER

## 2021-06-23 NOTE — TELEPHONE ENCOUNTER
RX refill request from the patient/pharmacy. Patient last seen 06- with labs, and next appt. scheduled for 09-  Requested Prescriptions     Pending Prescriptions Disp Refills    amLODIPine (NORVASC) 5 mg tablet [Pharmacy Med Name: amLODIPine BESYLATE 5 MG TAB] 90 Tablet 3     Sig: TAKE ONE TABLET BY MOUTH DAILY   .

## 2021-08-25 DIAGNOSIS — Z86.73 HISTORY OF CVA (CEREBROVASCULAR ACCIDENT): ICD-10-CM

## 2021-08-25 RX ORDER — CLOPIDOGREL BISULFATE 75 MG/1
TABLET ORAL
Qty: 90 TABLET | Refills: 3 | Status: SHIPPED | OUTPATIENT
Start: 2021-08-25 | End: 2022-08-23

## 2021-08-25 NOTE — TELEPHONE ENCOUNTER
RX refill request from the patient/pharmacy. Patient last seen 06- with labs, and next appt. scheduled for 09-  Requested Prescriptions     Pending Prescriptions Disp Refills    clopidogreL (PLAVIX) 75 mg tab [Pharmacy Med Name: CLOPIDOGREL 75 MG TABLET] 90 Tablet 3     Sig: TAKE ONE TABLET BY MOUTH DAILY   .

## 2021-09-13 RX ORDER — ALLOPURINOL 100 MG/1
TABLET ORAL
Qty: 90 TABLET | Refills: 3 | Status: SHIPPED | OUTPATIENT
Start: 2021-09-13 | End: 2022-09-12

## 2021-09-13 NOTE — TELEPHONE ENCOUNTER
RX refill request from the patient/pharmacy. Patient last seen 06- with labs, and next appt. scheduled for 09-  Requested Prescriptions     Pending Prescriptions Disp Refills    allopurinoL (ZYLOPRIM) 100 mg tablet [Pharmacy Med Name: ALLOPURINOL 100 MG TABLET] 90 Tablet 3     Sig: TAKE ONE TABLET BY MOUTH DAILY   .

## 2021-09-14 NOTE — PROGRESS NOTES
Chief Complaint   Patient presents with    Hypertension     3 month follow up    Cholesterol Problem       SUBJECTIVE:    Asha Victoria is a 80 y.o. male who returns in follow-up of his medical problems include hypertension, hyperlipidemia, DJD, history of PACs, prior CVA, allergic rhinitis and other medical problems. He is taking his medications and trying to follow his diet and remains physically active. He currently denies any chest pain, shortness of breath, palpitations, PND, orthopnea or other cardiac or respiratory complaints. He notes no GI or  complaints. He has no headaches, dizziness or neurologic complaints. There are no current changes of his chronic arthritic complaints and no other complaints on complete review of systems. He does monitor his blood pressure regularly and brought in a long list of blood pressure readings from home. Current Outpatient Medications   Medication Sig Dispense Refill    allopurinoL (ZYLOPRIM) 100 mg tablet TAKE ONE TABLET BY MOUTH DAILY 90 Tablet 3    clopidogreL (PLAVIX) 75 mg tab TAKE ONE TABLET BY MOUTH DAILY 90 Tablet 3    amLODIPine (NORVASC) 5 mg tablet TAKE ONE TABLET BY MOUTH DAILY 90 Tablet 3    telmisartan-hydroCHLOROthiazide (MICARDIS HCT) 80-25 mg per tablet Take 1 Tab by mouth daily. 30 Tab 5    nebivoloL (Bystolic) 5 mg tablet TAKE ONE TABLET BY MOUTH DAILY 30 Tab 5    cycloSPORINE (Restasis) 0.05 % dpet INSTILL TWO DROPS DAILY IN EACH EYE 1 Each 4    diclofenac EC (VOLTAREN) 75 mg EC tablet TAKE ONE TABLET BY MOUTH TWICE A  Tab 1    doxycycline (VIBRAMYCIN) 100 mg capsule TAKE ONE CAPSULE BY MOUTH DAILY 90 Cap 2    bicalutamide (CASODEX) 50 mg tablet       omega-3 fatty acids 1,000 mg cap Take  by mouth.  aspirin (ASPIRIN) 325 mg tablet Take 325 mg by mouth daily.  Cetirizine (ZYRTEC) 10 mg cap Take  by mouth.  silodosin (RAPAFLO) 8 mg capsule Take 8 mg by mouth daily (with breakfast).        Past Medical History:   Diagnosis Date    Adverse effect of anesthesia     very bad gag reflex    Allergic rhinitis 8/19/2017    BPH (benign prostatic hypertrophy) 8/19/2017    Cancer (HCC)     basal cell carcinoma right ear    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    CKD (chronic kidney disease), stage III ([address] 8/19/2017    CVA (cerebrovascular accident) ([address] 2009    DJD (degenerative joint disease) 8/19/2017    Gout     Hyperlipidemia 8/19/2017    Hypertension     Hypertension with renal disease 8/19/2017    Meniere disease 8/19/2017    Nausea & vomiting     On statin therapy 8/19/2017    PAC (premature atrial contraction) 8/19/2017    Palpitation 8/19/2017    Prostate CA ([address] 8/19/2017    Pseudophakia of both eyes 10/16/2018    Rosacea 8/19/2017    Testosterone deficiency 8/19/2017     Past Surgical History:   Procedure Laterality Date    COLORECTAL SCRN; HI RISK IND  5/3/2016         HX HEENT  1970    tonsillectomy    HX HEENT  1980    basal cell carcinoma right ear    HX OTHER SURGICAL Right 01/21/2018    EXCISION RIGHT NECK MASS     HX PROSTATECTOMY      radiation only    SD ABDOMEN SURGERY PROC UNLISTED      bilateral inguinal hernia repair    SD COLONOSCOPY FLX DX W/COLLJ SPEC WHEN PFRMD  1/19/2011          Allergies   Allergen Reactions    Sulfanilamide Rash    Sulfa (Sulfonamide Antibiotics) Rash     Very mild rash several years ago       REVIEW OF SYSTEMS:  General: negative for - chills or fever, or weight loss or gain  ENT: negative for - headaches, nasal congestion or tinnitus  Eyes: no blurred or visual changes  Neck: No stiffness or swollen nodes  Respiratory: negative for - cough, hemoptysis, shortness of breath or wheezing  Cardiovascular : negative for - chest pain, edema, palpitations or shortness of breath  Gastrointestinal: negative for - abdominal pain, blood in stools, heartburn or nausea/vomiting  Genito-Urinary: no dysuria, trouble voiding, or hematuria  Musculoskeletal: negative for - gait disturbance, joint pain, joint stiffness or joint swelling  Neurological: no TIA or stroke symptoms  Hematologic: no bruises, no bleeding  Lymphatic: no swollen glands  Integument: no lumps, mole changes, nail changes or rash  Endocrine:no malaise/lethargy poly uria or polydipsia or unexpected weight changes        Social History     Socioeconomic History    Marital status:      Spouse name: Not on file    Number of children: Not on file    Years of education: Not on file    Highest education level: Not on file   Tobacco Use    Smoking status: Never Smoker    Smokeless tobacco: Never Used   Vaping Use    Vaping Use: Never used   Substance and Sexual Activity    Alcohol use: No    Drug use: No    Sexual activity: Never     Social Determinants of Health     Financial Resource Strain:     Difficulty of Paying Living Expenses:    Food Insecurity:     Worried About Running Out of Food in the Last Year:     Ran Out of Food in the Last Year:    Transportation Needs:     Lack of Transportation (Medical):      Lack of Transportation (Non-Medical):    Physical Activity:     Days of Exercise per Week:     Minutes of Exercise per Session:    Stress:     Feeling of Stress :    Social Connections:     Frequency of Communication with Friends and Family:     Frequency of Social Gatherings with Friends and Family:     Attends Worship Services:     Active Member of Clubs or Organizations:     Attends Club or Organization Meetings:     Marital Status:      Family History   Problem Relation Age of Onset    Cancer Mother         uterine, cervical    Cancer Father         colon ca       OBJECTIVE:     Visit Vitals  BP (!) 162/84 (BP 1 Location: Left upper arm, BP Patient Position: Sitting, BP Cuff Size: Adult)   Pulse (!) 52   Temp 97.7 °F (36.5 °C) (Oral)   Resp 17   Ht 6' 2\" (1.88 m)   Wt 211 lb 4.8 oz (95.8 kg)   SpO2 95%   BMI 27.13 kg/m² CONSTITUTIONAL:   well nourished, appears age appropriate  EYES: sclera anicteric, PERRL, EOMI  ENMT:nares clear, moist mucous membranes, pharynx clear  NECK: supple. Thyroid normal, No JVD or bruits  RESPIRATORY: Chest: clear to ascultation and percussion, normal inspiratory effort  CARDIOVASCULAR: Heart: regular rate and rhythm no murmurs, rubs or gallops, PMI not displaced, No thrills, no peripheral edema  GASTROINTESTINAL: Abdomen: non distended, soft, non tender, bowel sounds normal  HEMATOLOGIC: no purpura, petechiae or bruising  LYMPHATIC: No lymph node enlargemant  MUSCULOSKELETAL: Extremities: no active synovitis, pulse 1+   INTEGUMENT: No unusual rashes or suspicious skin lesions noted. Nails appear normal.  PERIPHERAL VASCULAR: normal pulses femoral, PT and DP  NEUROLOGIC: non-focal exam, A & O X 3  PSYCHIATRIC:, appropriate affect     ASSESSMENT:   1. Hypertension with renal disease    2. Mixed hyperlipidemia    3. Primary osteoarthritis involving multiple joints    4. Cerebrovascular accident (CVA), unspecified mechanism ([address]    5. Stage 3 chronic kidney disease, unspecified whether stage 3a or 3b CKD ([address]    6. Gout, unspecified cause, unspecified chronicity, unspecified site      Impression  1. Hypertension there is a labile component but I reviewed his list of blood pressures from home and they have been all acceptable except on 2 occasions where he has been 150 on one occasion 162 on another occasion. Blood pressure is up today but he forgot to take his medicine last night. I will not make any changes in his medicines. 2.  Hyperlipidemia prior lab reviewed repeat status pending I will adjust if needed. 3. DJD that is stable  4. Prior CVA continue aspirin daily  5. CKD stage III repeat status pending  6. Gout no recent flares  I will call the lab and make further recommendations or adjustments if necessary. Follow-up in 3 months or sooner if there is a problem.     PLAN:  .  Orders Placed This Encounter    METABOLIC PANEL, COMPREHENSIVE    LIPID PANEL    CK         ATTENTION:   This medical record was transcribed using an electronic medical records system. Although proofread, it may and can contain electronic and spelling errors. Other human spelling and other errors may be present. Corrections may be executed at a later time. Please feel free to contact us for any clarifications as needed. Follow-up and Dispositions    · Return in about 3 months (around 12/15/2021). No results found for any visits on 09/15/21. Sanchez Finney MD    The patient verbalized understanding of the problems and plans as explained.

## 2021-09-15 ENCOUNTER — OFFICE VISIT (OUTPATIENT)
Dept: INTERNAL MEDICINE CLINIC | Age: 86
End: 2021-09-15
Payer: MEDICARE

## 2021-09-15 VITALS
RESPIRATION RATE: 17 BRPM | HEART RATE: 52 BPM | OXYGEN SATURATION: 95 % | BODY MASS INDEX: 27.12 KG/M2 | SYSTOLIC BLOOD PRESSURE: 162 MMHG | HEIGHT: 74 IN | WEIGHT: 211.3 LBS | DIASTOLIC BLOOD PRESSURE: 84 MMHG | TEMPERATURE: 97.7 F

## 2021-09-15 DIAGNOSIS — N18.30 STAGE 3 CHRONIC KIDNEY DISEASE, UNSPECIFIED WHETHER STAGE 3A OR 3B CKD (HCC): ICD-10-CM

## 2021-09-15 DIAGNOSIS — E78.2 MIXED HYPERLIPIDEMIA: ICD-10-CM

## 2021-09-15 DIAGNOSIS — M15.9 PRIMARY OSTEOARTHRITIS INVOLVING MULTIPLE JOINTS: ICD-10-CM

## 2021-09-15 DIAGNOSIS — I63.9 CEREBROVASCULAR ACCIDENT (CVA), UNSPECIFIED MECHANISM (HCC): ICD-10-CM

## 2021-09-15 DIAGNOSIS — M10.9 GOUT, UNSPECIFIED CAUSE, UNSPECIFIED CHRONICITY, UNSPECIFIED SITE: ICD-10-CM

## 2021-09-15 DIAGNOSIS — I12.9 HYPERTENSION WITH RENAL DISEASE: Primary | ICD-10-CM

## 2021-09-15 LAB
ALBUMIN SERPL-MCNC: 4.3 G/DL (ref 3.5–5)
ALBUMIN/GLOB SERPL: 1.3 {RATIO} (ref 1.1–2.2)
ALP SERPL-CCNC: 99 U/L (ref 45–117)
ALT SERPL-CCNC: 26 U/L (ref 12–78)
ANION GAP SERPL CALC-SCNC: 8 MMOL/L (ref 5–15)
AST SERPL-CCNC: 24 U/L (ref 15–37)
BILIRUB SERPL-MCNC: 0.7 MG/DL (ref 0.2–1)
BUN SERPL-MCNC: 28 MG/DL (ref 6–20)
BUN/CREAT SERPL: 23 (ref 12–20)
CALCIUM SERPL-MCNC: 9.2 MG/DL (ref 8.5–10.1)
CHLORIDE SERPL-SCNC: 106 MMOL/L (ref 97–108)
CHOLEST SERPL-MCNC: 178 MG/DL
CK SERPL-CCNC: 74 U/L (ref 39–308)
CO2 SERPL-SCNC: 25 MMOL/L (ref 21–32)
CREAT SERPL-MCNC: 1.2 MG/DL (ref 0.7–1.3)
GLOBULIN SER CALC-MCNC: 3.3 G/DL (ref 2–4)
GLUCOSE SERPL-MCNC: 101 MG/DL (ref 65–100)
HDLC SERPL-MCNC: 49 MG/DL
HDLC SERPL: 3.6 {RATIO} (ref 0–5)
LDLC SERPL CALC-MCNC: 107.4 MG/DL (ref 0–100)
POTASSIUM SERPL-SCNC: 4.5 MMOL/L (ref 3.5–5.1)
PROT SERPL-MCNC: 7.6 G/DL (ref 6.4–8.2)
SODIUM SERPL-SCNC: 139 MMOL/L (ref 136–145)
TRIGL SERPL-MCNC: 108 MG/DL (ref ?–150)
VLDLC SERPL CALC-MCNC: 21.6 MG/DL

## 2021-09-15 PROCEDURE — G8510 SCR DEP NEG, NO PLAN REQD: HCPCS | Performed by: INTERNAL MEDICINE

## 2021-09-15 PROCEDURE — 1101F PT FALLS ASSESS-DOCD LE1/YR: CPT | Performed by: INTERNAL MEDICINE

## 2021-09-15 PROCEDURE — 99214 OFFICE O/P EST MOD 30 MIN: CPT | Performed by: INTERNAL MEDICINE

## 2021-09-15 PROCEDURE — G8419 CALC BMI OUT NRM PARAM NOF/U: HCPCS | Performed by: INTERNAL MEDICINE

## 2021-09-15 PROCEDURE — G8427 DOCREV CUR MEDS BY ELIG CLIN: HCPCS | Performed by: INTERNAL MEDICINE

## 2021-09-15 PROCEDURE — G8536 NO DOC ELDER MAL SCRN: HCPCS | Performed by: INTERNAL MEDICINE

## 2021-09-15 NOTE — PATIENT INSTRUCTIONS
Allergies: Care Instructions  Your Care Instructions     Allergies occur when your body's defense system (immune system) overreacts to certain substances. The immune system treats a harmless substance as if it were a harmful germ or virus. Many things can make this happen. These include pollens, medicine, food, dust, animal dander, and mold. Allergies can be mild or severe. Mild allergies can be managed with home treatment. But medicine may be needed to prevent problems. Managing your allergies is an important part of staying healthy. Your doctor may suggest that you have allergy testing to help find out what is causing your allergies. Severe allergies can cause reactions that affect your whole body (anaphylactic reactions). Your doctor may prescribe a shot of epinephrine to carry with you in case you have a severe reaction. Learn how to give yourself the shot and keep it with you at all times. Make sure it is not . Follow-up care is a key part of your treatment and safety. Be sure to make and go to all appointments, and call your doctor if you are having problems. It's also a good idea to know your test results and keep a list of the medicines you take. How can you care for yourself at home? · If you have been told by your doctor that dust or dust mites are causing your allergy, decrease the dust around your bed:  ? Wash sheets, pillowcases, and other bedding in hot water every week. ? Use dust-proof covers for pillows, duvets, and mattresses. Avoid plastic covers because they tear easily and do not \"breathe. \" Wash as instructed on the label. ? Do not use any blankets and pillows that you do not need. ? Use blankets that you can wash in your washing machine. ? Consider removing drapes and carpets, which attract and hold dust, from your bedroom. · If you are allergic to house dust and mites, do not use home humidifiers. Your doctor can suggest ways you can control dust and mites.   · Look for signs of cockroaches. Cockroaches cause allergic reactions. Use cockroach baits to get rid of them. Then, clean your home well. Cockroaches like areas where grocery bags, newspapers, empty bottles, or cardboard boxes are stored. Do not keep these inside your home, and keep trash and food containers sealed. Seal off any spots where cockroaches might enter your home. · If you are allergic to mold, get rid of furniture, rugs, and drapes that smell musty. Check for mold in the bathroom. · If you are allergic to outdoor pollen or mold spores, use air-conditioning. Change or clean all filters every month. Keep windows closed. · If you are allergic to pollen, stay inside when pollen counts are high. Use a vacuum  with a HEPA filter or a double-thickness filter at least two times each week. · Stay inside when air pollution is bad. Avoid paint fumes, perfumes, and other strong odors. · Avoid conditions that make your allergies worse. Stay away from smoke. Do not smoke or let anyone else smoke in your house. Do not use fireplaces or wood-burning stoves. · If you are allergic to your pets, change the air filter in your furnace every month. Use high-efficiency filters. · If you are allergic to pet dander, keep pets outside or out of your bedroom. Old carpet and cloth furniture can hold a lot of animal dander. You may need to replace them. When should you call for help? Give an epinephrine shot if:    · You think you are having a severe allergic reaction.     · You have symptoms in more than one body area, such as mild nausea and an itchy mouth. After giving an epinephrine shot call 911, even if you feel better. Call 911 if:    · You have symptoms of a severe allergic reaction. These may include:  ? Sudden raised, red areas (hives) all over your body. ? Swelling of the throat, mouth, lips, or tongue. ? Trouble breathing. ? Passing out (losing consciousness).  Or you may feel very lightheaded or suddenly feel weak, confused, or restless.     · You have been given an epinephrine shot, even if you feel better. Call your doctor now or seek immediate medical care if:    · You have symptoms of an allergic reaction, such as:  ? A rash or hives (raised, red areas on the skin). ? Itching. ? Swelling. ? Belly pain, nausea, or vomiting. Watch closely for changes in your health, and be sure to contact your doctor if:    · You do not get better as expected. Where can you learn more? Go to http://www.[facility].com/  Enter W171 in the search box to learn more about \"Allergies: Care Instructions. \"  Current as of: November 6, 2020               Content Version: 12.8  © 2006-2021 [employer]. Care instructions adapted under license by [employer] (which disclaims liability or warranty for this information). If you have questions about a medical condition or this instruction, always ask your healthcare professional. [place] [address] any warranty or liability for your use of this information.

## 2021-09-15 NOTE — PROGRESS NOTES
Chief Complaint   Patient presents with    Hypertension     3 month follow up    Cholesterol Problem     Visit Vitals  BP (!) 162/84 (BP 1 Location: Left upper arm, BP Patient Position: Sitting, BP Cuff Size: Adult)   Pulse (!) 52   Temp 97.7 °F (36.5 °C) (Oral)   Resp 17   Ht 6' 2\" (1.88 m)   Wt 211 lb 4.8 oz (95.8 kg)   SpO2 95%   BMI 27.13 kg/m²     1. Have you been to the ER, urgent care clinic since your last visit? Hospitalized since your last visit? No    2. Have you seen or consulted any other health care providers outside of the [address] [place] [address] since your last visit? Include any pap smears or colon screening.  No

## 2021-09-27 RX ORDER — NEBIVOLOL 5 MG/1
TABLET ORAL
Qty: 30 TABLET | Refills: 5 | Status: SHIPPED | OUTPATIENT
Start: 2021-09-27 | End: 2021-12-20 | Stop reason: SDUPTHER

## 2021-10-12 RX ORDER — TELMISARTAN AND HYDROCHLORTHIAZIDE 80; 25 MG/1; MG/1
TABLET ORAL
Qty: 90 TABLET | Refills: 2 | Status: SHIPPED | OUTPATIENT
Start: 2021-10-12 | End: 2022-06-08 | Stop reason: ALTCHOICE

## 2021-10-12 RX ORDER — DOXYCYCLINE 100 MG/1
CAPSULE ORAL
Qty: 90 CAPSULE | Refills: 2 | Status: SHIPPED | OUTPATIENT
Start: 2021-10-12

## 2021-10-12 NOTE — TELEPHONE ENCOUNTER
RX refill request from the patient/pharmacy. Patient last seen 09- with labs, and next appt. scheduled for 12-  Requested Prescriptions     Pending Prescriptions Disp Refills    doxycycline (VIBRAMYCIN) 100 mg capsule [Pharmacy Med Name: DOXYCYCLINE HYCLATE 100 MG CAP] 90 Capsule 2     Sig: TAKE ONE CAPSULE BY MOUTH DAILY    telmisartan-hydroCHLOROthiazide (MICARDIS HCT) 80-25 mg per tablet [Pharmacy Med Name: Sesar Ok 80-25 MG TAB] 90 Tablet 2     Sig: TAKE ONE TABLET BY MOUTH DAILY   .

## 2021-10-14 ENCOUNTER — CLINICAL SUPPORT (OUTPATIENT)
Dept: INTERNAL MEDICINE CLINIC | Age: 86
End: 2021-10-14
Payer: MEDICARE

## 2021-10-14 VITALS
HEART RATE: 51 BPM | RESPIRATION RATE: 16 BRPM | WEIGHT: 217.4 LBS | BODY MASS INDEX: 27.9 KG/M2 | TEMPERATURE: 98.2 F | DIASTOLIC BLOOD PRESSURE: 69 MMHG | SYSTOLIC BLOOD PRESSURE: 133 MMHG | OXYGEN SATURATION: 98 % | HEIGHT: 74 IN

## 2021-10-14 DIAGNOSIS — Z23 NEEDS FLU SHOT: Primary | ICD-10-CM

## 2021-10-14 PROCEDURE — 90694 VACC AIIV4 NO PRSRV 0.5ML IM: CPT | Performed by: INTERNAL MEDICINE

## 2021-10-14 PROCEDURE — G0008 ADMIN INFLUENZA VIRUS VAC: HCPCS | Performed by: INTERNAL MEDICINE

## 2021-10-14 NOTE — PROGRESS NOTES
Thea Appl a 80 y.o. male who is present for routine immunizations. Prior to vaccine administration After obtaining verbal consent and per orders of Dr. Maribel Cooley, injection of FLU SHOT IN LEFT DELTOID given by Nomi China. Risks and adverse reactions were discussed. The patient was provided the VIS and they were given an opportunity to ask questions, all questions addressed. Order and injection/medication verified by second nurse/ma review by Lesvia Higgins LPN. Patient tolerated procedure well. No reactions noted. Patient was advised to seek medical or call the office with any questions or concerns post vaccination. Patient verbalized understanding. Nomi China  Blood pressure 133/69, pulse (!) 51, temperature 98.2 °F (36.8 °C), temperature source Oral, resp. rate 16, height 6' 2\" (1.88 m), weight 217 lb 6.4 oz (98.6 kg), SpO2 98 %.

## 2021-12-17 PROBLEM — E55.9 VITAMIN D DEFICIENCY: Status: ACTIVE | Noted: 2021-12-17

## 2021-12-17 NOTE — PROGRESS NOTES
This is a Subsequent Medicare Annual Wellness Visit providing Personalized Prevention Plan Services (PPPS) (Performed 12 months after initial AWV and PPPS )    I have reviewed the patient's medical history in detail and updated the computerized patient record. He returns today accompanied by his wife for his Medicare subsequent annual wellness examination and screening questionnaire. He is also in follow-up of his medical problems include hypertension with a labile component, hyperlipidemia, prior CVA, history of PACs, BPH, history of prostate cancer, CKD stage III, DJD and other multiple medical problems. He is taking his medications and trying to follow his diet and try to remain physically active. He currently denies any chest pain, shortness of breath, palpitations, PND, orthopnea or other cardiac or respiratory complaints. He notes no GI or  complaints cystification does note he has a little bowel leakage but nothing severe. He denies any headaches, dizziness or neurologic complaints. Has no current active arthritic complaints although he does have his chronic joint aches and pains are not changed. There are no other complaints on complete view of systems.     History     Past Medical History:   Diagnosis Date    Adverse effect of anesthesia     very bad gag reflex    Allergic rhinitis 8/19/2017    BPH (benign prostatic hypertrophy) 8/19/2017    Cancer (HCC)     basal cell carcinoma right ear    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    CKD (chronic kidney disease), stage III ([address] 8/19/2017    CVA [facility]cerebrovascular accident[facility] 2009    DJD (degenerative joint disease) 8/19/2017    Gout     Hyperlipidemia 8/19/2017    Hypertension     Hypertension with renal disease 8/19/2017    Meniere disease 8/19/2017    Nausea & vomiting     On statin therapy 8/19/2017    PAC (premature atrial contraction) 8/19/2017    Palpitation 8/19/2017    Prostate CA ([address] 8/19/2017    Pseudophakia of both eyes 10/16/2018    Rosacea 8/19/2017    Testosterone deficiency 8/19/2017      Past Surgical History:   Procedure Laterality Date    COLORECTAL SCRN; HI RISK IND  5/3/2016         HX HEENT  1970    tonsillectomy    HX HEENT  1980    basal cell carcinoma right ear    HX OTHER SURGICAL Right 01/21/2018    EXCISION RIGHT NECK MASS     HX PROSTATECTOMY      radiation only    OR ABDOMEN SURGERY PROC UNLISTED      bilateral inguinal hernia repair    OR COLONOSCOPY FLX DX W/COLLJ SPEC WHEN PFRMD  1/19/2011          Social History     Tobacco Use    Smoking status: Never Smoker    Smokeless tobacco: Never Used   Vaping Use    Vaping Use: Never used   Substance Use Topics    Alcohol use: No    Drug use: No     Current Outpatient Medications   Medication Sig Dispense Refill    nebivoloL (Bystolic) 10 mg tablet TAKE ONE TABLET BY MOUTH DAILY 30 Tablet prn    doxycycline (VIBRAMYCIN) 100 mg capsule TAKE ONE CAPSULE BY MOUTH DAILY 90 Capsule 2    telmisartan-hydroCHLOROthiazide (MICARDIS HCT) 80-25 mg per tablet TAKE ONE TABLET BY MOUTH DAILY 90 Tablet 2    allopurinoL (ZYLOPRIM) 100 mg tablet TAKE ONE TABLET BY MOUTH DAILY 90 Tablet 3    clopidogreL (PLAVIX) 75 mg tab TAKE ONE TABLET BY MOUTH DAILY 90 Tablet 3    amLODIPine (NORVASC) 5 mg tablet TAKE ONE TABLET BY MOUTH DAILY 90 Tablet 3    cycloSPORINE (Restasis) 0.05 % dpet INSTILL TWO DROPS DAILY IN EACH EYE 1 Each 4    diclofenac EC (VOLTAREN) 75 mg EC tablet TAKE ONE TABLET BY MOUTH TWICE A  Tab 1    bicalutamide (CASODEX) 50 mg tablet       omega-3 fatty acids 1,000 mg cap Take  by mouth.  aspirin (ASPIRIN) 325 mg tablet Take 325 mg by mouth daily.  Cetirizine (ZYRTEC) 10 mg cap Take  by mouth.  silodosin (RAPAFLO) 8 mg capsule Take 8 mg by mouth daily (with breakfast).        Allergies   Allergen Reactions    Sulfanilamide Rash    Sulfa (Sulfonamide Antibiotics) Rash     Very mild rash several years ago Family History   Problem Relation Age of Onset   [facility] Cancer Mother         uterine, cervical    Cancer Father         colon ca       Patient Active Problem List    Diagnosis    Hypertension with renal disease    Mixed hyperlipidemia    Gout    Primary osteoarthritis involving multiple joints    [facility]    CKD (chronic kidney disease), stage III ([facility]    Rosacea    PAC (premature atrial contraction)    Non-seasonal allergic rhinitis    Vitamin D deficiency    Cellulitis of face    History of prostate cancer    Alcohol screening    Syncope    Medicare annual wellness visit, subsequent    Lymph node enlargement    Meniere disease    Testosterone deficiency    Palpitation    Benign prostatic hyperplasia       Patient Care Team:  Regine Rae MD as PCP - General (Internal Medicine)  Regine Rae MD as PCP - [facility] Rosemary Regalado Provider    Depression Risk Factor Screening:     3 most recent PHQ Screens 12/20/2021   Little interest or pleasure in doing things Not at all   Feeling down, depressed, irritable, or hopeless Not at all   Total Score PHQ 2 0     Alcohol Risk Factor Screening: You do not drink alcohol or very rarely. Functional Ability and Level of Safety:     Fall Risk     Fall Risk Assessment, last 12 mths 12/20/2021   Able to walk? Yes   Fall in past 12 months? 0   Do you feel unsteady? 0   Are you worried about falling 0       Hearing Loss   mild    Activities of Daily Living   Self-care.    ADL Assessment 12/20/2021   Feeding yourself No Help Needed   Getting from bed to chair No Help Needed   Getting dressed No Help Needed   Bathing or showering No Help Needed   Walk across the room (includes cane/walker) No Help Needed   Using the telphone No Help Needed   Taking your medications No Help Needed   Preparing meals No Help Needed   Managing money (expenses/bills) No Help Needed   Moderately strenuous housework (laundry) No Help Needed   Shopping for personal items (toiletries/medicines) No Help Needed   Shopping for groceries No Help Needed   Driving No Help Needed   Climbing a flight of stairs No Help Needed   Getting to places beyond walking distances No Help Needed       Abuse Screen   Patient is not abused    Social History     Social History Narrative    Not on file       Review of Systems      ROS:    Constitutional: He denies fevers, weight loss, sweats. Eyes: No blurred or double vision. ENT: No difficulty with swallowing, taste, speech or smell. Neck: no stiffness or swelling  Respiratory: No cough wheezing or shortness of breath. Cardiovascular: Denies chest pain, palpitations, unexplained indigestion or syncope. Gastrointestinal:  No changes in bowel movements, no abdominal pain, no bloating. Genitourinary:  He denies frequency, nocturia or stranguria. Extremities: No joint pain, stiffness or swelling. Neurological:  No numbness, tingling, burring paresthesias or loss of motor strength. No syncope, dizziness or frequent headache  Lymphatic: no adenopathy noted  Hematologic: no easy bruising or bleeding gums  Skin:  No recent rashes or mole changes. Psychiatric/Behavioral:  Negative for depression. Physical Examination     Evaluation of Cognitive Function:  Mood/affect:  happy  Appearance: age appropriate  Family member/caregiver input: Wife    Vitals:    12/20/21 0925 12/20/21 0940   BP: (!) 174/74 (!) 166/66   Pulse: (!) 52    Resp: 18    Temp: 97.7 °F (36.5 °C)    TempSrc: Oral    SpO2: 98%    Weight: 219 lb 4.8 oz (99.5 kg)    Height: 6' 2\" (1.88 m)    PainSc:   0 - No pain         PHYSICAL EXAM:    General appearance - alert, well appearing, and in no distress  Mental status - alert, oriented to person, place, and time  HEENT:  Ears - bilateral TM's and external ear canals clear  Eyes - pupillary responses were normal.  Extraocular muscle function intact. Lids and conjunctiva not injected.   Fundoscopic exam revealed sharp disc margins. eye movements intact  Pharynx- clear with teeth in good repair. No masses were noted  Neck - supple without thyromegaly or burit. No JVD noted  Lungs - clear to auscultation and percussion  Cardiac- normal rate, regular rhythm without murmurs. PMI not displaced. No gallop, rub or click  Abdomen - flat, soft, non-tender without palpable organomegaly or mass. No pulsatile mass was felt, and not bruit was heard. Bowel sounds were active  : Circumcised, Testes descended w/o masses  Rectal: normal sphincter tone, prostate 1+ enlarged, smooth and nontender without nodules, no masses, stool brown and hemacult negative  Extremities -  no clubbing cyanosis or edema  Lymphatics - no palpable lymphadenopathy, no hepatosplenomegaly  Hematologic: no petechiae or purpura  Peripheral vascular -Femoral, Dorsalis pedis and posterior tibial pulses felt without difficulty  Skin - no rash or unusual mole change noted  Neurological - Cranial nerves II-XII grossly intact. Motor strength 5/5. DTR's 2+ and symmetric.   Station and gait normal  Back exam - full range of motion, no tenderness, palpable spasm or pain on motion  Musculoskeletal - no joint tenderness, deformity or swelling        Results for orders placed or performed in visit on 09/15/21   CK   Result Value Ref Range    CK 74 39 - 308 U/L   LIPID PANEL   Result Value Ref Range    Cholesterol, total 178 <200 MG/DL    Triglyceride 108 <150 MG/DL    HDL Cholesterol 49 MG/DL    LDL, calculated 107.4 (H) 0 - 100 MG/DL    VLDL, calculated 21.6 MG/DL    CHOL/HDL Ratio 3.6 0.0 - 5.0     METABOLIC PANEL, COMPREHENSIVE   Result Value Ref Range    Sodium 139 136 - 145 mmol/L    Potassium 4.5 3.5 - 5.1 mmol/L    Chloride 106 97 - 108 mmol/L    CO2 25 21 - 32 mmol/L    Anion gap 8 5 - 15 mmol/L    Glucose 101 (H) 65 - 100 mg/dL    BUN 28 (H) 6 - 20 MG/DL    Creatinine 1.20 0.70 - 1.30 MG/DL    BUN/Creatinine ratio 23 (H) 12 - 20      GFR est AA >60 >60 ml/min/1.73m2 GFR est non-AA 57 (L) >60 ml/min/1.73m2    Calcium 9.2 8.5 - 10.1 MG/DL    Bilirubin, total 0.7 0.2 - 1.0 MG/DL    ALT (SGPT) 26 12 - 78 U/L    AST (SGOT) 24 15 - 37 U/L    Alk. phosphatase 99 45 - 117 U/L    Protein, total 7.6 6.4 - 8.2 g/dL    Albumin 4.3 3.5 - 5.0 g/dL    Globulin 3.3 2.0 - 4.0 g/dL    A-G Ratio 1.3 1.1 - 2.2         Advice/Referrals/Counseling   Education and counseling provided:  Are appropriate based on today's review and evaluation  End-of-Life planning (with patient's consent)  Pneumococcal Vaccine  Influenza Vaccine  Colorectal cancer screening tests      Assessment/Plan     ASSESSMENT:   1. Hypertension with renal disease    2. Mixed hyperlipidemia    3. Primary osteoarthritis involving multiple joints    4. Gout, unspecified cause, unspecified chronicity, unspecified site    5. Stage 3 chronic kidney disease, unspecified whether stage 3a or 3b CKD ([address]    6. Cerebrovascular accident (CVA), unspecified mechanism ([address]    7. PAC (premature atrial contraction)    8. Non-seasonal allergic rhinitis due to other allergic trigger    9. Rosacea    10. Benign prostatic hyperplasia without lower urinary tract symptoms    11. History of prostate cancer    12. Vitamin D deficiency    13. Alcohol screening    14. Medicare annual wellness visit, subsequent      Impression  1. Hypertension he does have a labile component with blood pressures of been running high by his checks at home as well. I am increasing his Bystolic to 10 mg daily. We will continue his telmisartan HCT and Norvasc at current dosage. 2.  Hyperlipidemia prior lab reviewed repeat status pending and I will adjust if needed. 3   DJD that is stable   4. Gout that is currently stable with no recent flares  5. CKD stage III repeat status pending  6. History of CVA continue aspirin daily   7. History of PACs no recent symptoms  8. Allergic rhinitis stable  9. Rosacea stable  10. BPH currently not having symptoms  11.   History of prostate cancer PSA pending  12   Vitamin D deficiency repeat status pending  13. Annual alcohol screening is done and he does not drink alcohol at all now. We did spend 5 minutes discussing complications of excessive alcohol use in males with greater than 2 drinks per day with increased cardiovascular risk and increased risk of liver disease and other GI effects. Medicare subsequent annual wellness examination screening questionnaires completed today. The results were reviewed with him and his wife and their questions were answered. Lifestyle recommendations and modifications discussed and made. Follow-up in 3 months or sooner if there is a problem. I will call with lab results in interim. PLAN:  .  Orders Placed This Encounter    CBC WITH AUTOMATED DIFF    METABOLIC PANEL, COMPREHENSIVE    LIPID PANEL    CK    T4 (THYROXINE)    TSH 3RD GENERATION    URINALYSIS W/ REFLEX CULTURE    PSA SCREENING (SCREENING)    VITAMIN D, 25 HYDROXY    DISCONTD: nebivoloL (Bystolic) 5 mg tablet    nebivoloL (Bystolic) 10 mg tablet         ATTENTION:   This medical record was transcribed using an electronic medical records system. Although proofread, it may and can contain electronic and spelling errors. Other human spelling and other errors may be present. Corrections may be executed at a later time. Please feel free to contact us for any clarifications as needed. Follow-up and Dispositions    · Return in about 3 months (around 3/20/2022). Oralee MD Pietro    Recommended healthy diet low in carbohydrates, fats, sodium and cholesterol. Recommended regular cardiovascular exercise 3-6 times per week for 30-60 minutes daily.       Current Outpatient Medications   Medication Sig Dispense Refill    nebivoloL (Bystolic) 10 mg tablet TAKE ONE TABLET BY MOUTH DAILY 30 Tablet prn    doxycycline (VIBRAMYCIN) 100 mg capsule TAKE ONE CAPSULE BY MOUTH DAILY 90 Capsule 2    telmisartan-hydroCHLOROthiazide (MICARDIS HCT) 80-25 mg per tablet TAKE ONE TABLET BY MOUTH DAILY 90 Tablet 2    allopurinoL (ZYLOPRIM) 100 mg tablet TAKE ONE TABLET BY MOUTH DAILY 90 Tablet 3    clopidogreL (PLAVIX) 75 mg tab TAKE ONE TABLET BY MOUTH DAILY 90 Tablet 3    amLODIPine (NORVASC) 5 mg tablet TAKE ONE TABLET BY MOUTH DAILY 90 Tablet 3    cycloSPORINE (Restasis) 0.05 % dpet INSTILL TWO DROPS DAILY IN EACH EYE 1 Each 4    diclofenac EC (VOLTAREN) 75 mg EC tablet TAKE ONE TABLET BY MOUTH TWICE A  Tab 1    bicalutamide (CASODEX) 50 mg tablet       omega-3 fatty acids 1,000 mg cap Take  by mouth.  aspirin (ASPIRIN) 325 mg tablet Take 325 mg by mouth daily.  Cetirizine (ZYRTEC) 10 mg cap Take  by mouth.  silodosin (RAPAFLO) 8 mg capsule Take 8 mg by mouth daily (with breakfast). No results found for any visits on 12/20/21. Verbal and written instructions (see AVS) provided. Patient expresses understanding of diagnosis and treatment plan.     Esequiel Mata MD

## 2021-12-20 ENCOUNTER — OFFICE VISIT (OUTPATIENT)
Dept: INTERNAL MEDICINE CLINIC | Age: 86
End: 2021-12-20
Payer: MEDICARE

## 2021-12-20 VITALS
DIASTOLIC BLOOD PRESSURE: 66 MMHG | BODY MASS INDEX: 28.14 KG/M2 | SYSTOLIC BLOOD PRESSURE: 166 MMHG | HEART RATE: 52 BPM | OXYGEN SATURATION: 98 % | RESPIRATION RATE: 18 BRPM | TEMPERATURE: 97.7 F | HEIGHT: 74 IN | WEIGHT: 219.3 LBS

## 2021-12-20 DIAGNOSIS — I12.9 HYPERTENSION WITH RENAL DISEASE: Primary | ICD-10-CM

## 2021-12-20 DIAGNOSIS — Z85.46 HISTORY OF PROSTATE CANCER: ICD-10-CM

## 2021-12-20 DIAGNOSIS — I49.1 PAC (PREMATURE ATRIAL CONTRACTION): ICD-10-CM

## 2021-12-20 DIAGNOSIS — M10.9 GOUT, UNSPECIFIED CAUSE, UNSPECIFIED CHRONICITY, UNSPECIFIED SITE: ICD-10-CM

## 2021-12-20 DIAGNOSIS — E55.9 VITAMIN D DEFICIENCY: ICD-10-CM

## 2021-12-20 DIAGNOSIS — N40.0 BENIGN PROSTATIC HYPERPLASIA WITHOUT LOWER URINARY TRACT SYMPTOMS: ICD-10-CM

## 2021-12-20 DIAGNOSIS — Z00.00 MEDICARE ANNUAL WELLNESS VISIT, SUBSEQUENT: ICD-10-CM

## 2021-12-20 DIAGNOSIS — L71.9 ROSACEA: ICD-10-CM

## 2021-12-20 DIAGNOSIS — E78.2 MIXED HYPERLIPIDEMIA: ICD-10-CM

## 2021-12-20 DIAGNOSIS — J30.89 NON-SEASONAL ALLERGIC RHINITIS DUE TO OTHER ALLERGIC TRIGGER: ICD-10-CM

## 2021-12-20 DIAGNOSIS — M15.9 PRIMARY OSTEOARTHRITIS INVOLVING MULTIPLE JOINTS: ICD-10-CM

## 2021-12-20 DIAGNOSIS — Z13.39 ALCOHOL SCREENING: ICD-10-CM

## 2021-12-20 DIAGNOSIS — I63.9 CEREBROVASCULAR ACCIDENT (CVA), UNSPECIFIED MECHANISM (HCC): ICD-10-CM

## 2021-12-20 DIAGNOSIS — N18.30 STAGE 3 CHRONIC KIDNEY DISEASE, UNSPECIFIED WHETHER STAGE 3A OR 3B CKD (HCC): ICD-10-CM

## 2021-12-20 LAB
25(OH)D3 SERPL-MCNC: 32.2 NG/ML (ref 30–100)
ALBUMIN SERPL-MCNC: 4.3 G/DL (ref 3.5–5)
ALBUMIN/GLOB SERPL: 1.4 {RATIO} (ref 1.1–2.2)
ALP SERPL-CCNC: 106 U/L (ref 45–117)
ALT SERPL-CCNC: 30 U/L (ref 12–78)
ANION GAP SERPL CALC-SCNC: 5 MMOL/L (ref 5–15)
APPEARANCE UR: CLEAR
AST SERPL-CCNC: 29 U/L (ref 15–37)
BACTERIA URNS QL MICRO: NEGATIVE /HPF
BASOPHILS # BLD: 0.1 K/UL (ref 0–0.1)
BASOPHILS NFR BLD: 1 % (ref 0–1)
BILIRUB SERPL-MCNC: 0.7 MG/DL (ref 0.2–1)
BILIRUB UR QL: NEGATIVE
BUN SERPL-MCNC: 31 MG/DL (ref 6–20)
BUN/CREAT SERPL: 27 (ref 12–20)
CALCIUM SERPL-MCNC: 9.2 MG/DL (ref 8.5–10.1)
CHLORIDE SERPL-SCNC: 106 MMOL/L (ref 97–108)
CHOLEST SERPL-MCNC: 163 MG/DL
CK SERPL-CCNC: 221 U/L (ref 39–308)
CO2 SERPL-SCNC: 27 MMOL/L (ref 21–32)
COLOR UR: NORMAL
CREAT SERPL-MCNC: 1.14 MG/DL (ref 0.7–1.3)
DIFFERENTIAL METHOD BLD: ABNORMAL
EOSINOPHIL # BLD: 0.3 K/UL (ref 0–0.4)
EOSINOPHIL NFR BLD: 6 % (ref 0–7)
EPITH CASTS URNS QL MICRO: NORMAL /LPF
ERYTHROCYTE [DISTWIDTH] IN BLOOD BY AUTOMATED COUNT: 13.3 % (ref 11.5–14.5)
GLOBULIN SER CALC-MCNC: 3.1 G/DL (ref 2–4)
GLUCOSE SERPL-MCNC: 104 MG/DL (ref 65–100)
GLUCOSE UR STRIP.AUTO-MCNC: NEGATIVE MG/DL
HCT VFR BLD AUTO: 40.8 % (ref 36.6–50.3)
HDLC SERPL-MCNC: 43 MG/DL
HDLC SERPL: 3.8 {RATIO} (ref 0–5)
HGB BLD-MCNC: 14.2 G/DL (ref 12.1–17)
HGB UR QL STRIP: NEGATIVE
HYALINE CASTS URNS QL MICRO: NORMAL /LPF (ref 0–5)
IMM GRANULOCYTES # BLD AUTO: 0 K/UL (ref 0–0.04)
IMM GRANULOCYTES NFR BLD AUTO: 1 % (ref 0–0.5)
KETONES UR QL STRIP.AUTO: NEGATIVE MG/DL
LDLC SERPL CALC-MCNC: 91.4 MG/DL (ref 0–100)
LEUKOCYTE ESTERASE UR QL STRIP.AUTO: NEGATIVE
LYMPHOCYTES # BLD: 1.2 K/UL (ref 0.8–3.5)
LYMPHOCYTES NFR BLD: 26 % (ref 12–49)
MCH RBC QN AUTO: 34.5 PG (ref 26–34)
MCHC RBC AUTO-ENTMCNC: 34.8 G/DL (ref 30–36.5)
MCV RBC AUTO: 99.3 FL (ref 80–99)
MONOCYTES # BLD: 0.7 K/UL (ref 0–1)
MONOCYTES NFR BLD: 14 % (ref 5–13)
NEUTS SEG # BLD: 2.4 K/UL (ref 1.8–8)
NEUTS SEG NFR BLD: 52 % (ref 32–75)
NITRITE UR QL STRIP.AUTO: NEGATIVE
NRBC # BLD: 0 K/UL (ref 0–0.01)
NRBC BLD-RTO: 0 PER 100 WBC
PH UR STRIP: 5 [PH] (ref 5–8)
PLATELET # BLD AUTO: 183 K/UL (ref 150–400)
PMV BLD AUTO: 10.2 FL (ref 8.9–12.9)
POTASSIUM SERPL-SCNC: 4.3 MMOL/L (ref 3.5–5.1)
PROT SERPL-MCNC: 7.4 G/DL (ref 6.4–8.2)
PROT UR STRIP-MCNC: NEGATIVE MG/DL
PSA SERPL-MCNC: 0.9 NG/ML (ref 0.01–4)
RBC # BLD AUTO: 4.11 M/UL (ref 4.1–5.7)
RBC #/AREA URNS HPF: NORMAL /HPF (ref 0–5)
SODIUM SERPL-SCNC: 138 MMOL/L (ref 136–145)
SP GR UR REFRACTOMETRY: 1.01 (ref 1–1.03)
T4 SERPL-MCNC: 8.8 UG/DL (ref 4.5–12.1)
TRIGL SERPL-MCNC: 143 MG/DL (ref ?–150)
TSH SERPL DL<=0.05 MIU/L-ACNC: 3.6 UIU/ML (ref 0.36–3.74)
UA: UC IF INDICATED,UAUC: NORMAL
UROBILINOGEN UR QL STRIP.AUTO: 0.2 EU/DL (ref 0.2–1)
VLDLC SERPL CALC-MCNC: 28.6 MG/DL
WBC # BLD AUTO: 4.6 K/UL (ref 4.1–11.1)
WBC URNS QL MICRO: NORMAL /HPF (ref 0–4)

## 2021-12-20 PROCEDURE — 1101F PT FALLS ASSESS-DOCD LE1/YR: CPT | Performed by: INTERNAL MEDICINE

## 2021-12-20 PROCEDURE — G8536 NO DOC ELDER MAL SCRN: HCPCS | Performed by: INTERNAL MEDICINE

## 2021-12-20 PROCEDURE — G0439 PPPS, SUBSEQ VISIT: HCPCS | Performed by: INTERNAL MEDICINE

## 2021-12-20 PROCEDURE — G8427 DOCREV CUR MEDS BY ELIG CLIN: HCPCS | Performed by: INTERNAL MEDICINE

## 2021-12-20 PROCEDURE — G8510 SCR DEP NEG, NO PLAN REQD: HCPCS | Performed by: INTERNAL MEDICINE

## 2021-12-20 PROCEDURE — 99214 OFFICE O/P EST MOD 30 MIN: CPT | Performed by: INTERNAL MEDICINE

## 2021-12-20 PROCEDURE — G8419 CALC BMI OUT NRM PARAM NOF/U: HCPCS | Performed by: INTERNAL MEDICINE

## 2021-12-20 RX ORDER — NEBIVOLOL 5 MG/1
TABLET ORAL
Qty: 30 TABLET | Refills: 5 | Status: SHIPPED | OUTPATIENT
Start: 2021-12-20 | End: 2021-12-20 | Stop reason: SDUPTHER

## 2021-12-20 RX ORDER — NEBIVOLOL 10 MG/1
TABLET ORAL
Qty: 30 TABLET | Status: SHIPPED | OUTPATIENT
Start: 2021-12-20 | End: 2022-06-08 | Stop reason: ALTCHOICE

## 2021-12-20 NOTE — PROGRESS NOTES
HIPAA verified by two patient identifiers. Alber Benites is a 80 y.o. male    Chief Complaint   Patient presents with   [facility] Annual Wellness Visit       Visit Vitals  BP (!) 174/74 (BP 1 Location: Left upper arm, BP Patient Position: Sitting, BP Cuff Size: Large adult)   Pulse (!) 52   Temp 97.7 °F (36.5 °C) (Oral)   Resp 18   Ht 6' 2\" (1.88 m)   Wt 219 lb 4.8 oz (99.5 kg)   SpO2 98%   BMI 28.16 kg/m²       Pain Scale: 0 - No pain/10  Pain Location:       Health Maintenance Due   Topic Date Due    DTaP/Tdap/Td series (1 - Tdap) 06/22/2013    COVID-19 Vaccine (3 - Booster for Pfizer series) 10/01/2021    Medicare Yearly Exam  12/09/2021         Coordination of Care Questionnaire:  :   1) Have you been to an emergency room, urgent care, or hospitalized since your last visit? If yes, where when, and reason for visit? no       2. Have seen or consulted any other health care provider since your last visit? If yes, where when, and reason for visit? NO      Patient is accompanied by self I have received verbal consent from Alber Benites to discuss any/all medical information while they are present in the room.

## 2021-12-20 NOTE — PATIENT INSTRUCTIONS
Medicare Wellness Visit, Male    The best way to live healthy is to have a lifestyle where you eat a well-balanced diet, exercise regularly, limit alcohol use, and quit all forms of tobacco/nicotine, if applicable. Regular preventive services are another way to keep healthy. Preventive services (vaccines, screening tests, monitoring & exams) can help personalize your care plan, which helps you manage your own care. Screening tests can find health problems at the earliest stages, when they are easiest to treat. [facility] follows the current, evidence-based guidelines published by the [facility] ([facility]) when recommending preventive services for our patients. Because we follow these guidelines, sometimes recommendations change over time as research supports it. (For example, a prostate screening blood test is no longer routinely recommended for men with no symptoms). Of course, you and your doctor may decide to screen more often for some diseases, based on your risk and co-morbidities (chronic disease you are already diagnosed with). Preventive services for you include:  - Medicare offers their members a free annual wellness visit, which is time for you and your primary care provider to discuss and plan for your preventive service needs. Take advantage of this benefit every year!  -All adults over age 72 should receive the recommended pneumonia vaccines. Current USPSTF guidelines recommend a series of two vaccines for the best pneumonia protection.   -All adults should have a flu vaccine yearly and tetanus vaccine every 10 years.  -All adults age 48 and older should receive the shingles vaccines (series of two vaccines).        -All adults age 38-68 who are overweight should have a diabetes screening test once every three years.   -Other screening tests & preventive services for persons with diabetes include: an eye exam to screen for diabetic retinopathy, a kidney function test, a foot exam, and stricter control over your cholesterol.   -Cardiovascular screening for adults with routine risk involves an electrocardiogram (ECG) at intervals determined by the provider.   -Colorectal cancer screening should be done for adults age 54-65 with no increased risk factors for colorectal cancer. There are a number of acceptable methods of screening for this type of cancer. Each test has its own benefits and drawbacks. Discuss with your provider what is most appropriate for you during your annual wellness visit. The different tests include: colonoscopy (considered the best screening method), a fecal occult blood test, a fecal DNA test, and sigmoidoscopy.  -All adults born between [facility] should be screened once for Hepatitis C.  -An Abdominal Aortic Aneurysm (AAA) Screening is recommended for men age 73-68 who has ever smoked in their lifetime. Here is a list of your current Health Maintenance items (your personalized list of preventive services) with a due date:  Health Maintenance Due   Topic Date Due    DTaP/Tdap/Td  (1 - Tdap) 06/22/2013    COVID-19 Vaccine (3 - Booster for Hernandez Peter series) 10/01/2021    Annual Well Visit  12/09/2021        Allergies: Care Instructions  Overview     Allergies occur when your body's defense system (immune system) overreacts to certain substances. The immune system treats a harmless substance as if it were a harmful germ or virus. Many things can make this happen. These include pollens, medicine, food, dust, animal dander, and mold. Allergies can be mild or severe. Mild allergies can be managed with home treatment. But medicine may be needed to prevent problems. Managing your allergies is an important part of staying healthy. Your doctor may suggest that you have allergy testing to help find out what is causing your allergies. Severe allergies can cause reactions that affect your whole body (anaphylactic reactions). Your doctor may prescribe a shot of epinephrine to carry with you in case you have a severe reaction. Learn how to give yourself the shot and keep it with you at all times. Make sure it is not . Follow-up care is a key part of your treatment and safety. Be sure to make and go to all appointments, and call your doctor if you are having problems. It's also a good idea to know your test results and keep a list of the medicines you take. How can you care for yourself at home? · If you have been told by your doctor that dust or dust mites are causing your allergy, decrease the dust around your bed:  ? Wash sheets, pillowcases, and other bedding in hot water every week. ? Use dust-proof covers for pillows, duvets, and mattresses. Avoid plastic covers because they tear easily and do not \"breathe. \" Wash as instructed on the label. ? Do not use any blankets and pillows that you do not need. ? Use blankets that you can wash in your washing machine. ? Consider removing drapes and carpets, which attract and hold dust, from your bedroom. · If you are allergic to house dust and mites, do not use home humidifiers. Your doctor can suggest ways you can control dust and mites. · Look for signs of cockroaches. Cockroaches cause allergic reactions. Use cockroach baits to get rid of them. Then, clean your home well. Cockroaches like areas where grocery bags, newspapers, empty bottles, or cardboard boxes are stored. Do not keep these inside your home, and keep trash and food containers sealed. Seal off any spots where cockroaches might enter your home. · If you are allergic to mold, get rid of furniture, rugs, and drapes that smell musty. Check for mold in the bathroom. · If you are allergic to outdoor pollen or mold spores, use air-conditioning. Change or clean all filters every month. Keep windows closed. · If you are allergic to pollen, stay inside when pollen counts are high.  Use a vacuum  with a HEPA filter or a double-thickness filter at least two times each week. · Stay inside when air pollution is bad. Avoid paint fumes, perfumes, and other strong odors. · Avoid conditions that make your allergies worse. Stay away from smoke. Do not smoke or let anyone else smoke in your house. Do not use fireplaces or wood-burning stoves. · If you are allergic to your pets, change the air filter in your furnace every month. Use high-efficiency filters. · If you are allergic to pet dander, keep pets outside or out of your bedroom. Old carpet and cloth furniture can hold a lot of animal dander. You may need to replace them. When should you call for help? Give an epinephrine shot if:    · You think you are having a severe allergic reaction.     · You have symptoms in more than one body area, such as mild nausea and an itchy mouth. After giving an epinephrine shot call 911, even if you feel better. Call 911 if:    · You have symptoms of a severe allergic reaction. These may include:  ? Sudden raised, red areas (hives) all over your body. ? Swelling of the throat, mouth, lips, or tongue. ? Trouble breathing. ? Passing out (losing consciousness). Or you may feel very lightheaded or suddenly feel weak, confused, or restless.     · You have been given an epinephrine shot, even if you feel better. Call your doctor now or seek immediate medical care if:    · You have symptoms of an allergic reaction, such as:  ? A rash or hives (raised, red areas on the skin). ? Itching. ? Swelling. ? Belly pain, nausea, or vomiting. Watch closely for changes in your health, and be sure to contact your doctor if:    · You do not get better as expected. Where can you learn more? Go to http://www."[employer].com/  Enter W171 in the search box to learn more about \"Allergies: Care Instructions. \"  Current as of: February 10, 2021               Content Version: 13.0  © 5982-1922 Healthwise, [facility].    Care instructions adapted under license by [employer] (which disclaims liability or warranty for this information). If you have questions about a medical condition or this instruction, always ask your healthcare professional. [address] any warranty or liability for your use of this information.

## 2021-12-28 RX ORDER — DICLOFENAC SODIUM 75 MG/1
TABLET, DELAYED RELEASE ORAL
Qty: 180 TABLET | Refills: 1 | Status: SHIPPED | OUTPATIENT
Start: 2021-12-28 | End: 2022-03-30 | Stop reason: ALTCHOICE

## 2022-01-16 PROBLEM — Z00.00 MEDICARE ANNUAL WELLNESS VISIT, SUBSEQUENT: Status: RESOLVED | Noted: 2018-01-05 | Resolved: 2022-01-16

## 2022-02-18 ENCOUNTER — OFFICE VISIT (OUTPATIENT)
Dept: INTERNAL MEDICINE CLINIC | Age: 87
End: 2022-02-18
Payer: MEDICARE

## 2022-02-18 ENCOUNTER — TELEPHONE (OUTPATIENT)
Dept: INTERNAL MEDICINE CLINIC | Age: 87
End: 2022-02-18

## 2022-02-18 VITALS
HEART RATE: 51 BPM | BODY MASS INDEX: 28.8 KG/M2 | HEIGHT: 74 IN | RESPIRATION RATE: 18 BRPM | DIASTOLIC BLOOD PRESSURE: 74 MMHG | SYSTOLIC BLOOD PRESSURE: 144 MMHG | TEMPERATURE: 98.2 F | WEIGHT: 224.4 LBS | OXYGEN SATURATION: 97 %

## 2022-02-18 DIAGNOSIS — I12.9 HYPERTENSION WITH RENAL DISEASE: ICD-10-CM

## 2022-02-18 DIAGNOSIS — N18.30 STAGE 3 CHRONIC KIDNEY DISEASE, UNSPECIFIED WHETHER STAGE 3A OR 3B CKD (HCC): ICD-10-CM

## 2022-02-18 DIAGNOSIS — R60.9 EDEMA, UNSPECIFIED TYPE: Primary | ICD-10-CM

## 2022-02-18 PROCEDURE — 1101F PT FALLS ASSESS-DOCD LE1/YR: CPT | Performed by: INTERNAL MEDICINE

## 2022-02-18 PROCEDURE — G8510 SCR DEP NEG, NO PLAN REQD: HCPCS | Performed by: INTERNAL MEDICINE

## 2022-02-18 PROCEDURE — G8419 CALC BMI OUT NRM PARAM NOF/U: HCPCS | Performed by: INTERNAL MEDICINE

## 2022-02-18 PROCEDURE — G8536 NO DOC ELDER MAL SCRN: HCPCS | Performed by: INTERNAL MEDICINE

## 2022-02-18 PROCEDURE — 99213 OFFICE O/P EST LOW 20 MIN: CPT | Performed by: INTERNAL MEDICINE

## 2022-02-18 PROCEDURE — G8427 DOCREV CUR MEDS BY ELIG CLIN: HCPCS | Performed by: INTERNAL MEDICINE

## 2022-02-18 NOTE — TELEPHONE ENCOUNTER
Returned patient's wife call and concerned because he has swollen ankles. Advised to make an appointment for further evaluation.

## 2022-02-18 NOTE — TELEPHONE ENCOUNTER
Patient's wife called in. .    States she would like a call back, she has questions about Leighton's BP medication     [phone]

## 2022-02-18 NOTE — PROGRESS NOTES
Subjective:   Esthela Khoury is a 80 y.o. male      Chief Complaint   Patient presents with    Leg Swelling     Both        History of present illness: He presents for swelling in his ankles right greater than left. This has been going on now for a couple of weeks. He notes no shortness of breath, palpitations, PND, orthopnea or other cardiac or respiratory complaints. He notes no current GI or  complaints. He notes no headaches, dizziness or neurologic complaints. The remainder complete review of systems is negative. Patient Active Problem List   Diagnosis Code    Meniere disease H81.09    Testosterone deficiency E34.9    Rosacea L71.9    Palpitation R00.2    PAC (premature atrial contraction) I49.1    Hypertension with renal disease I12.9    Mixed hyperlipidemia E78.2    Gout M10.9    Primary osteoarthritis involving multiple joints M89.49    CVA (cerebrovascular accident) ([address] I63.9    CKD (chronic kidney disease), stage III ([address] N18.30    Benign prostatic hyperplasia N40.0    Non-seasonal allergic rhinitis J30.89    Lymph node enlargement R59.9    Syncope R55    Alcohol screening Z13.39    History of prostate cancer Z85.46    Cellulitis of face L03. 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    CKD (chronic kidney disease), stage III ([address] 8/19/2017    CVA (cerebrovascular accident) [facility]) 2009    DJD (degenerative joint disease) 8/19/2017    Gout     Hyperlipidemia 8/19/2017    Hypertension     Hypertension with renal disease 8/19/2017    Meniere disease 8/19/2017    Nausea & vomiting     On statin therapy 8/19/2017    PAC (premature atrial contraction) 8/19/2017    Palpitation 8/19/2017    Prostate CA ([facility] 8/19/2017    Pseudophakia of both eyes 10/16/2018    Rosacea 8/19/2017    Testosterone deficiency 8/19/2017      Allergies   Allergen Reactions    Sulfanilamide Rash    Sulfa (Sulfonamide Antibiotics) Rash     Very mild rash several years ago      Family History   Problem Relation Age of Onset    Cancer Mother         uterine, cervical    Cancer Father         colon ca      Social History     Socioeconomic History    Marital status:      Spouse name: Not on file    Number of children: Not on file    Years of education: Not on file    Highest education level: Not on file   Occupational History    Not on file   Tobacco Use    Smoking status: Never Smoker    Smokeless tobacco: Never Used   Vaping Use    Vaping Use: Never used   Substance and Sexual Activity    Alcohol use: No    Drug use: No    Sexual activity: Never   Other Topics Concern    Not on file   Social History Narrative    Not on file     Social Determinants of Health     Financial Resource Strain:     Difficulty of Paying Living Expenses: Not on file   Food Insecurity:     Worried About Running Out of Food in the Last Year: Not on file    Connie of Food in the Last Year: Not on file   Transportation Needs:     Lack of Transportation (Medical): Not on file    Lack of Transportation (Non-Medical):  Not on file   Physical Activity:     Days of Exercise per Week: Not on file    Minutes of Exercise per Session: Not on file   Stress:     Feeling of Stress : Not on file   Social Connections:     Frequency of Communication with Friends and Family: Not on file    Frequency of Social Gatherings with Friends and Family: Not on file    Attends Jainism Services: Not on file    Active Member of Clubs or Organizations: Not on file    Attends Club or Organization Meetings: Not on file    Marital Status: Not on file   Intimate Partner Violence:     Fear of Current or Ex-Partner: Not on file   [place] Emotionally Abused: Not on file    Physically Abused: Not on file    Sexually Abused: Not on file   Housing Stability:     Unable to Pay for Housing in the Last Year: Not on file    Number of Places Lived in the Last Year: Not on file    Unstable Housing in the Last Year: Not on file     Prior to Admission medications    Medication Sig Start Date End Date Taking? Authorizing Provider   diclofenac EC (VOLTAREN) 75 mg EC tablet TAKE ONE TABLET BY MOUTH TWICE A DAY 12/28/21  Yes Michael Silva MD   nebivoloL (Bystolic) 10 mg tablet TAKE ONE TABLET BY MOUTH DAILY 12/20/21  Yes Michael Silva MD   doxycycline (VIBRAMYCIN) 100 mg capsule TAKE ONE CAPSULE BY MOUTH DAILY 10/12/21  Yes Michael Silva MD   telmisartan-hydroCHLOROthiazide (MICARDIS HCT) 80-25 mg per tablet TAKE ONE TABLET BY MOUTH DAILY 10/12/21  Yes Michael Silva MD   allopurinoL (ZYLOPRIM) 100 mg tablet TAKE ONE TABLET BY MOUTH DAILY 9/13/21  Yes Michael Silva MD   clopidogreL (PLAVIX) 75 mg tab TAKE ONE TABLET BY MOUTH DAILY 8/25/21  Yes Michael Silva MD   amLODIPine (NORVASC) 5 mg tablet TAKE ONE TABLET BY MOUTH DAILY 6/23/21  Yes Michael Silva MD   cycloSPORINE (Restasis) 0.05 % dpet INSTILL TWO DROPS DAILY IN [facility] 2/24/21  Yes Frederick oJhnston MD   bicalutamide (CASODEX) 50 mg tablet  8/7/19  Yes Provider, Historical   omega-3 fatty acids 1,000 mg cap Take  by mouth. Yes Provider, Historical   aspirin (ASPIRIN) 325 mg tablet Take 325 mg by mouth daily. Yes Provider, Historical   Cetirizine (ZYRTEC) 10 mg cap Take  by mouth. Yes Provider, Historical   silodosin (RAPAFLO) 8 mg capsule Take 8 mg by mouth daily (with breakfast). Yes Provider, Historical        Review of Systems              Constitutional:  He denies fever, weight loss, sweats or fatigue. EYES: No blurred or double vision,               ENT: no nasal congestion, no headache or dizziness.   No difficulty with swallowing, taste, speech or smell. Respiratory:  No cough, wheezing or shortness of breath. No sputum production. Cardiac:  Denies chest pain, palpitations, unexplained indigestion, syncope, PND or orthopnea. Positive for swelling in ankles  GI:  No changes in bowel movements, no abdominal pain, no bloating, anorexia, nausea, vomiting or heartburn. :  No frequency or dysuria. Denies incontinence or sexual dysfunction. Extremities:  No joint pain, stiffness but bilateral right greater than left ankle swelling  Back:.no pain or soreness  Skin:  No recent rashes or mole changes. Neurological:  No numbness, tingling, burning paresthesias or loss of motor strength. No syncope, dizziness, frequent headaches or memory loss. Hematologic:  No easy bruising  Lymphatic: No lymph node enlargement    Objective:     Vitals:    02/18/22 1516   BP: (!) 144/74   Pulse: (!) 51   Resp: 18   Temp: 98.2 °F (36.8 °C)   TempSrc: Oral   SpO2: 97%   Weight: 224 lb 6.4 oz (101.8 kg)   Height: 6' 2\" (1.88 m)   PainSc:   0 - No pain       Body mass index is 28.81 kg/m². Physical Examination:              General Appearance:  Well-developed, well-nourished, no acute distress. HEENT:      Ears:  The TMs and ear canals were clear. Eyes:  The pupillary responses were normal.  Extraocular muscle function intact. Lids and conjunctiva not injected. Funduscopic exam revealed sharp disc margins. Nares: Clear w/o edema or erythema  Pharynx:  Clear with teeth in good repair. No masses were noted. Neck:  Supple without thyromegaly or adenopathy. No JVD noted. No carotid                bruits. Lungs:  Clear to auscultation and percussion. Cardiac:  Regular rate and rhythm without murmur. PMI not displaced. No gallop, rub or click.   Abdominal: Soft, non-tender, no hepata-spleenomegally or masses  Extremities:  No clubbing, cyanosis but 1+ edema right lower extremity and trace left lower extremity  Skin:  No rash or unusual mole changes noted. Lymph Nodes:  None felt in the cervical, supraclavicular, axillary or inguinal region. Neurological: . DTRs 2+ and symmetric. Station and gait normal.   Hematologic:   No purpura or petechiae        Assessment/Plan:         1. Edema, unspecified type    2. Hypertension with renal disease    3. Stage 3 chronic kidney disease, unspecified whether stage 3a or 3b CKD (HCC)        Impressions/Plan:  Impression  1. Edema I think this is part related to amlodipine. He does monitor his blood pressure regularly at home and does have a labile whitecoat component. 2.  Hypertension blood pressures have been okay by his home check  3. CKD stage III I do not think is playing a role in this. I think the edema is related to his amlodipine which she has written on his list that he is taking 10 mg daily even though our chart says only 5 mg. I have asked him to look at his bottle when he gets home if his 10 mg then will cut it in half and take a 5 mg daily. If it is 5 mg and he is going to stop it. He is going to continue to monitor his blood pressure closely at home and if her blood pressure starts going up then we will have to adjust medications possible by increasing his Bystolic but have to keep in mind what his heart rate is considering that is on the low side. He is due to see me in 3 weeks we will see what his blood pressures at that time and adjust if needed. No orders of the defined types were placed in this encounter. Follow-up and Dispositions    · Return in about 3 months (around 5/18/2022). No results found for any visits on 02/18/22. Luz Sanchez MD    The patient was given after the visit summary the patient verbalized an understanding of the plans and problems as explained.

## 2022-02-18 NOTE — PROGRESS NOTES
Christianne Antonio is a 80 y.o. male presenting for Leg Swelling (Both)  . 1. Have you been to the ER, urgent care clinic since your last visit? Hospitalized since your last visit? No    2. Have you seen or consulted any other health care providers outside of the [address] [place] [address] since your last visit? Include any pap smears or colon screening. Dermatologist, Dentist    Fall Risk Assessment, last 12 mths 12/20/2021   Able to walk? Yes   Fall in past 12 months? 0   Do you feel unsteady? 0   Are you worried about falling 0         Abuse Screening Questionnaire 12/20/2021   Do you ever feel afraid of your partner? N   Are you in a relationship with someone who physically or mentally threatens you? N   Is it safe for you to go home? Y       3 most recent PHQ Screens 12/20/2021   Little interest or pleasure in doing things Not at all   Feeling down, depressed, irritable, or hopeless Not at all   Total Score PHQ 2 0       There are no discontinued medications.

## 2022-02-22 RX ORDER — CYCLOSPORINE 0.5 MG/ML
EMULSION OPHTHALMIC
Qty: 60 EACH | Refills: 0 | Status: SHIPPED | OUTPATIENT
Start: 2022-02-22 | End: 2022-05-04

## 2022-03-18 PROBLEM — M10.9 GOUT: Status: ACTIVE | Noted: 2017-08-19

## 2022-03-18 PROBLEM — J30.89 NON-SEASONAL ALLERGIC RHINITIS: Status: ACTIVE | Noted: 2017-08-19

## 2022-03-18 PROBLEM — Z85.46 HISTORY OF PROSTATE CANCER: Status: ACTIVE | Noted: 2020-03-30

## 2022-03-18 PROBLEM — N40.0 BENIGN PROSTATIC HYPERPLASIA: Status: ACTIVE | Noted: 2017-08-19

## 2022-03-18 PROBLEM — L03.211 CELLULITIS OF FACE: Status: ACTIVE | Noted: 2021-05-06

## 2022-03-18 PROBLEM — H81.09 MENIERE DISEASE: Status: ACTIVE | Noted: 2017-08-19

## 2022-03-18 PROBLEM — R55 SYNCOPE: Status: ACTIVE | Noted: 2019-02-08

## 2022-03-19 PROBLEM — E78.2 MIXED HYPERLIPIDEMIA: Status: ACTIVE | Noted: 2017-08-19

## 2022-03-19 PROBLEM — R60.9 EDEMA: Status: ACTIVE | Noted: 2022-02-18

## 2022-03-19 PROBLEM — M15.9 PRIMARY OSTEOARTHRITIS INVOLVING MULTIPLE JOINTS: Status: ACTIVE | Noted: 2017-08-19

## 2022-03-19 PROBLEM — I63.9 CVA (CEREBROVASCULAR ACCIDENT) (HCC): Status: ACTIVE | Noted: 2017-08-19

## 2022-03-19 PROBLEM — E55.9 VITAMIN D DEFICIENCY: Status: ACTIVE | Noted: 2021-12-17

## 2022-03-19 PROBLEM — E34.9 TESTOSTERONE DEFICIENCY: Status: ACTIVE | Noted: 2017-08-19

## 2022-03-19 PROBLEM — M15.0 PRIMARY OSTEOARTHRITIS INVOLVING MULTIPLE JOINTS: Status: ACTIVE | Noted: 2017-08-19

## 2022-03-19 PROBLEM — R00.2 PALPITATION: Status: ACTIVE | Noted: 2017-08-19

## 2022-03-19 PROBLEM — I12.9 HYPERTENSION WITH RENAL DISEASE: Status: ACTIVE | Noted: 2017-08-19

## 2022-03-19 PROBLEM — L71.9 ROSACEA: Status: ACTIVE | Noted: 2017-08-19

## 2022-03-19 PROBLEM — Z13.39 ALCOHOL SCREENING: Status: ACTIVE | Noted: 2019-12-10

## 2022-03-19 PROBLEM — I49.1 PAC (PREMATURE ATRIAL CONTRACTION): Status: ACTIVE | Noted: 2017-08-19

## 2022-03-20 PROBLEM — R59.9 LYMPH NODE ENLARGEMENT: Status: ACTIVE | Noted: 2018-01-05

## 2022-03-20 PROBLEM — N18.30 CKD (CHRONIC KIDNEY DISEASE), STAGE III (HCC): Status: ACTIVE | Noted: 2017-08-19

## 2022-03-30 ENCOUNTER — OFFICE VISIT (OUTPATIENT)
Dept: INTERNAL MEDICINE CLINIC | Age: 87
End: 2022-03-30
Payer: MEDICARE

## 2022-03-30 VITALS
BODY MASS INDEX: 27.87 KG/M2 | HEIGHT: 74 IN | WEIGHT: 217.2 LBS | DIASTOLIC BLOOD PRESSURE: 80 MMHG | TEMPERATURE: 97.7 F | RESPIRATION RATE: 17 BRPM | SYSTOLIC BLOOD PRESSURE: 166 MMHG | OXYGEN SATURATION: 95 % | HEART RATE: 46 BPM

## 2022-03-30 DIAGNOSIS — N18.30 STAGE 3 CHRONIC KIDNEY DISEASE, UNSPECIFIED WHETHER STAGE 3A OR 3B CKD (HCC): ICD-10-CM

## 2022-03-30 DIAGNOSIS — E78.2 MIXED HYPERLIPIDEMIA: ICD-10-CM

## 2022-03-30 DIAGNOSIS — I12.9 HYPERTENSION WITH RENAL DISEASE: Primary | ICD-10-CM

## 2022-03-30 DIAGNOSIS — M15.9 PRIMARY OSTEOARTHRITIS INVOLVING MULTIPLE JOINTS: ICD-10-CM

## 2022-03-30 DIAGNOSIS — M10.9 GOUT, UNSPECIFIED CAUSE, UNSPECIFIED CHRONICITY, UNSPECIFIED SITE: ICD-10-CM

## 2022-03-30 DIAGNOSIS — I63.9 CEREBROVASCULAR ACCIDENT (CVA), UNSPECIFIED MECHANISM (HCC): ICD-10-CM

## 2022-03-30 PROCEDURE — 99214 OFFICE O/P EST MOD 30 MIN: CPT | Performed by: INTERNAL MEDICINE

## 2022-03-30 PROCEDURE — 1101F PT FALLS ASSESS-DOCD LE1/YR: CPT | Performed by: INTERNAL MEDICINE

## 2022-03-30 PROCEDURE — G8536 NO DOC ELDER MAL SCRN: HCPCS | Performed by: INTERNAL MEDICINE

## 2022-03-30 PROCEDURE — G8510 SCR DEP NEG, NO PLAN REQD: HCPCS | Performed by: INTERNAL MEDICINE

## 2022-03-30 PROCEDURE — G8427 DOCREV CUR MEDS BY ELIG CLIN: HCPCS | Performed by: INTERNAL MEDICINE

## 2022-03-30 PROCEDURE — G8419 CALC BMI OUT NRM PARAM NOF/U: HCPCS | Performed by: INTERNAL MEDICINE

## 2022-03-30 RX ORDER — AMLODIPINE BESYLATE 5 MG/1
5 TABLET ORAL 2 TIMES DAILY
Qty: 180 TABLET | Refills: 3 | Status: SHIPPED | OUTPATIENT
Start: 2022-03-30 | End: 2022-06-08 | Stop reason: ALTCHOICE

## 2022-03-30 NOTE — PROGRESS NOTES
Chief Complaint   Patient presents with    Hypertension     3 month follow up     Visit Vitals  BP (!) 188/92 (BP 1 Location: Left upper arm, BP Patient Position: Sitting, BP Cuff Size: Adult)   Pulse (!) 46   Temp 97.7 °F (36.5 °C) (Oral)   Resp 17   Ht 6' 2\" (1.88 m)   Wt 217 lb 3.2 oz (98.5 kg)   SpO2 95%   BMI 27.89 kg/m²     1. Have you been to the ER, urgent care clinic since your last visit? Hospitalized since your last visit? No    2. Have you seen or consulted any other health care providers outside of the [address] [place] [address] since your last visit? Include any pap smears or colon screening.  Dr. Twila Escamilla

## 2022-03-30 NOTE — PROGRESS NOTES
Chief Complaint   Patient presents with    Hypertension     3 month follow up       SUBJECTIVE:    Megha Miles is a 80 y.o. male follow-up of his medical problems include hypertension with labile component and whitecoat component, prior CVA, hyperlipidemia, CKD stage III, DJD and other multiple medical problems. He is taking his medications and trying to follow his diet and he monitors blood pressure very regularly at home. Recently did go up and started taking her Norvasc twice a day and that seemed to work quite well for him. He currently denies any chest pain, shortness of breath, palpitations, PND, orthopnea or other cardiac or respiratory complaints. He notes no GI or  complaints. He notes no headaches, dizziness or neurologic complaints. He does have a lot of arthritic complaints and has been taking 2 Tylenol arthritis at night which helps but is bothered again by morning. There are no other complaints on complete view of systems. Current Outpatient Medications   Medication Sig Dispense Refill    amLODIPine (NORVASC) 5 mg tablet Take 1 Tablet by mouth two (2) times a day. 180 Tablet 3    cycloSPORINE (Restasis) 0.05 % dpet INSTILL TWO DROP IN [facility] EYE DAILY 60 Each 0    nebivoloL (Bystolic) 10 mg tablet TAKE ONE TABLET BY MOUTH DAILY 30 Tablet prn    doxycycline (VIBRAMYCIN) 100 mg capsule TAKE ONE CAPSULE BY MOUTH DAILY 90 Capsule 2    telmisartan-hydroCHLOROthiazide (MICARDIS HCT) 80-25 mg per tablet TAKE ONE TABLET BY MOUTH DAILY 90 Tablet 2    allopurinoL (ZYLOPRIM) 100 mg tablet TAKE ONE TABLET BY MOUTH DAILY 90 Tablet 3    clopidogreL (PLAVIX) 75 mg tab TAKE ONE TABLET BY MOUTH DAILY 90 Tablet 3    bicalutamide (CASODEX) 50 mg tablet       omega-3 fatty acids 1,000 mg cap Take  by mouth.  aspirin (ASPIRIN) 325 mg tablet Take 325 mg by mouth daily.  Cetirizine (ZYRTEC) 10 mg cap Take  by mouth.       silodosin (RAPAFLO) 8 mg capsule Take 8 mg by mouth daily (with breakfast).        Past Medical History:   Diagnosis Date    Adverse effect of anesthesia     very bad gag reflex    Allergic rhinitis 8/19/2017    BPH (benign prostatic hypertrophy) 8/19/2017    Cancer (HCC)     basal cell carcinoma right ear    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    CKD (chronic kidney disease), stage III ([address] 8/19/2017    CVA (cerebrovascular accident) ([address] 2009    DJD (degenerative joint disease) 8/19/2017    Gout     Hyperlipidemia 8/19/2017    Hypertension     Hypertension with renal disease 8/19/2017    Meniere disease 8/19/2017    Nausea & vomiting     On statin therapy 8/19/2017    PAC (premature atrial contraction) 8/19/2017    Palpitation 8/19/2017    Prostate CA ([address] 8/19/2017    Pseudophakia of both eyes 10/16/2018    Rosacea 8/19/2017    Testosterone deficiency 8/19/2017     Past Surgical History:   Procedure Laterality Date    COLORECTAL SCRN; HI RISK IND  5/3/2016         HX HEENT  1970    tonsillectomy    HX HEENT  1980    basal cell carcinoma right ear    HX OTHER SURGICAL Right 01/21/2018    EXCISION RIGHT NECK MASS     HX PROSTATECTOMY      radiation only    NE ABDOMEN SURGERY PROC UNLISTED      bilateral inguinal hernia repair    NE COLONOSCOPY FLX DX W/COLLJ SPEC WHEN PFRMD  1/19/2011          Allergies   Allergen Reactions    Sulfanilamide Rash    Sulfa (Sulfonamide Antibiotics) Rash     Very mild rash several years ago       REVIEW OF SYSTEMS:  General: negative for - chills or fever, or weight loss or gain  ENT: negative for - headaches, nasal congestion or tinnitus  Eyes: no blurred or visual changes  Neck: No stiffness or swollen nodes  Respiratory: negative for - cough, hemoptysis, shortness of breath or wheezing  Cardiovascular : negative for - chest pain, edema, palpitations or shortness of breath  Gastrointestinal: negative for - abdominal pain, blood in stools, heartburn or nausea/vomiting  Genito-Urinary: no dysuria, trouble voiding, or hematuria  Musculoskeletal: negative for - gait disturbance, joint pain, joint stiffness or joint swelling  Neurological: no TIA or stroke symptoms  Hematologic: no bruises, no bleeding  Lymphatic: no swollen glands  Integument: no lumps, mole changes, nail changes or rash  Endocrine:no malaise/lethargy poly uria or polydipsia or unexpected weight changes        Social History     Socioeconomic History    Marital status:    Tobacco Use    Smoking status: Never Smoker    Smokeless tobacco: Never Used   Vaping Use    Vaping Use: Never used   Substance and Sexual Activity    Alcohol use: No    Drug use: No    Sexual activity: Never     Family History   Problem Relation Age of Onset    Cancer Mother         uterine, cervical    Cancer Father         colon ca       OBJECTIVE:     Visit Vitals  BP (!) 166/80   Pulse (!) 46   Temp 97.7 °F (36.5 °C) (Oral)   Resp 17   Ht 6' 2\" (1.88 m)   Wt 217 lb 3.2 oz (98.5 kg)   SpO2 95%   BMI 27.89 kg/m²     CONSTITUTIONAL:   well nourished, appears age appropriate  EYES: sclera anicteric, PERRL, EOMI  ENMT:nares clear, moist mucous membranes, pharynx clear  NECK: supple. Thyroid normal, No JVD or bruits  RESPIRATORY: Chest: clear to ascultation and percussion, normal inspiratory effort  CARDIOVASCULAR: Heart: regular rate and rhythm no murmurs, rubs or gallops, PMI not displaced, No thrills, no peripheral edema  GASTROINTESTINAL: Abdomen: non distended, soft, non tender, bowel sounds normal  HEMATOLOGIC: no purpura, petechiae or bruising  LYMPHATIC: No lymph node enlargemant  MUSCULOSKELETAL: Extremities: no active synovitis, pulse 1+   INTEGUMENT: No unusual rashes or suspicious skin lesions noted. Nails appear normal.  PERIPHERAL VASCULAR: normal pulses femoral, PT and DP  NEUROLOGIC: non-focal exam, A & O X 3  PSYCHIATRIC:, appropriate affect     ASSESSMENT:   1. Hypertension with renal disease    2. Mixed hyperlipidemia    3.  Primary osteoarthritis involving multiple joints    4. Stage 3 chronic kidney disease, unspecified whether stage 3a or 3b CKD ([facility]    5. Cerebrovascular accident (CVA), unspecified mechanism ([facility] [address]    6. Gout, unspecified cause, unspecified chronicity, unspecified site      Impression  1. Hypertension I will increase his Norvasc to 5 mg twice daily  2. Hyperlipidemia prior lab reviewed repeat status pending I will adjust if needed. 3.  DJD I did tell him he can increase the Tylenol arthritis to 2 twice daily  4. CKD stage III repeat status pending next #5 prior CVA continue aspirin daily  6 gout stable  I will call the lab and make further recommendations or adjustments if necessary. Follow-up scheduled for 3 months or sooner if there is a problem. PLAN:  .  Orders Placed This Encounter    LIPID PANEL    METABOLIC PANEL, COMPREHENSIVE    CK    amLODIPine (NORVASC) 5 mg tablet         ATTENTION:   This medical record was transcribed using an electronic medical records system. Although proofread, it may and can contain electronic and spelling errors. Other human spelling and other errors may be present. Corrections may be executed at a later time. Please feel free to contact us for any clarifications as needed. Follow-up and Dispositions    · Return in about 3 months (around 6/30/2022). No results found for any visits on 03/30/22. Nikhil Temple MD    The patient verbalized understanding of the problems and plans as explained.

## 2022-03-30 NOTE — PATIENT INSTRUCTIONS
Allergies: Care Instructions  Overview     Allergies occur when your body's defense system (immune system) overreacts to certain substances. The immune system treats a harmless substance as if it were a harmful germ or virus. Many things can make this happen. These include pollens, medicine, food, dust, animal dander, and mold. Allergies can be mild or severe. Mild allergies can be managed with home treatment. But medicine may be needed to prevent problems. Managing your allergies is an important part of staying healthy. Your doctor may suggest that you have allergy testing to help find out what is causing your allergies. Severe allergies can cause reactions that affect your whole body (anaphylactic reactions). Your doctor may prescribe a shot of epinephrine to carry with you in case you have a severe reaction. Learn how to give yourself the shot and keep it with you at all times. Make sure it is not . Follow-up care is a key part of your treatment and safety. Be sure to make and go to all appointments, and call your doctor if you are having problems. It's also a good idea to know your test results and keep a list of the medicines you take. How can you care for yourself at home? · If you have been told by your doctor that dust or dust mites are causing your allergy, decrease the dust around your bed:  ? Wash sheets, pillowcases, and other bedding in hot water every week. ? Use dust-proof covers for pillows, duvets, and mattresses. Avoid plastic covers because they tear easily and do not \"breathe. \" Wash as instructed on the label. ? Do not use any blankets and pillows that you do not need. ? Use blankets that you can wash in your washing machine. ? Consider removing drapes and carpets, which attract and hold dust, from your bedroom. · If you are allergic to house dust and mites, do not use home humidifiers. Your doctor can suggest ways you can control dust and mites.   · Look for signs of cockroaches. Cockroaches cause allergic reactions. Use cockroach baits to get rid of them. Then, clean your home well. Cockroaches like areas where grocery bags, newspapers, empty bottles, or cardboard boxes are stored. Do not keep these inside your home, and keep trash and food containers sealed. Seal off any spots where cockroaches might enter your home. · If you are allergic to mold, get rid of furniture, rugs, and drapes that smell musty. Check for mold in the bathroom. · If you are allergic to outdoor pollen or mold spores, use air-conditioning. Change or clean all filters every month. Keep windows closed. · If you are allergic to pollen, stay inside when pollen counts are high. Use a vacuum  with a HEPA filter or a double-thickness filter at least two times each week. · Stay inside when air pollution is bad. Avoid paint fumes, perfumes, and other strong odors. · Avoid conditions that make your allergies worse. Stay away from smoke. Do not smoke or let anyone else smoke in your house. Do not use fireplaces or wood-burning stoves. · If you are allergic to your pets, change the air filter in your furnace every month. Use high-efficiency filters. · If you are allergic to pet dander, keep pets outside or out of your bedroom. Old carpet and cloth furniture can hold a lot of animal dander. You may need to replace them. When should you call for help? Give an epinephrine shot if:    · You think you are having a severe allergic reaction.     · You have symptoms in more than one body area, such as mild nausea and an itchy mouth. After giving an epinephrine shot call 911, even if you feel better. Call 911 if:    · You have symptoms of a severe allergic reaction. These may include:  ? Sudden raised, red areas (hives) all over your body. ? Swelling of the throat, mouth, lips, or tongue. ? Trouble breathing. ? Passing out (losing consciousness).  Or you may feel very lightheaded or suddenly feel weak, confused, or restless.     · You have been given an epinephrine shot, even if you feel better. Call your doctor now or seek immediate medical care if:    · You have symptoms of an allergic reaction, such as:  ? A rash or hives (raised, red areas on the skin). ? Itching. ? Swelling. ? Belly pain, nausea, or vomiting. Watch closely for changes in your health, and be sure to contact your doctor if:    · You do not get better as expected. Where can you learn more? Go to http://www.[employer].com/  Enter W171 in the search box to learn more about \"Allergies: Care Instructions. \"  Current as of: February 10, 2021               Content Version: 13.2  © 2006-2022 [employer]. Care instructions adapted under license by [employer] (which disclaims liability or warranty for this information). If you have questions about a medical condition or this instruction, always ask your healthcare professional. [place] [address] any warranty or liability for your use of this information.

## 2022-03-31 LAB
ALBUMIN SERPL-MCNC: 4.3 G/DL (ref 3.5–5)
ALBUMIN/GLOB SERPL: 1.5 {RATIO} (ref 1.1–2.2)
ALP SERPL-CCNC: 95 U/L (ref 45–117)
ALT SERPL-CCNC: 26 U/L (ref 12–78)
ANION GAP SERPL CALC-SCNC: 8 MMOL/L (ref 5–15)
AST SERPL-CCNC: 20 U/L (ref 15–37)
BILIRUB SERPL-MCNC: 0.7 MG/DL (ref 0.2–1)
BUN SERPL-MCNC: 23 MG/DL (ref 6–20)
BUN/CREAT SERPL: 21 (ref 12–20)
CALCIUM SERPL-MCNC: 9.9 MG/DL (ref 8.5–10.1)
CHLORIDE SERPL-SCNC: 103 MMOL/L (ref 97–108)
CHOLEST SERPL-MCNC: 157 MG/DL
CK SERPL-CCNC: 60 U/L (ref 39–308)
CO2 SERPL-SCNC: 27 MMOL/L (ref 21–32)
CREAT SERPL-MCNC: 1.08 MG/DL (ref 0.7–1.3)
GLOBULIN SER CALC-MCNC: 2.9 G/DL (ref 2–4)
GLUCOSE SERPL-MCNC: 112 MG/DL (ref 65–100)
HDLC SERPL-MCNC: 44 MG/DL
HDLC SERPL: 3.6 {RATIO} (ref 0–5)
LDLC SERPL CALC-MCNC: 92.2 MG/DL (ref 0–100)
POTASSIUM SERPL-SCNC: 4.2 MMOL/L (ref 3.5–5.1)
PROT SERPL-MCNC: 7.2 G/DL (ref 6.4–8.2)
SODIUM SERPL-SCNC: 138 MMOL/L (ref 136–145)
TRIGL SERPL-MCNC: 104 MG/DL (ref ?–150)
VLDLC SERPL CALC-MCNC: 20.8 MG/DL

## 2022-05-04 RX ORDER — CYCLOSPORINE 0.5 MG/ML
EMULSION OPHTHALMIC
Qty: 60 EACH | Refills: 0 | Status: SHIPPED | OUTPATIENT
Start: 2022-05-04 | End: 2022-06-08 | Stop reason: ALTCHOICE

## 2022-05-04 RX ORDER — CYCLOSPORINE 0.5 MG/ML
EMULSION OPHTHALMIC
Qty: 60 EACH | Refills: 0 | Status: SHIPPED | OUTPATIENT
Start: 2022-05-04 | End: 2022-07-11

## 2022-05-20 ENCOUNTER — OFFICE VISIT (OUTPATIENT)
Dept: INTERNAL MEDICINE CLINIC | Age: 87
End: 2022-05-20
Payer: MEDICARE

## 2022-05-20 VITALS
SYSTOLIC BLOOD PRESSURE: 136 MMHG | HEIGHT: 74 IN | OXYGEN SATURATION: 98 % | WEIGHT: 213.4 LBS | BODY MASS INDEX: 27.39 KG/M2 | HEART RATE: 54 BPM | DIASTOLIC BLOOD PRESSURE: 68 MMHG | TEMPERATURE: 98.8 F | RESPIRATION RATE: 17 BRPM

## 2022-05-20 DIAGNOSIS — N18.30 STAGE 3 CHRONIC KIDNEY DISEASE, UNSPECIFIED WHETHER STAGE 3A OR 3B CKD (HCC): ICD-10-CM

## 2022-05-20 DIAGNOSIS — I63.9 CEREBROVASCULAR ACCIDENT (CVA), UNSPECIFIED MECHANISM (HCC): ICD-10-CM

## 2022-05-20 DIAGNOSIS — I12.9 HYPERTENSION WITH RENAL DISEASE: Primary | ICD-10-CM

## 2022-05-20 PROCEDURE — G8510 SCR DEP NEG, NO PLAN REQD: HCPCS | Performed by: INTERNAL MEDICINE

## 2022-05-20 PROCEDURE — G8417 CALC BMI ABV UP PARAM F/U: HCPCS | Performed by: INTERNAL MEDICINE

## 2022-05-20 PROCEDURE — 99213 OFFICE O/P EST LOW 20 MIN: CPT | Performed by: INTERNAL MEDICINE

## 2022-05-20 PROCEDURE — G8536 NO DOC ELDER MAL SCRN: HCPCS | Performed by: INTERNAL MEDICINE

## 2022-05-20 PROCEDURE — 1101F PT FALLS ASSESS-DOCD LE1/YR: CPT | Performed by: INTERNAL MEDICINE

## 2022-05-20 PROCEDURE — G8427 DOCREV CUR MEDS BY ELIG CLIN: HCPCS | Performed by: INTERNAL MEDICINE

## 2022-05-20 NOTE — PROGRESS NOTES
HIPAA verified by two patient identifiers. Natalia Rivas is a 80 y.o. male    Chief Complaint   Patient presents with    Hypotension    Medication Evaluation       Visit Vitals  BP (!) 160/70 (BP 1 Location: Left upper arm, BP Patient Position: Sitting, BP Cuff Size: Adult)   Pulse (!) 54   Temp 98.8 °F (37.1 °C) (Oral)   Resp 17   Ht 6' 2\" (1.88 m)   Wt 213 lb 6.4 oz (96.8 kg)   SpO2 98%   BMI 27.40 kg/m²       Pain Scale: 0 - No pain/10  Pain Location:       Health Maintenance Due   Topic Date Due    DTaP/Tdap/Td series (1 - Tdap) 06/22/2013    COVID-19 Vaccine (3 - Booster for Pfizer series) 09/01/2021         Coordination of Care Questionnaire:  :   1) Have you been to an emergency room, urgent care, or hospitalized since your last visit? If yes, where when, and reason for visit? no       2. Have seen or consulted any other health care provider since your last visit? If yes, where when, and reason for visit? NO      Patient is accompanied by self I have received verbal consent from Natalia Rivas to discuss any/all medical information while they are present in the room.

## 2022-05-20 NOTE — PROGRESS NOTES
Chief Complaint   Patient presents with    Hypotension    Medication Evaluation       SUBJECTIVE:    Dl Napier is a 80 y.o. male who returns today in follow-up regarding low blood pressure question related to medications and just generally feeling poorly since he was switched to generic Bystolic. He notes that he has been monitoring his blood pressure regularly and his blood pressures been on the low side every time he checks it at home and he brings in a list of all his blood pressure readings. He did note he had significant swelling in his ankles and he stopped taking his amlodipine and the swelling resolved. The Bystolic he is taking 10 mg but as noted he has had general fatigue as well as arthralgias and myalgias since he was switched to generic. Current Outpatient Medications   Medication Sig Dispense Refill    cycloSPORINE (Restasis) 0.05 % dpet INSTILL TWO DROPS IN [facility] EYE DAILY 60 Each 0    cycloSPORINE (Restasis) 0.05 % dpet INSTILL TWO DROP IN [facility] EYE DAILY 60 Each 0    nebivoloL (Bystolic) 10 mg tablet TAKE ONE TABLET BY MOUTH DAILY 30 Tablet prn    doxycycline (VIBRAMYCIN) 100 mg capsule TAKE ONE CAPSULE BY MOUTH DAILY 90 Capsule 2    telmisartan-hydroCHLOROthiazide (MICARDIS HCT) 80-25 mg per tablet TAKE ONE TABLET BY MOUTH DAILY 90 Tablet 2    allopurinoL (ZYLOPRIM) 100 mg tablet TAKE ONE TABLET BY MOUTH DAILY 90 Tablet 3    clopidogreL (PLAVIX) 75 mg tab TAKE ONE TABLET BY MOUTH DAILY 90 Tablet 3    bicalutamide (CASODEX) 50 mg tablet       omega-3 fatty acids 1,000 mg cap Take  by mouth.  aspirin (ASPIRIN) 325 mg tablet Take 325 mg by mouth daily.  Cetirizine (ZYRTEC) 10 mg cap Take  by mouth.  silodosin (RAPAFLO) 8 mg capsule Take 8 mg by mouth daily (with breakfast).  amLODIPine (NORVASC) 5 mg tablet Take 1 Tablet by mouth two (2) times a day.  (Patient not taking: Reported on 5/20/2022) 180 Tablet 3     Past Medical History:   Diagnosis Date    Adverse effect of anesthesia     very bad gag reflex    Allergic rhinitis 8/19/2017    BPH (benign prostatic hypertrophy) 8/19/2017    Cancer (HCC)     basal cell carcinoma right ear    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    CKD (chronic kidney disease), stage III ([address] 8/19/2017    CVA (cerebrovascular accident) ([address] 2009    DJD (degenerative joint disease) 8/19/2017    Gout     Hyperlipidemia 8/19/2017    Hypertension     Hypertension with renal disease 8/19/2017    Meniere disease 8/19/2017    Nausea & vomiting     On statin therapy 8/19/2017    PAC (premature atrial contraction) 8/19/2017    Palpitation 8/19/2017    Prostate CA ([address] 8/19/2017    Pseudophakia of both eyes 10/16/2018    Rosacea 8/19/2017    Testosterone deficiency 8/19/2017     Past Surgical History:   Procedure Laterality Date    COLORECTAL SCRN; HI RISK IND  5/3/2016         HX HEENT  1970    tonsillectomy    HX HEENT  1980    basal cell carcinoma right ear    HX OTHER SURGICAL Right 01/21/2018    EXCISION RIGHT NECK MASS     HX PROSTATECTOMY      radiation only    WA ABDOMEN SURGERY PROC UNLISTED      bilateral inguinal hernia repair    WA COLONOSCOPY FLX DX W/COLLJ SPEC WHEN PFRMD  1/19/2011          Allergies   Allergen Reactions    Sulfanilamide Rash    Sulfa (Sulfonamide Antibiotics) Rash     Very mild rash several years ago       REVIEW OF SYSTEMS:  General: negative for - chills or fever, or weight loss or gain  ENT: negative for - headaches, nasal congestion or tinnitus  Eyes: no blurred or visual changes  Neck: No stiffness or swollen nodes  Respiratory: negative for - cough, hemoptysis, shortness of breath or wheezing  Cardiovascular : negative for - chest pain, edema, palpitations or shortness of breath  Gastrointestinal: negative for - abdominal pain, blood in stools, heartburn or nausea/vomiting  Genito-Urinary: no dysuria, trouble voiding, or hematuria  Musculoskeletal: negative for - gait disturbance, joint pain, joint stiffness or joint swelling  Neurological: no TIA or stroke symptoms  Hematologic: no bruises, no bleeding  Lymphatic: no swollen glands  Integument: no lumps, mole changes, nail changes or rash  Endocrine:no malaise/lethargy poly uria or polydipsia or unexpected weight changes        Social History     Socioeconomic History    Marital status:    Tobacco Use    Smoking status: Never Smoker    Smokeless tobacco: Never Used   Vaping Use    Vaping Use: Never used   Substance and Sexual Activity    Alcohol use: No    Drug use: No    Sexual activity: Never     Family History   Problem Relation Age of Onset    Cancer Mother         uterine, cervical    Cancer Father         colon ca       OBJECTIVE:     Visit Vitals  /68   Pulse (!) 54   Temp 98.8 °F (37.1 °C) (Oral)   Resp 17   Ht 6' 2\" (1.88 m)   Wt 213 lb 6.4 oz (96.8 kg)   SpO2 98%   BMI 27.40 kg/m²     CONSTITUTIONAL:   well nourished, appears age appropriate  EYES: sclera anicteric, PERRL, EOMI  ENMT:nares clear, moist mucous membranes, pharynx clear  NECK: supple. Thyroid normal, No JVD or bruits  RESPIRATORY: Chest: clear to ascultation and percussion, normal inspiratory effort  CARDIOVASCULAR: Heart: regular rate and rhythm no murmurs, rubs or gallops, PMI not displaced, No thrills, no peripheral edema  GASTROINTESTINAL: Abdomen: non distended, soft, non tender, bowel sounds normal  HEMATOLOGIC: no purpura, petechiae or bruising  LYMPHATIC: No lymph node enlargemant  MUSCULOSKELETAL: Extremities: no active synovitis, pulse 1+   INTEGUMENT: No unusual rashes or suspicious skin lesions noted. Nails appear normal.  PERIPHERAL VASCULAR: normal pulses femoral, PT and DP  NEUROLOGIC: non-focal exam, A & O X 3  PSYCHIATRIC:, appropriate affect     ASSESSMENT:   1. Hypertension with renal disease    2.  Stage 3 chronic kidney disease, unspecified whether stage 3a or 3b CKD ([facility] [address]    3. Cerebrovascular accident (CVA), unspecified mechanism ([facility]      Impression  1. Hypertension with a labile component of amlodipine we will stay off of that at this point since he is having side effects from generic Bystolic, we will try getting off of that. He is currently on 10 mg daily I am going to ask him to cut that in one half and just take 5 mg daily for 3 days and then go to 5 mg every other day for 3 doses and then discontinue it. And when to recheck a myself in about 2 and half weeks at which time we will have been off the medicine for about a week  2. CKD stage III we will recheck that on follow-up  3. Prior CVA continue aspirin we will have to watch his blood pressure closely. Note he does check blood pressures at home on a daily basis and he is to notify me if they start going up excessively    PLAN:  . No orders of the defined types were placed in this encounter. ATTENTION:   This medical record was transcribed using an electronic medical records system. Although proofread, it may and can contain electronic and spelling errors. Other human spelling and other errors may be present. Corrections may be executed at a later time. Please feel free to contact us for any clarifications as needed. Follow-up and Dispositions    · Return in about 3 weeks (around 6/10/2022). No results found for any visits on 05/20/22. Fatuma Forrest MD    The patient verbalized understanding of the problems and plans as explained.

## 2022-05-20 NOTE — PATIENT INSTRUCTIONS
Statins: Care Instructions  Overview     Statins are medicines that lower your cholesterol and your risk for a heart attack and stroke. Cholesterol is a type of fat in your blood. If you have too much cholesterol, it can build up in blood vessels. This raises your risk of coronary artery disease, heart attack, and stroke. Statins lower cholesterol by blocking how much your body makes. This prevents cholesterol from building up in your blood vessels. This is called hardening of the arteries. It is the starting point for some heart and blood flow problems, such as coronary artery disease. Statins may also reduce inflammation around the buildup (called plaque). This can lower the risk that the plaque will break apart and lead to a heart attack or stroke. A heart-healthy lifestyle is important for lowering your risk whether you take statins or not. This includes eating healthy foods, being active, staying at a healthy weight, and not smoking. Examples of statins include:  · Atorvastatin (Lipitor). · Pravastatin (Pravachol). · Simvastatin (Zocor). Statins interact with many medicines. So tell your doctor all of the other medicines that you take. These include prescription medicines, over-the-counter medicines, dietary supplements, and herbal products. Take a statin regularly so that it can work well. High cholesterol doesn't make you feel sick. That's why some people may not feel that they need to take their medicine. But it's important to take your statin because it can lower your risk of heart attack and stroke. Talk with your doctor if you have side effects that bother you. Follow-up care is a key part of your treatment and safety. Be sure to make and go to all appointments, and call your doctor if you are having problems. It's also a good idea to know your test results and keep a list of the medicines you take. How can you care for yourself at home? · Take statins exactly as your doctor tells you. High cholesterol has no symptoms. So it is easy to forget to take the pills. Try to make a system that reminds you to take them. · Check with your doctor or pharmacist before you use any other medicines, including over-the-counter medicines. Make sure your doctor knows all of the medicines, vitamins, herbal products, and supplements you take. Taking some medicines together can cause problems. · Call your doctor if you have side effects that bother you. There may be different statins you can try. Work with your doctor to find the right statin and amount for you. · Have a heart-healthy lifestyle. Eat heart-healthy foods, be active, don't smoke, and stay at a healthy weight. · Talk to your doctor about avoiding grapefruit juice if you take statins. Grapefruit juice can raise the level of this medicine in your blood. This could increase side effects. When should you call for help? Watch closely for changes in your health, and be sure to contact your doctor if:    · You think you are having problems with your medicine.     · You have aches or muscle pain. Where can you learn more? Go to http://www.gray.com/  Enter R358 in the search box to learn more about \"Statins: Care Instructions. \"  Current as of: January 10, 2022               Content Version: 13.2  © 2006-2022 [employer]. Care instructions adapted under license by [employer] (which disclaims liability or warranty for this information). If you have questions about a medical condition or this instruction, always ask your healthcare professional. [place] [address] any warranty or liability for your use of this information.

## 2022-06-08 ENCOUNTER — OFFICE VISIT (OUTPATIENT)
Dept: INTERNAL MEDICINE CLINIC | Age: 87
End: 2022-06-08
Payer: MEDICARE

## 2022-06-08 VITALS
WEIGHT: 205.7 LBS | HEART RATE: 75 BPM | SYSTOLIC BLOOD PRESSURE: 140 MMHG | TEMPERATURE: 98.6 F | BODY MASS INDEX: 26.41 KG/M2 | OXYGEN SATURATION: 98 % | DIASTOLIC BLOOD PRESSURE: 70 MMHG

## 2022-06-08 DIAGNOSIS — I63.9 CEREBROVASCULAR ACCIDENT (CVA), UNSPECIFIED MECHANISM (HCC): ICD-10-CM

## 2022-06-08 DIAGNOSIS — M15.9 PRIMARY OSTEOARTHRITIS INVOLVING MULTIPLE JOINTS: ICD-10-CM

## 2022-06-08 DIAGNOSIS — U07.1 COVID-19: ICD-10-CM

## 2022-06-08 DIAGNOSIS — I12.9 HYPERTENSION WITH RENAL DISEASE: Primary | ICD-10-CM

## 2022-06-08 PROCEDURE — G8510 SCR DEP NEG, NO PLAN REQD: HCPCS | Performed by: INTERNAL MEDICINE

## 2022-06-08 PROCEDURE — 99213 OFFICE O/P EST LOW 20 MIN: CPT | Performed by: INTERNAL MEDICINE

## 2022-06-08 PROCEDURE — G8536 NO DOC ELDER MAL SCRN: HCPCS | Performed by: INTERNAL MEDICINE

## 2022-06-08 PROCEDURE — G8427 DOCREV CUR MEDS BY ELIG CLIN: HCPCS | Performed by: INTERNAL MEDICINE

## 2022-06-08 PROCEDURE — G8417 CALC BMI ABV UP PARAM F/U: HCPCS | Performed by: INTERNAL MEDICINE

## 2022-06-08 PROCEDURE — 1123F ACP DISCUSS/DSCN MKR DOCD: CPT | Performed by: INTERNAL MEDICINE

## 2022-06-08 PROCEDURE — 1101F PT FALLS ASSESS-DOCD LE1/YR: CPT | Performed by: INTERNAL MEDICINE

## 2022-06-08 RX ORDER — TELMISARTAN 80 MG/1
80 TABLET ORAL DAILY
Qty: 30 TABLET | Status: SHIPPED | OUTPATIENT
Start: 2022-06-08 | End: 2022-09-26 | Stop reason: SDUPTHER

## 2022-06-08 NOTE — PROGRESS NOTES
Chief Complaint   Patient presents with    Hypertension     2 weeks    Cerebrovascular Accident    Medication Evaluation    Ankle swelling     had  dispatch come to house on may 29th    Positive For Covid-19     may 29th    wife had too        SUBJECTIVE:    Duard Mcardle is a 80 y.o. male he returns in follow-up regarding his hypertension relates that last time he was seen here we weaned him off Bystolic having already stopped Norvasc because his blood pressure was running so low he was feeling poorly. He has been monitoring his blood pressure since then for the most part his blood pressures have been okay. When he feels low he feels poorly. Unfortunately since he last saw me he tested positive for COVID-19 on May 29 but has cleared up from that. He did have some ankle swelling in his left ankle at that time but x-ray only revealed a bone spur in that swelling is resolved. He denies any chest pain, shortness of breath, palpitations, PND, orthopnea or other cardiac or respiratory complaints. He notes no current GI or  complaints. He has no other current complaints. Current Outpatient Medications   Medication Sig Dispense Refill    telmisartan (MICARDIS) 80 mg tablet Take 1 Tablet by mouth daily. 30 Tablet prn    cycloSPORINE (Restasis) 0.05 % dpet INSTILL TWO DROPS IN [facility] EYE DAILY 60 Each 0    doxycycline (VIBRAMYCIN) 100 mg capsule TAKE ONE CAPSULE BY MOUTH DAILY 90 Capsule 2    allopurinoL (ZYLOPRIM) 100 mg tablet TAKE ONE TABLET BY MOUTH DAILY 90 Tablet 3    clopidogreL (PLAVIX) 75 mg tab TAKE ONE TABLET BY MOUTH DAILY 90 Tablet 3    bicalutamide (CASODEX) 50 mg tablet       omega-3 fatty acids 1,000 mg cap Take  by mouth.  aspirin (ASPIRIN) 325 mg tablet Take 325 mg by mouth daily.  silodosin (RAPAFLO) 8 mg capsule Take 8 mg by mouth daily (with breakfast).        Past Medical History:   Diagnosis Date    Adverse effect of anesthesia     very bad gag reflex    Allergic rhinitis 8/19/2017    BPH (benign prostatic hypertrophy) 8/19/2017    Cancer (HCC)     basal cell carcinoma right ear    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    CKD (chronic kidney disease), stage III ([address] 8/19/2017    CVA (cerebrovascular accident) ([facility] [address] 2009    DJD (degenerative joint disease) 8/19/2017    Gout     Hyperlipidemia 8/19/2017    Hypertension     Hypertension with renal disease 8/19/2017    Meniere disease 8/19/2017    Nausea & vomiting     On statin therapy 8/19/2017    PAC (premature atrial contraction) 8/19/2017    Palpitation 8/19/2017    Prostate CA ([facility] [address] 8/19/2017    Pseudophakia of both eyes 10/16/2018    Rosacea 8/19/2017    Testosterone deficiency 8/19/2017     Past Surgical History:   Procedure Laterality Date    COLORECTAL SCRN; HI RISK IND  5/3/2016         HX HEENT  1970    tonsillectomy    HX HEENT  1980    basal cell carcinoma right ear    HX OTHER SURGICAL Right 01/21/2018    EXCISION RIGHT NECK MASS     HX PROSTATECTOMY      radiation only    HI ABDOMEN SURGERY PROC UNLISTED      bilateral inguinal hernia repair    HI COLONOSCOPY FLX DX W/COLLJ SPEC WHEN PFRMD  1/19/2011          Allergies   Allergen Reactions    Sulfanilamide Rash    Sulfa (Sulfonamide Antibiotics) Rash     Very mild rash several years ago       REVIEW OF SYSTEMS:  General: negative for - chills or fever, or weight loss or gain  ENT: negative for - headaches, nasal congestion or tinnitus  Eyes: no blurred or visual changes  Neck: No stiffness or swollen nodes  Respiratory: negative for - cough, hemoptysis, shortness of breath or wheezing  Cardiovascular : negative for - chest pain, edema, palpitations or shortness of breath  Gastrointestinal: negative for - abdominal pain, blood in stools, heartburn or nausea/vomiting  Genito-Urinary: no dysuria, trouble voiding, or hematuria  Musculoskeletal: negative for - gait disturbance, joint pain, joint stiffness or joint swelling  Neurological: no TIA or stroke symptoms  Hematologic: no bruises, no bleeding  Lymphatic: no swollen glands  Integument: no lumps, mole changes, nail changes or rash  Endocrine:no malaise/lethargy poly uria or polydipsia or unexpected weight changes        Social History     Socioeconomic History    Marital status:    Tobacco Use    Smoking status: Never Smoker    Smokeless tobacco: Never Used   Vaping Use    Vaping Use: Never used   Substance and Sexual Activity    Alcohol use: No    Drug use: No    Sexual activity: Never     Family History   Problem Relation Age of Onset    Cancer Mother         uterine, cervical    Cancer Father         colon ca       OBJECTIVE:     Visit Vitals  BP (!) 140/70 (BP 1 Location: Left upper arm, BP Patient Position: Sitting, BP Cuff Size: Adult)   Pulse 75   Temp 98.6 °F (37 °C) (Oral)   Resp (P) 17   Ht (P) 6' 2\" (1.88 m)   Wt 205 lb 11.2 oz (93.3 kg)   SpO2 98%   BMI (P) 26.41 kg/m²     CONSTITUTIONAL:   well nourished, appears age appropriate  EYES: sclera anicteric, PERRL, EOMI  ENMT:nares clear, moist mucous membranes, pharynx clear  NECK: supple. Thyroid normal, No JVD or bruits  RESPIRATORY: Chest: clear to ascultation and percussion, normal inspiratory effort  CARDIOVASCULAR: Heart: regular rate and rhythm no murmurs, rubs or gallops, PMI not displaced, No thrills, no peripheral edema  GASTROINTESTINAL: Abdomen: non distended, soft, non tender, bowel sounds normal  HEMATOLOGIC: no purpura, petechiae or bruising  LYMPHATIC: No lymph node enlargemant  MUSCULOSKELETAL: Extremities: no active synovitis, pulse 1+   INTEGUMENT: No unusual rashes or suspicious skin lesions noted. Nails appear normal.  PERIPHERAL VASCULAR: normal pulses femoral, PT and DP  NEUROLOGIC: non-focal exam, A & O X 3  PSYCHIATRIC:, appropriate affect     ASSESSMENT:   1. Hypertension with renal disease    2. Cerebrovascular accident (CVA), unspecified mechanism ([address]    3.  Primary osteoarthritis involving multiple joints    4. COVID-19      Impression  1. Hypertension there is a labile component but I think at this point since he feels poorly and blood pressures are still running low at times I am going to switch his telmisartan HCT to plain telmisartan 80 mg daily and reassess that when he returns in early July  2. Prior CVA we will keep in mind there is blood pressure control particular that history  3. DJD I have recommended Tylenol arthritis to take 2 tablets at nighttime and possibly earlier in the day as well  4. Recent COVID-19 infection no residual    PLAN:  .  Orders Placed This Encounter    telmisartan (MICARDIS) 80 mg tablet         ATTENTION:   This medical record was transcribed using an electronic medical records system. Although proofread, it may and can contain electronic and spelling errors. Other human spelling and other errors may be present. Corrections may be executed at a later time. Please feel free to contact us for any clarifications as needed. Follow-up and Dispositions    · Return in about 3 weeks (around 6/29/2022). No results found for any visits on 06/08/22. Pierre Srinivasan MD    The patient verbalized understanding of the problems and plans as explained.

## 2022-06-08 NOTE — PATIENT INSTRUCTIONS
Arthritis: Care Instructions  Overview     Arthritis, also called osteoarthritis, is a breakdown of the cartilage that cushions your joints. When the cartilage wears down, your bones rub against each other. This causes pain and stiffness. Many people have some arthritis as they age. Arthritis most often affects the joints of the spine, hands, hips, knees, or feet. Arthritis never goes away completely. But medicine and home treatment can help reduce pain and help you stay active. Follow-up care is a key part of your treatment and safety. Be sure to make and go to all appointments, and call your doctor if you are having problems. It's also a good idea to know your test results and keep a list of the medicines you take. How can you care for yourself at home? · Stay at a healthy weight. Being overweight puts extra strain on your joints. · Talk to your doctor or physical therapist about exercises that will help ease joint pain. ? Stretch. You may enjoy gentle forms of yoga to help keep your joints and muscles flexible. ? Walk instead of jog. Other types of exercise that are less stressful on the joints include riding a bike, swimming, kary chi, or water exercise. ? Lift weights. Strong muscles help reduce stress on your joints. Stronger thigh muscles, for example, take some of the stress off of the knees and hips. Learn the right way to lift weights so you don't make joint pain worse. · Take your medicines exactly as prescribed. Call your doctor if you think you are having a problem with your medicine. · Take pain medicines exactly as directed. ? If the doctor gave you a prescription medicine for pain, take it as prescribed. ? If you are not taking a prescription pain medicine, ask your doctor if you can take an over-the-counter medicine. · Use a cane, crutch, walker, or another device if you need help to get around. These can help rest your joints.  You also can use other things to make life easier, such as a higher toilet seat and padded handles on kitchen utensils. · Do not sit in low chairs. They can make it hard to get up. · Put heat or cold on your sore joints as needed. Use whichever helps you most. You can also switch between hot and cold packs. ? Apply heat 2 or 3 times a day for 20 to 30 minutes--using a heating pad, hot shower, or hot pack--to relieve pain and stiffness. But don't use heat on a swollen joint. ? Put ice or a cold pack on your sore joint for 10 to 20 minutes at a time. Put a thin cloth between the ice and your skin. When should you call for help? Call your doctor now or seek immediate medical care if:    · You have sudden swelling, warmth, or pain in any joint.     · You have joint pain and a fever or rash.     · You have such bad pain that you cannot use a joint. Watch closely for changes in your health, and be sure to contact your doctor if:    · You have mild joint symptoms that continue even with more than 6 weeks of care at home.     · You have stomach pain or other problems with your medicine. Where can you learn more? Go to http://www.gray.com/  Enter V183 in the search box to learn more about \"Arthritis: Care Instructions. \"  Current as of: December 20, 2021               Content Version: 13.2  © 1983-6327 [employer]. Care instructions adapted under license by [employer] (which disclaims liability or warranty for this information). If you have questions about a medical condition or this instruction, always ask your healthcare professional. [place] [address] any warranty or liability for your use of this information.

## 2022-06-08 NOTE — PROGRESS NOTES
HIPAA verified by two patient identifiers. Pavan Hairston is a 80 y.o. male    Chief Complaint   Patient presents with    Hypertension     2 weeks    Cerebrovascular Accident    Medication Evaluation    Ankle swelling     had  dispatch come to house on may 29th    Positive For Covid-19     may 29th    wife had too        Visit Vitals  BP (!) 140/70 (BP 1 Location: Left upper arm, BP Patient Position: Sitting, BP Cuff Size: Adult)   Pulse 75   Temp 98.6 °F (37 °C) (Oral)   Resp (P) 17   Ht (P) 6' 2\" (1.88 m)   Wt 205 lb 11.2 oz (93.3 kg)   SpO2 98%   BMI (P) 26.41 kg/m²       Pain Scale: /10  Pain Location:       Health Maintenance Due   Topic Date Due    DTaP/Tdap/Td series (1 - Tdap) 06/22/2013    COVID-19 Vaccine (3 - Booster for Pfizer series) 09/01/2021         Coordination of Care Questionnaire:  :   1) Have you been to an emergency room, urgent care, or hospitalized since your last visit? If yes, where when, and reason for visit? no       2. Have seen or consulted any other health care provider since your last visit? If yes, where when, and reason for visit? NO      Patient is accompanied by wife I have received verbal consent from Pavan Hairston to discuss any/all medical information while they are present in the room.

## 2022-06-30 NOTE — PROGRESS NOTES
Chief Complaint   Patient presents with    Hypertension     3 months    Osteoarthritis    Cerebrovascular Accident    Chronic Kidney Disease    Cholesterol Problem       SUBJECTIVE:    Shannon Lantigua is a 80 y.o. male who returns in follow-up for his medical problems including hypertension, hyperlipidemia, prior CVA, DJD, chronic kidney disease stage III, history of gout and other medical problems. He has noted the last few days he has had some left ankle pain and some swelling over the lateral aspect. There is no history of trauma. He denies any chest pain, shortness of breath, palpitations, PND, orthopnea or other cardiac or respiratory complaints. He notes no current GI or  complaints. He notes no headaches, dizziness or neurologic complaints. Other than the left ankle there are no arthritic complaints and there are no other complaints on complete your systems. Current Outpatient Medications   Medication Sig Dispense Refill    telmisartan (MICARDIS) 80 mg tablet Take 1 Tablet by mouth daily. 30 Tablet prn    cycloSPORINE (Restasis) 0.05 % dpet INSTILL TWO DROPS IN [facility] EYE DAILY 60 Each 0    doxycycline (VIBRAMYCIN) 100 mg capsule TAKE ONE CAPSULE BY MOUTH DAILY 90 Capsule 2    allopurinoL (ZYLOPRIM) 100 mg tablet TAKE ONE TABLET BY MOUTH DAILY 90 Tablet 3    clopidogreL (PLAVIX) 75 mg tab TAKE ONE TABLET BY MOUTH DAILY 90 Tablet 3    bicalutamide (CASODEX) 50 mg tablet       omega-3 fatty acids 1,000 mg cap Take  by mouth.  aspirin (ASPIRIN) 325 mg tablet Take 325 mg by mouth daily.  silodosin (RAPAFLO) 8 mg capsule Take 8 mg by mouth daily (with breakfast).        Past Medical History:   Diagnosis Date    Adverse effect of anesthesia     very bad gag reflex    Allergic rhinitis 8/19/2017    BPH (benign prostatic hypertrophy) 8/19/2017    Cancer (HCC)     basal cell carcinoma right ear    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    CKD (chronic kidney disease), stage III ([address] 8/19/2017    CVA (cerebrovascular accident) [facility]) 2009    DJD (degenerative joint disease) 8/19/2017    Gout     Hyperlipidemia 8/19/2017    Hypertension     Hypertension with renal disease 8/19/2017    Meniere disease 8/19/2017    Nausea & vomiting     On statin therapy 8/19/2017    PAC (premature atrial contraction) 8/19/2017    Palpitation 8/19/2017    Prostate CA ([facility] [address] 8/19/2017    Pseudophakia of both eyes 10/16/2018    Rosacea 8/19/2017    Testosterone deficiency 8/19/2017     Past Surgical History:   Procedure Laterality Date    COLORECTAL SCRN; HI RISK IND  5/3/2016         HX HEENT  1970    tonsillectomy    HX HEENT  1980    basal cell carcinoma right ear    HX OTHER SURGICAL Right 01/21/2018    EXCISION RIGHT NECK MASS     HX PROSTATECTOMY      radiation only    DE ABDOMEN SURGERY PROC UNLISTED      bilateral inguinal hernia repair    DE COLONOSCOPY FLX DX W/COLLJ SPEC WHEN PFRMD  1/19/2011          Allergies   Allergen Reactions    Sulfanilamide Rash    Sulfa (Sulfonamide Antibiotics) Rash     Very mild rash several years ago       REVIEW OF SYSTEMS:  General: negative for - chills or fever, or weight loss or gain  ENT: negative for - headaches, nasal congestion or tinnitus  Eyes: no blurred or visual changes  Neck: No stiffness or swollen nodes  Respiratory: negative for - cough, hemoptysis, shortness of breath or wheezing  Cardiovascular : negative for - chest pain, edema, palpitations or shortness of breath  Gastrointestinal: negative for - abdominal pain, blood in stools, heartburn or nausea/vomiting  Genito-Urinary: no dysuria, trouble voiding, or hematuria  Musculoskeletal: negative for - gait disturbance, joint pain, joint stiffness or joint swelling  Neurological: no TIA or stroke symptoms  Hematologic: no bruises, no bleeding  Lymphatic: no swollen glands  Integument: no lumps, mole changes, nail changes or rash  Endocrine:no malaise/lethargy poly uria or polydipsia or unexpected weight changes        Social History     Socioeconomic History    Marital status:    Tobacco Use    Smoking status: Never Smoker    Smokeless tobacco: Never Used   Vaping Use    Vaping Use: Never used   Substance and Sexual Activity    Alcohol use: No    Drug use: No    Sexual activity: Never     Family History   Problem Relation Age of Onset    Cancer Mother         uterine, cervical    Cancer Father         colon ca       OBJECTIVE:     Visit Vitals  BP (!) 150/80 (BP 1 Location: Left upper arm, BP Patient Position: Sitting, BP Cuff Size: Large adult)   Pulse 70   Temp 97.5 °F (36.4 °C) (Oral)   Resp 17   Ht 6' 2\" (1.88 m)   Wt 206 lb 14.4 oz (93.8 kg)   SpO2 95%   BMI 26.56 kg/m²     CONSTITUTIONAL:   well nourished, appears age appropriate  EYES: sclera anicteric, PERRL, EOMI  ENMT:nares clear, moist mucous membranes, pharynx clear  NECK: supple. Thyroid normal, No JVD or bruits  RESPIRATORY: Chest: clear to ascultation and percussion, normal inspiratory effort  CARDIOVASCULAR: Heart: regular rate and rhythm no murmurs, rubs or gallops, PMI not displaced, No thrills, no peripheral edema  GASTROINTESTINAL: Abdomen: non distended, soft, non tender, bowel sounds normal  HEMATOLOGIC: no purpura, petechiae or bruising  LYMPHATIC: No lymph node enlargemant  MUSCULOSKELETAL: Extremities: no active synovitis, pulse 1+   INTEGUMENT: No unusual rashes or suspicious skin lesions noted. Nails appear normal.  PERIPHERAL VASCULAR: normal pulses femoral, PT and DP  NEUROLOGIC: non-focal exam, A & O X 3  PSYCHIATRIC:, appropriate affect     ASSESSMENT:   1. Hypertension with renal disease    2. Mixed hyperlipidemia    3. Primary osteoarthritis involving multiple joints    4. Gout, unspecified cause, unspecified chronicity, unspecified site    5. Stage 3 chronic kidney disease, unspecified whether stage 3a or 3b CKD ([address]    6.  Cerebrovascular accident (CVA), unspecified mechanism ([address] Impression  1. Hypertension with labile component that is controlled by his checks at home so continue current therapy reviewed with him. 2.  Hyperlipidemia prior lab reviewed repeat status pending I will adjust if needed. 3. DJD stable   4. Gout he has an acute flare of the left ankle. I have asked him to take 2 Aleve twice a day until symptoms resolve. He is already on allopurinol  5. CKD stage III repeat status pending  6. Prior CVA continue aspirin daily  I will call with lab and make further recommendations or adjustments if necessary. Follow-up in 3 months or sooner if there is a problem. PLAN:  .  Orders Placed This Encounter    METABOLIC PANEL, COMPREHENSIVE    LIPID PANEL    CK         ATTENTION:   This medical record was transcribed using an electronic medical records system. Although proofread, it may and can contain electronic and spelling errors. Other human spelling and other errors may be present. Corrections may be executed at a later time. Please feel free to contact us for any clarifications as needed. Follow-up and Dispositions    · Return in about 3 months (around 10/1/2022). No results found for any visits on 07/01/22. Monty Mabry MD    The patient verbalized understanding of the problems and plans as explained.

## 2022-07-01 ENCOUNTER — OFFICE VISIT (OUTPATIENT)
Dept: INTERNAL MEDICINE CLINIC | Age: 87
End: 2022-07-01
Payer: MEDICARE

## 2022-07-01 VITALS
SYSTOLIC BLOOD PRESSURE: 150 MMHG | OXYGEN SATURATION: 95 % | WEIGHT: 206.9 LBS | RESPIRATION RATE: 17 BRPM | DIASTOLIC BLOOD PRESSURE: 80 MMHG | HEART RATE: 70 BPM | TEMPERATURE: 97.5 F | BODY MASS INDEX: 26.55 KG/M2 | HEIGHT: 74 IN

## 2022-07-01 DIAGNOSIS — N18.30 STAGE 3 CHRONIC KIDNEY DISEASE, UNSPECIFIED WHETHER STAGE 3A OR 3B CKD (HCC): ICD-10-CM

## 2022-07-01 DIAGNOSIS — M10.9 GOUT, UNSPECIFIED CAUSE, UNSPECIFIED CHRONICITY, UNSPECIFIED SITE: ICD-10-CM

## 2022-07-01 DIAGNOSIS — I63.9 CEREBROVASCULAR ACCIDENT (CVA), UNSPECIFIED MECHANISM (HCC): ICD-10-CM

## 2022-07-01 DIAGNOSIS — E78.2 MIXED HYPERLIPIDEMIA: ICD-10-CM

## 2022-07-01 DIAGNOSIS — I12.9 HYPERTENSION WITH RENAL DISEASE: Primary | ICD-10-CM

## 2022-07-01 DIAGNOSIS — M15.9 PRIMARY OSTEOARTHRITIS INVOLVING MULTIPLE JOINTS: ICD-10-CM

## 2022-07-01 LAB
ALBUMIN SERPL-MCNC: 3.7 G/DL (ref 3.5–5)
ALBUMIN/GLOB SERPL: 1 {RATIO} (ref 1.1–2.2)
ALP SERPL-CCNC: 91 U/L (ref 45–117)
ALT SERPL-CCNC: 14 U/L (ref 12–78)
ANION GAP SERPL CALC-SCNC: 6 MMOL/L (ref 5–15)
AST SERPL-CCNC: 15 U/L (ref 15–37)
BILIRUB SERPL-MCNC: 1.2 MG/DL (ref 0.2–1)
BUN SERPL-MCNC: 23 MG/DL (ref 6–20)
BUN/CREAT SERPL: 19 (ref 12–20)
CALCIUM SERPL-MCNC: 8.9 MG/DL (ref 8.5–10.1)
CHLORIDE SERPL-SCNC: 106 MMOL/L (ref 97–108)
CHOLEST SERPL-MCNC: 130 MG/DL
CK SERPL-CCNC: 57 U/L (ref 39–308)
CO2 SERPL-SCNC: 26 MMOL/L (ref 21–32)
CREAT SERPL-MCNC: 1.21 MG/DL (ref 0.7–1.3)
GLOBULIN SER CALC-MCNC: 3.8 G/DL (ref 2–4)
GLUCOSE SERPL-MCNC: 101 MG/DL (ref 65–100)
HDLC SERPL-MCNC: 44 MG/DL
HDLC SERPL: 3 {RATIO} (ref 0–5)
LDLC SERPL CALC-MCNC: 71 MG/DL (ref 0–100)
POTASSIUM SERPL-SCNC: 4.4 MMOL/L (ref 3.5–5.1)
PROT SERPL-MCNC: 7.5 G/DL (ref 6.4–8.2)
SODIUM SERPL-SCNC: 138 MMOL/L (ref 136–145)
TRIGL SERPL-MCNC: 75 MG/DL (ref ?–150)
VLDLC SERPL CALC-MCNC: 15 MG/DL

## 2022-07-01 PROCEDURE — G8536 NO DOC ELDER MAL SCRN: HCPCS | Performed by: INTERNAL MEDICINE

## 2022-07-01 PROCEDURE — G8427 DOCREV CUR MEDS BY ELIG CLIN: HCPCS | Performed by: INTERNAL MEDICINE

## 2022-07-01 PROCEDURE — G8510 SCR DEP NEG, NO PLAN REQD: HCPCS | Performed by: INTERNAL MEDICINE

## 2022-07-01 PROCEDURE — 1123F ACP DISCUSS/DSCN MKR DOCD: CPT | Performed by: INTERNAL MEDICINE

## 2022-07-01 PROCEDURE — G8417 CALC BMI ABV UP PARAM F/U: HCPCS | Performed by: INTERNAL MEDICINE

## 2022-07-01 PROCEDURE — 99214 OFFICE O/P EST MOD 30 MIN: CPT | Performed by: INTERNAL MEDICINE

## 2022-07-01 PROCEDURE — 1101F PT FALLS ASSESS-DOCD LE1/YR: CPT | Performed by: INTERNAL MEDICINE

## 2022-07-01 NOTE — PATIENT INSTRUCTIONS
Arthritis: Care Instructions  Overview     Arthritis, also called osteoarthritis, is a breakdown of the cartilage that cushions your joints. When the cartilage wears down, your bones rub against each other. This causes pain and stiffness. Many people have some arthritis as they age. Arthritis most often affects the joints of the spine, hands, hips, knees, or feet. Arthritis never goes away completely. But medicine and home treatment can help reduce pain and help you stay active. Follow-up care is a key part of your treatment and safety. Be sure to make and go to all appointments, and call your doctor if you are having problems. It's also a good idea to know your test results and keep a list of the medicines you take. How can you care for yourself at home? · Stay at a healthy weight. Being overweight puts extra strain on your joints. · Talk to your doctor or physical therapist about exercises that will help ease joint pain. ? Stretch. You may enjoy gentle forms of yoga to help keep your joints and muscles flexible. ? Walk instead of jog. Other types of exercise that are less stressful on the joints include riding a bike, swimming, kary chi, or water exercise. ? Lift weights. Strong muscles help reduce stress on your joints. Stronger thigh muscles, for example, take some of the stress off of the knees and hips. Learn the right way to lift weights so you don't make joint pain worse. · Take your medicines exactly as prescribed. Call your doctor if you think you are having a problem with your medicine. · Take pain medicines exactly as directed. ? If the doctor gave you a prescription medicine for pain, take it as prescribed. ? If you are not taking a prescription pain medicine, ask your doctor if you can take an over-the-counter medicine. · Use a cane, crutch, walker, or another device if you need help to get around. These can help rest your joints.  You also can use other things to make life easier, such as a higher toilet seat and padded handles on kitchen utensils. · Do not sit in low chairs. They can make it hard to get up. · Put heat or cold on your sore joints as needed. Use whichever helps you most. You can also switch between hot and cold packs. ? Apply heat 2 or 3 times a day for 20 to 30 minutesusing a heating pad, hot shower, or hot packto relieve pain and stiffness. But don't use heat on a swollen joint. ? Put ice or a cold pack on your sore joint for 10 to 20 minutes at a time. Put a thin cloth between the ice and your skin. When should you call for help? Call your doctor now or seek immediate medical care if:    · You have sudden swelling, warmth, or pain in any joint.     · You have joint pain and a fever or rash.     · You have such bad pain that you cannot use a joint. Watch closely for changes in your health, and be sure to contact your doctor if:    · You have mild joint symptoms that continue even with more than 6 weeks of care at home.     · You have stomach pain or other problems with your medicine. Where can you learn more? Go to http://www.gray.com/  Enter W043 in the search box to learn more about \"Arthritis: Care Instructions. \"  Current as of: December 20, 2021               Content Version: 13.2  © 0120-3235 [employer]. Care instructions adapted under license by [employer] (which disclaims liability or warranty for this information). If you have questions about a medical condition or this instruction, always ask your healthcare professional. [place] [address] any warranty or liability for your use of this information.

## 2022-07-01 NOTE — PROGRESS NOTES
HIPAA verified by two patient identifiers. Gene Salgado is a 80 y.o. male    Chief Complaint   Patient presents with    Hypertension     3 months    Osteoarthritis    Cerebrovascular Accident    Chronic Kidney Disease    Cholesterol Problem       Visit Vitals  BP (!) 150/80 (BP 1 Location: Left upper arm, BP Patient Position: Sitting, BP Cuff Size: Large adult)   Pulse 70   Temp 97.5 °F (36.4 °C) (Oral)   Resp 17   Ht 6' 2\" (1.88 m)   Wt 206 lb 14.4 oz (93.8 kg)   SpO2 95%   BMI 26.56 kg/m²       Pain Scale: 0 - No pain/10  Pain Location:       Health Maintenance Due   Topic Date Due    DTaP/Tdap/Td series (1 - Tdap) 06/22/2013    COVID-19 Vaccine (3 - Booster for Pfizer series) 09/01/2021         Coordination of Care Questionnaire:  :   1) Have you been to an emergency room, urgent care, or hospitalized since your last visit? If yes, where when, and reason for visit? no       2. Have seen or consulted any other health care provider since your last visit? If yes, where when, and reason for visit? NO      Patient is accompanied by self I have received verbal consent from Gene Salgado to discuss any/all medical information while they are present in the room.

## 2022-07-11 RX ORDER — CYCLOSPORINE 0.5 MG/ML
EMULSION OPHTHALMIC
Qty: 60 EACH | Refills: 5 | Status: SHIPPED | OUTPATIENT
Start: 2022-07-11

## 2022-07-11 NOTE — TELEPHONE ENCOUNTER
RX refill request from the patient/pharmacy. Patient last seen 07- with labs, and next appt. scheduled for 10-  Requested Prescriptions     Pending Prescriptions Disp Refills    cycloSPORINE (Restasis) 0.05 % dpet 60 Each 5     Sig: INSTILL 2 DROPS IN [facility] EYE DAILY AS DIRECTED   .

## 2022-08-23 DIAGNOSIS — Z86.73 HISTORY OF CVA (CEREBROVASCULAR ACCIDENT): ICD-10-CM

## 2022-08-23 RX ORDER — CLOPIDOGREL BISULFATE 75 MG/1
TABLET ORAL
Qty: 90 TABLET | Refills: 3 | Status: SHIPPED
Start: 2022-08-23 | End: 2022-09-22

## 2022-09-12 RX ORDER — ALLOPURINOL 100 MG/1
TABLET ORAL
Qty: 90 TABLET | Refills: 3 | Status: SHIPPED | OUTPATIENT
Start: 2022-09-12

## 2022-09-13 ENCOUNTER — APPOINTMENT (OUTPATIENT)
Dept: CT IMAGING | Age: 87
DRG: 696 | End: 2022-09-13
Attending: EMERGENCY MEDICINE
Payer: MEDICARE

## 2022-09-13 ENCOUNTER — HOSPITAL ENCOUNTER (INPATIENT)
Age: 87
LOS: 8 days | Discharge: HOME HEALTH | DRG: 696 | End: 2022-09-22
Attending: EMERGENCY MEDICINE | Admitting: STUDENT IN AN ORGANIZED HEALTH CARE EDUCATION/TRAINING PROGRAM
Payer: MEDICARE

## 2022-09-13 DIAGNOSIS — Z85.46 HISTORY OF PROSTATE CANCER: ICD-10-CM

## 2022-09-13 DIAGNOSIS — R31.0 GROSS HEMATURIA: ICD-10-CM

## 2022-09-13 DIAGNOSIS — M10.9 GOUT, UNSPECIFIED CAUSE, UNSPECIFIED CHRONICITY, UNSPECIFIED SITE: ICD-10-CM

## 2022-09-13 DIAGNOSIS — J30.89 NON-SEASONAL ALLERGIC RHINITIS, UNSPECIFIED TRIGGER: ICD-10-CM

## 2022-09-13 DIAGNOSIS — I63.9 CEREBROVASCULAR ACCIDENT (CVA), UNSPECIFIED MECHANISM (HCC): ICD-10-CM

## 2022-09-13 DIAGNOSIS — M15.9 PRIMARY OSTEOARTHRITIS INVOLVING MULTIPLE JOINTS: ICD-10-CM

## 2022-09-13 DIAGNOSIS — R09.89 LABILE HYPERTENSION: ICD-10-CM

## 2022-09-13 DIAGNOSIS — N18.31 STAGE 3A CHRONIC KIDNEY DISEASE (HCC): ICD-10-CM

## 2022-09-13 DIAGNOSIS — Z79.01 CHRONIC ANTICOAGULATION: ICD-10-CM

## 2022-09-13 DIAGNOSIS — I12.9 HYPERTENSION WITH RENAL DISEASE: ICD-10-CM

## 2022-09-13 DIAGNOSIS — R31.0 PERSISTENT GROSS HEMATURIA: Primary | ICD-10-CM

## 2022-09-13 DIAGNOSIS — E78.2 MIXED HYPERLIPIDEMIA: ICD-10-CM

## 2022-09-13 LAB
ALBUMIN SERPL-MCNC: 3.7 G/DL (ref 3.5–5)
ALBUMIN/GLOB SERPL: 0.9 {RATIO} (ref 1.1–2.2)
ALP SERPL-CCNC: 102 U/L (ref 45–117)
ALT SERPL-CCNC: 17 U/L (ref 12–78)
ANION GAP SERPL CALC-SCNC: 4 MMOL/L (ref 5–15)
APTT PPP: 28 SEC (ref 22.1–31)
AST SERPL-CCNC: 20 U/L (ref 15–37)
BASOPHILS # BLD: 0 K/UL (ref 0–0.1)
BASOPHILS NFR BLD: 1 % (ref 0–1)
BILIRUB SERPL-MCNC: 0.7 MG/DL (ref 0.2–1)
BILIRUB UR QL CFM: NEGATIVE
BUN SERPL-MCNC: 19 MG/DL (ref 6–20)
BUN/CREAT SERPL: 15 (ref 12–20)
CALCIUM SERPL-MCNC: 8.9 MG/DL (ref 8.5–10.1)
CHLORIDE SERPL-SCNC: 102 MMOL/L (ref 97–108)
CO2 SERPL-SCNC: 30 MMOL/L (ref 21–32)
CREAT SERPL-MCNC: 1.27 MG/DL (ref 0.7–1.3)
DIFFERENTIAL METHOD BLD: NORMAL
EOSINOPHIL # BLD: 0.1 K/UL (ref 0–0.4)
EOSINOPHIL NFR BLD: 1 % (ref 0–7)
ERYTHROCYTE [DISTWIDTH] IN BLOOD BY AUTOMATED COUNT: 12.6 % (ref 11.5–14.5)
GLOBULIN SER CALC-MCNC: 4.1 G/DL (ref 2–4)
GLUCOSE SERPL-MCNC: 110 MG/DL (ref 65–100)
HCT VFR BLD AUTO: 40.8 % (ref 36.6–50.3)
HGB BLD-MCNC: 13.8 G/DL (ref 12.1–17)
IMM GRANULOCYTES # BLD AUTO: 0 K/UL (ref 0–0.04)
IMM GRANULOCYTES NFR BLD AUTO: 0 % (ref 0–0.5)
INR PPP: 1.1 (ref 0.9–1.1)
LYMPHOCYTES # BLD: 2.1 K/UL (ref 0.8–3.5)
LYMPHOCYTES NFR BLD: 32 % (ref 12–49)
MCH RBC QN AUTO: 33.3 PG (ref 26–34)
MCHC RBC AUTO-ENTMCNC: 33.8 G/DL (ref 30–36.5)
MCV RBC AUTO: 98.6 FL (ref 80–99)
MONOCYTES # BLD: 0.8 K/UL (ref 0–1)
MONOCYTES NFR BLD: 13 % (ref 5–13)
NEUTS SEG # BLD: 3.6 K/UL (ref 1.8–8)
NEUTS SEG NFR BLD: 53 % (ref 32–75)
NRBC # BLD: 0 K/UL (ref 0–0.01)
NRBC BLD-RTO: 0 PER 100 WBC
PLATELET # BLD AUTO: 196 K/UL (ref 150–400)
PMV BLD AUTO: 9.4 FL (ref 8.9–12.9)
POTASSIUM SERPL-SCNC: 4 MMOL/L (ref 3.5–5.1)
PROT SERPL-MCNC: 7.8 G/DL (ref 6.4–8.2)
PROTHROMBIN TIME: 11.5 SEC (ref 9–11.1)
RBC # BLD AUTO: 4.14 M/UL (ref 4.1–5.7)
SODIUM SERPL-SCNC: 136 MMOL/L (ref 136–145)
THERAPEUTIC RANGE,PTTT: NORMAL SECS (ref 58–77)
WBC # BLD AUTO: 6.7 K/UL (ref 4.1–11.1)

## 2022-09-13 PROCEDURE — 85025 COMPLETE CBC W/AUTO DIFF WBC: CPT

## 2022-09-13 PROCEDURE — 51702 INSERT TEMP BLADDER CATH: CPT

## 2022-09-13 PROCEDURE — 87086 URINE CULTURE/COLONY COUNT: CPT

## 2022-09-13 PROCEDURE — 87186 SC STD MICRODIL/AGAR DIL: CPT

## 2022-09-13 PROCEDURE — 99285 EMERGENCY DEPT VISIT HI MDM: CPT

## 2022-09-13 PROCEDURE — 51798 US URINE CAPACITY MEASURE: CPT

## 2022-09-13 PROCEDURE — 36415 COLL VENOUS BLD VENIPUNCTURE: CPT

## 2022-09-13 PROCEDURE — 85730 THROMBOPLASTIN TIME PARTIAL: CPT

## 2022-09-13 PROCEDURE — 96374 THER/PROPH/DIAG INJ IV PUSH: CPT

## 2022-09-13 PROCEDURE — 81001 URINALYSIS AUTO W/SCOPE: CPT

## 2022-09-13 PROCEDURE — 87077 CULTURE AEROBIC IDENTIFY: CPT

## 2022-09-13 PROCEDURE — 85610 PROTHROMBIN TIME: CPT

## 2022-09-13 PROCEDURE — 74176 CT ABD & PELVIS W/O CONTRAST: CPT

## 2022-09-13 PROCEDURE — 80053 COMPREHEN METABOLIC PANEL: CPT

## 2022-09-13 RX ORDER — LIDOCAINE HYDROCHLORIDE 20 MG/ML
JELLY TOPICAL
Status: COMPLETED | OUTPATIENT
Start: 2022-09-13 | End: 2022-09-14

## 2022-09-14 PROBLEM — N40.0 BENIGN PROSTATIC HYPERPLASIA: Status: RESOLVED | Noted: 2017-08-19 | Resolved: 2022-09-14

## 2022-09-14 PROBLEM — R31.9 HEMATURIA: Status: ACTIVE | Noted: 2022-09-14

## 2022-09-14 PROBLEM — R09.89 LABILE HYPERTENSION: Status: ACTIVE | Noted: 2022-09-14

## 2022-09-14 LAB
APPEARANCE UR: ABNORMAL
BACTERIA URNS QL MICRO: NEGATIVE /HPF
COLOR UR: ABNORMAL
EPITH CASTS URNS QL MICRO: ABNORMAL /LPF
GLUCOSE UR STRIP.AUTO-MCNC: NEGATIVE MG/DL
HGB BLD-MCNC: 12.9 G/DL (ref 12.1–17)
HGB UR QL STRIP: ABNORMAL
KETONES UR QL STRIP.AUTO: NEGATIVE MG/DL
LEUKOCYTE ESTERASE UR QL STRIP.AUTO: ABNORMAL
NITRITE UR QL STRIP.AUTO: NEGATIVE
PH UR STRIP: 7.5 [PH] (ref 5–8)
PROT UR STRIP-MCNC: >300 MG/DL
RBC #/AREA URNS HPF: >100 /HPF (ref 0–5)
SP GR UR REFRACTOMETRY: 1.02 (ref 1–1.03)
UA: UC IF INDICATED,UAUC: ABNORMAL
UROBILINOGEN UR QL STRIP.AUTO: 0.2 EU/DL (ref 0.2–1)
WBC URNS QL MICRO: ABNORMAL /HPF (ref 0–4)

## 2022-09-14 PROCEDURE — 74011250636 HC RX REV CODE- 250/636: Performed by: EMERGENCY MEDICINE

## 2022-09-14 PROCEDURE — 74011000250 HC RX REV CODE- 250: Performed by: EMERGENCY MEDICINE

## 2022-09-14 PROCEDURE — 74011000250 HC RX REV CODE- 250: Performed by: STUDENT IN AN ORGANIZED HEALTH CARE EDUCATION/TRAINING PROGRAM

## 2022-09-14 PROCEDURE — 36415 COLL VENOUS BLD VENIPUNCTURE: CPT

## 2022-09-14 PROCEDURE — 85018 HEMOGLOBIN: CPT

## 2022-09-14 PROCEDURE — 99233 SBSQ HOSP IP/OBS HIGH 50: CPT | Performed by: INTERNAL MEDICINE

## 2022-09-14 PROCEDURE — 65270000046 HC RM TELEMETRY

## 2022-09-14 PROCEDURE — 74011250637 HC RX REV CODE- 250/637: Performed by: STUDENT IN AN ORGANIZED HEALTH CARE EDUCATION/TRAINING PROGRAM

## 2022-09-14 RX ORDER — ONDANSETRON 2 MG/ML
4 INJECTION INTRAMUSCULAR; INTRAVENOUS
Status: DISCONTINUED | OUTPATIENT
Start: 2022-09-14 | End: 2022-09-22 | Stop reason: HOSPADM

## 2022-09-14 RX ORDER — SODIUM CHLORIDE 0.9 % (FLUSH) 0.9 %
5-40 SYRINGE (ML) INJECTION AS NEEDED
Status: DISCONTINUED | OUTPATIENT
Start: 2022-09-14 | End: 2022-09-22 | Stop reason: HOSPADM

## 2022-09-14 RX ORDER — ACETAMINOPHEN 325 MG/1
650 TABLET ORAL
Status: DISCONTINUED | OUTPATIENT
Start: 2022-09-14 | End: 2022-09-22 | Stop reason: HOSPADM

## 2022-09-14 RX ORDER — POLYETHYLENE GLYCOL 3350 17 G/17G
17 POWDER, FOR SOLUTION ORAL DAILY PRN
Status: DISCONTINUED | OUTPATIENT
Start: 2022-09-14 | End: 2022-09-22 | Stop reason: HOSPADM

## 2022-09-14 RX ORDER — FENTANYL CITRATE 50 UG/ML
50 INJECTION, SOLUTION INTRAMUSCULAR; INTRAVENOUS
Status: COMPLETED | OUTPATIENT
Start: 2022-09-14 | End: 2022-09-14

## 2022-09-14 RX ORDER — ONDANSETRON 4 MG/1
4 TABLET, ORALLY DISINTEGRATING ORAL
Status: DISCONTINUED | OUTPATIENT
Start: 2022-09-14 | End: 2022-09-22 | Stop reason: HOSPADM

## 2022-09-14 RX ORDER — SODIUM CHLORIDE 0.9 % (FLUSH) 0.9 %
5-40 SYRINGE (ML) INJECTION EVERY 8 HOURS
Status: DISCONTINUED | OUTPATIENT
Start: 2022-09-14 | End: 2022-09-22 | Stop reason: HOSPADM

## 2022-09-14 RX ORDER — ONDANSETRON 2 MG/ML
4 INJECTION INTRAMUSCULAR; INTRAVENOUS
Status: DISCONTINUED | OUTPATIENT
Start: 2022-09-14 | End: 2022-09-14 | Stop reason: SDUPTHER

## 2022-09-14 RX ORDER — ACETAMINOPHEN 325 MG/1
650 TABLET ORAL
Status: DISCONTINUED | OUTPATIENT
Start: 2022-09-14 | End: 2022-09-14 | Stop reason: SDUPTHER

## 2022-09-14 RX ORDER — VALSARTAN 160 MG/1
160 TABLET ORAL DAILY
Status: DISCONTINUED | OUTPATIENT
Start: 2022-09-14 | End: 2022-09-22 | Stop reason: HOSPADM

## 2022-09-14 RX ORDER — ALLOPURINOL 100 MG/1
100 TABLET ORAL DAILY
Status: DISCONTINUED | OUTPATIENT
Start: 2022-09-14 | End: 2022-09-22 | Stop reason: HOSPADM

## 2022-09-14 RX ADMIN — SODIUM CHLORIDE, PRESERVATIVE FREE 10 ML: 5 INJECTION INTRAVENOUS at 22:07

## 2022-09-14 RX ADMIN — VALSARTAN 160 MG: 160 TABLET, FILM COATED ORAL at 09:47

## 2022-09-14 RX ADMIN — FENTANYL CITRATE 50 MCG: 50 INJECTION, SOLUTION INTRAMUSCULAR; INTRAVENOUS at 02:43

## 2022-09-14 RX ADMIN — SODIUM CHLORIDE, PRESERVATIVE FREE 10 ML: 5 INJECTION INTRAVENOUS at 14:06

## 2022-09-14 RX ADMIN — LIDOCAINE HYDROCHLORIDE: 20 JELLY TOPICAL at 02:43

## 2022-09-14 RX ADMIN — ALLOPURINOL 100 MG: 100 TABLET ORAL at 09:47

## 2022-09-14 NOTE — H&P
This note is compiled to communicate with the medical care team. It may contain sensitive and protected information. It is not intended to serve as a source of communication with patients/families/friends; that communication occurs at the bedside or via alternative direct communications means (secure messaging, letters, video/telephone, etc.). Hospitalist Admission Note    NAME: Beverly Lopez   :  1934   MRN:  [id number]     Date/Time:  2022 4:57 AM    Patient PCP: Bere Hussein MD  ______________________________________________________________________  Given the patient's current clinical presentation, I have a high level of concern for decompensation if discharged from the emergency department. Complex decision making was performed, which includes reviewing the patient's available past medical records, laboratory results, and x-ray films. My assessment of this patient's clinical condition and my plan of care is as follows. Subjective:   CHIEF COMPLAINT: Hematuria    HISTORY OF PRESENT ILLNESS:     Marzena Baig is a 80 y.o.  male with pertinent past medical history as listed below who presents with acute onset hematuria. Patient reports first noticing gross blood in urine at 1800 last night. He reports since he has been experiencing urgency and frequency and persistent bleeding as well as passage of small clots. He is not on anticoagulation, but does take aspirin and Plavix due to remote history of CVA. Patient also has notable history of prostate cancer reportedly cured. Patient admits to some lower abdominal discomfort, but denies any other symptoms. He denies tobacco, alcohol, or illicit drug use. In the ED, patient afebrile with normal heart rate and hypertensive to 160s/80s.   CT abdomen pelvis demonstrated well distended urinary bladder with area of density in bladder that may represent blood products and underlying mass lesion could not be excluded without evidence of hydronephrosis and incidental cholelithiasis. Labs demonstrate: CBC unremarkable, BMP unremarkable, PTT/PT/INR within normal limits, UA demonstrates greater than 300 protein, large blood, moderate leuk esterase, greater than 100 RBCs with reflex to culture. Initially three-way catheter placed in ED for continuous bladder irrigation, but continued to clot so this was removed and large two way catheter placed with good drainage. Urology contacted who requested admission to medicine service with evaluation the morning for consideration of cystoscopy. We were asked to admit for work up and evaluation of the above problems.      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    CKD (chronic kidney disease), stage III ([address] 8/19/2017    CVA (cerebrovascular accident) ([address] 2009    DJD (degenerative joint disease) 8/19/2017    Gout     Hyperlipidemia 8/19/2017    Hypertension     Hypertension with renal disease 8/19/2017    Meniere disease 8/19/2017    Nausea & vomiting     On statin therapy 8/19/2017    PAC (premature atrial contraction) 8/19/2017    Palpitation 8/19/2017    Prostate CA ([address] 8/19/2017    Pseudophakia of both eyes 10/16/2018    Rosacea 8/19/2017    Testosterone deficiency 8/19/2017        Past Surgical History:   Procedure Laterality Date    COLORECTAL SCRN; HI RISK IND  5/3/2016         HX HEENT  1970    tonsillectomy    HX HEENT  1980    basal cell carcinoma right ear    HX OTHER SURGICAL Right 01/21/2018    EXCISION RIGHT NECK MASS     HX PROSTATECTOMY      radiation only    WI ABDOMEN SURGERY PROC UNLISTED      bilateral inguinal hernia repair    WI COLONOSCOPY FLX DX W/COLLJ SPEC WHEN PFRMD  1/19/2011            Social History     Tobacco Use Smoking status: Never    Smokeless tobacco: Never   Substance Use Topics    Alcohol use: No        Family History   Problem Relation Age of Onset    Cancer Mother         uterine, cervical    Cancer Father         colon ca       Allergies   Allergen Reactions    Sulfanilamide Rash    Sulfa (Sulfonamide Antibiotics) Rash     Very mild rash several years ago        Prior to Admission medications    Medication Sig Start Date End Date Taking? Authorizing Provider   allopurinoL (ZYLOPRIM) 100 mg tablet TAKE ONE TABLET BY MOUTH DAILY 9/12/22   Sanjeev Morillo MD   clopidogreL (PLAVIX) 75 mg tab TAKE ONE TABLET BY MOUTH DAILY 8/23/22   Sanjeev Morillo MD   cycloSPORINE (Restasis) 0.05 % dpet INSTILL 2 DROPS IN [facility] EYE DAILY AS DIRECTED 7/11/22   Sanjeev Morillo MD   telmisartan (MICARDIS) 80 mg tablet Take 1 Tablet by mouth daily. 6/8/22   Sanjeev Morillo MD   doxycycline (VIBRAMYCIN) 100 mg capsule TAKE ONE CAPSULE BY MOUTH DAILY 10/12/21   Sanjeev Morillo MD   bicalutamide (CASODEX) 50 mg tablet  8/7/19   Provider, Historical   omega-3 fatty acids 1,000 mg cap Take  by mouth. Provider, Historical   aspirin (ASPIRIN) 325 mg tablet Take 325 mg by mouth daily. Provider, Historical   silodosin (RAPAFLO) 8 mg capsule Take 8 mg by mouth daily (with breakfast).     Provider, Historical       REVIEW OF SYSTEMS:       Total of 12 systems reviewed as follows:       POSITIVE= Red text   General: Fever, chills, sweats, weakness/malaise, weight loss/gain, loss of appetite    Eyes:   Vision change, eye pain   ENT:    Rhinorrhea, pharyngitis, Hearing loss    Respiratory:   cough, sputum production, SOB, GUIDRY, wheezing, pleuritic pain    Cardiology:   chest pain, palpitations, orthopnea, edema, syncope    Gastrointestinal:  abdominal pain , N/V, diarrhea, dysphagia, constipation, bleeding    Genitourinary:  frequency, urgency, dysuria, hematuria, incontinence    Muskuloskeletal:  arthralgia, myalgia, back pain Hematology:  easy bruising, nose or gum bleeding, lymphadenopathy    Dermatological: rash, ulceration, pruritis, color change / jaundice   Endocrine:  hot flashes or polydipsia    Neurological:  headache, dizziness, confusion, focal weakness, paresthesia, Speech difficulties, memory loss, gait difficulty   Psychological: Feelings of anxiety, depression, agitation       Objective:   VITALS:    Visit Vitals  BP (!) 166/87 (BP 1 Location: Left arm, BP Patient Position: At rest)   Pulse 80   Temp 97.5 °F (36.4 °C)   Resp 16   Ht 6' 2\" (1.88 m)   Wt 90.6 kg (199 lb 11.8 oz)   SpO2 97%   BMI 25.64 kg/m²       PHYSICAL EXAM:    General:   Alert, cooperative, no distress, well-appearing elderly  male        HEENT:  Atraumatic, anicteric sclerae, pink conjunctivae, mucosa moist, dentition fair     Neck:  Supple, symmetrical, trachea midline, no abnormalities on palpation     Lungs:   CTAB. Symmetric expansion. Good aeration. Normal respiratory effort. Chest wall:  No tenderness. No Accessory muscle use. Cardiovascular:   RRR, No murmur/rub/gallop. No JVD. Radial/AT/DP pulse 2+     GI/:   Soft, mild tenderness to deep suprapubic palpation. Not distended. Bowel sounds normal. No HSM, Schaffer catheter draining bright red bloody urine with few clots noted     Extremities No edema. No cyanosis. Capillary refill normal     Skin: No significant lesions noted. Not Jaundiced. No rashes      Neurologic: PERRL. EOMI. No facial asymmetry. No aphasia or slurred speech. Moves all extremities. Sensation to light touch grossly intact BUE/BLE. No overt focal deficits appreciated     Psych:  Alert and oriented X 4. Normal affect.  Good insight     Other:   N/A         LAB DATA REVIEWED:    Recent Results (from the past 24 hour(s))   CBC WITH AUTOMATED DIFF    Collection Time: 09/13/22  9:59 PM   Result Value Ref Range    WBC 6.7 4.1 - 11.1 K/uL    RBC 4.14 4.10 - 5.70 M/uL    HGB 13.8 12.1 - 17.0 g/dL    HCT 40.8 36.6 - 50.3 %    MCV 98.6 80.0 - 99.0 FL    MCH 33.3 26.0 - 34.0 PG    MCHC 33.8 30.0 - 36.5 g/dL    RDW 12.6 11.5 - 14.5 %    PLATELET 004 249 - 790 K/uL    MPV 9.4 8.9 - 12.9 FL    NRBC 0.0 0  WBC    ABSOLUTE NRBC 0.00 0.00 - 0.01 K/uL    NEUTROPHILS 53 32 - 75 %    LYMPHOCYTES 32 12 - 49 %    MONOCYTES 13 5 - 13 %    EOSINOPHILS 1 0 - 7 %    BASOPHILS 1 0 - 1 %    IMMATURE GRANULOCYTES 0 0.0 - 0.5 %    ABS. NEUTROPHILS 3.6 1.8 - 8.0 K/UL    ABS. LYMPHOCYTES 2.1 0.8 - 3.5 K/UL    ABS. MONOCYTES 0.8 0.0 - 1.0 K/UL    ABS. EOSINOPHILS 0.1 0.0 - 0.4 K/UL    ABS. BASOPHILS 0.0 0.0 - 0.1 K/UL    ABS. IMM. GRANS. 0.0 0.00 - 0.04 K/UL    DF AUTOMATED     METABOLIC PANEL, COMPREHENSIVE    Collection Time: 09/13/22  9:59 PM   Result Value Ref Range    Sodium 136 136 - 145 mmol/L    Potassium 4.0 3.5 - 5.1 mmol/L    Chloride 102 97 - 108 mmol/L    CO2 30 21 - 32 mmol/L    Anion gap 4 (L) 5 - 15 mmol/L    Glucose 110 (H) 65 - 100 mg/dL    BUN 19 6 - 20 MG/DL    Creatinine 1.27 0.70 - 1.30 MG/DL    BUN/Creatinine ratio 15 12 - 20      GFR est AA >60 >60 ml/min/1.73m2    GFR est non-AA 54 (L) >60 ml/min/1.73m2    Calcium 8.9 8.5 - 10.1 MG/DL    Bilirubin, total 0.7 0.2 - 1.0 MG/DL    ALT (SGPT) 17 12 - 78 U/L    AST (SGOT) 20 15 - 37 U/L    Alk.  phosphatase 102 45 - 117 U/L    Protein, total 7.8 6.4 - 8.2 g/dL    Albumin 3.7 3.5 - 5.0 g/dL    Globulin 4.1 (H) 2.0 - 4.0 g/dL    A-G Ratio 0.9 (L) 1.1 - 2.2     PTT    Collection Time: 09/13/22  9:59 PM   Result Value Ref Range    aPTT 28.0 22.1 - 31.0 sec    aPTT, therapeutic range     58.0 - 77.0 SECS   PROTHROMBIN TIME + INR    Collection Time: 09/13/22  9:59 PM   Result Value Ref Range    INR 1.1 0.9 - 1.1      Prothrombin time 11.5 (H) 9.0 - 11.1 sec   URINALYSIS W/ REFLEX CULTURE    Collection Time: 09/13/22 11:37 PM    Specimen: Urine    Urine specimen   Result Value Ref Range    Color RED      Appearance TURBID (A) CLEAR      Specific gravity 1.020 1.003 - 1.030      pH (UA) 7.5 5.0 - 8.0      Protein >300 (A) NEG mg/dL    Glucose Negative NEG mg/dL    Ketone Negative NEG mg/dL    Blood LARGE (A) NEG      Urobilinogen 0.2 0.2 - 1.0 EU/dL    Nitrites Negative NEG      Leukocyte Esterase MODERATE (A) NEG      WBC 10-20 0 - 4 /hpf    RBC >100 (H) 0 - 5 /hpf    Epithelial cells FEW FEW /lpf    Bacteria Negative NEG /hpf    UA:UC IF INDICATED URINE CULTURE ORDERED (A) CNI     BILIRUBIN, CONFIRM    Collection Time: 09/13/22 11:37 PM   Result Value Ref Range    Bilirubin UA, confirm Negative         IMAGING:  CT abdomen pelvis:  1. Well-distended urinary bladder with an area of density in the bladder that  may represent blood products. An underlying mass lesion is not excluded. No  hydronephrosis. 2. Cholelithiasis.     ______________________________________________________________________    Assessment / Plan:  Hematuria  History of prostate cancer  Remote history CVA  Essential hypertension  CKD 3  Gout    PLAN:    Admit to telemetry monitoring  Urology consulted in ED, greatly appreciate their expertise  -Keep n.p.o. and hold DVT chemoprophylaxis for possible cystoscopy  Continue Schaffer catheter with intermittent flushing  Bladder checks  Trend CBC and BMP  Continue PTA: Telmisartan, allopurinol    Code Status: Full  Surrogate Decision Maker:   Spouse    DVT Prophylaxis: Held  GI Prophylaxis: not indicated  Diet: N.p.o.       Care Plan discussed with:    Comments   Patient x    Family  x    RN     Care Manager                    Consultant:      _______________________________________________________________________  Prior Level of Function: Independent    Expected  Disposition:   Home with Family x   HH/PT/OT/RN    SNF/LTC    YARED    ________________________________________________________________________  TOTAL TIME:  39 Minutes      Comments    x Reviewed previous records   >50% of visit spent in counseling and coordination of care x Discussion with patient and/or family and questions answered       ________________________________________________________________________  Signed: Rachel De Los Santos DO  9/14/2022 4:57 AM    Please note that this documentation was completed in part with Dragon dictation software. Occasionally unanticipated grammatical, syntax, homophones, and other interpretive errors are inadvertently transcribed by the computer software. Please excuse and disregard any errors that have escaped final proofreading. If in doubt, please do not hesitate to reach out to myself or the assigned hospitalist via [facility] paging system for clarification.

## 2022-09-14 NOTE — PROGRESS NOTES
1210: TRANSFER - IN REPORT:    Verbal report received from Navneet Ribera RN(name) on Shannon Lantigua  being received from ED(unit) for routine progression of care      Report consisted of patients Situation, Background, Assessment and   Recommendations(SBAR). Information from the following report(s) SBAR, Kardex, Intake/Output, MAR, and Recent Results was reviewed with the receiving nurse. Opportunity for questions and clarification was provided. Assessment completed upon patients arrival to unit and care assumed. 1246: Patient arrived on floor. A&)X4, VSS. CBI currently going, urine is red in color. Call bell in reach, will monitor. 1250:Primary Nurse Jay Madera and Katarzyna Kellogg RN performed a dual skin assessment on this patient No impairment noted  Oswaldo score is 19     1900: End of Shift Note    Bedside shift change report given to oncoming RN (oncoming nurse) by Jay Madera (offgoing nurse). Report included the following information SBAR, Kardex, Intake/Output, MAR, and Recent Results    Shift worked:  [id number]     Shift summary and any significant changes:     CBI, urine dark red at this time, clots present. Concerns for physician to address:  none     Zone phone for oncoming shift:   XXX       Activity:  Activity Level: Up with Assistance  Number times ambulated in hallways past shift: 0  Number of times OOB to chair past shift: 1    Cardiac:   Cardiac Monitoring: Yes      Cardiac Rhythm: Sinus Rhythm    Access:  Current line(s): PIV     Genitourinary:   Urinary status: samaniego    Respiratory:   O2 Device: None (Room air)  Chronic home O2 use?: NO  Incentive spirometer at bedside: N/A       GI:     Current diet:  ADULT DIET Regular;  Low Sodium (2 gm)  Passing flatus: YES  Tolerating current diet: YES       Pain Management:   Patient states pain is manageable on current regimen: YES    Skin:  Oswaldo Score: 19  Interventions: speciality bed, float heels, increase time out of bed, and PT/OT consult    Patient Safety:  Fall Score:  Total Score: 3  Interventions: bed/chair alarm, gripper socks, pt to call before getting OOB, and stay with me (per policy)  High Fall Risk: Yes    Length of Stay:  Expected LOS: 2d 7h  Actual LOS: [address]

## 2022-09-14 NOTE — PROGRESS NOTES
Care Management Initial Assessment    Transition of Care Plan:    RUR: 7% low risk for readmission   Disposition: Home with spouse   Follow up appointments: PCP, Urology? DME needed: No DME needs identified  Transportation at Discharge: Pt's family   Percell Aretha or means to access home:  Has access      IM Medicare Letter: Will need [facility]nd [facility] letter prior to d/c  Is patient a [place] and connected with the [address]? N/A              If yes, was [employer] transfer form completed and [facility] notified? Caregiver Contact: Pt's spouse/LNOK: Carson Escobar" Zain Garcia p) [phone]  Discharge Caregiver contacted prior to discharge? Yes, to be contacted with updates  Care Conference needed?:  No                   Reason for Admission:  Hematuria                      RUR Score: 7% low risk for readmission                    Plan for utilizing home health: No home health needs identified          PCP: First and Last name:  Regine Bose MD     Name of Practice:    Are you a current patient: Yes/No: Yes   Approximate date of last visit: July 2022 - sees every 3 months    Can you participate in a virtual visit with your PCP: No                    Current Advanced Directive/Advance Care Plan: Full Code  Advance Care Planning   Healthcare Decision Maker:       Primary Decision Maker: Sandraschuyler Baig \"Lenka\" - Spouse - [phone]    Today we documented Decision Maker(s) consistent with Legal Next of Kin hierarchy. Healthcare Decision Maker:              Primary Decision Maker: Sandraschuyler Baig \"Lenka\" - Spouse - [phone]                  Transition of Care Plan:  Home with spouse, outpatient follow up    Initial note: CM reviewed chart. CM completed assessment with pt and pt's spouse at bedside. CM introduced self, role of CM, verified demographics, and discussed transition of care planning. Pt resides with spouse in 1 level home with approx 13 [address]. Pt is independent at baseline, active, enjoys doing yard work.  Pt does drive, does not use DME at baseline. Pt's spouse or daughter in law will transport home at d/c. Pt's daughter in law is local, son resides in [place]. Pt follows with PCP Dr. Author Vallejo every 3 months, see urologist Dr. Madelyn Carrera at [facility] every 4 months. Denies concerns with medication home management, prefers [employer] [address]. No concerns with transition of care plans at this time. Spouse will upload ACP documents via [address] Care management will continue to be available to assist as transition of care planning needs arise. Care Management Interventions  PCP Verified by CM: Yes  Palliative Care Criteria Met (RRAT>21 & CHF Dx)?: No  Mode of Transport at Discharge:  Other (see comment) (Spouse or DIL )  Transition of Care Consult (CM Consult): Discharge Planning  Discharge Durable Medical Equipment: No (No DME at baseline)  Physical Therapy Consult: No  Occupational Therapy Consult: No  Speech Therapy Consult: No  Support Systems: Spouse/Significant Other, Other Family Member(s), Child(alberto)  Confirm Follow Up Transport: Family  The Patient and/or Patient Representative was Provided with a Choice of Provider and Agrees with the Discharge Plan?: Yes  Discharge Location  Patient Expects to be Discharged to[de-identified] Home (Home with outpatient follow up)    Shraddha Parker [phone]

## 2022-09-14 NOTE — ED PROVIDER NOTES
[facility] DEPARTMENT HISTORY AND PHYSICAL EXAM     ------------------------------------------------------------------------------------------------------  Please note that this dictation was completed with [employer], the [employer] voice recognition software. Quite often unanticipated grammatical, syntax, homophones, and other interpretive errors are inadvertently transcribed by the computer software. Please disregard these errors. Please excuse any errors that have escaped final proofreading.  -----------------------------------------------------------------------------------------------------------------    Date: 9/13/2022  Patient Name: Jasmin Walker    History of Presenting Illness     Chief Complaint   Patient presents with    Hematuria     Patient stated that he was working in his yard most of the day and when he went to the bathroom around 1800, he noticed that he was voiding blood. He has been having urgency and frequency since then and has still been bleeding. Patient takes Plavix. Denies any urinary symptoms prior to today. Past history of prostate cancer as well. Denies any analgesics today. History Provided By: Patient    HPI: Jasmin Walker is a 80 y.o. male, with significant pmhx of hypertension, chronic kidney disease stage III CVA, prostate cancer, who presents via private vehicle to the ED with c/o passing dark red blood instead of urine. Patient reports he was out working in his yard for majority of the day. Came in to go bath around 6 PM noticed that he was voiding mostly blood. Is not having urgency and frequency since that time having gone to the bathroom with passage of blood many times. Denies chest pain, shortness of breath or dizziness. Denies having similar symptoms in the past.  Currently on Plavix with history of prostate cancer.   Patient reports to me that after arriving to the emergency department he was able to go to the bathroom again and passed a significant amount of clots and then was able to pass what appeared to be more like urine and less like blood. Notes having relief after having passed clots and less urgency. Pt also specifically denies any recent fevers, chills, CP, SOB, nausea, vomiting, diarrhea, changes in BM, or headache. PCP: Juan Carlos Hall MD    Social Hx: denies tobacco, denies EtOH, denies recreational/ Illicit Drugs     There are no other complaints, changes, or physical findings at this time. Allergies   Allergen Reactions    Sulfanilamide Rash    Sulfa (Sulfonamide Antibiotics) Rash     Very mild rash several years ago         Current Facility-Administered Medications   Medication Dose Route Frequency Provider Last Rate Last Admin    acetaminophen (TYLENOL) tablet 650 mg  650 mg Oral Q6H PRN Matt Yu MD        ondansetron [facility]) injection 4 mg  4 mg IntraVENous Q4H PRN Matt Yu MD         Current Outpatient Medications   Medication Sig Dispense Refill    allopurinoL (ZYLOPRIM) 100 mg tablet TAKE ONE TABLET BY MOUTH DAILY 90 Tablet 3    clopidogreL (PLAVIX) 75 mg tab TAKE ONE TABLET BY MOUTH DAILY 90 Tablet 3    cycloSPORINE (Restasis) 0.05 % dpet INSTILL 2 DROPS IN EACH EYE DAILY AS DIRECTED 60 Each 5    telmisartan (MICARDIS) 80 mg tablet Take 1 Tablet by mouth daily. 30 Tablet prn    doxycycline (VIBRAMYCIN) 100 mg capsule TAKE ONE CAPSULE BY MOUTH DAILY 90 Capsule 2    bicalutamide (CASODEX) 50 mg tablet       omega-3 fatty acids 1,000 mg cap Take  by mouth. aspirin (ASPIRIN) 325 mg tablet Take 325 mg by mouth daily. silodosin (RAPAFLO) 8 mg capsule Take 8 mg by mouth daily (with breakfast).          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 [address] 8/19/2017    Pseudophakia of both eyes 10/16/2018    Rosacea 8/19/2017    Testosterone deficiency 8/19/2017       Past Surgical History:  Past Surgical History:   Procedure Laterality Date    COLORECTAL SCRN; HI RISK IND  5/3/2016         HX HEENT  1970    tonsillectomy    HX HEENT  1980    basal cell carcinoma right ear    HX OTHER SURGICAL Right 01/21/2018    EXCISION RIGHT NECK MASS     HX PROSTATECTOMY      radiation only    VT ABDOMEN SURGERY PROC UNLISTED      bilateral inguinal hernia repair    VT COLONOSCOPY FLX DX W/COLLJ SPEC WHEN PFRMD  1/19/2011            Family History:  Family History   Problem Relation Age of Onset    Cancer Mother         uterine, cervical    Cancer Father         colon ca       Social History:  Social History     Tobacco Use    Smoking status: Never    Smokeless tobacco: Never   Vaping Use    Vaping Use: Never used   Substance Use Topics    Alcohol use: No    Drug use: No       Allergies: Allergies   Allergen Reactions    Sulfanilamide Rash    Sulfa (Sulfonamide Antibiotics) Rash     Very mild rash several years ago         Review of Systems   Review of Systems   Constitutional:  Negative for chills and fever. HENT: Negative. Eyes: Negative. Respiratory:  Negative for cough, chest tightness and shortness of breath. Cardiovascular:  Negative for chest pain and leg swelling. Gastrointestinal:  Positive for abdominal pain. Negative for diarrhea, nausea and vomiting. Endocrine: Negative.     Genitourinary:  Positive for dysuria, frequency, hematuria and urgency. Negative for difficulty urinating. Musculoskeletal:  Negative for myalgias. Skin: Negative. Neurological: Negative. Psychiatric/Behavioral: Negative. All other systems reviewed and are negative. Physical Exam   Physical Exam  Vitals and nursing note reviewed. Constitutional:       General: He is not in acute distress. Appearance: He is well-developed. He is not diaphoretic. HENT:      Head: Normocephalic and atraumatic. Nose: Nose normal.      Mouth/Throat:      Pharynx: No oropharyngeal exudate. Eyes:      Conjunctiva/sclera: Conjunctivae normal.      Pupils: Pupils are equal, round, and reactive to light. Neck:      Vascular: No JVD. Cardiovascular:      Rate and Rhythm: Normal rate and regular rhythm. Heart sounds: Normal heart sounds. No murmur heard. No friction rub. Pulmonary:      Effort: Pulmonary effort is normal. No respiratory distress. Breath sounds: Normal breath sounds. No stridor. No wheezing or rales. Abdominal:      General: Bowel sounds are normal. There is no distension. Palpations: Abdomen is soft. Tenderness: There is abdominal tenderness in the suprapubic area. There is no rebound. Musculoskeletal:         General: No tenderness. Normal range of motion. Cervical back: Normal range of motion and neck supple. Skin:     General: Skin is warm and dry. Findings: No rash. Neurological:      Mental Status: He is alert and oriented to person, place, and time. Cranial Nerves: No cranial nerve deficit. Psychiatric:         Speech: Speech normal.         Behavior: Behavior normal.         Thought Content:  Thought content normal.         Judgment: Judgment normal.         Diagnostic Study Results     Labs -     Recent Results (from the past 12 hour(s))   CBC WITH AUTOMATED DIFF    Collection Time: 09/13/22  9:59 PM   Result Value Ref Range    WBC 6.7 4.1 - 11.1 K/uL    RBC 4.14 4.10 - 5.70 M/uL    HGB 13.8 12.1 - 17.0 g/dL    HCT 40.8 36.6 - 50.3 %    MCV 98.6 80.0 - 99.0 FL    MCH 33.3 26.0 - 34.0 PG    MCHC 33.8 30.0 - 36.5 g/dL    RDW 12.6 11.5 - 14.5 %    PLATELET 549 160 - 777 K/uL    MPV 9.4 8.9 - 12.9 FL    NRBC 0.0 0  WBC    ABSOLUTE NRBC 0.00 0.00 - 0.01 K/uL    NEUTROPHILS 53 32 - 75 %    LYMPHOCYTES 32 12 - 49 %    MONOCYTES 13 5 - 13 %    EOSINOPHILS 1 0 - 7 %    BASOPHILS 1 0 - 1 %    IMMATURE GRANULOCYTES 0 0.0 - 0.5 %    ABS. NEUTROPHILS 3.6 1.8 - 8.0 K/UL    ABS. LYMPHOCYTES 2.1 0.8 - 3.5 K/UL    ABS. MONOCYTES 0.8 0.0 - 1.0 K/UL    ABS. EOSINOPHILS 0.1 0.0 - 0.4 K/UL    ABS. BASOPHILS 0.0 0.0 - 0.1 K/UL    ABS. IMM. GRANS. 0.0 0.00 - 0.04 K/UL    DF AUTOMATED     METABOLIC PANEL, COMPREHENSIVE    Collection Time: 09/13/22  9:59 PM   Result Value Ref Range    Sodium 136 136 - 145 mmol/L    Potassium 4.0 3.5 - 5.1 mmol/L    Chloride 102 97 - 108 mmol/L    CO2 30 21 - 32 mmol/L    Anion gap 4 (L) 5 - 15 mmol/L    Glucose 110 (H) 65 - 100 mg/dL    BUN 19 6 - 20 MG/DL    Creatinine 1.27 0.70 - 1.30 MG/DL    BUN/Creatinine ratio 15 12 - 20      GFR est AA >60 >60 ml/min/1.73m2    GFR est non-AA 54 (L) >60 ml/min/1.73m2    Calcium 8.9 8.5 - 10.1 MG/DL    Bilirubin, total 0.7 0.2 - 1.0 MG/DL    ALT (SGPT) 17 12 - 78 U/L    AST (SGOT) 20 15 - 37 U/L    Alk.  phosphatase 102 45 - 117 U/L    Protein, total 7.8 6.4 - 8.2 g/dL    Albumin 3.7 3.5 - 5.0 g/dL    Globulin 4.1 (H) 2.0 - 4.0 g/dL    A-G Ratio 0.9 (L) 1.1 - 2.2     PTT    Collection Time: 09/13/22  9:59 PM   Result Value Ref Range    aPTT 28.0 22.1 - 31.0 sec    aPTT, therapeutic range     58.0 - 77.0 SECS   PROTHROMBIN TIME + INR    Collection Time: 09/13/22  9:59 PM   Result Value Ref Range    INR 1.1 0.9 - 1.1      Prothrombin time 11.5 (H) 9.0 - 11.1 sec   URINALYSIS W/ REFLEX CULTURE    Collection Time: 09/13/22 11:37 PM    Specimen: Urine    Urine specimen   Result Value Ref Range    Color RED      Appearance TURBID (A) CLEAR      Specific gravity 1.020 1.003 - 1.030      pH (UA) 7.5 5.0 - 8.0      Protein >300 (A) NEG mg/dL    Glucose Negative NEG mg/dL    Ketone Negative NEG mg/dL    Blood LARGE (A) NEG      Urobilinogen 0.2 0.2 - 1.0 EU/dL    Nitrites Negative NEG      Leukocyte Esterase MODERATE (A) NEG      WBC 10-20 0 - 4 /hpf    RBC >100 (H) 0 - 5 /hpf    Epithelial cells FEW FEW /lpf    Bacteria Negative NEG /hpf    UA:UC IF INDICATED URINE CULTURE ORDERED (A) CNI     BILIRUBIN, CONFIRM    Collection Time: 09/13/22 11:37 PM   Result Value Ref Range    Bilirubin UA, confirm Negative         Radiologic Studies -   CT ABD PELV WO CONT   Final Result   1. Well-distended urinary bladder with an area of density in the bladder that   may represent blood products. An underlying mass lesion is not excluded. No   hydronephrosis. 2. Cholelithiasis. CT Results  (Last 48 hours)                 09/13/22 2331  CT ABD PELV WO CONT Final result    Impression:  1. Well-distended urinary bladder with an area of density in the bladder that   may represent blood products. An underlying mass lesion is not excluded. No   hydronephrosis. 2. Cholelithiasis. Narrative:  EXAM:  CT abdomen pelvis without contrast       INDICATION: Hematuria       COMPARISON: None. TECHNIQUE: Helical CT of the abdomen  and pelvis  without contrast. Coronal and   sagittal reformats are performed. CT dose reduction was achieved through use of   a standardized protocol tailored for this examination and automatic exposure   control for dose modulation. FINDINGS:    Solid organ evaluation is limited without contrast.        The visualized lung bases demonstrate no mass or consolidation. The heart size   is normal. There is no pericardial or pleural effusion. There is no renal, ureteral, or bladder calculus. The kidneys are symmetric   without hydronephrosis. There is no perinephric fluid or fat stranding.  A right   kidney cyst measures 2.5 cm for which no imaging follow-up is recommended. Spleen calcifications are noted. The pancreas, and adrenal glands are normal.   There are small gallstones or sludge without intra- or extra-hepatic biliary   dilatation. There are no dilated bowel loops. The appendix is normal.         There are no enlarged lymph nodes. There is no free fluid or free air. The urinary bladder is well-distended with an area of density dependently   measuring 5.5 x 4.0 cm. There is no pelvic mass. The prostate is mildly enlarged   measuring 4.2 x 5.4 cm. Bilateral inguinal hernia repair surgical changes are noted. There are   degenerative changes in the spine. There is no aggressive bony lesion. CXR Results  (Last 48 hours)      None              Medical Decision Making   I am the first provider for this patient. I reviewed the vital signs, available nursing notes, past medical history, past surgical history, family history and social history. Vital Signs-Reviewed the patient's vital signs. Patient Vitals for the past 12 hrs:   Temp Pulse Resp BP SpO2   09/14/22 0242 -- 80 16 (!) 166/87 97 %   09/13/22 2149 97.5 °F (36.4 °C) 83 18 (!) 174/93 98 %       Pulse Oximetry Analysis - 98% on RA Normal    Records Reviewed/Interpretted: Nursing Notes from triage and Old Medical Records, no new previous primary care visits for management of chronic conditions    Provider Notes (Medical Decision Making):     DDX:  Kidney stone, UTI, hematuria from bladder source    Plan:  Labs, UA, CT scan abdomen pelvis without contrast    Impression:  Hematuria    ED Course:   Initial assessment performed. The patients presenting problems have been discussed, and they are in agreement with the care plan formulated and outlined with them. I have encouraged them to ask questions as they arise throughout their visit.     I reviewed our electronic medical record system for any past medical records that were available that may contribute to the patients current condition, the nursing notes and and vital signs from today's visit  Nursing notes will be reviewed as they become available in realtime while the pt has been in the ED. Jennifer Anna MD    PROGRESS NOTE  Just after voiding patient was bladder scanned and noted to retain 280. Schaffer catheter was placed to drain remaining urine and clot. Patient was copiously irrigated by nursing who initially placed a three-way catheter. Noted to get some clots and drainage but with subsequent reclotting of catheter. Patient was irrigated with 2 L at that time. Schaffer catheter was subsequently removed and replaced with a to a large bore catheter with subsequent removal of large clots and some urine. Nursing notes subsequent clotting off of catheter again although able to place saline into the bladder. We will consult with urology for further recommendations. CONSULT NOTE:   4:21 AM  Jennifer Anna MD spoke with Dr. Joshua Moore,   Specialty: Urology  Consulted  due to gross hematuria with difficulty irrigating to clear with indwelling Schaffer catheter. Discussed pt's HPI and available diagnostic results thus far. Consultant recommends admitted to hospital service with urology consultation in the morning for likely cystoscopy. Jennifer Anna MD    CONSULT NOTE:   4:21 AM  Jennifer Anna MD spoke with Dr. Nguyen Gonzales,   Specialty: Camron Gonzales due to gross hematuria on anticoagulant. Discussed pt's HPI and available diagnostic results thus far. Expressed concerns for needed admission. Consultant will evaluate for admission. Jennifer Anna MD      ADMISSION NOTE:  4:21 AM  Patient is being admitted to the hospital by Dr. Nguyen Gonzales. The results of their tests and reasons for their admission have been discussed with them and/or available family. They convey agreement and understanding for the need to be admitted and for their admission diagnosis.    Wilian Heading Susie Hudson MD             Critical Care Time:     none      Diagnosis     Clinical Impression:   1. Persistent gross hematuria    2. Chronic anticoagulation        PLAN:  1.   Admit to hospitalist

## 2022-09-14 NOTE — PROGRESS NOTES
Report given to Mode Haskins on PCU. Placing patient on tele box and will tx patient to 2250. Wife at bedside. Pt transferred wearing one silver wedding band,.  Per wife she has his hearing aids, cell phone and wallet in [address] [facility]

## 2022-09-14 NOTE — CONSULTS
Urology Consult    Patient: Helena Santiago MRN: [id number]  SSN: [id number]    YOB: 1934  Age: 80 y.o. Sex: male          Date of Consultation:  September 14, 2022  Requesting Physician: Ramos Hernandez MD  Reason for Consultation:  Gross haematuria         History of Present Illness:  Helena Santiago is a 80 y.o. male admitted 9/13/2022 to the hospital for Hematuria [R31.9]. He presented to the emergency room with acute onset gross hematuria which started at about 1800 last night. He states abdominal discomfort associated with the hematuria as well as passage of some clots. He has a history of CVA on Plavix and aspirin daily last dose yesterday. Patient also has a history of prostate cancer treated with radiation. In the ED patient had multiple catheter changes due to clotting off of catheters. Unable to determine how much clots were evacuated overnight. Urology has been consulted for gross hematuria. Wbc 6.7  Hgb 12.9 (13)  Cr 1.27  Ua is bland shows negative for nitrate negative for bacteria greater than 100 RBCs  CT showed well distended bladder with possible large clot versus neoplasm no hydronephrosis was noted      Patient known to [place] [facility] followed by [facility] for history of prostate cancer. Patient reports some urgency and frequency for several weeks. He denies any fever or chills prior to presentation to the emergency room. He also states that his cardiologist had been considering discontinuing his Plavix and aspirin since its been 13 years since the initial CVA. Patient seen with 32 Chinese catheter in place irrigated with 1 L normal saline was able to evacuate 300 mL of clots. Additional 500 mL of irrigation needed to clear bladder to punch colored urine. 25 [place] three-way was then placed and CBI was initiated . 1 CBI was initiated patient had to be irrigated to evacuate clots.   Discussed with nurse to monitor CBI to avoid clotting off of catheter. Patient instructed to notify staff of any abdominal discomfort or pressure in the abdomen or suprapubic area. Discussed with patient and wife will hold off any anticoagulants with the current bleeding and will continue to monitor throughout the day. 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    CKD (chronic kidney disease), stage III ([address] 8/19/2017    CVA (cerebrovascular accident) ([address] 2009    DJD (degenerative joint disease) 8/19/2017    Gout     Hyperlipidemia 8/19/2017    Hypertension     Hypertension with renal disease 8/19/2017    Meniere disease 8/19/2017    Nausea & vomiting     On statin therapy 8/19/2017    PAC (premature atrial contraction) 8/19/2017    Palpitation 8/19/2017    Prostate CA ([address] 8/19/2017    Pseudophakia of both eyes 10/16/2018    Rosacea 8/19/2017    Testosterone deficiency 8/19/2017        Past Surgical History:   Procedure Laterality Date    COLORECTAL SCRN; HI RISK IND  5/3/2016         HX HEENT  1970    tonsillectomy    HX HEENT  1980    basal cell carcinoma right ear    HX OTHER SURGICAL Right 01/21/2018    EXCISION RIGHT NECK MASS     HX PROSTATECTOMY      radiation only    ID ABDOMEN SURGERY PROC UNLISTED      bilateral inguinal hernia repair    ID COLONOSCOPY FLX DX W/COLLJ SPEC WHEN PFRMD  1/19/2011            Allergies   Allergen Reactions    Sulfanilamide Rash    Sulfa (Sulfonamide Antibiotics) Rash     Very mild rash several years ago        Prior to Admission medications    Medication Sig Start Date End Date Taking?  Authorizing Provider   allopurinoL (ZYLOPRIM) 100 mg tablet TAKE ONE TABLET BY MOUTH DAILY 9/12/22   Monie Blake MD   clopidogreL (PLAVIX) 75 mg tab TAKE ONE TABLET BY MOUTH DAILY 8/23/22   Chantel Burgess Reina Antonio MD   cycloSPORINE (Restasis) 0.05 % dpet INSTILL 2 DROPS IN [facility] DAILY AS DIRECTED 22   Sanjeev Morillo MD   telmisartan (MICARDIS) 80 mg tablet Take 1 Tablet by mouth daily. 22   Sanjeev Morillo MD   doxycycline (VIBRAMYCIN) 100 mg capsule TAKE ONE CAPSULE BY MOUTH DAILY 10/12/21   Sanjeev Morillo MD   bicalutamide (CASODEX) 50 mg tablet  19   Provider, Historical   omega-3 fatty acids 1,000 mg cap Take  by mouth. Provider, Historical   aspirin (ASPIRIN) 325 mg tablet Take 325 mg by mouth daily. Provider, Historical   silodosin (RAPAFLO) 8 mg capsule Take 8 mg by mouth daily (with breakfast). Provider, Historical       Social History     Tobacco Use    Smoking status: Never    Smokeless tobacco: Never   Substance Use Topics    Alcohol use: No        Family History   Problem Relation Age of Onset    Cancer Mother         uterine, cervical    Cancer Father         colon ca        ROS:  Admission ROS from 2022 was reviewed and changes (other than per HPI) include: none. Physical Exam:    Vital Signs:  Temp (24hrs), Av.8 °F (36.6 °C), Min:97.5 °F (36.4 °C), Max:98.1 °F (36.7 °C)   Blood pressure 134/71, pulse 83, temperature 98.1 °F (36.7 °C), resp. rate 14, height 6' 2\" (1.88 m), weight 90.6 kg (199 lb 11.8 oz), SpO2 96 %. I&O's:  No intake/output data recorded.     Appearance: well-developed NAD   Neck: supple   Respiratory Effort: breathing easily   Lower Extremity: no edema   Abdomen/Flank: soft non-tender without masses; no CVA tenderness   Liver/Spleen: no organomegaly   Hernia: no ventral hernia   Anus/Perineum: non-tender   Rectal: deferred  Hemoccult: not indicated   Scrotum: without lesions   Testes: bilaterally non-tender, no masses   Epididymes: bilaterally no masses palpated, non-tender   Penis: no plaques; no lesions   Meatus: patent   Prostate:deferred  SV's: non-palpable   Lymph: no adenopathy   Skin Inspection: warm and dry   Mood/Affect: normal      Lab Review:     CBC   Recent Labs     09/14/22  0439 09/13/22 2159   WBC  --  6.7   HGB 12.9 13.8   HCT  --  40.8   PLT  --  196     CMP   Recent Labs     09/13/22 2159      K 4.0      CO2 30   *   BUN 19   CREA 1.27   CA 8.9   ALB 3.7   TBILI 0.7   ALT 17       Other   Recent Labs     09/13/22 2159   INR 1.1       UA:   Lab Results   Component Value Date/Time    Color RED 09/13/2022 11:37 PM    Appearance TURBID (A) 09/13/2022 11:37 PM    Specific gravity 1.020 09/13/2022 11:37 PM    Specific gravity 1.013 12/20/2021 09:59 AM    pH (UA) 7.5 09/13/2022 11:37 PM    Protein >300 (A) 09/13/2022 11:37 PM    Glucose Negative 09/13/2022 11:37 PM    Ketone Negative 09/13/2022 11:37 PM    Bilirubin Negative 12/20/2021 09:59 AM    Urobilinogen 0.2 09/13/2022 11:37 PM    Nitrites Negative 09/13/2022 11:37 PM    Leukocyte Esterase MODERATE (A) 09/13/2022 11:37 PM    Epithelial cells FEW 09/13/2022 11:37 PM    Bacteria Negative 09/13/2022 11:37 PM    WBC 10-20 09/13/2022 11:37 PM    RBC >100 (H) 09/13/2022 11:37 PM       Cultures:  pending    Imaging:    CT: Results for orders placed during the hospital encounter of 09/13/22    CT ABD PELV WO CONT    Narrative  EXAM:  CT abdomen pelvis without contrast    INDICATION: Hematuria    COMPARISON: None. TECHNIQUE: Helical CT of the abdomen  and pelvis  without contrast. Coronal and  sagittal reformats are performed. CT dose reduction was achieved through use of  a standardized protocol tailored for this examination and automatic exposure  control for dose modulation. FINDINGS:  Solid organ evaluation is limited without contrast.    The visualized lung bases demonstrate no mass or consolidation. The heart size  is normal. There is no pericardial or pleural effusion. There is no renal, ureteral, or bladder calculus. The kidneys are symmetric  without hydronephrosis. There is no perinephric fluid or fat stranding.  A right  kidney cyst measures 2.5 cm for which no imaging follow-up is recommended. Spleen calcifications are noted. The pancreas, and adrenal glands are normal.  There are small gallstones or sludge without intra- or extra-hepatic biliary  dilatation. There are no dilated bowel loops. The appendix is normal.    There are no enlarged lymph nodes. There is no free fluid or free air. The urinary bladder is well-distended with an area of density dependently  measuring 5.5 x 4.0 cm. There is no pelvic mass. The prostate is mildly enlarged  measuring 4.2 x 5.4 cm. Bilateral inguinal hernia repair surgical changes are noted. There are  degenerative changes in the spine. There is no aggressive bony lesion. Impression  1. Well-distended urinary bladder with an area of density in the bladder that  may represent blood products. An underlying mass lesion is not excluded. No  hydronephrosis. 2. Cholelithiasis. Assessment:     Principal Problem:    Hematuria (9/14/2022)    Active Problems:    CVA (cerebrovascular accident) ([facility] (8/19/2017)      Overview: L temporoparietal 3/7/2009      CKD (chronic kidney disease), stage III ([address] (8/19/2017)      History of prostate cancer (3/30/2020)      Labile hypertension (9/14/2022)          Plan:     1. Gross hematuria secondary to history of radiation aggravated by chronic AC use  -Maintain Schaffer for now  -Continue CBI titrate to light pink  -As needed manual irrigation for clots low output or no output  -Hold all AC for now  - No urgent urology intervention today   -Will allow diet today  Urology will follow  Plan discussed with Dr. Viry Horan By: Yan Queen.  Britta Blackmon NP  - September 14, 2022

## 2022-09-14 NOTE — ED NOTES
Bedside shift change report given to [address] [place] [address],[address] (oncoming nurse) by Miryam Camacho (offgoing nurse). Report included the following information SBAR, Kardex and ED Summary.

## 2022-09-14 NOTE — PROGRESS NOTES
INTERNAL MEDICINE PROGRESS NOTE    NAME:  Silvio Segura   :   1934   MRN:   [id number]     Date/Time:  2022 7:52 AM  Subjective:   History:  Chart reviewed and patient seen and examined and D/W his nurse, his wife and Urology this AM and all events noted. 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 He was in his usual state of health working in the yard yesterday and then around 6 PM passed significant amount of blood in his urine with clots and presented to the emergency room. In the emergency room CT revealed question of a mass versus clot in the bladder and the fact that he was still passing bloody urine with clots he was admitted to the hospital for further work-up and evaluation. This a.m. he notes no abdominal pain or nausea vomiting has no back pain. He denies any other GI or  complaints other than the blood. He denies any headaches, dizziness or neurologic complaints. He notes no chest pain, shortness of breath or cardiac or respiratory complaints. He notes no change of his chronic arthritic complaints and no other complaints on complete your systems.     Medications reviewed:  Current Facility-Administered Medications   Medication Dose Route Frequency    acetaminophen (TYLENOL) tablet 650 mg  650 mg Oral Q6H PRN    ondansetron (ZOFRAN) injection 4 mg  4 mg IntraVENous Q4H PRN    allopurinoL (ZYLOPRIM) tablet 100 mg  100 mg Oral DAILY    valsartan (DIOVAN) tablet 160 mg  160 mg Oral DAILY    sodium chloride (NS) flush 5-40 mL  5-40 mL IntraVENous Q8H    sodium chloride (NS) flush 5-40 mL  5-40 mL IntraVENous PRN    acetaminophen (TYLENOL) tablet 650 mg  650 mg Oral Q6H PRN    Or    acetaminophen (TYLENOL) suppository 650 mg  650 mg Rectal Q6H PRN    polyethylene glycol (MIRALAX) packet 17 g  17 g Oral DAILY PRN    ondansetron (ZOFRAN ODT) tablet 4 mg  4 mg Oral Q8H PRN    Or    ondansetron (ZOFRAN) injection 4 mg  4 mg IntraVENous Q6H PRN     Current Outpatient Medications   Medication Sig    allopurinoL (ZYLOPRIM) 100 mg tablet TAKE ONE TABLET BY MOUTH DAILY    clopidogreL (PLAVIX) 75 mg tab TAKE ONE TABLET BY MOUTH DAILY    cycloSPORINE (Restasis) 0.05 % dpet INSTILL 2 DROPS IN EACH EYE DAILY AS DIRECTED    telmisartan (MICARDIS) 80 mg tablet Take 1 Tablet by mouth daily. doxycycline (VIBRAMYCIN) 100 mg capsule TAKE ONE CAPSULE BY MOUTH DAILY    bicalutamide (CASODEX) 50 mg tablet     omega-3 fatty acids 1,000 mg cap Take  by mouth. aspirin (ASPIRIN) 325 mg tablet Take 325 mg by mouth daily. silodosin (RAPAFLO) 8 mg capsule Take 8 mg by mouth daily (with breakfast). Objective:   Vitals:  Visit Vitals  /67   Pulse 80   Temp 97.5 °F (36.4 °C)   Resp 16   Ht 6' 2\" (1.88 m)   Wt 199 lb 11.8 oz (90.6 kg)   SpO2 96%   BMI 25.64 kg/m²      O2 Device: None (Room air) Temp (24hrs), Av.5 °F (36.4 °C), Min:97.5 °F (36.4 °C), Max:97.5 °F (36.4 °C)      Last 24hr Input/Output:    Intake/Output Summary (Last 24 hours) at 2022 0752  Last data filed at 2022 0058  Gross per 24 hour   Intake 2000 ml   Output --   Net 2000 ml        PHYSICAL EXAM:  General:     Alert, cooperative, no distress, appears stated age. Head:    Normocephalic, without obvious abnormality, atraumatic. Eyes:    Conjunctivae/corneas clear. PERRLA  Nose:   Nares normal. No drainage or sinus tenderness. Throat:     Lips, mucosa, and tongue normal.  No Thrush  Neck:   Supple, symmetrical,  no adenopathy, thyroid: non tender     no carotid bruit and no JVD. Back:     Symmetric,  No CVA tenderness. Lungs:    Clear to auscultation bilaterally. No Wheezing or Rhonchi. No rales. Heart:    Regular rate and rhythm,  no murmur, rub or gallop. Abdomen:    Soft, non-tender. Not distended.   Bowel sounds normal. No masses  Extremities:  Extremities normal, atraumatic, No cyanosis. No edema. No clubbing  Lymph nodes:  Cervical, supraclavicular normal.  Neurologic:  Normal strength, Alert and oriented X 3. Skin:                No rash      Lab Data Reviewed:    Recent Results (from the past 24 hour(s))   CBC WITH AUTOMATED DIFF    Collection Time: 09/13/22  9:59 PM   Result Value Ref Range    WBC 6.7 4.1 - 11.1 K/uL    RBC 4.14 4.10 - 5.70 M/uL    HGB 13.8 12.1 - 17.0 g/dL    HCT 40.8 36.6 - 50.3 %    MCV 98.6 80.0 - 99.0 FL    MCH 33.3 26.0 - 34.0 PG    MCHC 33.8 30.0 - 36.5 g/dL    RDW 12.6 11.5 - 14.5 %    PLATELET 491 611 - 899 K/uL    MPV 9.4 8.9 - 12.9 FL    NRBC 0.0 0  WBC    ABSOLUTE NRBC 0.00 0.00 - 0.01 K/uL    NEUTROPHILS 53 32 - 75 %    LYMPHOCYTES 32 12 - 49 %    MONOCYTES 13 5 - 13 %    EOSINOPHILS 1 0 - 7 %    BASOPHILS 1 0 - 1 %    IMMATURE GRANULOCYTES 0 0.0 - 0.5 %    ABS. NEUTROPHILS 3.6 1.8 - 8.0 K/UL    ABS. LYMPHOCYTES 2.1 0.8 - 3.5 K/UL    ABS. MONOCYTES 0.8 0.0 - 1.0 K/UL    ABS. EOSINOPHILS 0.1 0.0 - 0.4 K/UL    ABS. BASOPHILS 0.0 0.0 - 0.1 K/UL    ABS. IMM. GRANS. 0.0 0.00 - 0.04 K/UL    DF AUTOMATED     METABOLIC PANEL, COMPREHENSIVE    Collection Time: 09/13/22  9:59 PM   Result Value Ref Range    Sodium 136 136 - 145 mmol/L    Potassium 4.0 3.5 - 5.1 mmol/L    Chloride 102 97 - 108 mmol/L    CO2 30 21 - 32 mmol/L    Anion gap 4 (L) 5 - 15 mmol/L    Glucose 110 (H) 65 - 100 mg/dL    BUN 19 6 - 20 MG/DL    Creatinine 1.27 0.70 - 1.30 MG/DL    BUN/Creatinine ratio 15 12 - 20      GFR est AA >60 >60 ml/min/1.73m2    GFR est non-AA 54 (L) >60 ml/min/1.73m2    Calcium 8.9 8.5 - 10.1 MG/DL    Bilirubin, total 0.7 0.2 - 1.0 MG/DL    ALT (SGPT) 17 12 - 78 U/L    AST (SGOT) 20 15 - 37 U/L    Alk.  phosphatase 102 45 - 117 U/L    Protein, total 7.8 6.4 - 8.2 g/dL    Albumin 3.7 3.5 - 5.0 g/dL    Globulin 4.1 (H) 2.0 - 4.0 g/dL    A-G Ratio 0.9 (L) 1.1 - 2.2     PTT    Collection Time: 09/13/22  9:59 PM   Result Value Ref Range    aPTT 28.0 22.1 - 31.0 sec    aPTT, therapeutic range     58.0 - 77.0 SECS   PROTHROMBIN TIME + INR    Collection Time: 09/13/22  9:59 PM   Result Value Ref Range    INR 1.1 0.9 - 1.1      Prothrombin time 11.5 (H) 9.0 - 11.1 sec   URINALYSIS W/ REFLEX CULTURE    Collection Time: 09/13/22 11:37 PM    Specimen: Urine    Urine specimen   Result Value Ref Range    Color RED      Appearance TURBID (A) CLEAR      Specific gravity 1.020 1.003 - 1.030      pH (UA) 7.5 5.0 - 8.0      Protein >300 (A) NEG mg/dL    Glucose Negative NEG mg/dL    Ketone Negative NEG mg/dL    Blood LARGE (A) NEG      Urobilinogen 0.2 0.2 - 1.0 EU/dL    Nitrites Negative NEG      Leukocyte Esterase MODERATE (A) NEG      WBC 10-20 0 - 4 /hpf    RBC >100 (H) 0 - 5 /hpf    Epithelial cells FEW FEW /lpf    Bacteria Negative NEG /hpf    UA:UC IF INDICATED URINE CULTURE ORDERED (A) CNI     BILIRUBIN, CONFIRM    Collection Time: 09/13/22 11:37 PM   Result Value Ref Range    Bilirubin UA, confirm Negative     HEMOGLOBIN    Collection Time: 09/14/22  4:39 AM   Result Value Ref Range    HGB 12.9 12.1 - 17.0 g/dL         Assessment/Plan:     Principal Problem:    Hematuria (9/14/2022)    Active Problems:    CVA (cerebrovascular accident) ([facility] (8/19/2017)      Overview: L temporoparietal 3/7/2009      CKD (chronic kidney disease), stage III ([facility] (8/19/2017)      History of prostate cancer (3/30/2020)      Labile hypertension (9/14/2022)       ___________________________________________________  PLAN:    1. Continuous bladder irrigation per urology discussion with nurse practitioner this morning  2. Hold aspirin and Plavix for now but will need to resume aspirin once hematuria controlled with previous history of CVA  3. Follow hemoglobin which was 13.8 on admission and 12.9 this a.m.  4.  Follow renal function, 19/1.27 admission  5. Follow blood pressure closely with known labile hypertension.   Continue Valsartan 160 mg daily which is equivalent to one half of his home dose (At home Telmisartan 80 every day)  6. Continue allopurinol history of gout  7. Hold fish oil as could exacerbate bleeding  8   Holding Rapaflo as Schaffer catheter again  9. Urine culture sent which I will follow-up on      45 Minutes spent today in direct care of this high complexity patient with greater than 50% in counseling and coordination of care.     If need to contact me use hospital  [phone], DO NOT USE PERFECT SERVE    ___________________________________________________    Attending Physician: Crissy Mccullough MD

## 2022-09-14 NOTE — ED NOTES
Bedside and Verbal shift change report given to Renato Pepe RN (oncoming nurse) by Latonya Islas RN (offgoing nurse). Report included the following information SBAR, Kardex, ED Summary, Intake/Output, and MAR.

## 2022-09-14 NOTE — ED NOTES
Answered pt's call borja. Pt was actively vomiting. Assisted patient to sit upright. Patient denied offer when asking if he would like Zofran to help with his nausea and vomiting. Changed patients linen. Patients wife believes he threw up because of his medications and having an empty stomach.

## 2022-09-15 LAB
ANION GAP SERPL CALC-SCNC: 7 MMOL/L (ref 5–15)
BASOPHILS # BLD: 0 K/UL (ref 0–0.1)
BASOPHILS NFR BLD: 0 % (ref 0–1)
BUN SERPL-MCNC: 26 MG/DL (ref 6–20)
BUN/CREAT SERPL: 26 (ref 12–20)
CALCIUM SERPL-MCNC: 8.8 MG/DL (ref 8.5–10.1)
CHLORIDE SERPL-SCNC: 103 MMOL/L (ref 97–108)
CO2 SERPL-SCNC: 26 MMOL/L (ref 21–32)
CREAT SERPL-MCNC: 1 MG/DL (ref 0.7–1.3)
DIFFERENTIAL METHOD BLD: ABNORMAL
EOSINOPHIL # BLD: 0.1 K/UL (ref 0–0.4)
EOSINOPHIL NFR BLD: 1 % (ref 0–7)
ERYTHROCYTE [DISTWIDTH] IN BLOOD BY AUTOMATED COUNT: 12.6 % (ref 11.5–14.5)
GLUCOSE SERPL-MCNC: 121 MG/DL (ref 65–100)
HCT VFR BLD AUTO: 32.2 % (ref 36.6–50.3)
HGB BLD-MCNC: 11.2 G/DL (ref 12.1–17)
IMM GRANULOCYTES # BLD AUTO: 0 K/UL (ref 0–0.04)
IMM GRANULOCYTES NFR BLD AUTO: 1 % (ref 0–0.5)
LYMPHOCYTES # BLD: 1.5 K/UL (ref 0.8–3.5)
LYMPHOCYTES NFR BLD: 19 % (ref 12–49)
MCH RBC QN AUTO: 34.1 PG (ref 26–34)
MCHC RBC AUTO-ENTMCNC: 34.8 G/DL (ref 30–36.5)
MCV RBC AUTO: 98.2 FL (ref 80–99)
MONOCYTES # BLD: 0.9 K/UL (ref 0–1)
MONOCYTES NFR BLD: 11 % (ref 5–13)
NEUTS SEG # BLD: 5.8 K/UL (ref 1.8–8)
NEUTS SEG NFR BLD: 68 % (ref 32–75)
NRBC # BLD: 0 K/UL (ref 0–0.01)
NRBC BLD-RTO: 0 PER 100 WBC
PLATELET # BLD AUTO: 170 K/UL (ref 150–400)
PMV BLD AUTO: 9.5 FL (ref 8.9–12.9)
POTASSIUM SERPL-SCNC: 3.9 MMOL/L (ref 3.5–5.1)
RBC # BLD AUTO: 3.28 M/UL (ref 4.1–5.7)
SODIUM SERPL-SCNC: 136 MMOL/L (ref 136–145)
WBC # BLD AUTO: 8.3 K/UL (ref 4.1–11.1)

## 2022-09-15 PROCEDURE — 65270000046 HC RM TELEMETRY

## 2022-09-15 PROCEDURE — 74011250637 HC RX REV CODE- 250/637: Performed by: STUDENT IN AN ORGANIZED HEALTH CARE EDUCATION/TRAINING PROGRAM

## 2022-09-15 PROCEDURE — 80048 BASIC METABOLIC PNL TOTAL CA: CPT

## 2022-09-15 PROCEDURE — 74011000250 HC RX REV CODE- 250: Performed by: STUDENT IN AN ORGANIZED HEALTH CARE EDUCATION/TRAINING PROGRAM

## 2022-09-15 PROCEDURE — 99233 SBSQ HOSP IP/OBS HIGH 50: CPT | Performed by: INTERNAL MEDICINE

## 2022-09-15 PROCEDURE — 36415 COLL VENOUS BLD VENIPUNCTURE: CPT

## 2022-09-15 PROCEDURE — 85025 COMPLETE CBC W/AUTO DIFF WBC: CPT

## 2022-09-15 PROCEDURE — 74011250636 HC RX REV CODE- 250/636: Performed by: INTERNAL MEDICINE

## 2022-09-15 RX ORDER — LEVOFLOXACIN 5 MG/ML
500 INJECTION, SOLUTION INTRAVENOUS EVERY 24 HOURS
Status: DISCONTINUED | OUTPATIENT
Start: 2022-09-15 | End: 2022-09-22 | Stop reason: HOSPADM

## 2022-09-15 RX ADMIN — SODIUM CHLORIDE, PRESERVATIVE FREE 10 ML: 5 INJECTION INTRAVENOUS at 05:13

## 2022-09-15 RX ADMIN — VALSARTAN 160 MG: 160 TABLET, FILM COATED ORAL at 08:44

## 2022-09-15 RX ADMIN — SODIUM CHLORIDE, PRESERVATIVE FREE 10 ML: 5 INJECTION INTRAVENOUS at 21:38

## 2022-09-15 RX ADMIN — LEVOFLOXACIN 500 MG: 5 INJECTION, SOLUTION INTRAVENOUS at 08:44

## 2022-09-15 RX ADMIN — SODIUM CHLORIDE, PRESERVATIVE FREE 10 ML: 5 INJECTION INTRAVENOUS at 13:31

## 2022-09-15 RX ADMIN — ALLOPURINOL 100 MG: 100 TABLET ORAL at 08:44

## 2022-09-15 NOTE — PROGRESS NOTES
Progress Note    Patient: Charo Rivers MRN: [id number]  SSN: [id number]    YOB: 1934  Age: 80 y.o. Sex: male          ADMITTED:  2022 to Rajni Bartlett MD  for Hematuria [R31.9]           Charo Rivers was admitted for Hematuria [R31.9]. He was passing clots in ED yesterday so 25 F large bore 3 way schaffer was placed. Clots irrigated out and cbi initiated. Hx of cva on plavix, now currently held. Hx of radiation cysti  with prostate cancer hx. Reports of return of suprapubic pain this am. Schaffer leaking urine, no passage of urine through schaffer. I stopped cbi and manually irrigated out ~50ml of blocking clots. Urine immediately drained clear pink/red urine. Cbi was turned back on at slow gtt. HD currently stable. Pt had immediate resolution of suprapubic discomfort. Creat remains at NL. Vitals:  Temp (24hrs), Av °F (36.7 °C), Min:97.3 °F (36.3 °C), Max:98.4 °F (36.9 °C)     Blood pressure 127/74, pulse 76, temperature 97.7 °F (36.5 °C), resp. rate 18, height 6' 2\" (1.88 m), weight 90.6 kg (199 lb 11.8 oz), SpO2 98 %. I&O's:   1901 - 09/15 0700  In: 47142 [P.O.:240]  Out: 29042 [Urine:48361]   No intake/output data recorded.      Exam:   Appearance: well-developed NAD   Neck: supple   Respiratory Effort: breathing easily   Lower Extremity: no edema   Abdomen/Flank: soft non-tender without masses; no CVA tenderness   Rectal: deferred  Hemoccult: not indicated   Scrotum: without lesions   Testes: bilaterally non-tender, no masses   Epididymes: bilaterally no masses palpated, non-tender   Penis: no plaques; no lesions   Meatus: patent   Prostate:deferred  SV's: non-palpable   Lymph: no adenopathy   Skin Inspection: warm and dry   Mood/Affect: normal     Labs:   Recent Labs     09/15/22  0416 22  0439 22  2159   WBC 8.3  --  6.7   HGB 11.2* 12.9 13.8   HCT 32.2*  --  40.8     --  196     Recent Labs 09/15/22  0416 09/13/22  2159    136   K 3.9 4.0    102   CO2 26 30   * 110*   BUN 26* 19   CREA 1.00 1.27   CA 8.8 8.9        Cultures:        Imaging:       Assessment:     - Principal Problem:    Hematuria (9/14/2022)    Active Problems:    CVA (cerebrovascular accident) ([facility] (8/19/2017)      Overview: L temporoparietal 3/7/2009      CKD (chronic kidney disease), stage III ([facility] (8/19/2017)      History of prostate cancer (3/30/2020)      Labile hypertension (9/14/2022)        Plan:     - Discussed with nurse to monitor CBI to avoid clotting off of catheter. stop cbi and hand irrigate if blood clots, decreased UOP or suprapubic pain returns. Patient instructed to notify staff of any abdominal discomfort or pressure in the abdomen or suprapubic area. Discussed with patient and wife will hold off any anticoagulants with the current bleeding and will continue to monitor throughout the day. No current plan for OR intervention today as hematuria resolving with cbi currently.  Will watch closely however    -nursing to titrate cbi to clear pink UA    Discussed with Dr. Bobbye Holstein By: Isaias Person NP - September 15, 2022

## 2022-09-15 NOTE — PROGRESS NOTES
0750 Bedside, Verbal, and Written shift change report given to Rhett Anders82 (oncoming nurse) by Steven Whitmore (offgoing nurse). Report included the following information SBAR, Kardex, Intake/Output, MAR, Recent Results, and Cardiac Rhythm NSR .      2662 Urology was contacted regarding patient continuous bladder irrigation. 0900 Urology at bedside    [phone] Urology at bedside to check patient CBI    End of Shift Note    Bedside shift change report given to [facility] (oncoming nurse) by Rhett Hendrix (offgoing nurse). Report included the following information SBAR, Kardex, Intake/Output, MAR, Recent Results, and Cardiac Rhythm NSR    Shift worked: 7a-7p     Shift summary and any significant changes:     Patient had few clots while irrigating. Urology is aware. Urine is currently dark red. Concerns for physician to address:       Zone phone for oncoming shift:        Activity:  Activity Level: Up with Assistance  Number times ambulated in hallways past shift: 0  Number of times OOB to chair past shift: 0    Cardiac:   Cardiac Monitoring: Yes      Cardiac Rhythm: Sinus Rhythm    Access:  Current line(s): PIV     Genitourinary:   Urinary status: samaniego    Respiratory:   O2 Device: None (Room air)  Chronic home O2 use?: NO  Incentive spirometer at bedside: NO       GI:     Current diet:  ADULT DIET Regular; Low Sodium (2 gm)  Passing flatus: YES  Tolerating current diet: YES       Pain Management:   Patient states pain is manageable on current regimen: YES    Skin:  Oswaldo Score: 19  Interventions: float heels, increase time out of bed, and PT/OT consult    Patient Safety:  Fall Score:  Total Score: 3  Interventions: bed/chair alarm, gripper socks, pt to call before getting OOB, and stay with me (per policy)  High Fall Risk: Yes    Length of Stay:  Expected LOS: 2d 7h  Actual LOS: 1      Ayaka Marks, RN

## 2022-09-15 NOTE — PROGRESS NOTES
Problem: Infection - Risk of, Urinary Catheter-Associated Urinary Tract Infection  Goal: *Absence of infection signs and symptoms  Outcome: Progressing Towards Goal     Problem: Patient Education: Go to Patient Education Activity  Goal: Patient/Family Education  Outcome: Progressing Towards Goal     Problem: Falls - Risk of  Goal: *Absence of Falls  Description: Document Mary Fall Risk and appropriate interventions in the flowsheet.   Outcome: Progressing Towards Goal  Note: Fall Risk Interventions:  Mobility Interventions: Bed/chair exit alarm, Communicate number of staff needed for ambulation/transfer, Patient to call before getting OOB         Medication Interventions: Bed/chair exit alarm, Patient to call before getting OOB, Teach patient to arise slowly    Elimination Interventions: Bed/chair exit alarm, Call light in reach, Patient to call for help with toileting needs, Stay With Me (per policy), Toilet paper/wipes in reach, Toileting schedule/hourly rounds

## 2022-09-15 NOTE — PROGRESS NOTES
1929 Bedside and Verbal shift change report given to Stacey (oncoming nurse) by Karthik Dhillon (offgoing nurse). Report included the following information SBAR, MAR, and Cardiac Rhythm Sinus Rhythm . 1945 hand irrigation done. On 2nd attempt - clots came out from urethra, pt reports minimal discomfort. 2136 Bladder washout done. No clots noted. 0150 Pt complaint of knee pain. Acetaminophen given as prescribed. 8653 noted urine leaking from samaniego, hand irrigation done - no clots. End of Shift Note    Bedside shift change report given to George (oncoming nurse) by Alvaro Romero. Stacey Geiger RN (offgoing nurse). Report included the following information SBAR, MAR, and Cardiac Rhythm Sinus Rhythm    Shift worked:  7p-7a     Shift summary and any significant changes: On continuous bladder irrigation - more clear  Knee pain - left - see MAR   Concerns for physician to address: Family and pt would like an update from the Doctors. Zone phone for oncoming shift:          Activity:  Activity Level: Up with Assistance  Number times ambulated in hallways past shift: 0  Number of times OOB to chair past shift: 0    Cardiac:   Cardiac Monitoring: Yes      Cardiac Rhythm: Sinus Rhythm    Access:  Current line(s): PIV     Genitourinary:   Urinary status: samaniego    Respiratory:   O2 Device: None (Room air)  Chronic home O2 use?: NO  Incentive spirometer at bedside: NO       GI:     Current diet:  ADULT DIET Regular; Low Sodium (2 gm)  Passing flatus: YES  Tolerating current diet: YES       Pain Management:   Patient states pain is manageable on current regimen: YES    Skin:  Oswaldo Score: 20  Interventions: float heels, increase time out of bed, limit briefs, and nutritional support     Patient Safety:  Fall Score: Total Score: 3  Interventions: gripper socks and pt to call before getting OOB  High Fall Risk: Yes    Length of Stay:  Expected LOS: 2d 7h  Actual LOS: 2      Ma.  Stacey Geiger RN

## 2022-09-16 LAB
ANION GAP SERPL CALC-SCNC: 6 MMOL/L (ref 5–15)
BACTERIA SPEC CULT: ABNORMAL
BUN SERPL-MCNC: 26 MG/DL (ref 6–20)
BUN/CREAT SERPL: 25 (ref 12–20)
CALCIUM SERPL-MCNC: 9 MG/DL (ref 8.5–10.1)
CC UR VC: ABNORMAL
CHLORIDE SERPL-SCNC: 102 MMOL/L (ref 97–108)
CO2 SERPL-SCNC: 28 MMOL/L (ref 21–32)
CREAT SERPL-MCNC: 1.06 MG/DL (ref 0.7–1.3)
ERYTHROCYTE [DISTWIDTH] IN BLOOD BY AUTOMATED COUNT: 12.8 % (ref 11.5–14.5)
GLUCOSE SERPL-MCNC: 129 MG/DL (ref 65–100)
HCT VFR BLD AUTO: 33.3 % (ref 36.6–50.3)
HGB BLD-MCNC: 11.2 G/DL (ref 12.1–17)
MCH RBC QN AUTO: 33.7 PG (ref 26–34)
MCHC RBC AUTO-ENTMCNC: 33.6 G/DL (ref 30–36.5)
MCV RBC AUTO: 100.3 FL (ref 80–99)
NRBC # BLD: 0 K/UL (ref 0–0.01)
NRBC BLD-RTO: 0 PER 100 WBC
PLATELET # BLD AUTO: 190 K/UL (ref 150–400)
PMV BLD AUTO: 9.6 FL (ref 8.9–12.9)
POTASSIUM SERPL-SCNC: 4.2 MMOL/L (ref 3.5–5.1)
RBC # BLD AUTO: 3.32 M/UL (ref 4.1–5.7)
SERVICE CMNT-IMP: ABNORMAL
SODIUM SERPL-SCNC: 136 MMOL/L (ref 136–145)
WBC # BLD AUTO: 9.1 K/UL (ref 4.1–11.1)

## 2022-09-16 PROCEDURE — 85027 COMPLETE CBC AUTOMATED: CPT

## 2022-09-16 PROCEDURE — 74011000250 HC RX REV CODE- 250: Performed by: STUDENT IN AN ORGANIZED HEALTH CARE EDUCATION/TRAINING PROGRAM

## 2022-09-16 PROCEDURE — 80048 BASIC METABOLIC PNL TOTAL CA: CPT

## 2022-09-16 PROCEDURE — 99233 SBSQ HOSP IP/OBS HIGH 50: CPT | Performed by: INTERNAL MEDICINE

## 2022-09-16 PROCEDURE — 74011250637 HC RX REV CODE- 250/637: Performed by: STUDENT IN AN ORGANIZED HEALTH CARE EDUCATION/TRAINING PROGRAM

## 2022-09-16 PROCEDURE — 36415 COLL VENOUS BLD VENIPUNCTURE: CPT

## 2022-09-16 PROCEDURE — 65270000046 HC RM TELEMETRY

## 2022-09-16 PROCEDURE — 74011250636 HC RX REV CODE- 250/636: Performed by: INTERNAL MEDICINE

## 2022-09-16 RX ADMIN — VALSARTAN 160 MG: 160 TABLET, FILM COATED ORAL at 08:41

## 2022-09-16 RX ADMIN — ALLOPURINOL 100 MG: 100 TABLET ORAL at 08:42

## 2022-09-16 RX ADMIN — ACETAMINOPHEN 650 MG: 325 TABLET ORAL at 14:28

## 2022-09-16 RX ADMIN — SODIUM CHLORIDE, PRESERVATIVE FREE 10 ML: 5 INJECTION INTRAVENOUS at 07:00

## 2022-09-16 RX ADMIN — ACETAMINOPHEN 650 MG: 325 TABLET ORAL at 20:51

## 2022-09-16 RX ADMIN — ACETAMINOPHEN 650 MG: 325 TABLET ORAL at 01:52

## 2022-09-16 RX ADMIN — SODIUM CHLORIDE, PRESERVATIVE FREE 10 ML: 5 INJECTION INTRAVENOUS at 23:59

## 2022-09-16 RX ADMIN — LEVOFLOXACIN 500 MG: 5 INJECTION, SOLUTION INTRAVENOUS at 08:40

## 2022-09-16 RX ADMIN — SODIUM CHLORIDE, PRESERVATIVE FREE 10 ML: 5 INJECTION INTRAVENOUS at 14:29

## 2022-09-16 RX ADMIN — ACETAMINOPHEN 650 MG: 325 TABLET ORAL at 08:42

## 2022-09-16 NOTE — PROGRESS NOTES
pt continuous bladder irrigation has been clipped  since 1130 no neftali pain or discomfort reported . Viding dark felice urine no clot notes all day .

## 2022-09-16 NOTE — PROGRESS NOTES
End of Shift Note    Bedside shift change report given to *** (oncoming nurse) by Cristal Jo RN (offgoing nurse). Report included the following information SBAR    Shift worked:  7am-7pm     Shift summary and any significant changes:     Bladder irrigation has been off no c/o pain or discomfort reported     Concerns for physician to address:  None      Zone phone for oncoming shift:   ***       Activity:  Activity Level: Up with Assistance  Number times ambulated in hallways past shift: 0  Number of times OOB to chair past shift: 0    Cardiac:   Cardiac Monitoring: Yes      Cardiac Rhythm: Sinus Rhythm    Access:  Current line(s): PIV     Genitourinary:   Urinary status: samaniego    Respiratory:   O2 Device: None (Room air)  Chronic home O2 use?: NO  Incentive spirometer at bedside: YES       GI:     Current diet:  ADULT DIET Regular; Low Sodium (2 gm)  Passing flatus: YES  Tolerating current diet: YES       Pain Management:   Patient states pain is manageable on current regimen: YES    Skin:  Oswaldo Score: 20  Interventions: float heels    Patient Safety:  Fall Score:  Total Score: 3  Interventions: bed/chair alarm  High Fall Risk: Yes    Length of Stay:  Expected LOS: 2d 7h  Actual LOS: 2      Ti Bishop Cartagena, RN

## 2022-09-16 NOTE — PROGRESS NOTES
End of Shift Note    Bedside shift change report given to Neelima PANCHAL  (oncoming nurse) by Rubin Spatz, RN (offgoing nurse). Report included the following information SBAR    Shift worked:  7am-7pm     Shift summary and any significant changes:     Irrigation tube been stop all day no pain or discomfort noted     Concerns for physician to address:  None      Zone phone for oncoming shift:          Activity:  Activity Level: Up with Assistance  Number times ambulated in hallways past shift: 0  Number of times OOB to chair past shift: 0    Cardiac:   Cardiac Monitoring: Yes      Cardiac Rhythm: Sinus Rhythm    Access:  Current line(s): PIV     Genitourinary:   Urinary status: voiding    Respiratory:   O2 Device: None (Room air)  Chronic home O2 use?: YES  Incentive spirometer at bedside: NO       GI:     Current diet:  ADULT DIET Regular; Low Sodium (2 gm)  Passing flatus: YES  Tolerating current diet: YES       Pain Management:   Patient states pain is manageable on current regimen: YES    Skin:  Oswaldo Score: 20  Interventions: float heels and increase time out of bed    Patient Safety:  Fall Score:  Total Score: 3  Interventions: bed/chair alarm and gripper socks  High Fall Risk: Yes    Length of Stay:  Expected LOS: 2d 7h  Actual LOS: 2      Ti Amber Kaye RN

## 2022-09-16 NOTE — PROGRESS NOTES
Progress Note    Patient: Rigo Forman MRN: [id number]  SSN: [id number]    YOB: 1934  Age: 80 y.o. Sex: male          ADMITTED:  2022 to Kecia Segura MD  for Hematuria [R31.9]           Rigo Forman was admitted for Hematuria [R31.9]. He was passing clots in ED, a 24 F large bore 3 way samaniego was placed. Clots irrigated out and cbi initiated. Hx of cva on plavix and ASA now currently held. Hx of radiation cystitis with prostate cancer hx. Now x2 days of CBI, this am cbi on very slow gtt with clear pink urine. Samaniego was just irrigated by nursing staff and no clots expelled. No issues with urine leaking or suprapubic pain overnight. HD stable. Creat remains at NL. Urine cx Klebsiella   Cbi turned off this am     Vitals:  Temp (24hrs), Av °F (36.7 °C), Min:97.7 °F (36.5 °C), Max:98.3 °F (36.8 °C)     Blood pressure (!) 149/65, pulse 69, temperature 97.8 °F (36.6 °C), resp. rate 16, height 6' 2.02\" (1.88 m), weight 87 kg (191 lb 11.2 oz), SpO2 98 %. I&O's:   1901 -  0700  In: 01375 [P.O.:238]  Out: 32864 [Urine:06922]   No intake/output data recorded.      Exam:   Appearance: well-developed NAD   Neck: supple   Respiratory Effort: breathing easily   Lower Extremity: no edema   Abdomen/Flank: soft non-tender without masses; no CVA tenderness   Rectal: deferred  Hemoccult: not indicated   Scrotum: without lesions   Testes: bilaterally non-tender, no masses   Epididymes: bilaterally no masses palpated, non-tender   Penis: no plaques; no lesions   Meatus: patent   Prostate:deferred  SV's: non-palpable   Lymph: no adenopathy   Skin Inspection: warm and dry   Mood/Affect: normal     Labs:   Recent Labs     22  0158 09/15/22  0416 22  0439 22   WBC 9.1 8.3  --  6.7   HGB 11.2* 11.2* 12.9 13.8   HCT 33.3* 32.2*  --  40.8    170  --  196     Recent Labs     22  0158 09/15/22  0416 22  2156  136 136   K 4.2 3.9 4.0    103 102   CO2 28 26 30   * 121* 110*   BUN 26* 26* 19   CREA 1.06 1.00 1.27   CA 9.0 8.8 8.9        Cultures:        Imaging:       Assessment:     - Principal Problem:    Hematuria (9/14/2022)    Active Problems:    CVA (cerebrovascular accident) ([facility] [address] (8/19/2017)      Overview: L temporoparietal 3/7/2009      CKD (chronic kidney disease), stage III ([facility] [address] (8/19/2017)      History of prostate cancer (3/30/2020)      Labile hypertension (9/14/2022)    Klebsiella UTI    Plan:     [address]- aggravated secondary to history of radiation cystitis, chronic AC use and acute klebsiella UTI  -abx per primary team  - cbi off this morning, keep off if able for clear pink/felice urine. Resume Cbi if darkens to dark wine red UA.  hand irrigate if blood clots, decreased UOP or suprapubic pain returns and may need to resume CBI afterwards. Patient instructed to notify staff of any abdominal discomfort or pressure in the abdomen or suprapubic area. Discussed with patient and wife will hold off any anticoagulants with the current bleeding and will continue to monitor  - ideally holding all AC until after outpatient cystoscopy   -possible voiding trial if able to remain off cbi tomorrow morning.    + this afternoon, urine dark felice colored, still no clots. Cbi remains off.  Voiding trial in AM. His follow up with Dr. Madelyn Carrera has been arranged with office cystoscopy after resolution of infection     Discussed with Dr. Mariajose Recio By: Emil Maier, MIRYAM - September 16, 2022

## 2022-09-16 NOTE — PROGRESS NOTES
INTERNAL MEDICINE PROGRESS NOTE    NAME:  Yadiel Sahu   :   1934   MRN:   [id number]     Date/Time:  2022 6:14 AM  Subjective:   History:  Chart reviewed and patient seen and examined and D/W his nurse and his wife this AM and all events noted. He is well-known to me with history of labile hypertension, hyperlipidemia, prior CVA 2009 without residual, CKD stage III, and history of prostate cancer diagnosed 9 years ago without complications. He was in his usual state of health working in the yard on  and then around 6 PM passed significant amount of blood in his urine with clots and presented to the emergency room. In the emergency room CT revealed question of a mass versus clot in the bladder and the fact that he was still passing bloody urine with clots he was admitted to the hospital for further work-up and evaluation. This AM he notes no abdominal pain or nausea vomiting has no back pain. He continues with CBI w/o leakage around the catheter since clot removed yesterday with irrigation. He denies any headaches, dizziness or neurologic complaints. He notes no chest pain, shortness of breath or cardiac or respiratory complaints. He notes no change of his chronic arthritic complaints and no other complaints on complete your systems.     Medications reviewed:  Current Facility-Administered Medications   Medication Dose Route Frequency    levoFLOXacin (LEVAQUIN) 500 mg in D5W IVPB  500 mg IntraVENous Q24H    allopurinoL (ZYLOPRIM) tablet 100 mg  100 mg Oral DAILY    valsartan (DIOVAN) tablet 160 mg  160 mg Oral DAILY    sodium chloride (NS) flush 5-40 mL  5-40 mL IntraVENous Q8H    sodium chloride (NS) flush 5-40 mL  5-40 mL IntraVENous PRN    acetaminophen (TYLENOL) tablet 650 mg  650 mg Oral Q6H PRN    Or    acetaminophen (TYLENOL) suppository 650 mg  650 mg Rectal Q6H PRN    polyethylene glycol (MIRALAX) packet 17 g  17 g Oral DAILY PRN    ondansetron (ZOFRAN ODT) tablet 4 mg  4 mg Oral Q8H PRN    Or    ondansetron (ZOFRAN) injection 4 mg  4 mg IntraVENous Q6H PRN        Objective:   Vitals:  Visit Vitals  /63 (BP 1 Location: Right upper arm, BP Patient Position: At rest)   Pulse 77   Temp 98.3 °F (36.8 °C)   Resp 19   Ht 6' 2\" (1.88 m)   Wt 199 lb 11.8 oz (90.6 kg)   SpO2 97%   BMI 25.64 kg/m²      O2 Device: None (Room air) Temp (24hrs), Av °F (36.7 °C), Min:97.7 °F (36.5 °C), Max:98.3 °F (36.8 °C)      Last 24hr Input/Output:    Intake/Output Summary (Last 24 hours) at 2022 4700  Last data filed at 2022 0334  Gross per 24 hour   Intake 5338 ml   Output 7300 ml   Net -1962 ml          PHYSICAL EXAM:  General:     Alert, cooperative, no distress, appears stated age. Head:    Normocephalic, without obvious abnormality, atraumatic. Eyes:    Conjunctivae/corneas clear. PERRLA  Nose:   Nares normal. No drainage or sinus tenderness. Throat:     Lips, mucosa, and tongue normal.  No Thrush  Neck:   Supple, symmetrical,  no adenopathy, thyroid: non tender     no carotid bruit and no JVD. Back:     Symmetric,  No CVA tenderness. Lungs:    Clear to auscultation bilaterally. No Wheezing or Rhonchi. No rales. Heart:    Regular rate and rhythm,  no murmur, rub or gallop. Abdomen:    Soft, non-tender. Not distended. Bowel sounds normal. No masses. Schaffer in for CBI now w/o output  Extremities:  Extremities normal, atraumatic, No cyanosis. No edema. No clubbing  Lymph nodes:  Cervical, supraclavicular normal.  Neurologic:  Normal strength, Alert and oriented X 3.    Skin:                No rash      Lab Data Reviewed:    Recent Results (from the past 24 hour(s))   METABOLIC PANEL, BASIC    Collection Time: 22  1:58 AM   Result Value Ref Range    Sodium 136 136 - 145 mmol/L    Potassium 4.2 3.5 - 5.1 mmol/L    Chloride 102 97 - 108 mmol/L    CO2 28 21 - 32 mmol/L    Anion gap 6 5 - 15 mmol/L    Glucose 129 (H) 65 - 100 mg/dL    BUN 26 (H) 6 - 20 MG/DL    Creatinine 1.06 0.70 - 1.30 MG/DL    BUN/Creatinine ratio 25 (H) 12 - 20      GFR est AA >60 >60 ml/min/1.73m2    GFR est non-AA >60 >60 ml/min/1.73m2    Calcium 9.0 8.5 - 10.1 MG/DL   CBC W/O DIFF    Collection Time: 09/16/22  1:58 AM   Result Value Ref Range    WBC 9.1 4.1 - 11.1 K/uL    RBC 3.32 (L) 4.10 - 5.70 M/uL    HGB 11.2 (L) 12.1 - 17.0 g/dL    HCT 33.3 (L) 36.6 - 50.3 %    .3 (H) 80.0 - 99.0 FL    MCH 33.7 26.0 - 34.0 PG    MCHC 33.6 30.0 - 36.5 g/dL    RDW 12.8 11.5 - 14.5 %    PLATELET 213 810 - 640 K/uL    MPV 9.6 8.9 - 12.9 FL    NRBC 0.0 0  WBC    ABSOLUTE NRBC 0.00 0.00 - 0.01 K/uL         Assessment/Plan:     Principal Problem:    Hematuria (9/14/2022)    Active Problems:    [facility] (8/19/2017)      Overview: L temporoparietal 3/7/2009      CKD (chronic kidney disease), stage III ([facility] [address] (8/19/2017)      History of prostate cancer (3/30/2020)      Labile hypertension (9/14/2022)     ___________________________________________________  PLAN:    1. Continuous bladder irrigation per urology discussion with nurse practitioner 9/14 AM, Still blood with irrigation, I think he need cystoscopy (HOPEFULLY TODAY). He at least neds to be seen in person by [address] [place] [address] Physician to answer his and family questions  2. Holding aspirin and Plavix for now but will need to resume aspirin once hematuria controlled with previous history of CVA  3. Follow hemoglobin which was 13.8 on admission and 11.2 this AM.  4.  Follow renal function, 19/1.27 admission, now 26/1.06  5. Follow blood pressure closely with known labile hypertension. Continue Valsartan 160 mg daily which is equivalent to one half of his home dose (At home Telmisartan 80 every day), BP variable which I am certain is due to worthless digital BP cuffs  6. Continue allopurinol with history of gout  7. Hold fish oil as could exacerbate bleeding  8   Holding Rapaflo as Schaffer catheter in now  9.   Urine culture sent which I will follow-up on, Klebsiella S Levaquin so yesterday started Levaquin IV  10. PT to mobilize  11. SCDs since off anticoagulation due to hematuria      35 Minutes spent today in direct care of this high complexity patient with greater than 50% in counseling and coordination of care.     If need to contact me use hospital  [phone], DO NOT USE PERFECT SERVE    ___________________________________________________    Attending Physician: Paul Menjivar MD

## 2022-09-16 NOTE — PROGRESS NOTES
Problem: Infection - Risk of, Urinary Catheter-Associated Urinary Tract Infection  Goal: *Absence of infection signs and symptoms  Outcome: Progressing Towards Goal     Problem: Falls - Risk of  Goal: *Absence of Falls  Description: Document Mary Fall Risk and appropriate interventions in the flowsheet. Outcome: Progressing Towards Goal  Note: Fall Risk Interventions:  Mobility Interventions: Bed/chair exit alarm, Patient to call before getting OOB         Medication Interventions: Bed/chair exit alarm, Patient to call before getting OOB, Teach patient to arise slowly    Elimination Interventions: Call light in reach, Bed/chair exit alarm, Patient to call for help with toileting needs    Problem: Pressure Injury - Risk of  Goal: *Prevention of pressure injury  Description: Document Oswaldo Scale and appropriate interventions in the flowsheet.   Outcome: Progressing Towards Goal  Note: Pressure Injury Interventions:  Sensory Interventions: Assess changes in LOC, Chair cushion, Discuss PT/OT consult with provider, Float heels, Keep linens dry and wrinkle-free, Maintain/enhance activity level, Minimize linen layers, Monitor skin under medical devices    Activity Interventions: Increase time out of bed    Mobility Interventions: Chair cushion, Float heels, HOB 30 degrees or less    Nutrition Interventions: Document food/fluid/supplement intake, Offer support with meals,snacks and hydration    Friction and Shear Interventions: Minimize layers, Apply protective barrier, creams and emollients       Problem: Patient Education: Go to Patient Education Activity  Goal: Patient/Family Education  Outcome: Progressing Towards Goal

## 2022-09-16 NOTE — PROGRESS NOTES
Problem: Infection - Risk of, Urinary Catheter-Associated Urinary Tract Infection  Goal: *Absence of infection signs and symptoms  Outcome: Progressing Towards Goal     Problem: Patient Education: Go to Patient Education Activity  Goal: Patient/Family Education  Outcome: Progressing Towards Goal     Problem: Falls - Risk of  Goal: *Absence of Falls  Description: Document Mary Fall Risk and appropriate interventions in the flowsheet.   Outcome: Progressing Towards Goal  Note: Fall Risk Interventions:  Mobility Interventions: Assess mobility with egress test, Bed/chair exit alarm         Medication Interventions: Assess postural VS orthostatic hypotension, Bed/chair exit alarm    Elimination Interventions: Bed/chair exit alarm, Call light in reach

## 2022-09-17 ENCOUNTER — APPOINTMENT (OUTPATIENT)
Dept: GENERAL RADIOLOGY | Age: 87
DRG: 696 | End: 2022-09-17
Attending: INTERNAL MEDICINE
Payer: MEDICARE

## 2022-09-17 LAB
ANION GAP SERPL CALC-SCNC: 4 MMOL/L (ref 5–15)
BUN SERPL-MCNC: 25 MG/DL (ref 6–20)
BUN/CREAT SERPL: 26 (ref 12–20)
CALCIUM SERPL-MCNC: 8.5 MG/DL (ref 8.5–10.1)
CHLORIDE SERPL-SCNC: 103 MMOL/L (ref 97–108)
CO2 SERPL-SCNC: 28 MMOL/L (ref 21–32)
CREAT SERPL-MCNC: 0.98 MG/DL (ref 0.7–1.3)
ERYTHROCYTE [DISTWIDTH] IN BLOOD BY AUTOMATED COUNT: 12.5 % (ref 11.5–14.5)
GLUCOSE SERPL-MCNC: 129 MG/DL (ref 65–100)
HCT VFR BLD AUTO: 30.5 % (ref 36.6–50.3)
HGB BLD-MCNC: 10.6 G/DL (ref 12.1–17)
MCH RBC QN AUTO: 34.1 PG (ref 26–34)
MCHC RBC AUTO-ENTMCNC: 34.8 G/DL (ref 30–36.5)
MCV RBC AUTO: 98.1 FL (ref 80–99)
NRBC # BLD: 0 K/UL (ref 0–0.01)
NRBC BLD-RTO: 0 PER 100 WBC
PLATELET # BLD AUTO: 200 K/UL (ref 150–400)
PMV BLD AUTO: 9.4 FL (ref 8.9–12.9)
POTASSIUM SERPL-SCNC: 4.5 MMOL/L (ref 3.5–5.1)
RBC # BLD AUTO: 3.11 M/UL (ref 4.1–5.7)
SODIUM SERPL-SCNC: 135 MMOL/L (ref 136–145)
WBC # BLD AUTO: 7.5 K/UL (ref 4.1–11.1)

## 2022-09-17 PROCEDURE — 36415 COLL VENOUS BLD VENIPUNCTURE: CPT

## 2022-09-17 PROCEDURE — 99233 SBSQ HOSP IP/OBS HIGH 50: CPT | Performed by: INTERNAL MEDICINE

## 2022-09-17 PROCEDURE — 73560 X-RAY EXAM OF KNEE 1 OR 2: CPT

## 2022-09-17 PROCEDURE — 85027 COMPLETE CBC AUTOMATED: CPT

## 2022-09-17 PROCEDURE — 97110 THERAPEUTIC EXERCISES: CPT

## 2022-09-17 PROCEDURE — 74011000250 HC RX REV CODE- 250: Performed by: HOSPITALIST

## 2022-09-17 PROCEDURE — 74011250637 HC RX REV CODE- 250/637: Performed by: HOSPITALIST

## 2022-09-17 PROCEDURE — 80048 BASIC METABOLIC PNL TOTAL CA: CPT

## 2022-09-17 PROCEDURE — 97116 GAIT TRAINING THERAPY: CPT

## 2022-09-17 PROCEDURE — 97161 PT EVAL LOW COMPLEX 20 MIN: CPT

## 2022-09-17 PROCEDURE — 73552 X-RAY EXAM OF FEMUR 2/>: CPT

## 2022-09-17 PROCEDURE — 74011000250 HC RX REV CODE- 250: Performed by: STUDENT IN AN ORGANIZED HEALTH CARE EDUCATION/TRAINING PROGRAM

## 2022-09-17 PROCEDURE — 74011250637 HC RX REV CODE- 250/637: Performed by: INTERNAL MEDICINE

## 2022-09-17 PROCEDURE — 97530 THERAPEUTIC ACTIVITIES: CPT

## 2022-09-17 PROCEDURE — 65270000046 HC RM TELEMETRY

## 2022-09-17 PROCEDURE — 74011250636 HC RX REV CODE- 250/636: Performed by: INTERNAL MEDICINE

## 2022-09-17 PROCEDURE — 74011250637 HC RX REV CODE- 250/637: Performed by: STUDENT IN AN ORGANIZED HEALTH CARE EDUCATION/TRAINING PROGRAM

## 2022-09-17 RX ORDER — TRAMADOL HYDROCHLORIDE 50 MG/1
50 TABLET ORAL
Status: COMPLETED | OUTPATIENT
Start: 2022-09-17 | End: 2022-09-17

## 2022-09-17 RX ORDER — TRAMADOL HYDROCHLORIDE 50 MG/1
50 TABLET ORAL
Status: DISCONTINUED | OUTPATIENT
Start: 2022-09-17 | End: 2022-09-22 | Stop reason: HOSPADM

## 2022-09-17 RX ORDER — LIDOCAINE 40 MG/G
CREAM TOPICAL AS NEEDED
Status: DISCONTINUED | OUTPATIENT
Start: 2022-09-17 | End: 2022-09-22 | Stop reason: HOSPADM

## 2022-09-17 RX ADMIN — TRAMADOL HYDROCHLORIDE 50 MG: 50 TABLET, COATED ORAL at 21:03

## 2022-09-17 RX ADMIN — TRAMADOL HYDROCHLORIDE 50 MG: 50 TABLET, COATED ORAL at 00:28

## 2022-09-17 RX ADMIN — SODIUM CHLORIDE, PRESERVATIVE FREE 10 ML: 5 INJECTION INTRAVENOUS at 21:01

## 2022-09-17 RX ADMIN — LIDOCAINE: 40 CREAM TOPICAL at 03:52

## 2022-09-17 RX ADMIN — ACETAMINOPHEN 650 MG: 325 TABLET ORAL at 17:36

## 2022-09-17 RX ADMIN — LEVOFLOXACIN 500 MG: 5 INJECTION, SOLUTION INTRAVENOUS at 07:59

## 2022-09-17 RX ADMIN — VALSARTAN 160 MG: 160 TABLET, FILM COATED ORAL at 08:01

## 2022-09-17 RX ADMIN — ALLOPURINOL 100 MG: 100 TABLET ORAL at 08:01

## 2022-09-17 RX ADMIN — TRAMADOL HYDROCHLORIDE 50 MG: 50 TABLET, COATED ORAL at 10:53

## 2022-09-17 RX ADMIN — ACETAMINOPHEN 650 MG: 325 TABLET ORAL at 07:58

## 2022-09-17 NOTE — PROGRESS NOTES
INTERNAL MEDICINE ATTENDING NOTE     Patient Name: Yadiel Sahu   : 1934   Admit: 2022    ASSESSMENT / PLAN    Hematuria: He is s/p Continuous bladder irrigation per urology. Holding aspirin and Plavix for now but will need to resume aspirin once hematuria controlled with previous history of CVA. History of CVA L tempoparietal.   History of prostate cancer. Anemia: Follow hemoglobin which was 13.8 on admission and 10.6 this AM.  HTN: Follow blood pressure closely with known labile hypertension. Continue Valsartan 160 mg daily which is equivalent to one half of his home dose (At home Telmisartan 80 every day). Gout: Continue allopurinol. L leg and hip pain: Per request will check Xray. Likely arthritis. Add tramadol prn for pain. Hold fish oil as could exacerbate bleeding\  Holding Rapaflo as Schaffer catheter in now  UTI: Urine culture Klebsiella S Levaquin. Continue Levaquin IV. Leg pain: PT to mobilize  SCDs since off anticoagulation due to hematuria  CODE: Full code. SUBJECTIVE: Mr. Yadiel Sahu is a patient of Hallie Mehta MD and was seen by me today during rounds. At this time, he is resting comfortably. He has new complaint today of L leg pain, fairly severe; Wife requests xray. Urine continues to be blood-tinged. ROS otherwise unremarkable except as noted elsewhere. OBJECTIVE: Blood pressure (!) 167/74, pulse 71, temperature 97.5 °F (36.4 °C), resp. rate 18, height 6' 2.02\" (1.88 m), weight 191 lb 11.2 oz (87 kg), SpO2 98 %. Gen: Patient is in no acute distress. Lungs: CTAB. Heart: RRR. Abd: S, NT, ND, BS present. Exremities: Warm.      Recent Results (from the past 12 hour(s))   CBC W/O DIFF    Collection Time: 22  4:14 AM   Result Value Ref Range    WBC 7.5 4.1 - 11.1 K/uL    RBC 3.11 (L) 4.10 - 5.70 M/uL    HGB 10.6 (L) 12.1 - 17.0 g/dL    HCT 30.5 (L) 36.6 - 50.3 %    MCV 98.1 80.0 - 99.0 FL    MCH 34.1 (H) 26.0 - 34.0 PG    MCHC 34.8 30.0 - 36.5 g/dL    RDW 12.5 11.5 - 14.5 %    PLATELET 186 776 - 110 K/uL    MPV 9.4 8.9 - 12.9 FL    NRBC 0.0 0  WBC    ABSOLUTE NRBC 0.00 0.00 - 0.59 K/uL   METABOLIC PANEL, BASIC    Collection Time: 09/17/22  4:14 AM   Result Value Ref Range    Sodium 135 (L) 136 - 145 mmol/L    Potassium 4.5 3.5 - 5.1 mmol/L    Chloride 103 97 - 108 mmol/L    CO2 28 21 - 32 mmol/L    Anion gap 4 (L) 5 - 15 mmol/L    Glucose 129 (H) 65 - 100 mg/dL    BUN 25 (H) 6 - 20 MG/DL    Creatinine 0.98 0.70 - 1.30 MG/DL    BUN/Creatinine ratio 26 (H) 12 - 20      GFR est AA >60 >60 ml/min/1.73m2    GFR est non-AA >60 >60 ml/min/1.73m2    Calcium 8.5 8.5 - 10.1 MG/DL         Jayden Shepard MD, FACP  Best contact is via Pager: [phone], or via hospital  at [phone]. I can also be reached by Perfect Serve.

## 2022-09-17 NOTE — PROGRESS NOTES
Urology Progress Note    Patient: Brooke Cruz MRN: [id number]  SSN: [id number]    YOB: 1934  Age: 80 y.o. Sex: male        ADMITTED: 2022 to Regine Bose MD for Hematuria [R31.9]  POD# * No surgery found *     Urine remains completely clear off CBI. He c/o left leg pain and reports that he has not been able to get OOB or walk for 3 days. Vitals: Temp (24hrs), Av.9 °F (36.6 °C), Min:97.5 °F (36.4 °C), Max:98.4 °F (36.9 °C)                   Blood pressure (!) 151/67, pulse 70, temperature 98 °F (36.7 °C), resp. rate 18, height 6' 2.02\" (1.88 m), weight 87 kg (191 lb 11.2 oz), SpO2 98 %. Intake and Output:  09/15 1901 -  0700  In: 1740 [P.O.:240]  Out: 4350 [Urine:4350]            Labs:  Labs:   Lab Results   Component Value Date/Time    WBC 7.5 2022 04:14 AM    HGB 10.6 (L) 2022 04:14 AM    Creatinine 0.98 2022 04:14 AM         Assessment/Plan:   Urine is completley clear. Keep samaniego for now given his limited mobility. Possible voiding trial Monday if ambulatory. Call if any changes over weekend.          Signed By: Vanita Soni MD - 2022

## 2022-09-17 NOTE — PROGRESS NOTES
End of Shift Note     Bedside shift change report given to ANSLEY Vazquez  (oncoming nurse) by Mukul Fuentes RN (offgoing nurse). Report included the following information SBAR     Shift worked:  [id number]      Shift summary and any significant changes:      Irrigation tube stopped all night no pain or discomfort noted. Patient urine is currently clear with a slight red undertone. Concerns for physician to address:  Pt is experiencing severe L knee pain. Tylenol, Tramadol, Ice/warm compresses, Lidocaine cream given over night. Patient states \"nothing helped mitigate the pain\" Currently states \"7-8/10 pain\". Zone phone for oncoming shift:            Activity:  Activity Level: Up with Assistance  Number times ambulated in hallways past shift: 0  Number of times OOB to chair past shift: 0     Cardiac:   Cardiac Monitoring: Yes      Cardiac Rhythm: Sinus Rhythm     Access:  Current line(s): PIV      Genitourinary:   Urinary status: voiding     Respiratory:   O2 Device: None (Room air)  Chronic home O2 use?: YES  Incentive spirometer at bedside: NO     GI:  Current diet:  ADULT DIET Regular; Low Sodium (2 gm)  Passing flatus: YES  Tolerating current diet: YES     Pain Management:   Patient states pain is manageable on current regimen: YES     Skin:  Oswaldo Score: 20  Interventions: float heels and increase time out of bed    Patient Safety:  Fall Score:  Total Score: 3  Interventions: bed/chair alarm and gripper socks  High Fall Risk: Yes     Length of Stay:  Expected LOS: 2d 7h  Actual LOS: 654 Brando Bobo RN

## 2022-09-17 NOTE — PROGRESS NOTES
Physical Therapy    Order received and chart reviewed. Pt is currently preparing to leave the floor to have his L knee X rayed due to insidious significant pain 7-8/10. Will defer and follow up with pt later today.

## 2022-09-17 NOTE — PROGRESS NOTES
Problem: Falls - Risk of  Goal: *Absence of Falls  Description: Document Chris Gibson Fall Risk and appropriate interventions in the flowsheet. Outcome: Progressing Towards Goal  Note: Fall Risk Interventions:  Mobility Interventions: Assess mobility with egress test, Bed/chair exit alarm         Medication Interventions: Assess postural VS orthostatic hypotension, Bed/chair exit alarm    Elimination Interventions: Bed/chair exit alarm, Call light in reach              Problem: Pressure Injury - Risk of  Goal: *Prevention of pressure injury  Description: Document Oswaldo Scale and appropriate interventions in the flowsheet.   Outcome: Progressing Towards Goal  Note: Pressure Injury Interventions:  Sensory Interventions: Assess changes in LOC, Assess need for specialty bed         Activity Interventions: Assess need for specialty bed, Chair cushion, Increase time out of bed    Mobility Interventions: Assess need for specialty bed, Chair cushion, Float heels    Nutrition Interventions: Document food/fluid/supplement intake    Friction and Shear Interventions: Apply protective barrier, creams and emollients, Feet elevated on foot rest

## 2022-09-17 NOTE — PROGRESS NOTES
Patient transfer to [facility] report given to YIFAN BOUDREAUX [facility]. Family member request to finish eating dinner so we  awaiting.

## 2022-09-17 NOTE — PROGRESS NOTES
Problem: Mobility Impaired (Adult and Pediatric)  Goal: *Acute Goals and Plan of Care (Insert Text)  Description: FUNCTIONAL STATUS PRIOR TO ADMISSION: Patient was independent and active without use of DME.    HOME SUPPORT PRIOR TO ADMISSION: The patient lived with wife but did not require assist.    Physical Therapy Goals  Initiated 9/17/2022  1. Patient will move from supine to sit and sit to supine , scoot up and down, and roll side to side in bed with modified independence within 7 day(s). 2.  Patient will transfer from bed to chair and chair to bed with modified independence using the least restrictive device within 7 day(s). 3.  Patient will perform sit to stand with modified independence within 7 day(s). 4.  Patient will ambulate with modified independence for 150 feet with the least restrictive device within 7 day(s). 5.  Patient will ascend/descend 13 stairs with handrail(s) with modified independence within 7 day(s). Outcome: Progressing Towards Goal   PHYSICAL THERAPY EVALUATION  Patient: Merrill Pierre (1 y.o. male)  Date: 9/17/2022  Primary Diagnosis: Hematuria [R31.9]       Precautions:        ASSESSMENT  Based on the objective data described below, the patient presents with severe L knee/hip pain, impaired mobility, orthostatic hypotension, and overall decreased activity tolerance. Pt received in bed, wife present. X rays taken today of L femur and knee negative, just showing osteoarthritis. Pt states he feels well other than the LLE pain. He reports he normally puts a cream on his LLE every day and takes pain medication as needed. Pt only receiving tylenol and tramadol here. Overall pt requiring verbal cues and CGA with mobility using RW. Pt unfortunately with orthostatic hypotension in standing the came on quicker with each standing trial.  Pt able to ambulate with RW, reporting it helps take the pressure off of his LLE.   Pt left supine in recliner due to orthostatic hypotension after trying to step back to the recliner from the bedside commode. Recommend HHPT at discharge. /66 with amb after 10ft,   107/48 after seated rest then 3ft amb   117/54  after a few steps back to recline position in recliner   Pts diastolic BP was running in the 150s-160s supine in bed    Current Level of Function Impacting Discharge (mobility/balance): CGA, pt having to assist LLE with UE    Functional Outcome Measure: The patient scored 55/100 on the Barthel Index outcome measure which is indicative of 45% impaired function/adls. Other factors to consider for discharge: orthostatic hypotension, LLE pain control     Patient will benefit from skilled therapy intervention to address the above noted impairments. PLAN :  Recommendations and Planned Interventions: bed mobility training, transfer training, gait training, therapeutic exercises, patient and family training/education, and therapeutic activities      Frequency/Duration: Patient will be followed by physical therapy:  4 times a week to address goals. Recommendation for discharge: (in order for the patient to meet his/her long term goals)  Physical therapy at least 2 days/week in the home     This discharge recommendation:  A follow-up discussion with the attending provider and/or case management is planned    IF patient discharges home will need the following DME: patient owns DME required for discharge         SUBJECTIVE:   Patient stated this Leg really hurts.     OBJECTIVE DATA SUMMARY:   HISTORY:    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    CKD (chronic kidney disease), stage III ([address] 8/19/2017    CVA (cerebrovascular accident) ([address] 2009    DJD (degenerative joint disease) 8/19/2017    Gout Hyperlipidemia 8/19/2017    Hypertension     Hypertension with renal disease 8/19/2017    Meniere disease 8/19/2017    Nausea & vomiting     On statin therapy 8/19/2017    PAC (premature atrial contraction) 8/19/2017    Palpitation 8/19/2017    Prostate CA ([address] 8/19/2017    Pseudophakia of both eyes 10/16/2018    Rosacea 8/19/2017    Testosterone deficiency 8/19/2017     Past Surgical History:   Procedure Laterality Date    COLORECTAL SCRN; HI RISK IND  5/3/2016         HX HEENT  1970    tonsillectomy    HX HEENT  1980    basal cell carcinoma right ear    HX OTHER SURGICAL Right 01/21/2018    EXCISION RIGHT NECK MASS     HX PROSTATECTOMY      radiation only    MN ABDOMEN SURGERY PROC UNLISTED      bilateral inguinal hernia repair    MN COLONOSCOPY FLX DX W/COLLJ SPEC WHEN PFRMD  1/19/2011            Personal factors and/or comorbidities impacting plan of care: medical status, LLE pain control    Home Situation  Home Environment: Private residence  # Steps to Enter: (P) 13  Rails to Enter: (P) Yes  Hand Rails : (P) Right  One/Two Story Residence: (P) One story  Living Alone: No  Support Systems: (P) Spouse/Significant Other  Patient Expects to be Discharged to[de-identified] (P) Home  Current DME Used/Available at Home: (P) Cane, straight, Walker, rolling, Grab bars  Tub or Shower Type: (P) Shower (built in seat)    EXAMINATION/PRESENTATION/DECISION MAKING:   Critical Behavior:  Neurologic State: Alert  Orientation Level: Oriented X4  Cognition: Follows commands     Hearing: Auditory  Auditory Impairment: Hard of hearing, bilateral  Hearing Aids/Status: Bilateral, With patient  Range Of Motion:  AROM: Within functional limits (L hip/knee decreased due to pain)            Strength:    Strength:  Within functional limits            Tone & Sensation:   Tone: Normal              Sensation: Intact               Coordination:  Coordination: Within functional limits     Functional Mobility:  Bed Mobility:  Rolling: Stand-by assistance  Supine to Sit: Stand-by assistance     Scooting: Contact guard assistance  Transfers:  Sit to Stand: Contact guard assistance (height of bed elevated, vc for technique)  Stand to Sit: Contact guard assistance                 Balance:   Sitting: Intact  Standing: Intact; With support  Ambulation/Gait Training:  Distance (ft): 18 Feet (ft) (12,3,3)  Assistive Device: Gait belt;Walker, rolling  Ambulation - Level of Assistance: Contact guard assistance     Gait Description (WDL): Exceptions to WDL  Gait Abnormalities: Antalgic;Decreased step clearance; Step to gait     Left Side Weight Bearing: As tolerated     Stance: Left decreased  Speed/Sharon: Slow  Step Length: Right shortened;Left shortened    Functional Measure:  Barthel Index:    Bathin  Bladder: 0  Bowels: 10  Groomin  Dressin  Feeding: 10  Mobility: 5  Stairs: 5  Toilet Use: 5  Transfer (Bed to Chair and Back): 10  Total: 55/100       The Barthel ADL Index: Guidelines  1. The index should be used as a record of what a patient does, not as a record of what a patient could do. 2. The main aim is to establish degree of independence from any help, physical or verbal, however minor and for whatever reason. 3. The need for supervision renders the patient not independent. 4. A patient's performance should be established using the best available evidence. Asking the patient, friends/relatives and nurses are the usual sources, but direct observation and common sense are also important. However direct testing is not needed. 5. Usually the patient's performance over the preceding 24-48 hours is important, but occasionally longer periods will be relevant. 6. Middle categories imply that the patient supplies over 50 per cent of the effort. 7. Use of aids to be independent is allowed.     Score Interpretation (from [address] [address])    Independent   60-79 Minimally independent   40-59 Partially dependent   20-39 Very dependent   <20 Totally dependent     Farooq Davies, Barthel, D.W. (3164). Functional evaluation: the Barthel Index. [address] ([address] (1997). The Barthel activities of daily living index: self-reporting versus actual performance in the old (> or = 75 years). Journal of [address] [place] [address] 45(7), [facility], YOUSIF, Kait Carty., Mabel Felty. (1999). Measuring the change in disability after inpatient rehabilitation; comparison of the responsiveness of the Barthel Index and Functional [place] Measure. Journal of Neurology, Neurosurgery, and Psychiatry, 66(4), 522-316. Jitendra Mrashall, N.J.A, BRIAN Vu, & Elmer Lilly MBILL. (2004) Assessment of post-stroke quality of life in cost-effectiveness studies: The usefulness of the Barthel Index and the EuroQoL-5D. Quality of Life Research, 13, 427-71        Pain Rating:  10/10 L knee/hip    Activity Tolerance:   Fair and signs and symptoms of orthostatic hypotension    After treatment patient left in no apparent distress:   Supine in bed, Call bell within reach, and Caregiver / family present    COMMUNICATION/EDUCATION:   The patients plan of care was discussed with: Registered nurse. Fall prevention education was provided and the patient/caregiver indicated understanding., Patient/family have participated as able in goal setting and plan of care. , and Patient/family agree to work toward stated goals and plan of care.     Thank you for this referral.  Khari Jha, PT   Time Calculation: 51 mins

## 2022-09-18 LAB
ANION GAP SERPL CALC-SCNC: 8 MMOL/L (ref 5–15)
BASOPHILS # BLD: 0 K/UL (ref 0–0.1)
BASOPHILS NFR BLD: 0 % (ref 0–1)
BUN SERPL-MCNC: 18 MG/DL (ref 6–20)
BUN/CREAT SERPL: 21 (ref 12–20)
CALCIUM SERPL-MCNC: 8.7 MG/DL (ref 8.5–10.1)
CHLORIDE SERPL-SCNC: 100 MMOL/L (ref 97–108)
CO2 SERPL-SCNC: 25 MMOL/L (ref 21–32)
CREAT SERPL-MCNC: 0.87 MG/DL (ref 0.7–1.3)
DIFFERENTIAL METHOD BLD: ABNORMAL
EOSINOPHIL # BLD: 0.1 K/UL (ref 0–0.4)
EOSINOPHIL NFR BLD: 1 % (ref 0–7)
ERYTHROCYTE [DISTWIDTH] IN BLOOD BY AUTOMATED COUNT: 12.4 % (ref 11.5–14.5)
GLUCOSE SERPL-MCNC: 122 MG/DL (ref 65–100)
HCT VFR BLD AUTO: 29.1 % (ref 36.6–50.3)
HGB BLD-MCNC: 10.1 G/DL (ref 12.1–17)
IMM GRANULOCYTES # BLD AUTO: 0.1 K/UL (ref 0–0.04)
IMM GRANULOCYTES NFR BLD AUTO: 1 % (ref 0–0.5)
LYMPHOCYTES # BLD: 1.1 K/UL (ref 0.8–3.5)
LYMPHOCYTES NFR BLD: 15 % (ref 12–49)
MCH RBC QN AUTO: 34 PG (ref 26–34)
MCHC RBC AUTO-ENTMCNC: 34.7 G/DL (ref 30–36.5)
MCV RBC AUTO: 98 FL (ref 80–99)
MONOCYTES # BLD: 0.9 K/UL (ref 0–1)
MONOCYTES NFR BLD: 12 % (ref 5–13)
NEUTS SEG # BLD: 5.5 K/UL (ref 1.8–8)
NEUTS SEG NFR BLD: 71 % (ref 32–75)
NRBC # BLD: 0 K/UL (ref 0–0.01)
NRBC BLD-RTO: 0 PER 100 WBC
PLATELET # BLD AUTO: 196 K/UL (ref 150–400)
PMV BLD AUTO: 9.1 FL (ref 8.9–12.9)
POTASSIUM SERPL-SCNC: 4.1 MMOL/L (ref 3.5–5.1)
RBC # BLD AUTO: 2.97 M/UL (ref 4.1–5.7)
SODIUM SERPL-SCNC: 133 MMOL/L (ref 136–145)
WBC # BLD AUTO: 7.8 K/UL (ref 4.1–11.1)

## 2022-09-18 PROCEDURE — 99233 SBSQ HOSP IP/OBS HIGH 50: CPT | Performed by: INTERNAL MEDICINE

## 2022-09-18 PROCEDURE — 74011250636 HC RX REV CODE- 250/636: Performed by: INTERNAL MEDICINE

## 2022-09-18 PROCEDURE — 65270000046 HC RM TELEMETRY

## 2022-09-18 PROCEDURE — 74011250636 HC RX REV CODE- 250/636: Performed by: STUDENT IN AN ORGANIZED HEALTH CARE EDUCATION/TRAINING PROGRAM

## 2022-09-18 PROCEDURE — 74011250637 HC RX REV CODE- 250/637: Performed by: STUDENT IN AN ORGANIZED HEALTH CARE EDUCATION/TRAINING PROGRAM

## 2022-09-18 PROCEDURE — 85025 COMPLETE CBC W/AUTO DIFF WBC: CPT

## 2022-09-18 PROCEDURE — 80048 BASIC METABOLIC PNL TOTAL CA: CPT

## 2022-09-18 PROCEDURE — 36415 COLL VENOUS BLD VENIPUNCTURE: CPT

## 2022-09-18 PROCEDURE — 74011250637 HC RX REV CODE- 250/637: Performed by: INTERNAL MEDICINE

## 2022-09-18 PROCEDURE — 74011000250 HC RX REV CODE- 250: Performed by: STUDENT IN AN ORGANIZED HEALTH CARE EDUCATION/TRAINING PROGRAM

## 2022-09-18 RX ADMIN — LEVOFLOXACIN 500 MG: 5 INJECTION, SOLUTION INTRAVENOUS at 08:56

## 2022-09-18 RX ADMIN — ONDANSETRON 4 MG: 2 INJECTION INTRAMUSCULAR; INTRAVENOUS at 03:55

## 2022-09-18 RX ADMIN — ALLOPURINOL 100 MG: 100 TABLET ORAL at 08:56

## 2022-09-18 RX ADMIN — TRAMADOL HYDROCHLORIDE 50 MG: 50 TABLET, COATED ORAL at 20:56

## 2022-09-18 RX ADMIN — SODIUM CHLORIDE, PRESERVATIVE FREE 5 ML: 5 INJECTION INTRAVENOUS at 09:00

## 2022-09-18 RX ADMIN — VALSARTAN 160 MG: 160 TABLET, FILM COATED ORAL at 08:56

## 2022-09-18 RX ADMIN — SODIUM CHLORIDE, PRESERVATIVE FREE 5 ML: 5 INJECTION INTRAVENOUS at 06:00

## 2022-09-18 RX ADMIN — ACETAMINOPHEN 650 MG: 325 TABLET ORAL at 01:38

## 2022-09-18 NOTE — PROGRESS NOTES
End of Shift Note    Bedside shift change report given to Mary Ramon RN (oncoming nurse) by Jae Rodriguez RN (offgoing nurse). Report included the following information SBAR, Kardex, Intake/Output, and MAR    Shift worked:  7p-7a     Shift summary and any significant changes:     Patient arrived to [facility] this evening. Patient noted some discomfort this shift; medication administered-- see MAR     Concerns for physician to address:  None     Zone phone for oncoming shift:          Activity:  Activity Level: Up with Assistance  Number times ambulated in hallways past shift: 0  Number of times OOB to chair past shift: 0    Cardiac:   Cardiac Monitoring: No      Cardiac Rhythm: Sinus Rhythm    Access:  Current line(s): PIV     Genitourinary:   Urinary status: samaniego    Respiratory:   O2 Device: None (Room air)         GI:  Last Bowel Movement Date: 09/15/22  Current diet:  ADULT DIET Regular; Low Sodium (2 gm)  Tolerating current diet: YES       Pain Management:   Patient states pain is manageable on current regimen: YES    Skin:  Oswaldo Score: 19  Interventions: turn team and speciality bed    Patient Safety:  Fall Score:  Total Score: 3  Interventions: bed/chair alarm, assistive device (walker, cane, etc), gripper socks, and pt to call before getting OOB  High Fall Risk: Yes    Length of Stay:  Expected LOS: 2d 7h  Actual LOS: 4      Jae Rodriguez RN

## 2022-09-18 NOTE — PROGRESS NOTES
End of Shift Note    Bedside shift change report given to *** (oncoming nurse) by Mansi Verdugo RN (offgoing nurse). Report included the following information SBAR, Kardex, Intake/Output, MAR, and Recent Results    Shift worked:  [id number]     Shift summary and any significant changes:     Pt's wife expresses desire for therapy to work with her  to be able to walk. Informed her that therapy does not work with pts over the weekend, but encouraged pt to sit on the side of the bed and perform strength exercises. Pt's wife concerned as pt almost fell x3 times the previous day with nursing staff. She states pt is very weak. Made MD aware of potential need for reeval for PT/OT as pt requires more assistance than previously noted. Offered prn pain medication, but pt not willing to take anything. Concerns for physician to address:  PT/OT; difficulty ambulating     Zone phone for oncoming shift:          Activity:  Activity Level: Up with Assistance  Number times ambulated in hallways past shift: 0  Number of times OOB to chair past shift: 2    Cardiac:   Cardiac Monitoring: No      Cardiac Rhythm: Sinus Rhythm    Access:  Current line(s): PIV     Genitourinary:   Urinary status: samaniego    Respiratory:   O2 Device: None (Room air)  Chronic home O2 use?: N/A  Incentive spirometer at bedside: YES       GI:  Last Bowel Movement Date: 09/15/22  Current diet:  ADULT DIET Regular; Low Sodium (2 gm)  Passing flatus: YES  Tolerating current diet: YES       Pain Management:   Patient states pain is manageable on current regimen: YES    Skin:  Oswaldo Score: 19  Interventions: float heels, increase time out of bed, and internal/external urinary devices    Patient Safety:  Fall Score:  Total Score: 3  Interventions: bed/chair alarm, assistive device (walker, cane, etc), gripper socks, pt to call before getting OOB, and stay with me (per policy)  High Fall Risk: Yes    Length of Stay:  Expected LOS: 2d 7h  Actual LOS: 60 [facility],

## 2022-09-18 NOTE — PROGRESS NOTES
INTERNAL MEDICINE ATTENDING NOTE     Patient Name: Rigo Forman   : 1934   Admit: 2022    ASSESSMENT / PLAN    Hematuria: Followed by Urology service. He is s/p Continuous bladder irrigation per urology. Holding aspirin and Plavix for now but will need to resume aspirin once hematuria controlled with previous history of CVA. History of CVA L tempoparietal.   History of prostate cancer. Anemia: Follow hemoglobin which was 13.8 on admission and 10.6 this AM.  HTN: Follow blood pressure closely with known labile hypertension. Continue Valsartan 160 mg daily which is equivalent to one half of his home dose (At home Telmisartan 80 every day). Gout: Continue allopurinol. L leg and hip pain: Xrays showed no acute abnormality, and osteoarthritis. Added tramadol prn for pain. Hold fish oil as could exacerbate bleeding  Holding Rapaflo as Schaffer catheter in now  UTI: Urine culture Klebsiella S Levaquin. Continue Levaquin IV. Leg pain: PT to mobilize  SCDs since off anticoagulation due to hematuria  CODE: Full code. SUBJECTIVE: Mr. Rigo Forman is a patient of Kecia Segura MD and was seen by me today during rounds. At this time, he is resting comfortably. Urine is much clearer. ROS otherwise unremarkable except as noted elsewhere. OBJECTIVE: Blood pressure (!) 161/71, pulse 77, temperature 98.4 °F (36.9 °C), resp. rate 18, height 6' 2.02\" (1.88 m), weight 191 lb 11.2 oz (87 kg), SpO2 96 %. Gen: Patient is in no acute distress. Lungs: CTAB. Heart: RRR. Abd: S, NT, ND, BS present. Exremities: Warm.      Recent Results (from the past 12 hour(s))   CBC WITH AUTOMATED DIFF    Collection Time: 22  1:49 AM   Result Value Ref Range    WBC 7.8 4.1 - 11.1 K/uL    RBC 2.97 (L) 4.10 - 5.70 M/uL    HGB 10.1 (L) 12.1 - 17.0 g/dL    HCT 29.1 (L) 36.6 - 50.3 %    MCV 98.0 80.0 - 99.0 FL    MCH 34.0 26.0 - 34.0 PG    MCHC 34.7 30.0 - 36.5 g/dL    RDW 12.4 11.5 - 14.5 % PLATELET 574 558 - 440 K/uL    MPV 9.1 8.9 - 12.9 FL    NRBC 0.0 0  WBC    ABSOLUTE NRBC 0.00 0.00 - 0.01 K/uL    NEUTROPHILS 71 32 - 75 %    LYMPHOCYTES 15 12 - 49 %    MONOCYTES 12 5 - 13 %    EOSINOPHILS 1 0 - 7 %    BASOPHILS 0 0 - 1 %    IMMATURE GRANULOCYTES 1 (H) 0.0 - 0.5 %    ABS. NEUTROPHILS 5.5 1.8 - 8.0 K/UL    ABS. LYMPHOCYTES 1.1 0.8 - 3.5 K/UL    ABS. MONOCYTES 0.9 0.0 - 1.0 K/UL    ABS. EOSINOPHILS 0.1 0.0 - 0.4 K/UL    ABS. BASOPHILS 0.0 0.0 - 0.1 K/UL    ABS. IMM. GRANS. 0.1 (H) 0.00 - 0.04 K/UL    DF AUTOMATED     METABOLIC PANEL, BASIC    Collection Time: 09/18/22  1:49 AM   Result Value Ref Range    Sodium 133 (L) 136 - 145 mmol/L    Potassium 4.1 3.5 - 5.1 mmol/L    Chloride 100 97 - 108 mmol/L    CO2 25 21 - 32 mmol/L    Anion gap 8 5 - 15 mmol/L    Glucose 122 (H) 65 - 100 mg/dL    BUN 18 6 - 20 MG/DL    Creatinine 0.87 0.70 - 1.30 MG/DL    BUN/Creatinine ratio 21 (H) 12 - 20      GFR est AA >60 >60 ml/min/1.73m2    GFR est non-AA >60 >60 ml/min/1.73m2    Calcium 8.7 8.5 - 10.1 MG/DL           Lukasz Cook MD, FACP  Best contact is via Pager: [phone], or via hospital  at [phone]. I can also be reached by Perfect [employer].

## 2022-09-19 LAB
ANION GAP SERPL CALC-SCNC: 5 MMOL/L (ref 5–15)
APPEARANCE FLD: ABNORMAL
BASOPHILS # BLD: 0 K/UL (ref 0–0.1)
BASOPHILS NFR BLD: 0 % (ref 0–1)
BODY FLD TYPE: NORMAL
BUN SERPL-MCNC: 19 MG/DL (ref 6–20)
BUN/CREAT SERPL: 20 (ref 12–20)
CALCIUM SERPL-MCNC: 8.6 MG/DL (ref 8.5–10.1)
CHLORIDE SERPL-SCNC: 99 MMOL/L (ref 97–108)
CO2 SERPL-SCNC: 26 MMOL/L (ref 21–32)
COLOR FLD: YELLOW
CREAT SERPL-MCNC: 0.93 MG/DL (ref 0.7–1.3)
CRYSTALS FLD MICRO: NORMAL
DIFFERENTIAL METHOD BLD: ABNORMAL
EOSINOPHIL # BLD: 0.1 K/UL (ref 0–0.4)
EOSINOPHIL NFR BLD: 1 % (ref 0–7)
ERYTHROCYTE [DISTWIDTH] IN BLOOD BY AUTOMATED COUNT: 12.3 % (ref 11.5–14.5)
GLUCOSE SERPL-MCNC: 119 MG/DL (ref 65–100)
HCT VFR BLD AUTO: 28 % (ref 36.6–50.3)
HGB BLD-MCNC: 9.7 G/DL (ref 12.1–17)
IMM GRANULOCYTES # BLD AUTO: 0.1 K/UL (ref 0–0.04)
IMM GRANULOCYTES NFR BLD AUTO: 1 % (ref 0–0.5)
LYMPHOCYTES # BLD: 1.2 K/UL (ref 0.8–3.5)
LYMPHOCYTES NFR BLD: 15 % (ref 12–49)
LYMPHOCYTES NFR FLD: 3 %
MCH RBC QN AUTO: 33.9 PG (ref 26–34)
MCHC RBC AUTO-ENTMCNC: 34.6 G/DL (ref 30–36.5)
MCV RBC AUTO: 97.9 FL (ref 80–99)
MONOCYTES # BLD: 1.1 K/UL (ref 0–1)
MONOCYTES NFR BLD: 13 % (ref 5–13)
MONOS+MACROS NFR FLD: 3 %
NEUTROPHILS NFR FLD: 94 %
NEUTS SEG # BLD: 5.6 K/UL (ref 1.8–8)
NEUTS SEG NFR BLD: 70 % (ref 32–75)
NRBC # BLD: 0 K/UL (ref 0–0.01)
NRBC BLD-RTO: 0 PER 100 WBC
NUC CELL # FLD: ABNORMAL /CU MM
PLATELET # BLD AUTO: 222 K/UL (ref 150–400)
PMV BLD AUTO: 9.2 FL (ref 8.9–12.9)
POTASSIUM SERPL-SCNC: 4.2 MMOL/L (ref 3.5–5.1)
RBC # BLD AUTO: 2.86 M/UL (ref 4.1–5.7)
RBC # FLD: >100 /CU MM
SODIUM SERPL-SCNC: 130 MMOL/L (ref 136–145)
SPECIMEN SOURCE FLD: ABNORMAL
WBC # BLD AUTO: 8.1 K/UL (ref 4.1–11.1)

## 2022-09-19 PROCEDURE — 89060 EXAM SYNOVIAL FLUID CRYSTALS: CPT

## 2022-09-19 PROCEDURE — 74011250637 HC RX REV CODE- 250/637: Performed by: INTERNAL MEDICINE

## 2022-09-19 PROCEDURE — 51798 US URINE CAPACITY MEASURE: CPT

## 2022-09-19 PROCEDURE — 99233 SBSQ HOSP IP/OBS HIGH 50: CPT | Performed by: INTERNAL MEDICINE

## 2022-09-19 PROCEDURE — 65270000046 HC RM TELEMETRY

## 2022-09-19 PROCEDURE — 74011250636 HC RX REV CODE- 250/636: Performed by: INTERNAL MEDICINE

## 2022-09-19 PROCEDURE — 89050 BODY FLUID CELL COUNT: CPT

## 2022-09-19 PROCEDURE — 87205 SMEAR GRAM STAIN: CPT

## 2022-09-19 PROCEDURE — 85025 COMPLETE CBC W/AUTO DIFF WBC: CPT

## 2022-09-19 PROCEDURE — 74011250637 HC RX REV CODE- 250/637: Performed by: STUDENT IN AN ORGANIZED HEALTH CARE EDUCATION/TRAINING PROGRAM

## 2022-09-19 PROCEDURE — 74011000250 HC RX REV CODE- 250: Performed by: ORTHOPAEDIC SURGERY

## 2022-09-19 PROCEDURE — 74011000250 HC RX REV CODE- 250: Performed by: STUDENT IN AN ORGANIZED HEALTH CARE EDUCATION/TRAINING PROGRAM

## 2022-09-19 PROCEDURE — 36415 COLL VENOUS BLD VENIPUNCTURE: CPT

## 2022-09-19 PROCEDURE — 80048 BASIC METABOLIC PNL TOTAL CA: CPT

## 2022-09-19 PROCEDURE — 74011250636 HC RX REV CODE- 250/636: Performed by: ORTHOPAEDIC SURGERY

## 2022-09-19 RX ORDER — MELOXICAM 7.5 MG/1
7.5 TABLET ORAL DAILY
Status: DISCONTINUED | OUTPATIENT
Start: 2022-09-19 | End: 2022-09-22 | Stop reason: HOSPADM

## 2022-09-19 RX ORDER — LIDOCAINE HYDROCHLORIDE 10 MG/ML
5 INJECTION INFILTRATION; PERINEURAL ONCE
Status: COMPLETED | OUTPATIENT
Start: 2022-09-19 | End: 2022-09-19

## 2022-09-19 RX ORDER — BETAMETHASONE SODIUM PHOSPHATE AND BETAMETHASONE ACETATE 3; 3 MG/ML; MG/ML
12 INJECTION, SUSPENSION INTRA-ARTICULAR; INTRALESIONAL; INTRAMUSCULAR; SOFT TISSUE ONCE
Status: COMPLETED | OUTPATIENT
Start: 2022-09-19 | End: 2022-09-19

## 2022-09-19 RX ADMIN — ALLOPURINOL 100 MG: 100 TABLET ORAL at 09:42

## 2022-09-19 RX ADMIN — LIDOCAINE HYDROCHLORIDE 0.5 ML: 10 INJECTION, SOLUTION INFILTRATION; PERINEURAL at 13:00

## 2022-09-19 RX ADMIN — SODIUM CHLORIDE, PRESERVATIVE FREE 10 ML: 5 INJECTION INTRAVENOUS at 09:58

## 2022-09-19 RX ADMIN — SODIUM CHLORIDE, PRESERVATIVE FREE 10 ML: 5 INJECTION INTRAVENOUS at 13:07

## 2022-09-19 RX ADMIN — MELOXICAM 7.5 MG: 7.5 TABLET ORAL at 09:42

## 2022-09-19 RX ADMIN — BETAMETHASONE ACETATE AND BETAMETHASONE SODIUM PHOSPHATE 12 MG: 3; 3 INJECTION, SUSPENSION INTRA-ARTICULAR; INTRALESIONAL; INTRAMUSCULAR; SOFT TISSUE at 13:00

## 2022-09-19 RX ADMIN — POLYETHYLENE GLYCOL 3350 17 G: 17 POWDER, FOR SOLUTION ORAL at 09:54

## 2022-09-19 RX ADMIN — ACETAMINOPHEN 650 MG: 325 TABLET ORAL at 22:19

## 2022-09-19 RX ADMIN — VALSARTAN 160 MG: 160 TABLET, FILM COATED ORAL at 09:42

## 2022-09-19 RX ADMIN — TRAMADOL HYDROCHLORIDE 50 MG: 50 TABLET, COATED ORAL at 06:26

## 2022-09-19 RX ADMIN — LEVOFLOXACIN 500 MG: 5 INJECTION, SOLUTION INTRAVENOUS at 09:42

## 2022-09-19 RX ADMIN — SODIUM CHLORIDE, PRESERVATIVE FREE 10 ML: 5 INJECTION INTRAVENOUS at 21:50

## 2022-09-19 NOTE — PROGRESS NOTES
End of Shift Note    Bedside shift change report given to Mayo Guillory (oncoming nurse) by Basilio Joel RN (offgoing nurse). Report included the following information SBAR, Kardex, Intake/Output, and MAR    Shift worked:  7p-7a     Shift summary and any significant changes:     Patient rested this shift  Patient noted some discomfort this shift; medication administered- see MAR. Concerns for physician to address:  None     Zone phone for oncoming shift:   [phone]       Activity:  Activity Level: Up with Assistance  Number times ambulated in hallways past shift: 0  Number of times OOB to chair past shift: 0    Cardiac:   Cardiac Monitoring: Yes      Cardiac Rhythm: Sinus Rhythm    Access:  Current line(s): PIV     Genitourinary:   Urinary status: voiding       Respiratory:   O2 Device: None (Room air)    Incentive spirometer at bedside: YES       GI:  Last Bowel Movement Date: 09/15/22  Current diet:  ADULT DIET Regular; Low Sodium (2 gm)  Passing flatus: NO  Tolerating current diet: YES       Pain Management:   Patient states pain is manageable on current regimen: YES    Skin:  Oswaldo Score: 19  Interventions: turn team    Patient Safety:  Fall Score: Total Score: 3  Interventions: gripper socks, pt to call before getting OOB, and stay with me (per policy)End of Shift Note    Bedside shift change report given to Mayo Guillory (oncoming nurse) by Basilio Joel RN (offgoing nurse). Report included the following information SBAR, Kardex, Intake/Output, and MAR    Shift worked:  7p-7a     Shift summary and any significant changes:     Patient rested this shift; patient slept this shift     Concerns for physician to address:  None     Zone phone for oncoming shift:   [phone]         Activity:  Activity Level:  Up with Assistance  Number times ambulated in hallways past shift: 0  Number of times OOB to chair past shift: 0    Cardiac:   Cardiac Monitoring: Yes      Cardiac Rhythm: Sinus Rhythm    Access:  Current line(s): PIV Genitourinary:   Urinary status: voiding and samaniego    Respiratory:   O2 Device: None (Room air)      GI:  Last Bowel Movement Date: 09/15/22  Current diet:  ADULT DIET Regular; Low Sodium (2 gm)  Passing flatus: YES  Tolerating current diet: YES       Pain Management:   Patient states pain is manageable on current regimen: YES    Skin:  Oswaldo Score: 19  Interventions: increase time out of bed    Patient Safety:  Fall Score:  Total Score: 3  Interventions: assistive device (walker, cane, etc) and stay with me (per policy)  High Fall Risk: Yes    Length of Stay:  Expected LOS: 2d 7h  Actual LOS: 196 Carey Lino RN                           High Fall Risk: Yes    Length of Stay:  Expected LOS: 2d 7h  Actual LOS: 5      Roderick Gaona RN

## 2022-09-19 NOTE — PROGRESS NOTES
End of Shift Note    Bedside shift change report given to Mayo Herr (oncoming nurse) by Gabbie Bhagat RN (offgoing nurse). Report included the following information SBAR, Kardex, ED Summary, Procedure Summary, and MAR    Shift worked:  7p-7a     Shift summary and any significant changes:     Patient rested this shift    Patient noted some pain this shift; medication administered-- see MAR     Concerns for physician to address:  Discharge? ?? Zone phone for oncoming shift:   [phone]       Activity:  Activity Level: Up with Assistance  Number times ambulated in hallways past shift: 0  Number of times OOB to chair past shift: 0    Cardiac:   Cardiac Monitoring: Yes      Cardiac Rhythm: Sinus Rhythm    Access:  Current line(s): PIV     Genitourinary:   Urinary status: voiding    Respiratory:   O2 Device: None (Room air)  Chronic home O2 use?: YES     GI:  Last Bowel Movement Date: 09/15/22  Current diet:  ADULT DIET Regular; Low Sodium (2 gm)  Tolerating current diet: YES       Pain Management:   Patient states pain is manageable on current regimen: YES    Skin:  Oswaldo Score: 19  Interventions: turn team, float heels, increase time out of bed, and internal/external urinary devices    Patient Safety:  Fall Score:  Total Score: 3  Interventions: bed/chair alarm, gripper socks, pt to call before getting OOB, and stay with me (per policy)  High Fall Risk: Yes    Length of Stay:  Expected LOS: 2d 7h  Actual LOS: 5      Gabbie Bhagat RN

## 2022-09-19 NOTE — PROGRESS NOTES
INTERNAL MEDICINE PROGRESS NOTE    NAME:  Mona Costello   :   1934   MRN:   [id number]     Date/Time:  2022 7:58 AM  Subjective:   History:  Chart reviewed and patient seen and examined and D/W his nurse and his wife at length this AM and all events noted. 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 He was in his usual state of health working in the yard on  and then around 6 PM passed significant amount of blood in his urine with clots and presented to the emergency room. In the emergency room CT revealed question of a mass versus clot in the bladder and the fact that he was still passing bloody urine with clots he was admitted to the hospital for further work-up and evaluation. This AM he notes no abdominal pain or nausea vomiting has no back pain. He continues with CBI w/o leakage around the catheter since clot removed yesterday with irrigation. He denies any headaches, dizziness or neurologic complaints. He notes no chest pain, shortness of breath or cardiac or respiratory complaints. He notes no change of his chronic arthritic complaints except now with bedrest more pain L leg and knee. X-ray done over weekend only arthritis. He has no other complaints on complete your systems.     Medications reviewed:  Current Facility-Administered Medications   Medication Dose Route Frequency    lidocaine (XYLOCAINE) 4 % cream   Topical PRN    traMADoL (ULTRAM) tablet 50 mg  50 mg Oral Q6H PRN    levoFLOXacin (LEVAQUIN) 500 mg in D5W IVPB  500 mg IntraVENous Q24H    allopurinoL (ZYLOPRIM) tablet 100 mg  100 mg Oral DAILY    valsartan (DIOVAN) tablet 160 mg  160 mg Oral DAILY    sodium chloride (NS) flush 5-40 mL  5-40 mL IntraVENous Q8H    sodium chloride (NS) flush 5-40 mL  5-40 mL IntraVENous PRN    acetaminophen (TYLENOL) tablet 650 mg  650 mg Oral Q6H PRN    Or    acetaminophen (TYLENOL) suppository 650 mg  650 mg Rectal Q6H PRN    polyethylene glycol (MIRALAX) packet 17 g  17 g Oral DAILY PRN    ondansetron (ZOFRAN ODT) tablet 4 mg  4 mg Oral Q8H PRN    Or    ondansetron (ZOFRAN) injection 4 mg  4 mg IntraVENous Q6H PRN        Objective:   Vitals:  Visit Vitals  BP (!) 148/56   Pulse 75   Temp 98.1 °F (36.7 °C)   Resp 18   Ht 6' 2.02\" (1.88 m)   Wt 191 lb 11.2 oz (87 kg)   SpO2 92%   BMI 24.60 kg/m²      O2 Device: None (Room air) Temp (24hrs), Av.5 °F (36.9 °C), Min:98.1 °F (36.7 °C), Max:98.8 °F (37.1 °C)      Last 24hr Input/Output:    Intake/Output Summary (Last 24 hours) at 2022 0758  Last data filed at 2022  Gross per 24 hour   Intake --   Output 1000 ml   Net -1000 ml          PHYSICAL EXAM:  General:     Alert, cooperative, no distress, appears stated age. Head:    Normocephalic, without obvious abnormality, atraumatic. Eyes:    Conjunctivae/corneas clear. PERRLA  Nose:   Nares normal. No drainage or sinus tenderness. Throat:     Lips, mucosa, and tongue normal.  No Thrush  Neck:   Supple, symmetrical,  no adenopathy, thyroid: non tender     no carotid bruit and no JVD. Back:     Symmetric,  No CVA tenderness. Lungs:    Clear to auscultation bilaterally. No Wheezing or Rhonchi. No rales. Heart:    Regular rate and rhythm,  no murmur, rub or gallop. Abdomen:    Soft, non-tender. Not distended. Bowel sounds normal. No masses. Schaffer in for CBI now w/o output  Extremities:  Extremities normal except L knee tender, atraumatic, No cyanosis. No edema. No clubbing  Lymph nodes:  Cervical, supraclavicular normal.  Neurologic:  Normal strength, Alert and oriented X 3.    Skin:                No rash      Lab Data Reviewed:    Recent Results (from the past 24 hour(s))   CBC WITH AUTOMATED DIFF    Collection Time: 22  6:01 AM   Result Value Ref Range    WBC 8.1 4.1 - 11.1 K/uL    RBC 2.86 (L) 4.10 - 5.70 M/uL    HGB 9.7 (L) 12.1 - 17.0 g/dL    HCT 28.0 (L) 36.6 - 50.3 %    MCV 97.9 80.0 - 99.0 FL    MCH 33.9 26.0 - 34.0 PG    MCHC 34.6 30.0 - 36.5 g/dL    RDW 12.3 11.5 - 14.5 %    PLATELET 721 185 - 623 K/uL    MPV 9.2 8.9 - 12.9 FL    NRBC 0.0 0  WBC    ABSOLUTE NRBC 0.00 0.00 - 0.01 K/uL    NEUTROPHILS 70 32 - 75 %    LYMPHOCYTES 15 12 - 49 %    MONOCYTES 13 5 - 13 %    EOSINOPHILS 1 0 - 7 %    BASOPHILS 0 0 - 1 %    IMMATURE GRANULOCYTES 1 (H) 0.0 - 0.5 %    ABS. NEUTROPHILS 5.6 1.8 - 8.0 K/UL    ABS. LYMPHOCYTES 1.2 0.8 - 3.5 K/UL    ABS. MONOCYTES 1.1 (H) 0.0 - 1.0 K/UL    ABS. EOSINOPHILS 0.1 0.0 - 0.4 K/UL    ABS. BASOPHILS 0.0 0.0 - 0.1 K/UL    ABS. IMM. GRANS. 0.1 (H) 0.00 - 0.04 K/UL    DF AUTOMATED     METABOLIC PANEL, BASIC    Collection Time: 09/19/22  6:01 AM   Result Value Ref Range    Sodium 130 (L) 136 - 145 mmol/L    Potassium 4.2 3.5 - 5.1 mmol/L    Chloride 99 97 - 108 mmol/L    CO2 26 21 - 32 mmol/L    Anion gap 5 5 - 15 mmol/L    Glucose 119 (H) 65 - 100 mg/dL    BUN 19 6 - 20 MG/DL    Creatinine 0.93 0.70 - 1.30 MG/DL    BUN/Creatinine ratio 20 12 - 20      GFR est AA >60 >60 ml/min/1.73m2    GFR est non-AA >60 >60 ml/min/1.73m2    Calcium 8.6 8.5 - 10.1 MG/DL         Assessment/Plan:     Principal Problem:    Hematuria (9/14/2022)    Active Problems:    CVA [facility]ce[facility]bro[facility] accident) [facility] (8/19/2017)      Overview: L temporoparietal 3/7/2009      CKD (chronic kidney disease), stage III ([address] (8/19/2017)      History of prostate cancer (3/30/2020)      Labile hypertension (9/14/2022)     ___________________________________________________  PLAN:    1. Continuous bladder irrigation completed per urology discussion with nurse practitioner 9/14 AM, and now w/o blood in urine so voiding trial  2. Holding aspirin and Plavix for now but will need to resume aspirin once hematuria controlled with previous history of CVA  3.   Follow hemoglobin which was 13.8 on admission and 9.7 this AM.  4.  Follow renal function, 19/1.27 admission, now 19/0.93  5. Follow blood pressure closely with known labile hypertension. Continue Valsartan 160 mg daily which is equivalent to one half of his home dose (At home Telmisartan 80 every day), BP variable which I am certain is due to worthless digital BP cuffs  6. Continue allopurinol with history of gout  7. Hold fish oil as could exacerbate bleeding  8   Holding Rapaflo as Schaffer catheter in now  9. Urine culture sent which I will follow-up on, Klebsiella S Levaquin so on 9/15 started Levaquin IV  10. PT to mobilize  11. SCDs since off anticoagulation due to hematuria  12. Start Mobic  13. Ortho consult for consideration of cortisone shot      35 Minutes spent today in direct care of this high complexity patient with greater than 50% in counseling and coordination of care.     If need to contact me use hospital  [phone], DO NOT USE PERFECT SERVE    ___________________________________________________    Attending Physician: Crissy Mccullough MD

## 2022-09-19 NOTE — PROGRESS NOTES
Problem: Infection - Risk of, Urinary Catheter-Associated Urinary Tract Infection  Goal: *Absence of infection signs and symptoms  Outcome: Progressing Towards Goal     Problem: Patient Education: Go to Patient Education Activity  Goal: Patient/Family Education  Outcome: Progressing Towards Goal     Problem: Falls - Risk of  Goal: *Absence of Falls  Description: Document Mary Fall Risk and appropriate interventions in the flowsheet. Outcome: Progressing Towards Goal  Note: Fall Risk Interventions:  Mobility Interventions: Communicate number of staff needed for ambulation/transfer         Medication Interventions: Teach patient to arise slowly    Elimination Interventions: Call light in reach              Problem: Patient Education: Go to Patient Education Activity  Goal: Patient/Family Education  Outcome: Progressing Towards Goal     Problem: Pressure Injury - Risk of  Goal: *Prevention of pressure injury  Description: Document Oswaldo Scale and appropriate interventions in the flowsheet.   Outcome: Progressing Towards Goal  Note: Pressure Injury Interventions:  Sensory Interventions: Assess changes in LOC    Moisture Interventions: Apply protective barrier, creams and emollients    Activity Interventions: Increase time out of bed    Mobility Interventions: HOB 30 degrees or less    Nutrition Interventions: Document food/fluid/supplement intake    Friction and Shear Interventions: HOB 30 degrees or less, Lift sheet                Problem: Patient Education: Go to Patient Education Activity  Goal: Patient/Family Education  Outcome: Progressing Towards Goal     Problem: Patient Education: Go to Patient Education Activity  Goal: Patient/Family Education  Outcome: Progressing Towards Goal

## 2022-09-19 NOTE — PROGRESS NOTES
End of Shift Note    Bedside shift change report given to Winsome Hunt RN (oncoming nurse) by Giuliano Abbott LPN (offgoing nurse). Report included the following information SBAR, Kardex, and MAR    Shift worked:  7a-7p     Shift summary and any significant changes: Schaffer removed. Pt had low urine output after 6 hours-100. Bladder scan showed 247. Pt then voided 350. Joint aspiration performed at bedside. Concerns for physician to address: none     Zone phone for oncoming shift:  [phone]       Activity:  Activity Level: Up with Assistance  Number times ambulated in hallways past shift: 0  Number of times OOB to chair past shift: 0    Cardiac:   Cardiac Monitoring: No      Cardiac Rhythm: Sinus Rhythm    Access:  Current line(s): PIV     Genitourinary:   Urinary status: voiding    Respiratory:   O2 Device: None (Room air)  Chronic home O2 use?: NO  Incentive spirometer at bedside: YES       GI:  Last Bowel Movement Date: 09/15/22  Current diet:  ADULT DIET Regular; Low Sodium (2 gm)  Passing flatus: YES  Tolerating current diet: YES       Pain Management:   Patient states pain is manageable on current regimen: YES    Skin:  Oswaldo Score: 18  Interventions: increase time out of bed and nutritional support     Patient Safety:  Fall Score:  Total Score: 2  Interventions: pt to call before getting OOB  High Fall Risk: Yes    Length of Stay:  Expected LOS: 2d 7h  Actual LOS: 5      Giuliano Abbott LPN

## 2022-09-19 NOTE — CONSULTS
ORTHOPAEDIC CONSULT NOTE    Subjective:     Date of Consultation:  September 19, 2022      Catrachito Mendez is a 80 y.o. male who is admitted for hematuria who is being seen for left knee pain. Patient states that a few days ago he was attempting to move in bed when he bent his left knee and had immediate onset of left knee pain. Patient states since this happened he has had difficulty moving his knee, has had constant pain, and has only been able to put minimal weight on the left knee. He denies any history of previous injuries. He complains of swelling in his left knee. He complains of pain in his left knee. He denies any other symptoms at this time. Patient's wife present for exam.  Dr. Monica Henderson present.   Patient Active Problem List    Diagnosis Date Noted    Hematuria 09/14/2022    Labile hypertension 09/14/2022    COVID-19 06/08/2022    Edema 02/18/2022    Vitamin D deficiency 12/17/2021    Cellulitis of face 05/06/2021    History of prostate cancer 03/30/2020    Alcohol screening 12/10/2019    Syncope 02/08/2019    Lymph node enlargement 01/05/2018    Meniere disease 08/19/2017    Testosterone deficiency 08/19/2017    Rosacea 08/19/2017    Palpitation 08/19/2017    PAC (premature atrial contraction) 08/19/2017    Hypertension with renal disease 08/19/2017    Mixed hyperlipidemia 08/19/2017    Gout 08/19/2017    Primary osteoarthritis involving multiple joints 08/19/2017    CVA (cerebrovascular accident) ([address] 08/19/2017    CKD (chronic kidney disease), stage III ([address] 08/19/2017    Non-seasonal allergic rhinitis 08/19/2017     Family History   Problem Relation Age of Onset    Cancer Mother         uterine, cervical    Cancer Father         colon ca      Social History     Tobacco Use    Smoking status: Never    Smokeless tobacco: Never   Substance Use Topics    Alcohol use: No     Past Medical History:   Diagnosis Date    Adverse effect of anesthesia     very bad gag reflex    Allergic rhinitis 8/19/2017 BPH (benign prostatic hypertrophy) 8/19/2017    Cancer ([facility]     basal cell carcinoma right ear    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    CKD (chronic kidney disease), stage III ([address] 8/19/2017    CVA (cerebrovascular accident) ([address] 2009    DJD (degenerative joint disease) 8/19/2017    Gout     Hyperlipidemia 8/19/2017    Hypertension     Hypertension with renal disease 8/19/2017    Meniere disease 8/19/2017    Nausea & vomiting     On statin therapy 8/19/2017    PAC (premature atrial contraction) 8/19/2017    Palpitation 8/19/2017    Prostate CA ([address] 8/19/2017    Pseudophakia of both eyes 10/16/2018    Rosacea 8/19/2017    Testosterone deficiency 8/19/2017      Past Surgical History:   Procedure Laterality Date    COLORECTAL SCRN; HI RISK IND  5/3/2016         HX HEENT  1970    tonsillectomy    HX HEENT  1980    basal cell carcinoma right ear    HX OTHER SURGICAL Right 01/21/2018    EXCISION RIGHT NECK MASS     HX PROSTATECTOMY      radiation only    [place] ABDOMEN SURGERY PROC UNLISTED      bilateral inguinal hernia repair    [place] COLONOSCOPY FLX DX W/COLLJ SPEC WHEN PFRMD  1/19/2011           Prior to Admission medications    Medication Sig Start Date End Date Taking? Authorizing Provider   allopurinoL (ZYLOPRIM) 100 mg tablet TAKE ONE TABLET BY MOUTH DAILY 9/12/22   Mau Shepard MD   clopidogreL (PLAVIX) 75 mg tab TAKE ONE TABLET BY MOUTH DAILY 8/23/22   Mau Shepard MD   cycloSPORINE (Restasis) 0.05 % dpet INSTILL 2 DROPS IN [facility] DAILY AS DIRECTED 7/11/22   Mau Shepard MD   telmisartan (MICARDIS) 80 mg tablet Take 1 Tablet by mouth daily. 6/8/22   Mau Shepard MD   doxycycline (VIBRAMYCIN) 100 mg capsule TAKE ONE CAPSULE BY MOUTH DAILY 10/12/21   Mau Shepard MD   bicalutamide (CASODEX) 50 mg tablet  8/7/19   Provider, Historical   omega-3 fatty acids 1,000 mg cap Take  by mouth.     Provider, Historical   aspirin (ASPIRIN) 325 mg tablet Take 325 mg by mouth daily.    Provider, Historical   silodosin (RAPAFLO) 8 mg capsule Take 8 mg by mouth daily (with breakfast). Provider, Historical     Current Facility-Administered Medications   Medication Dose Route Frequency    meloxicam (MOBIC) tablet 7.5 mg  7.5 mg Oral DAILY    lidocaine (XYLOCAINE) 4 % cream   Topical PRN    traMADoL (ULTRAM) tablet 50 mg  50 mg Oral Q6H PRN    levoFLOXacin (LEVAQUIN) 500 mg in D5W IVPB  500 mg IntraVENous Q24H    allopurinoL (ZYLOPRIM) tablet 100 mg  100 mg Oral DAILY    valsartan (DIOVAN) tablet 160 mg  160 mg Oral DAILY    sodium chloride (NS) flush 5-40 mL  5-40 mL IntraVENous Q8H    sodium chloride (NS) flush 5-40 mL  5-40 mL IntraVENous PRN    acetaminophen (TYLENOL) tablet 650 mg  650 mg Oral Q6H PRN    Or    acetaminophen (TYLENOL) suppository 650 mg  650 mg Rectal Q6H PRN    polyethylene glycol (MIRALAX) packet 17 g  17 g Oral DAILY PRN    ondansetron (ZOFRAN ODT) tablet 4 mg  4 mg Oral Q8H PRN    Or    ondansetron (ZOFRAN) injection 4 mg  4 mg IntraVENous Q6H PRN      Allergies   Allergen Reactions    Sulfanilamide Rash    Sulfa (Sulfonamide Antibiotics) Rash     Very mild rash several years ago        Review of Systems:  A comprehensive review of systems was negative except for that written in the HPI. Mental Status: Alert and oriented x3. Objective:     Patient Vitals for the past 8 hrs:   BP Temp Pulse Resp SpO2   22 2152 (!) 164/72 98.4 °F (36.9 °C) 81 18 96 %   22 1504 (!) 157/67 98.7 °F (37.1 °C) 75 18 99 %     Temp (24hrs), Av.5 °F (36.9 °C), Min:98.1 °F (36.7 °C), Max:98.7 °F (37.1 °C)      Gen: Well-developed,  in no acute distress   HEENT: Pink conjunctivae, hearing intact to voice, moist mucous membranes   Neck: Supple  Resp: No respiratory distress   Card:  palpable distal pulse-equal bilaterally, brisk cap refill all distal digits   Abd: non-distended  Musc: Inspection of the left knee reveals a moderate sized effusion. No obvious erythema. Palpation reveals the effusion, but no pain. Minimal warmth to the touch. All compartments left lower extremity soft to palpation. Active range of motion of the left knee limited to about 20 degrees from full extension. Passive range of motion with approximately 40 degrees of flexion. Strength intact throughout tolerated range of motion as well as distally. Skin: No skin breakdown noted. Skin warm, pink, dry. Sensation to light touch intact. Neuro: Cranial nerves are grossly intact, no focal motor weakness, follows commands appropriately   Psych: Good insight, oriented to person, place and time, alert    Imaging Review: EXAM: XR KNEE LT MAX 2 VWS     INDICATION: Left knee and leg pain. COMPARISON: None. FINDINGS: Two views of the left knee demonstrate no fracture or other acute  osseous or articular abnormality. There is no effusion. There is mild  osteoarthritis. IMPRESSION  No acute abnormality. Osteoarthritis. Labs:   Recent Results (from the past 24 hour(s))   CBC WITH AUTOMATED DIFF    Collection Time: 09/19/22  6:01 AM   Result Value Ref Range    WBC 8.1 4.1 - 11.1 K/uL    RBC 2.86 (L) 4.10 - 5.70 M/uL    HGB 9.7 (L) 12.1 - 17.0 g/dL    HCT 28.0 (L) 36.6 - 50.3 %    MCV 97.9 80.0 - 99.0 FL    MCH 33.9 26.0 - 34.0 PG    MCHC 34.6 30.0 - 36.5 g/dL    RDW 12.3 11.5 - 14.5 %    PLATELET 365 894 - 360 K/uL    MPV 9.2 8.9 - 12.9 FL    NRBC 0.0 0  WBC    ABSOLUTE NRBC 0.00 0.00 - 0.01 K/uL    NEUTROPHILS 70 32 - 75 %    LYMPHOCYTES 15 12 - 49 %    MONOCYTES 13 5 - 13 %    EOSINOPHILS 1 0 - 7 %    BASOPHILS 0 0 - 1 %    IMMATURE GRANULOCYTES 1 (H) 0.0 - 0.5 %    ABS. NEUTROPHILS 5.6 1.8 - 8.0 K/UL    ABS. LYMPHOCYTES 1.2 0.8 - 3.5 K/UL    ABS. MONOCYTES 1.1 (H) 0.0 - 1.0 K/UL    ABS. EOSINOPHILS 0.1 0.0 - 0.4 K/UL    ABS. BASOPHILS 0.0 0.0 - 0.1 K/UL    ABS. IMM.  GRANS. 0.1 (H) 0.00 - 0.04 K/UL    DF AUTOMATED     METABOLIC PANEL, BASIC    Collection Time: 09/19/22  6:01 AM   Result Value Ref Range    Sodium 130 (L) 136 - 145 mmol/L    Potassium 4.2 3.5 - 5.1 mmol/L    Chloride 99 97 - 108 mmol/L    CO2 26 21 - 32 mmol/L    Anion gap 5 5 - 15 mmol/L    Glucose 119 (H) 65 - 100 mg/dL    BUN 19 6 - 20 MG/DL    Creatinine 0.93 0.70 - 1.30 MG/DL    BUN/Creatinine ratio 20 12 - 20      GFR est AA >60 >60 ml/min/1.73m2    GFR est non-AA >60 >60 ml/min/1.73m2    Calcium 8.6 8.5 - 10.1 MG/DL   CELL COUNT, BODY FLUID    Collection Time: 09/19/22  3:16 PM   Result Value Ref Range    BODY FLUID TYPE LEFT KNEE      FLUID COLOR YELLOW      FLUID APPEARANCE CLOUDY      FLUID RBC CT. >100 (H) 0 /cu mm    FLUID NUCLEATED CELLS 11,200 /cu mm    FLD NEUTROPHILS 94 (A) NRRE %    FLD LYMPHS 3 (A) NRRE %    FLD MONO/MACROPHAGES 3 (A) NRRE %   CRYSTALS, SYNOVIAL FLUID    Collection Time: 09/19/22  3:16 PM   Result Value Ref Range    FLUID TYPE(7) LEFT KNEE      Crystals, body fluid URIC ACID CRYSTALS SEEN WITH POLARIZED LIGHT.            Impression:     Patient Active Problem List    Diagnosis Date Noted    Hematuria 09/14/2022    Labile hypertension 09/14/2022    COVID-19 06/08/2022    Edema 02/18/2022    Vitamin D deficiency 12/17/2021    Cellulitis of face 05/06/2021    History of prostate cancer 03/30/2020    Alcohol screening 12/10/2019    Syncope 02/08/2019    Lymph node enlargement 01/05/2018    Meniere disease 08/19/2017    Testosterone deficiency 08/19/2017    Rosacea 08/19/2017    Palpitation 08/19/2017    PAC (premature atrial contraction) 08/19/2017    Hypertension with renal disease 08/19/2017    Mixed hyperlipidemia 08/19/2017    Gout 08/19/2017    Primary osteoarthritis involving multiple joints 08/19/2017    CVA (cerebrovascular accident) ([address] 08/19/2017    CKD (chronic kidney disease), stage III ([address] 08/19/2017    Non-seasonal allergic rhinitis 08/19/2017     Principal Problem:    Hematuria (9/14/2022)    Active Problems:    CVA (cerebrovascular accident) ([address] (8/19/2017)      Overview: L temporoparietal 3/7/2009      CKD (chronic kidney disease), stage III ([facility] (8/19/2017)      History of prostate cancer (3/30/2020)      Labile hypertension (9/14/2022)    Procedure note (performed by Dr Lewis Foster with my assistance) : After written consent was obtained, the patient's left knee superior lateral position was prepped using ChloraPrep by Dr. Lewis Foster. Once this was allowed to dry, an 18-gauge needle on a 20 cc syringe was inserted into the aspiration site. Approximately 8 cc of slightly cloudy yellow joint fluid was aspirated from the knee. The syringe was then exchanged with a 5 cc syringe containing 2 cc of Celestone. This was administered into the knee. The needle was then withdrawn. Sterile 4 x 4's were placed to control any bleeding. A sterile bandage was then applied to the area after the 4 x 4's were removed. Patient tolerated procedure well with no complications. Aspirate results were sent to the lab. Plan:   Left knee pain/effusion  Likely osteoarthritic flare of left knee    -After written consent was obtained, cortisone injection was administered to the left knee by Dr. Lewis Foster.  -Patient tolerated the procedure well  -Aspirate was sent for labs. Aspirate positive for crystals. 11,200 nucleated cell count (not concerning for infectious process), and cultures pending.  -Patient may weight-bear as tolerated  -Ice left knee as needed        Dr. Lewis Foster aware and agrees with plan as above.         Ricardo Holter, PA  [employer]

## 2022-09-19 NOTE — PROGRESS NOTES
Progress Note    Patient: Alber Benites MRN: [id number]  SSN: [id number]    YOB: 1934  Age: 80 y.o. Sex: male          ADMITTED:  2022 to Regine Rae MD  for Hematuria [R31.9]           Alber Benites was admitted for Hematuria [R31.9]. Cbi off since Friday. Hematuria resolved since Friday as well. No issues with clots over the weekend per patient. Urine clear yellow     Hx of cva on plavix and ASA now currently held. Hx of radiation cystitis with prostate cancer hx. Now x2 days of CBI, this am cbi on very slow gtt with clear pink urine. Schaffer was just irrigated by nursing staff and no clots expelled. No issues with urine leaking or suprapubic pain overnight. HD stable. Creat remains at NL. Urine cx Klebsiella   Cbi turned off this am     Vitals:  Temp (24hrs), Av.5 °F (36.9 °C), Min:98.1 °F (36.7 °C), Max:98.8 °F (37.1 °C)     Blood pressure (!) 148/56, pulse 75, temperature 98.1 °F (36.7 °C), resp. rate 18, height 6' 2.02\" (1.88 m), weight 87 kg (191 lb 11.2 oz), SpO2 92 %. I&O's:   1901 -  0700  In: -   Out: 4989 [Urine:1625]   No intake/output data recorded.      Exam:   Appearance: well-developed NAD   Neck: supple   Respiratory Effort: breathing easily   Lower Extremity: no edema   Abdomen/Flank: soft non-tender without masses; no CVA tenderness   Rectal: deferred  Hemoccult: not indicated   Scrotum: without lesions   Testes: bilaterally non-tender, no masses   Epididymes: bilaterally no masses palpated, non-tender   Penis: no plaques; no lesions   Meatus: patent   Prostate:deferred  SV's: non-palpable   Lymph: no adenopathy   Skin Inspection: warm and dry   Mood/Affect: normal     Labs:   Recent Labs     22  0601 22  0149 22  0414   WBC 8.1 7.8 7.5   HGB 9.7* 10.1* 10.6*   HCT 28.0* 29.1* 30.5*    196 200     Recent Labs     22  0601 22  0149 22  0414   * 133* 135*   K 4.2 4.1 4.5   CL 99 100 103   CO2 26 25 28   * 122* 129*   BUN 19 18 25*   CREA 0.93 0.87 0.98   CA 8.6 8.7 8.5        Cultures:        Imaging:       Assessment:     - Principal Problem:    Hematuria (9/14/2022)    Active Problems:    CVA (cerebrovascular accident) ([facility] [address] (8/19/2017)      Overview: L temporoparietal 3/7/2009      CKD (chronic kidney disease), stage III ([facility] [address] (8/19/2017)      History of prostate cancer (3/30/2020)      Labile hypertension (9/14/2022)    Klebsiella UTI    Plan:     [address]- aggravated secondary to history of radiation cystitis, chronic AC use and acute klebsiella UTI. Hematuria now resolved  -abx per primary team  - VT this AM. Only replace samaniego for PVR >400ml  -okay to resume ASA upon discharge. Ideally holding plavix through OP cystoscopy.    - His follow up with Dr. Edgard De La Cruz has been arranged with office cystoscopy after resolution of infection   -will follow peripherally, please call if needed    Discussed with Dr. Maxwell Dykes By: Emily Baca NP - September 19, 2022

## 2022-09-20 LAB
ANION GAP SERPL CALC-SCNC: 6 MMOL/L (ref 5–15)
BASOPHILS # BLD: 0 K/UL (ref 0–0.1)
BASOPHILS NFR BLD: 0 % (ref 0–1)
BUN SERPL-MCNC: 24 MG/DL (ref 6–20)
BUN/CREAT SERPL: 24 (ref 12–20)
CALCIUM SERPL-MCNC: 9.1 MG/DL (ref 8.5–10.1)
CHLORIDE SERPL-SCNC: 99 MMOL/L (ref 97–108)
CO2 SERPL-SCNC: 25 MMOL/L (ref 21–32)
CREAT SERPL-MCNC: 0.98 MG/DL (ref 0.7–1.3)
DIFFERENTIAL METHOD BLD: ABNORMAL
EOSINOPHIL # BLD: 0 K/UL (ref 0–0.4)
EOSINOPHIL NFR BLD: 0 % (ref 0–7)
ERYTHROCYTE [DISTWIDTH] IN BLOOD BY AUTOMATED COUNT: 12.1 % (ref 11.5–14.5)
GLUCOSE SERPL-MCNC: 168 MG/DL (ref 65–100)
HCT VFR BLD AUTO: 29.8 % (ref 36.6–50.3)
HGB BLD-MCNC: 10.5 G/DL (ref 12.1–17)
IMM GRANULOCYTES # BLD AUTO: 0.1 K/UL (ref 0–0.04)
IMM GRANULOCYTES NFR BLD AUTO: 1 % (ref 0–0.5)
LYMPHOCYTES # BLD: 0.6 K/UL (ref 0.8–3.5)
LYMPHOCYTES NFR BLD: 10 % (ref 12–49)
MCH RBC QN AUTO: 33.9 PG (ref 26–34)
MCHC RBC AUTO-ENTMCNC: 35.2 G/DL (ref 30–36.5)
MCV RBC AUTO: 96.1 FL (ref 80–99)
MONOCYTES # BLD: 0.2 K/UL (ref 0–1)
MONOCYTES NFR BLD: 3 % (ref 5–13)
NEUTS SEG # BLD: 5.1 K/UL (ref 1.8–8)
NEUTS SEG NFR BLD: 86 % (ref 32–75)
NRBC # BLD: 0 K/UL (ref 0–0.01)
NRBC BLD-RTO: 0 PER 100 WBC
PLATELET # BLD AUTO: 237 K/UL (ref 150–400)
PMV BLD AUTO: 9 FL (ref 8.9–12.9)
POTASSIUM SERPL-SCNC: 5 MMOL/L (ref 3.5–5.1)
RBC # BLD AUTO: 3.1 M/UL (ref 4.1–5.7)
SODIUM SERPL-SCNC: 130 MMOL/L (ref 136–145)
WBC # BLD AUTO: 5.9 K/UL (ref 4.1–11.1)

## 2022-09-20 PROCEDURE — 99221 1ST HOSP IP/OBS SF/LOW 40: CPT | Performed by: ORTHOPAEDIC SURGERY

## 2022-09-20 PROCEDURE — 74011000250 HC RX REV CODE- 250: Performed by: STUDENT IN AN ORGANIZED HEALTH CARE EDUCATION/TRAINING PROGRAM

## 2022-09-20 PROCEDURE — 74011250637 HC RX REV CODE- 250/637: Performed by: STUDENT IN AN ORGANIZED HEALTH CARE EDUCATION/TRAINING PROGRAM

## 2022-09-20 PROCEDURE — 97116 GAIT TRAINING THERAPY: CPT

## 2022-09-20 PROCEDURE — 85025 COMPLETE CBC W/AUTO DIFF WBC: CPT

## 2022-09-20 PROCEDURE — 74011250637 HC RX REV CODE- 250/637: Performed by: INTERNAL MEDICINE

## 2022-09-20 PROCEDURE — 20610 DRAIN/INJ JOINT/BURSA W/O US: CPT | Performed by: ORTHOPAEDIC SURGERY

## 2022-09-20 PROCEDURE — 65270000046 HC RM TELEMETRY

## 2022-09-20 PROCEDURE — 74011250636 HC RX REV CODE- 250/636: Performed by: INTERNAL MEDICINE

## 2022-09-20 PROCEDURE — 36415 COLL VENOUS BLD VENIPUNCTURE: CPT

## 2022-09-20 PROCEDURE — 80048 BASIC METABOLIC PNL TOTAL CA: CPT

## 2022-09-20 PROCEDURE — 99233 SBSQ HOSP IP/OBS HIGH 50: CPT | Performed by: INTERNAL MEDICINE

## 2022-09-20 RX ADMIN — ACETAMINOPHEN 650 MG: 325 TABLET ORAL at 21:04

## 2022-09-20 RX ADMIN — SODIUM CHLORIDE, PRESERVATIVE FREE 10 ML: 5 INJECTION INTRAVENOUS at 13:29

## 2022-09-20 RX ADMIN — LEVOFLOXACIN 500 MG: 5 INJECTION, SOLUTION INTRAVENOUS at 09:02

## 2022-09-20 RX ADMIN — ALLOPURINOL 100 MG: 100 TABLET ORAL at 09:02

## 2022-09-20 RX ADMIN — SODIUM CHLORIDE, PRESERVATIVE FREE 10 ML: 5 INJECTION INTRAVENOUS at 21:04

## 2022-09-20 RX ADMIN — POLYETHYLENE GLYCOL 3350 17 G: 17 POWDER, FOR SOLUTION ORAL at 13:28

## 2022-09-20 RX ADMIN — MELOXICAM 7.5 MG: 7.5 TABLET ORAL at 09:01

## 2022-09-20 RX ADMIN — VALSARTAN 160 MG: 160 TABLET, FILM COATED ORAL at 09:01

## 2022-09-20 RX ADMIN — SODIUM CHLORIDE, PRESERVATIVE FREE 10 ML: 5 INJECTION INTRAVENOUS at 09:25

## 2022-09-20 NOTE — PROGRESS NOTES
INTERNAL MEDICINE PROGRESS NOTE    NAME:  Silvio Segura   :   1934   MRN:   [id number]     Date/Time:  2022 6:04 AM  Subjective:   History:  Chart reviewed and patient seen and examined and D/W his nurse and his wife at length this AM and all events noted. 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 He was in his usual state of health working in the yard on  and then around 6 PM passed significant amount of blood in his urine with clots and presented to the emergency room. In the emergency room CT revealed question of a mass versus clot in the bladder and the fact that he was still passing bloody urine with clots he was admitted to the hospital for further work-up and evaluation. This AM he notes no abdominal pain or nausea vomiting has no back pain. He is now off CBI and samaniego out as of yesterday  and passing urine. He denies any headaches, dizziness or neurologic complaints. He notes no chest pain, shortness of breath or cardiac or respiratory complaints. He notes no change of his chronic arthritic complaints except he had more pain L leg and knee. X-ray done over weekend only arthritis. Yesterday he had aspiration and injection by Ortho and he had the best night since admission last night. He has no other complaints on complete your systems.     Medications reviewed:  Current Facility-Administered Medications   Medication Dose Route Frequency    meloxicam (MOBIC) tablet 7.5 mg  7.5 mg Oral DAILY    lidocaine (XYLOCAINE) 4 % cream   Topical PRN    traMADoL (ULTRAM) tablet 50 mg  50 mg Oral Q6H PRN    levoFLOXacin (LEVAQUIN) 500 mg in D5W IVPB  500 mg IntraVENous Q24H    allopurinoL (ZYLOPRIM) tablet 100 mg  100 mg Oral DAILY    valsartan (DIOVAN) tablet 160 mg  160 mg Oral DAILY    sodium chloride (NS) flush 5-40 mL  5-40 mL IntraVENous Q8H    sodium chloride (NS) flush 5-40 mL  5-40 mL IntraVENous PRN    acetaminophen (TYLENOL) tablet 650 mg  650 mg Oral Q6H PRN    Or    acetaminophen (TYLENOL) suppository 650 mg  650 mg Rectal Q6H PRN    polyethylene glycol (MIRALAX) packet 17 g  17 g Oral DAILY PRN    ondansetron (ZOFRAN ODT) tablet 4 mg  4 mg Oral Q8H PRN    Or    ondansetron (ZOFRAN) injection 4 mg  4 mg IntraVENous Q6H PRN        Objective:   Vitals:  Visit Vitals  BP (!) 163/88   Pulse 65   Temp 98.1 °F (36.7 °C)   Resp 17   Ht 6' 2.02\" (1.88 m)   Wt 191 lb 11.2 oz (87 kg)   SpO2 97%   BMI 24.60 kg/m²      O2 Device: None (Room air) Temp (24hrs), Av.3 °F (36.8 °C), Min:98.1 °F (36.7 °C), Max:98.7 °F (37.1 °C)      Last 24hr Input/Output:    Intake/Output Summary (Last 24 hours) at 2022 0604  Last data filed at 2022 2202  Gross per 24 hour   Intake --   Output 1800 ml   Net -1800 ml          PHYSICAL EXAM:  General:     Alert, cooperative, no distress, appears stated age. Head:    Normocephalic, without obvious abnormality, atraumatic. Eyes:    Conjunctivae/corneas clear. PERRLA  Nose:   Nares normal. No drainage or sinus tenderness. Throat:     Lips, mucosa, and tongue normal.  No Thrush  Neck:   Supple, symmetrical,  no adenopathy, thyroid: non tender     no carotid bruit and no JVD. Back:     Symmetric,  No CVA tenderness. Lungs:    Clear to auscultation bilaterally. No Wheezing or Rhonchi. No rales. Heart:    Regular rate and rhythm,  no murmur, rub or gallop. Abdomen:    Soft, non-tender. Not distended. Bowel sounds normal. No masses. Schaffer in for CBI now w/o output  Extremities:  Extremities normal except L knee tender, atraumatic, No cyanosis. No edema. No clubbing  Lymph nodes:  Cervical, supraclavicular normal.  Neurologic:  Normal strength, Alert and oriented X 3.    Skin:                No rash      Lab Data Reviewed:    Recent Results (from the past 24 hour(s))   CELL COUNT, BODY FLUID Collection Time: 09/19/22  3:16 PM   Result Value Ref Range    BODY FLUID TYPE LEFT KNEE      FLUID COLOR YELLOW      FLUID APPEARANCE CLOUDY      FLUID RBC CT. >100 (H) 0 /cu mm    FLUID NUCLEATED CELLS 11,200 /cu mm    FLD NEUTROPHILS 94 (A) NRRE %    FLD LYMPHS 3 (A) NRRE %    FLD MONO/MACROPHAGES 3 (A) NRRE %   CULTURE, BODY FLUID W GRAM STAIN    Collection Time: 09/19/22  3:16 PM    Specimen: Knee Fluid; Body Fluid   Result Value Ref Range    Special Requests: NO SPECIAL REQUESTS      GRAM STAIN 1+ WBCS SEEN      GRAM STAIN NO ORGANISMS SEEN      Culture result: PENDING    CRYSTALS, SYNOVIAL FLUID    Collection Time: 09/19/22  3:16 PM   Result Value Ref Range    FLUID TYPE(7) LEFT KNEE      Crystals, body fluid URIC ACID CRYSTALS SEEN WITH POLARIZED LIGHT. CBC WITH AUTOMATED DIFF    Collection Time: 09/20/22  4:39 AM   Result Value Ref Range    WBC 5.9 4.1 - 11.1 K/uL    RBC 3.10 (L) 4.10 - 5.70 M/uL    HGB 10.5 (L) 12.1 - 17.0 g/dL    HCT 29.8 (L) 36.6 - 50.3 %    MCV 96.1 80.0 - 99.0 FL    MCH 33.9 26.0 - 34.0 PG    MCHC 35.2 30.0 - 36.5 g/dL    RDW 12.1 11.5 - 14.5 %    PLATELET 268 070 - 258 K/uL    MPV 9.0 8.9 - 12.9 FL    NRBC 0.0 0  WBC    ABSOLUTE NRBC 0.00 0.00 - 0.01 K/uL    NEUTROPHILS 86 (H) 32 - 75 %    LYMPHOCYTES 10 (L) 12 - 49 %    MONOCYTES 3 (L) 5 - 13 %    EOSINOPHILS 0 0 - 7 %    BASOPHILS 0 0 - 1 %    IMMATURE GRANULOCYTES 1 (H) 0.0 - 0.5 %    ABS. NEUTROPHILS 5.1 1.8 - 8.0 K/UL    ABS. LYMPHOCYTES 0.6 (L) 0.8 - 3.5 K/UL    ABS. MONOCYTES 0.2 0.0 - 1.0 K/UL    ABS. EOSINOPHILS 0.0 0.0 - 0.4 K/UL    ABS. BASOPHILS 0.0 0.0 - 0.1 K/UL    ABS. IMM.  GRANS. 0.1 (H) 0.00 - 0.04 K/UL    DF AUTOMATED     METABOLIC PANEL, BASIC    Collection Time: 09/20/22  4:39 AM   Result Value Ref Range    Sodium 130 (L) 136 - 145 mmol/L    Potassium 5.0 3.5 - 5.1 mmol/L    Chloride 99 97 - 108 mmol/L    CO2 25 21 - 32 mmol/L    Anion gap 6 5 - 15 mmol/L    Glucose 168 (H) 65 - 100 mg/dL    BUN 24 (H) 6 - 20 MG/DL    Creatinine 0.98 0.70 - 1.30 MG/DL    BUN/Creatinine ratio 24 (H) 12 - 20      GFR est AA >60 >60 ml/min/1.73m2    GFR est non-AA >60 >60 ml/min/1.73m2    Calcium 9.1 8.5 - 10.1 MG/DL         Assessment/Plan:     Principal Problem:    Hematuria (9/14/2022)    Active Problems:    CVA (cerebrovascular accident) ([facility] [address] (8/19/2017)      Overview: L temporoparietal 3/7/2009      CKD (chronic kidney disease), stage III ([facility] [address] (8/19/2017)      History of prostate cancer (3/30/2020)      Labile hypertension (9/14/2022)     ___________________________________________________  PLAN:    1. Continuous bladder irrigation completed and now w/o blood in urine so voiding trial done yesterday 9/19 and has done well  2. Holding aspirin and Plavix for now but will need to resume aspirin once hematuria controlled with previous history of CVA  3. Follow hemoglobin which was 13.8 on admission and 10.5 this AM from 9.7 yesterday  4. Follow renal function, 19/1.27 admission, now 24/0.98  5. Follow blood pressure closely with known labile hypertension. Continue Valsartan 160 mg daily which is equivalent to one half of his home dose (At home Telmisartan 80 every day), BP variable which I am certain is due to worthless digital BP cuffs  6. Continue Allopurinol with history of gout and note gout crystals on knee aspirate yesterday  7. Hold fish oil as could exacerbate bleeding  8   Holding Rapaflo as Schaffer catheter in now  9. Urine culture sent which I will follow-up on, Klebsiella S Levaquin so on 9/15 started Levaquin IV  10. PT to mobilize  11. SCDs since off anticoagulation due to hematuria  12. Started Mobic 9/19  13. Ortho consult for consideration of cortisone shot, Note reviewed and help appreciated. Note gout crystals in joint fluid  14.   Home tomorrow if safely able to mobilize today      35 Minutes spent today in direct care of this high complexity patient with greater than 50% in counseling and coordination of care.    If need to contact me use hospital  [phone], DO NOT USE PERFECT SERVE    ___________________________________________________    Attending Physician: Augustine Abreu MD

## 2022-09-20 NOTE — CONSULTS
9/19/22    Chief Complaint: Left knee pain    Assessment: Osteoarthritis left knee    Plan: This patient and I did discuss the many options in treating knee osteoarthritis. We did discuss that we could continue to seek out nonoperative modalities, such as: NSAIDs, oral and topical analgesics, knee injections, knee braces, physical therapy, stretching, strengthening, and weight loss strategies, activity modification, ambulatory assistive devices. The patient stated their understanding with this, and would like to proceed with nonsurgical management in the form of injection. HPI: This is a 80 y.o. male who complains of left knee pain. Onset was gradual.  The patient has had activity dependent pain for four days after rolling in bed. The patient has tried activity modification, physical therapy exercises, injections have not been attempted. The pain is in the lateral knee, it is severe in intensity. The patient feels unstable with the knee, fears falling, and has significant limitation with activities of daily living, recreation, and cannot walk due to this.     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    CKD (chronic kidney disease), stage III ([address] 8/19/2017    CVA (cerebrovascular accident) ([address] 2009    DJD (degenerative joint disease) 8/19/2017    Gout     Hyperlipidemia 8/19/2017    Hypertension     Hypertension with renal disease 8/19/2017    Meniere disease 8/19/2017    Nausea & vomiting     On statin therapy 8/19/2017    PAC (premature atrial contraction) 8/19/2017    Palpitation 8/19/2017    Prostate CA ([address] 8/19/2017    Pseudophakia of both eyes 10/16/2018    Rosacea 8/19/2017    Testosterone deficiency 8/19/2017       Past Surgical History:   Procedure Laterality Date    COLORECTAL SCRN; HI RISK IND  5/3/2016         HX HEENT  1970    tonsillectomy    HX HEENT  1980    basal cell carcinoma right ear    HX OTHER SURGICAL Right 01/21/2018    EXCISION RIGHT NECK MASS     HX PROSTATECTOMY      radiation only    UT ABDOMEN SURGERY PROC UNLISTED      bilateral inguinal hernia repair    UT COLONOSCOPY FLX DX W/COLLJ SPEC WHEN PFRMD  1/19/2011            No current facility-administered medications on file prior to encounter. Current Outpatient Medications on File Prior to Encounter   Medication Sig Dispense Refill    allopurinoL (ZYLOPRIM) 100 mg tablet TAKE ONE TABLET BY MOUTH DAILY 90 Tablet 3    clopidogreL (PLAVIX) 75 mg tab TAKE ONE TABLET BY MOUTH DAILY 90 Tablet 3    cycloSPORINE (Restasis) 0.05 % dpet INSTILL 2 DROPS IN EACH EYE DAILY AS DIRECTED 60 Each 5    telmisartan (MICARDIS) 80 mg tablet Take 1 Tablet by mouth daily. 30 Tablet prn    doxycycline (VIBRAMYCIN) 100 mg capsule TAKE ONE CAPSULE BY MOUTH DAILY 90 Capsule 2    bicalutamide (CASODEX) 50 mg tablet       omega-3 fatty acids 1,000 mg cap Take  by mouth. aspirin (ASPIRIN) 325 mg tablet Take 325 mg by mouth daily. silodosin (RAPAFLO) 8 mg capsule Take 8 mg by mouth daily (with breakfast).          Allergies   Allergen Reactions    Sulfanilamide Rash    Sulfa (Sulfonamide Antibiotics) Rash     Very mild rash several years ago       Family History   Problem Relation Age of Onset    Cancer Mother         uterine, cervical    Cancer Father         colon ca       Social History     Socioeconomic History    Marital status:    Tobacco Use    Smoking status: Never    Smokeless tobacco: Never   Vaping Use    Vaping Use: Never used   Substance and Sexual Activity    Alcohol use: No    Drug use: No    Sexual activity: Never         Review of Systems:       General: Denies headache, lethargy, fever, weight loss  Ears/Nose/Throat: Denies ear discharge, drainage, nosebleeds, hoarse voice, dental problems  Cardiovascular: Denies chest pain, shortness of breath  Lungs: Denies chest pain, breathing problems, wheezing, pneumonia  Stomach: Denies stomach pain, heartburn, constipation, irritable bowel  Skin: Denies rash, sores, open wounds  Musculoskeletal: Admits to knee pain  Genitourinary: Denies dysuria, hematuria, polyuria  Gastrointestinal: Denies constipation, obstipation, diarrhea  Neurological: Denies changes in sight, smell, hearing, taste, seizures. Denies loss of consciousness. Psychiatric: Denies depression, sleep pattern changes, anxiety, change in personality  Endocrine: Denies mood swings, heat or cold intolerance  Hematologic/Lymphatic: Denies anemia, purpura, petechia  Allergic/Immunologic: Denies swelling of throat, pain or swelling at lymph nodes      Physical Examination:    Visit Vitals  BP (!) 160/80 (BP 1 Location: Left upper arm, BP Patient Position: At rest)   Pulse 69   Temp 98.4 °F (36.9 °C)   Resp 18   Ht 6' 2.02\" (1.88 m)   Wt 196 lb 13.9 oz (89.3 kg)   SpO2 95%   BMI 25.27 kg/m²        General: AOX3, no apparent distress  Psychiatric: mood and affect appropriate  Lungs: breathing is symmetric and unlabored bilaterally  Heart: regular rate and rhythm  Abdomen: no guarding  Head: normocephalic, atraumatic  Skin: No significant abnormalities, good turgor  Sensation intact to light touch: L1-S1 dermatomes  Muscular exam: 5/5 strength in all major muscle groups unless noted in specialty exam.    Extremities:      Left upper extremity: Full active and passive range of motion without pain, deformity, no open wound, strength 5/5 in all major muscle groups. Right upper extremity: Full active and passive range of motion without pain, deformity, no open wound, strength 5/5 in all major muscle groups. Right lower extremity: Full active and passive range of motion without pain, deformity, no open wound, strength 5/5 in all major muscle groups.     Left lower extremity:  No deformity is noted.  Range of motion of the knee is 0-40. Ligamentous testing of the knee indicates stability of the the MCL, LCL, PCL, and ACL. Lachman's, anterior and posterior drawer tests are specifically negative. Medial joint line tenderness to palpation is noted. Popliteal area is unremarkable. 2+  for effusion. No patellar crepitus. Patella tracks centrally . Pivot shift is negative. Strength testing is indicative of 5/5 strength at hip flexion, extension, knee flexion and extension, tibialis anterior, EHL, and FHL. Sensation is intact to light touch in the L1-S1 dermatomes. Capillary refill is less than 2 seconds in the toes. Diagnostics:    Pertinent Diagnostics:  Xrays are available of the left knee, they indicate mild osteoarthritis of the knee joint, no significant other findings, no other osseus abnormalities, fractures, or dislocations. Procedures: PROCEDURE NOTE:    After consent was obtained, the superolateral aspect of the left knee was prepped in the standard fashion with chlorhexidine scrub. Once this was allowed to dry, a timeout was taken and the skin and capsule were injected using 6cc of 1% lidocaine through a 22 gauge needle. Once adequate analgesia was confirmed, the same site was entered with an 18 gauge needle, and 30cc of slightly inflammatory appearing synovial fluid was aspirated. Once the joint was decompressed, an injection of a mixture of 6mg of betamethasone and 1% lidocaine without epinephrine was administered. The needle was then withdrawn and the injection site dressed with a sterile bandage at the conclusion. The procedure was well tolerated by the patient. No immediate adverse reactions were noted. the needle was extracted and a sterile dressing was applied. The patient tolerated well. Post injection instructions were given. Time in consult: 60 minutes  Mr. Lela Kumar has a reminder for a \"due or due soon\" health maintenance.  I have asked that he contact his primary care provider for follow-up on this health maintenance.

## 2022-09-20 NOTE — PROGRESS NOTES
Spiritual Care Assessment/Progress Note  [facility]      NAME: Silvio Segura      MRN: [id number]  AGE: 80 y.o. SEX: male  Scientology Affiliation: Buddhist   Language: English     9/20/2022     Total Time (in minutes): 12     Spiritual Assessment begun in [facility] 3 SURG TELE through conversation with:         [x]Patient        [x] Family    [] Friend(s)        Reason for Consult: Initial/Spiritual assessment, patient floor     Spiritual beliefs: (Please include comment if needed)     [x] Identifies with a kiran tradition:         [x] Supported by a kiran community:            [] Claims no spiritual orientation:           [] Seeking spiritual identity:                [] Adheres to an individual form of spirituality:           [] Not able to assess:                           Identified resources for coping:      [x] Prayer                               [] Music                  [] Guided Imagery     [x] Family/friends                 [] Pet visits     [] Devotional reading                         [] Unknown     [] Other:                                                Interventions offered during this visit: (See comments for more details)    Patient Interventions: Initial/Spiritual assessment, patient floor, Affirmation of kiran, Catharsis/review of pertinent events in supportive environment     Family/Friend(s):  Affirmation of kiran     Plan of Care:     [] Support spiritual and/or cultural needs    [] Support AMD and/or advance care planning process      [] Support grieving process   [] Coordinate Rites and/or Rituals    [] Coordination with community clergy   [x] No spiritual needs identified at this time   [] Detailed Plan of Care below (See Comments)  [] Make referral to Music Therapy  [] Make referral to Pet Therapy     [] Make referral to Addiction services  [] Make referral to [facility]  [] Make referral to Spiritual Care Partner  [] No future visits requested        [x] Contact Spiritual Care for further referrals     Comments:  Reviewed chart prior to visit on Surg Tele for spiritual assessment. Patient's wife was present. She shared that their  had just left. Mr. Catrachito Redd shared he had been sitting up in the chair for a good while and was getting back into the bed to eat his lunch. Offered spiritual presence, affirmation of spiritual support from  and assurance of prayer.    available upon referral by staff or by patient/family request.       SOBEIDA Mcdonald, [facility], Staff   [facility] Paging Service  [phone]

## 2022-09-20 NOTE — PROGRESS NOTES
End of Shift Note    Bedside shift change report given to Mayo Grigsby (oncoming nurse) by Cheryl Rebolledo RN (offgoing nurse). Report included the following information SBAR, Kardex, Intake/Output, MAR, and Recent Results    Shift worked:  7p-7a     Shift summary and any significant changes:     Patient rested this shift. Patient noted some discomfort this shift; medication administered-- see MAR. Patient voiding. Patient noted knee discomfort was much less noticeable this shift than previously. AM labs collected     Concerns for physician to address:  None     Zone phone for oncoming shift:   [phone]       Activity:  Activity Level: Up with Assistance  Number times ambulated in hallways past shift: 0  Number of times OOB to chair past shift: 0    Cardiac:   Cardiac Monitoring: No      Cardiac Rhythm: Sinus Rhythm    Access:  Current line(s): PIV     Genitourinary:   Urinary status: voiding    Respiratory:   O2 Device: None (Room air)       GI:  Last Bowel Movement Date: 09/15/22  Current diet:  ADULT DIET Regular; Low Sodium (2 gm)    Tolerating current diet: YES       Pain Management:   Patient states pain is manageable on current regimen: YES    Skin:  Oswaldo Score: 17  Interventions: speciality bed, float heels, and increase time out of bed    Patient Safety:  Fall Score:  Total Score: 3  Interventions: bed/chair alarm, assistive device (walker, cane, etc), gripper socks, and pt to call before getting OOB    High Fall Risk: Yes    Length of Stay:  Expected LOS: 2d 7h  Actual LOS: 6      Cheryl Rebolledo RN

## 2022-09-20 NOTE — PROGRESS NOTES
Problem: Mobility Impaired (Adult and Pediatric)  Goal: *Acute Goals and Plan of Care (Insert Text)  Description: FUNCTIONAL STATUS PRIOR TO ADMISSION: Patient was independent and active without use of DME.    HOME SUPPORT PRIOR TO ADMISSION: The patient lived with wife but did not require assist.    Physical Therapy Goals  Initiated 9/17/2022  1. Patient will move from supine to sit and sit to supine , scoot up and down, and roll side to side in bed with modified independence within 7 day(s). 2.  Patient will transfer from bed to chair and chair to bed with modified independence using the least restrictive device within 7 day(s). 3.  Patient will perform sit to stand with modified independence within 7 day(s). 4.  Patient will ambulate with modified independence for 150 feet with the least restrictive device within 7 day(s). 5.  Patient will ascend/descend 13 stairs with handrail(s) with modified independence within 7 day(s). Outcome: Progressing Towards Goal   PHYSICAL THERAPY TREATMENT  Patient: Cindy Stephenson (38 y.o. male)  Date: 9/20/2022  Diagnosis: Hematuria [R31.9] Hematuria      Precautions:    Chart, physical therapy assessment, plan of care and goals were reviewed. ASSESSMENT    Patient continues with skilled PT services and is progressing towards goals. He reports significantly decreased L knee pain S/P injection. Patient transition supine to sit with decreased SBA. He demonstrates good sitting balance. He is able to perform sit<>stand with CGA. Patient ambulates increased distance with RW and VC for step through gait pattern. He demonstrates intermittent mild LOB during gait and turns, primarily LOB posteriorly. Patient demonstrates stable BP with positional changes and denies orthostatic hypotension. Stair training is completed for D/C home. Patient and spouse are provided VC \"up with good and down with the bad\" as well as to perform with step to gait.  Patient attempts one step with step through gait and requires bilateral hand rails and Min A for safety. Provided increased practice patient is able to ascend and descend 4 steps with R hand rail and VC for sequencing. Gait belt is provided for safe D/C home. Of note patient would like to be seen and would benefit from tx on day of D/C secondary to limited opportunities to ambulate secondary to medical complications      Current Level of Function Impacting Discharge (mobility/balance): CGA- Min A     Other factors to consider for discharge: medical stability, increased gait training, decreased balance          PLAN :  Patient continues to benefit from skilled intervention to address the above impairments. Continue treatment per established plan of care. to address goals. Recommendation for discharge: (in order for the patient to meet his/her long term goals)  Physical therapy at least 2 days/week in the home AND ensure assist and/or supervision for safety with functional mobility     This discharge recommendation:  Has been made in collaboration with the attending provider and/or case management    IF patient discharges home will need the following DME: rolling walker       SUBJECTIVE:   Patient stated The pain is at least 90% better.     OBJECTIVE DATA SUMMARY:   Critical Behavior:  Neurologic State: Alert  Orientation Level: Oriented X4  Cognition: Appropriate decision making, Appropriate for age attention/concentration, Appropriate safety awareness, Follows commands     Functional Mobility Training:  Bed Mobility:     Supine to Sit: Stand-by assistance     Scooting: Stand-by assistance        Transfers:  Sit to Stand: Contact guard assistance  Stand to Sit: Contact guard assistance                             Balance:  Sitting: Intact  Standing: Impaired; With support  Standing - Static: Constant support;Good  Standing - Dynamic : Constant support; Fair  Ambulation/Gait Training:  Distance (ft): 130 Feet (ft)  Assistive Device: Gait belt;Walker, rolling  Ambulation - Level of Assistance: Contact guard assistance;Minimal assistance        Gait Abnormalities: Antalgic;Decreased step clearance; Step to gait           Stance: Left decreased  Speed/Sharon: Slow  Step Length: Right shortened;Left shortened                    Stairs:  Number of Stairs Trained: 8  Stairs - Level of Assistance: Contact guard assistance;Minimum assistance   Rail Use: Right     Therapeutic Exercises:     Pain Rating:      Activity Tolerance:   Good    After treatment patient left in no apparent distress:   Sitting in chair, Call bell within reach, and Caregiver / family present    COMMUNICATION/COLLABORATION:   The patients plan of care was discussed with: Registered nurse.      Ruben Sinha PT   Time Calculation: 42 mins

## 2022-09-20 NOTE — PROGRESS NOTES
Problem: Falls - Risk of  Goal: *Absence of Falls  Description: Document Jim Goyal Fall Risk and appropriate interventions in the flowsheet. Outcome: Progressing Towards Goal  Note: Fall Risk Interventions:  Mobility Interventions: Bed/chair exit alarm, Communicate number of staff needed for ambulation/transfer, Patient to call before getting OOB, Strengthening exercises (ROM-active/passive), Utilize walker, cane, or other assistive device, Utilize gait belt for transfers/ambulation         Medication Interventions: Assess postural VS orthostatic hypotension, Bed/chair exit alarm, Patient to call before getting OOB, Teach patient to arise slowly, Utilize gait belt for transfers/ambulation    Elimination Interventions: Call light in reach, Patient to call for help with toileting needs, Stay With Me (per policy), Toilet paper/wipes in reach, Urinal in reach              Problem: Patient Education: Go to Patient Education Activity  Goal: Patient/Family Education  Outcome: Progressing Towards Goal     Problem: Pressure Injury - Risk of  Goal: *Prevention of pressure injury  Description: Document Oswaldo Scale and appropriate interventions in the flowsheet.   Outcome: Progressing Towards Goal  Note: Pressure Injury Interventions:  Sensory Interventions: Keep linens dry and wrinkle-free, Maintain/enhance activity level, Minimize linen layers, Assess need for specialty bed    Moisture Interventions: Absorbent underpads, Maintain skin hydration (lotion/cream), Minimize layers    Activity Interventions: Chair cushion, Increase time out of bed, PT/OT evaluation    Mobility Interventions: Chair cushion, PT/OT evaluation    Nutrition Interventions: Document food/fluid/supplement intake, Offer support with meals,snacks and hydration    Friction and Shear Interventions: Apply protective barrier, creams and emollients                Problem: Patient Education: Go to Patient Education Activity  Goal: Patient/Family Education  Outcome: Progressing Towards Goal     Problem: Patient Education: Go to Patient Education Activity  Goal: Patient/Family Education  Outcome: Progressing Towards Goal

## 2022-09-21 LAB
ANION GAP SERPL CALC-SCNC: 7 MMOL/L (ref 5–15)
BASOPHILS # BLD: 0 K/UL (ref 0–0.1)
BASOPHILS NFR BLD: 0 % (ref 0–1)
BUN SERPL-MCNC: 38 MG/DL (ref 6–20)
BUN/CREAT SERPL: 32 (ref 12–20)
CALCIUM SERPL-MCNC: 9.3 MG/DL (ref 8.5–10.1)
CHLORIDE SERPL-SCNC: 100 MMOL/L (ref 97–108)
CO2 SERPL-SCNC: 25 MMOL/L (ref 21–32)
CREAT SERPL-MCNC: 1.18 MG/DL (ref 0.7–1.3)
DIFFERENTIAL METHOD BLD: ABNORMAL
EOSINOPHIL # BLD: 0 K/UL (ref 0–0.4)
EOSINOPHIL NFR BLD: 0 % (ref 0–7)
ERYTHROCYTE [DISTWIDTH] IN BLOOD BY AUTOMATED COUNT: 12.4 % (ref 11.5–14.5)
GLUCOSE SERPL-MCNC: 145 MG/DL (ref 65–100)
HCT VFR BLD AUTO: 29.6 % (ref 36.6–50.3)
HGB BLD-MCNC: 10.2 G/DL (ref 12.1–17)
IMM GRANULOCYTES # BLD AUTO: 0.1 K/UL (ref 0–0.04)
IMM GRANULOCYTES NFR BLD AUTO: 1 % (ref 0–0.5)
LYMPHOCYTES # BLD: 0.9 K/UL (ref 0.8–3.5)
LYMPHOCYTES NFR BLD: 9 % (ref 12–49)
MCH RBC QN AUTO: 33.7 PG (ref 26–34)
MCHC RBC AUTO-ENTMCNC: 34.5 G/DL (ref 30–36.5)
MCV RBC AUTO: 97.7 FL (ref 80–99)
MONOCYTES # BLD: 0.8 K/UL (ref 0–1)
MONOCYTES NFR BLD: 8 % (ref 5–13)
NEUTS SEG # BLD: 7.8 K/UL (ref 1.8–8)
NEUTS SEG NFR BLD: 82 % (ref 32–75)
NRBC # BLD: 0 K/UL (ref 0–0.01)
NRBC BLD-RTO: 0 PER 100 WBC
PLATELET # BLD AUTO: 281 K/UL (ref 150–400)
PMV BLD AUTO: 9 FL (ref 8.9–12.9)
POTASSIUM SERPL-SCNC: 4.8 MMOL/L (ref 3.5–5.1)
RBC # BLD AUTO: 3.03 M/UL (ref 4.1–5.7)
SODIUM SERPL-SCNC: 132 MMOL/L (ref 136–145)
WBC # BLD AUTO: 9.6 K/UL (ref 4.1–11.1)

## 2022-09-21 PROCEDURE — 36415 COLL VENOUS BLD VENIPUNCTURE: CPT

## 2022-09-21 PROCEDURE — 74011000250 HC RX REV CODE- 250: Performed by: STUDENT IN AN ORGANIZED HEALTH CARE EDUCATION/TRAINING PROGRAM

## 2022-09-21 PROCEDURE — 65270000046 HC RM TELEMETRY

## 2022-09-21 PROCEDURE — 74011250637 HC RX REV CODE- 250/637: Performed by: STUDENT IN AN ORGANIZED HEALTH CARE EDUCATION/TRAINING PROGRAM

## 2022-09-21 PROCEDURE — 85025 COMPLETE CBC W/AUTO DIFF WBC: CPT

## 2022-09-21 PROCEDURE — 80048 BASIC METABOLIC PNL TOTAL CA: CPT

## 2022-09-21 PROCEDURE — 99232 SBSQ HOSP IP/OBS MODERATE 35: CPT | Performed by: INTERNAL MEDICINE

## 2022-09-21 PROCEDURE — 74011250637 HC RX REV CODE- 250/637: Performed by: INTERNAL MEDICINE

## 2022-09-21 PROCEDURE — 74011250636 HC RX REV CODE- 250/636: Performed by: INTERNAL MEDICINE

## 2022-09-21 RX ORDER — MAGNESIUM CITRATE
296 SOLUTION, ORAL ORAL
Status: DISCONTINUED | OUTPATIENT
Start: 2022-09-21 | End: 2022-09-21

## 2022-09-21 RX ADMIN — VALSARTAN 160 MG: 160 TABLET, FILM COATED ORAL at 10:09

## 2022-09-21 RX ADMIN — LACTULOSE 45 ML: 20 SOLUTION ORAL at 10:09

## 2022-09-21 RX ADMIN — SODIUM CHLORIDE, PRESERVATIVE FREE 10 ML: 5 INJECTION INTRAVENOUS at 06:07

## 2022-09-21 RX ADMIN — LEVOFLOXACIN 500 MG: 5 INJECTION, SOLUTION INTRAVENOUS at 10:09

## 2022-09-21 RX ADMIN — SODIUM CHLORIDE, PRESERVATIVE FREE 10 ML: 5 INJECTION INTRAVENOUS at 20:58

## 2022-09-21 RX ADMIN — ALLOPURINOL 100 MG: 100 TABLET ORAL at 10:09

## 2022-09-21 RX ADMIN — MELOXICAM 7.5 MG: 7.5 TABLET ORAL at 10:09

## 2022-09-21 NOTE — PROGRESS NOTES
INTERNAL MEDICINE PROGRESS NOTE    NAME:  Marita Goode   :   1934   MRN:   [id number]     Date/Time:  2022 7:39 AM  Subjective:   History:  Chart reviewed and patient seen and examined and D/W his nurse and his wife at length this AM and all events noted. 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 He was in his usual state of health working in the yard on  and then around 6 PM passed significant amount of blood in his urine with clots and presented to the emergency room. In the emergency room CT revealed question of a mass versus clot in the bladder and the fact that he was still passing bloody urine with clots he was admitted to the hospital for further work-up and evaluation. This AM he notes no abdominal pain or nausea vomiting has no back pain. He is now off CBI and samaniego out as of  and passing urine. He has had no BM for at least 3 days and no response to Miralax and as a result he feels bloated. He denies any headaches, dizziness or neurologic complaints. He notes no chest pain, shortness of breath or cardiac or respiratory complaints. He notes no change of his chronic arthritic complaints except he had more pain L leg and knee. X-ray done over weekend only arthritis. On  he had aspiration and injection by Ortho and he had the best night since admission that night. He has no other complaints on complete your systems. He made good progress with PT yesterday.     Medications reviewed:  Current Facility-Administered Medications   Medication Dose Route Frequency    meloxicam (MOBIC) tablet 7.5 mg  7.5 mg Oral DAILY    lidocaine (XYLOCAINE) 4 % cream   Topical PRN    traMADoL (ULTRAM) tablet 50 mg  50 mg Oral Q6H PRN    levoFLOXacin (LEVAQUIN) 500 mg in D5W IVPB  500 mg IntraVENous Q24H    allopurinoL (ZYLOPRIM) tablet 100 mg 100 mg Oral DAILY    valsartan (DIOVAN) tablet 160 mg  160 mg Oral DAILY    sodium chloride (NS) flush 5-40 mL  5-40 mL IntraVENous Q8H    sodium chloride (NS) flush 5-40 mL  5-40 mL IntraVENous PRN    acetaminophen (TYLENOL) tablet 650 mg  650 mg Oral Q6H PRN    Or    acetaminophen (TYLENOL) suppository 650 mg  650 mg Rectal Q6H PRN    polyethylene glycol (MIRALAX) packet 17 g  17 g Oral DAILY PRN    ondansetron (ZOFRAN ODT) tablet 4 mg  4 mg Oral Q8H PRN    Or    ondansetron (ZOFRAN) injection 4 mg  4 mg IntraVENous Q6H PRN        Objective:   Vitals:  Visit Vitals  BP (!) 168/67 (BP 1 Location: Left upper arm, BP Patient Position: At rest)   Pulse 75   Temp 98.7 °F (37.1 °C)   Resp 18   Ht 6' 2.02\" (1.88 m)   Wt 196 lb 13.9 oz (89.3 kg)   SpO2 95%   BMI 25.27 kg/m²      O2 Device: None (Room air) Temp (24hrs), Av.2 °F (36.8 °C), Min:97.8 °F (36.6 °C), Max:98.7 °F (37.1 °C)      Last 24hr Input/Output:    Intake/Output Summary (Last 24 hours) at 2022 0739  Last data filed at 2022 1540  Gross per 24 hour   Intake --   Output 150 ml   Net -150 ml          PHYSICAL EXAM:  General:     Alert, cooperative, no distress, appears stated age. Head:    Normocephalic, without obvious abnormality, atraumatic. Eyes:    Conjunctivae/corneas clear. PERRLA  Nose:   Nares normal. No drainage or sinus tenderness. Throat:     Lips, mucosa, and tongue normal.  No Thrush  Neck:   Supple, symmetrical,  no adenopathy, thyroid: non tender     no carotid bruit and no JVD. Back:     Symmetric,  No CVA tenderness. Lungs:    Clear to auscultation bilaterally. No Wheezing or Rhonchi. No rales. Heart:    Regular rate and rhythm,  no murmur, rub or gallop. Abdomen:    Soft, non-tender. Not distended. Bowel sounds normal. No masses. Schaffer in for CBI now w/o output  Extremities:  Extremities normal except L knee tender, atraumatic, No cyanosis. No edema.  No clubbing  Lymph nodes:  Cervical, supraclavicular normal.  Neurologic:  Normal strength, Alert and oriented X 3. Skin:                No rash      Lab Data Reviewed:    Recent Results (from the past 24 hour(s))   CBC WITH AUTOMATED DIFF    Collection Time: 09/21/22  4:16 AM   Result Value Ref Range    WBC 9.6 4.1 - 11.1 K/uL    RBC 3.03 (L) 4.10 - 5.70 M/uL    HGB 10.2 (L) 12.1 - 17.0 g/dL    HCT 29.6 (L) 36.6 - 50.3 %    MCV 97.7 80.0 - 99.0 FL    MCH 33.7 26.0 - 34.0 PG    MCHC 34.5 30.0 - 36.5 g/dL    RDW 12.4 11.5 - 14.5 %    PLATELET 238 337 - 767 K/uL    MPV 9.0 8.9 - 12.9 FL    NRBC 0.0 0  WBC    ABSOLUTE NRBC 0.00 0.00 - 0.01 K/uL    NEUTROPHILS 82 (H) 32 - 75 %    LYMPHOCYTES 9 (L) 12 - 49 %    MONOCYTES 8 5 - 13 %    EOSINOPHILS 0 0 - 7 %    BASOPHILS 0 0 - 1 %    IMMATURE GRANULOCYTES 1 (H) 0.0 - 0.5 %    ABS. NEUTROPHILS 7.8 1.8 - 8.0 K/UL    ABS. LYMPHOCYTES 0.9 0.8 - 3.5 K/UL    ABS. MONOCYTES 0.8 0.0 - 1.0 K/UL    ABS. EOSINOPHILS 0.0 0.0 - 0.4 K/UL    ABS. BASOPHILS 0.0 0.0 - 0.1 K/UL    ABS. IMM. GRANS. 0.1 (H) 0.00 - 0.04 K/UL    DF AUTOMATED     METABOLIC PANEL, BASIC    Collection Time: 09/21/22  4:16 AM   Result Value Ref Range    Sodium 132 (L) 136 - 145 mmol/L    Potassium 4.8 3.5 - 5.1 mmol/L    Chloride 100 97 - 108 mmol/L    CO2 25 21 - 32 mmol/L    Anion gap 7 5 - 15 mmol/L    Glucose 145 (H) 65 - 100 mg/dL    BUN 38 (H) 6 - 20 MG/DL    Creatinine 1.18 0.70 - 1.30 MG/DL    BUN/Creatinine ratio 32 (H) 12 - 20      GFR est AA >60 >60 ml/min/1.73m2    GFR est non-AA 58 (L) >60 ml/min/1.73m2    Calcium 9.3 8.5 - 10.1 MG/DL         Assessment/Plan:     Principal Problem:    Hematuria (9/14/2022)    Active Problems:    CVA (cerebrovascular accident) ([facility] [place] [facility] (8/19/2017)      Overview: L temporoparietal 3/7/2009      CKD (chronic kidney disease), stage III ([facility] [place] [facility] (8/19/2017)      History of prostate cancer (3/30/2020)      Labile hypertension (9/14/2022)     ___________________________________________________  PLAN:    1.   Continuous bladder irrigation completed and now w/o blood in urine so voiding trial done yesterday 9/19 and has done well  2. Holding aspirin and Plavix for now but will need to resume aspirin once hematuria controlled with previous history of CVA  3. Follow hemoglobin which was 13.8 on admission and 10.2 this AM from 9.7 on 9/19  4. Follow renal function, 19/1.27 admission, now 38/1.18  5. Follow blood pressure closely with known labile hypertension. Continue Valsartan 160 mg daily which is equivalent to one half of his home dose (At home Telmisartan 80 every day), BP variable which I am certain is due to worthless digital BP cuffs  6. Continue Allopurinol with history of gout and note gout crystals on knee aspirate 9/19  7. Hold fish oil as could exacerbate bleeding  8   Holding Rapaflo as Schaffer catheter in now  9. Urine culture sent which I will follow-up on, Klebsiella S Levaquin so on 9/15 started Levaquin IV  10. PT to mobilize  11. SCDs since off anticoagulation due to hematuria  12. Started Mobic 9/19  13. Ortho consult for consideration of cortisone shot, Note reviewed and help appreciated. Note gout crystals in joint fluid  14. Mag citrate for constipation  15. Home tomorrow if safely able to mobilize and BM today      30 Minutes spent today in direct care of this high complexity patient with greater than 50% in counseling and coordination of care.     If need to contact me use hospital  [phone], DO NOT USE PERFECT SERVE    ___________________________________________________    Attending Physician: Mya Ricks MD

## 2022-09-21 NOTE — PROGRESS NOTES
End of Shift Note    Bedside shift change report given to Alissa Edmonds (oncoming nurse) by Amauri Wick (offgoing nurse). Report included the following information SBAR, Kardex, and MAR    Shift worked:  [id number]     Shift summary and any significant changes:     Pt had BM, formed brown med amount, passing gas     Concerns for physician to address:  none     Zone phone for oncoming shift:   [phone]       Activity:  Activity Level: Up with Assistance  Number times ambulated in hallways past shift: 0  Number of times OOB to chair past shift: 0    Cardiac:   Cardiac Monitoring: Yes      Cardiac Rhythm: Sinus Rhythm    Access:  Current line(s): PIV     Genitourinary:   Urinary status: voiding    Respiratory:   O2 Device: None (Room air)  Chronic home O2 use?: NO  Incentive spirometer at bedside: YES       GI:  Last Bowel Movement Date: 09/15/22  Current diet:  ADULT DIET Regular; Low Sodium (2 gm)  Passing flatus: YES  Tolerating current diet: YES       Pain Management:   Patient states pain is manageable on current regimen: YES    Skin:  Oswaldo Score: 21  Interventions: increase time out of bed    Patient Safety:  Fall Score:  Total Score: 3  Interventions: assistive device (walker, cane, etc), gripper socks, and pt to call before getting OOB  High Fall Risk: Yes    Length of Stay:  Expected LOS: 2d 7h  Actual LOS: [address]

## 2022-09-21 NOTE — PROGRESS NOTES
End of Shift Note    Bedside shift change report given to Jaquelin Henao RN (oncoming nurse) by Dedrick Sanderson LPN (offgoing nurse). Report included the following information SBAR, Kardex, and MAR    Shift worked:  7a-7p     Shift summary and any significant changes:    Voiding in urinal. Worked with PT/OT today and now ambulating to bathroom/     Concerns for physician to address: none     Zone phone for oncoming shift:  [phone]       Activity:  Activity Level: Up with Assistance  Number times ambulated in hallways past shift: 0  Number of times OOB to chair past shift: 0    Cardiac:   Cardiac Monitoring: No      Cardiac Rhythm: Sinus Rhythm    Access:  Current line(s): PIV     Genitourinary:   Urinary status: voiding    Respiratory:   O2 Device: None (Room air)  Chronic home O2 use?: NO  Incentive spirometer at bedside: NO       GI:  Last Bowel Movement Date: 09/15/22  Current diet:  ADULT DIET Regular; Low Sodium (2 gm)  Passing flatus: YES  Tolerating current diet: YES       Pain Management:   Patient states pain is manageable on current regimen: YES    Skin:  Oswaldo Score: 17  Interventions: increase time out of bed, PT/OT consult, and nutritional support     Patient Safety:  Fall Score:  Total Score: 3  Interventions: gripper socks  High Fall Risk: Yes    Length of Stay:  Expected LOS: 2d 7h  Actual LOS: [address] [facility]

## 2022-09-21 NOTE — PROGRESS NOTES
Transition of Care Plan:     RUR: 12% moderate risk for readmission   Disposition: Home with spouse   Follow up appointments: PCP, Urology? DME needed: RW needed  Transportation at Discharge: Pt's family   [place] or means to access home:  Has access      IM Medicare Letter: Will need 2nd [facility] letter prior to d/c  Is patient a [place] and connected with the [place]? N/A              If yes, was [place] transfer form completed and VA notified? Caregiver Contact: Pt's spouse/LNOK: Litadixie Dominguez" Vee [place] bree) [phone]  Discharge Caregiver contacted prior to discharge? Yes, to be contacted with updates  Care Conference needed?:  No       Patient is needing RW and HH prior to discharge, CM to speak with patient regarding [facility] options. Patient agreeable to BS HH and RW, CM sent referrals to [facility] and [place] for RW. FOC signed.       QUINCY Guillermo, RN    Care Management  [phone]

## 2022-09-22 ENCOUNTER — HOME HEALTH ADMISSION (OUTPATIENT)
Dept: HOME HEALTH SERVICES | Facility: HOME HEALTH | Age: 87
End: 2022-09-22
Payer: MEDICARE

## 2022-09-22 VITALS
DIASTOLIC BLOOD PRESSURE: 72 MMHG | OXYGEN SATURATION: 100 % | TEMPERATURE: 97.6 F | SYSTOLIC BLOOD PRESSURE: 145 MMHG | RESPIRATION RATE: 18 BRPM | BODY MASS INDEX: 25.27 KG/M2 | HEART RATE: 65 BPM | HEIGHT: 74 IN | WEIGHT: 196.87 LBS

## 2022-09-22 LAB
ANION GAP SERPL CALC-SCNC: 5 MMOL/L (ref 5–15)
BASOPHILS # BLD: 0 K/UL (ref 0–0.1)
BASOPHILS NFR BLD: 0 % (ref 0–1)
BUN SERPL-MCNC: 38 MG/DL (ref 6–20)
BUN/CREAT SERPL: 35 (ref 12–20)
CALCIUM SERPL-MCNC: 8.5 MG/DL (ref 8.5–10.1)
CHLORIDE SERPL-SCNC: 103 MMOL/L (ref 97–108)
CO2 SERPL-SCNC: 26 MMOL/L (ref 21–32)
CREAT SERPL-MCNC: 1.09 MG/DL (ref 0.7–1.3)
DIFFERENTIAL METHOD BLD: ABNORMAL
EOSINOPHIL # BLD: 0 K/UL (ref 0–0.4)
EOSINOPHIL NFR BLD: 0 % (ref 0–7)
ERYTHROCYTE [DISTWIDTH] IN BLOOD BY AUTOMATED COUNT: 12.5 % (ref 11.5–14.5)
GLUCOSE SERPL-MCNC: 124 MG/DL (ref 65–100)
HCT VFR BLD AUTO: 29 % (ref 36.6–50.3)
HGB BLD-MCNC: 9.9 G/DL (ref 12.1–17)
IMM GRANULOCYTES # BLD AUTO: 0.1 K/UL (ref 0–0.04)
IMM GRANULOCYTES NFR BLD AUTO: 1 % (ref 0–0.5)
LYMPHOCYTES # BLD: 1.1 K/UL (ref 0.8–3.5)
LYMPHOCYTES NFR BLD: 15 % (ref 12–49)
MCH RBC QN AUTO: 33.1 PG (ref 26–34)
MCHC RBC AUTO-ENTMCNC: 34.1 G/DL (ref 30–36.5)
MCV RBC AUTO: 97 FL (ref 80–99)
MONOCYTES # BLD: 0.8 K/UL (ref 0–1)
MONOCYTES NFR BLD: 12 % (ref 5–13)
NEUTS SEG # BLD: 5.1 K/UL (ref 1.8–8)
NEUTS SEG NFR BLD: 72 % (ref 32–75)
NRBC # BLD: 0 K/UL (ref 0–0.01)
NRBC BLD-RTO: 0 PER 100 WBC
PLATELET # BLD AUTO: 287 K/UL (ref 150–400)
PMV BLD AUTO: 8.7 FL (ref 8.9–12.9)
POTASSIUM SERPL-SCNC: 4.6 MMOL/L (ref 3.5–5.1)
RBC # BLD AUTO: 2.99 M/UL (ref 4.1–5.7)
SODIUM SERPL-SCNC: 134 MMOL/L (ref 136–145)
WBC # BLD AUTO: 7.1 K/UL (ref 4.1–11.1)

## 2022-09-22 PROCEDURE — 74011000250 HC RX REV CODE- 250: Performed by: STUDENT IN AN ORGANIZED HEALTH CARE EDUCATION/TRAINING PROGRAM

## 2022-09-22 PROCEDURE — 99239 HOSP IP/OBS DSCHRG MGMT >30: CPT | Performed by: INTERNAL MEDICINE

## 2022-09-22 PROCEDURE — 85025 COMPLETE CBC W/AUTO DIFF WBC: CPT

## 2022-09-22 PROCEDURE — 80048 BASIC METABOLIC PNL TOTAL CA: CPT

## 2022-09-22 PROCEDURE — 36415 COLL VENOUS BLD VENIPUNCTURE: CPT

## 2022-09-22 RX ORDER — LEVOFLOXACIN 500 MG/1
500 TABLET, FILM COATED ORAL DAILY
Qty: 7 TABLET | Refills: 0 | Status: SHIPPED | OUTPATIENT
Start: 2022-09-22 | End: 2022-10-03 | Stop reason: ALTCHOICE

## 2022-09-22 RX ADMIN — SODIUM CHLORIDE, PRESERVATIVE FREE 10 ML: 5 INJECTION INTRAVENOUS at 05:14

## 2022-09-22 NOTE — DISCHARGE INSTRUCTIONS
[address] [facility] [address]  [phone]      Patient Discharge Instructions    Emiliano García / [id number] : 1934    Admitted 2022 Discharged: 2022     Principal Problem:    Hematuria (2022)    Active Problems:    CVA (cerebrovascular accident) ([facility] (2017)      Overview: L temporoparietal 3/7/2009      CKD (chronic kidney disease), stage III ([facility] (2017)      History of prostate cancer (3/30/2020)      Labile hypertension (2022)          Allergies   Allergen Reactions    Sulfanilamide Rash    Sulfa (Sulfonamide Antibiotics) Rash     Very mild rash several years ago       It is important that you take the medication exactly as they are prescribed. Do not take other medications without consulting your doctor. What to do at Next Level of Care    Disposition:  Home    Recommended diet  Usual    Recommended activity: Usual    Hold Plavix and Fish Oil for now      Information obtained by :  I understand that if any problems occur once I am at home I am to contact my physician. I understand and acknowledge receipt of the instructions indicated above.                                                                                                                                            Physician's or R.N.'s Signature                                                                  Date/Time                                                                                                                                              Patient or Representative Signature                                                          Date/Time

## 2022-09-22 NOTE — PROGRESS NOTES
Hospital follow-up PCP transitional care appointment has been scheduled with Dr. Zuleika Rivera on 9/26/22 at 1400. Pending patient discharge.   Ivett Rose, Care Management Assistant

## 2022-09-22 NOTE — PROGRESS NOTES
End of Shift Note    Bedside shift change report given to Mary Zavaleta RN (oncoming nurse) by Elvia Yañez RN (offgoing nurse). Report included the following information SBAR, Kardex, Procedure Summary, Intake/Output, MAR, Recent Results, and Quality Measures    Shift worked:  [id number]     Shift summary and any significant changes:     Patient with no complaints overnight. Patient and spouse concerned with Urology and Ortho instructions with discharge paperwork.      Concerns for physician to address:  Discharge questions     Zone phone for oncoming shift:   [phone]       Elvia Yañez RN

## 2022-09-22 NOTE — PROGRESS NOTES
Transition of Care Plan: Home with follow ups and HH pending. RUR: 10% moderate risk for readmission   Disposition: Home with follow ups and HH pending. Follow up appointments: PCP, Urology  DME needed: RW delivered 9/22/2022. Transportation at Discharge: Pt's family   [place] or means to access home:  Has access      IM Medicare Letter: 2nd IMM received 9/22/22. Is patient a [place] and connected with the [place]? N/A              If yes, was [place] transfer form completed and VA notified? Caregiver Contact: Pt's spouse/LNOK: Rosaura Ramirez [facility] p) [phone]  Discharge Caregiver contacted prior to discharge? Spouse present and aware of discharge today. Care Conference needed?:  No        Patient had RW delivered to room and 2nd IMM signed. HH pending with BS. Patient had no further questions or concerns for CM prior to discharge.         QUINCY Madison, RN     Care Management  [phone]

## 2022-09-22 NOTE — PROGRESS NOTES
DISCHARGE NOTE FROM [facility] NURSE    Patient determined to be stable for discharge by attending provider. I have reviewed the discharge instructions and follow-up appointments with the patient and spouse. They verbalized understanding and all questions were answered to their satisfaction. No complaints or further questions were expressed. Medications sent to pharmacy. Appropriate educational materials and medication side effect teaching were provided. PIV were removed prior to discharge. Patient did not discharge with any line, samaniego, or drain. All personal items collected during admission were returned to the patient prior to discharge.     Post-op patient: Alexa Thomas

## 2022-09-23 ENCOUNTER — PATIENT OUTREACH (OUTPATIENT)
Dept: CASE MANAGEMENT | Age: 87
End: 2022-09-23

## 2022-09-23 ENCOUNTER — HOME CARE VISIT (OUTPATIENT)
Dept: SCHEDULING | Facility: HOME HEALTH | Age: 87
End: 2022-09-23
Payer: MEDICARE

## 2022-09-23 LAB
BACTERIA SPEC CULT: NORMAL
BACTERIA SPEC CULT: NORMAL
GRAM STN SPEC: NORMAL
GRAM STN SPEC: NORMAL
SERVICE CMNT-IMP: NORMAL

## 2022-09-23 PROCEDURE — 400018 HH-NO PAY CLAIM PROCEDURE

## 2022-09-23 PROCEDURE — 3331090001 HH PPS REVENUE CREDIT

## 2022-09-23 PROCEDURE — G0151 HHCP-SERV OF PT,EA 15 MIN: HCPCS

## 2022-09-23 PROCEDURE — 3331090002 HH PPS REVENUE DEBIT

## 2022-09-23 PROCEDURE — 400013 HH SOC

## 2022-09-23 NOTE — DISCHARGE SUMMARY
[address] SUMMARY    Name:  Marvin Prather  MR#:  [id number]  :  1934  ACCOUNT #:  [de-identified]  ADMIT DATE:  2022  DISCHARGE DATE:  2022    FINAL DIAGNOSES:  1.  Gross hematuria. 2.  Klebsiella urinary tract infection. 3.  Prior cerebrovascular accident, on chronic aspirin therapy as well as Plavix. 4.  Chronic kidney disease, stage III. 5.  History of prostate cancer status post radiation treatment. 6.  Labile hypertension. CONSULTATIONS:  Urology. PROCEDURES:  Continuous bladder irrigation initially until hematuria resolved. COMPLICATIONS:  None. For details of admission history and physical, please see admit note. HOSPITAL COURSE:  Briefly, the patient is an 41-year-old white male who presented to the emergency room after passing gross hematuria with clots. He was admitted to the hospital secondary to continued hematuria and was seen by Urology nurse practitioner at which time continuous bladder irrigation was started and that was continued for 3 days until hematuria resolved. He continued the Schaffer catheter for duration of 2 days after that and as he was having continuous clear urine it was decided to give a voiding trail on  which he was able to do that and pass his urine well after that. He did have a urine culture sent which did return positive for Klebsiella sensitive to Levaquin and on the 09/15 was started on Levaquin 500 mg daily and continued throughout the hospitalization. He did have some problems with labile blood pressure. During the hospitalization was asymptomatic. Unfortunately, he did become very weak with his bed rest with initial hospital stay due to lack of mobility because of continuous bladder irrigation and required physical therapy at which time his physical therapy and mobility improved, it was felt at this point that maximum hospital benefit has been achieved and he will be discharged home.   He was discharged home. Medication changes, he will be on Levaquin 500 daily for an additional 7 days. We will discontinue his Plavix for now, possible to resume later. At this point, I will resume his aspirin that has been held during the hospitalization and he will resume his fish oil tablets and we will watch closely for bleeding. DISCHARGE MEDICATIONS:  Consist of:  1. Levaquin 500 daily for 7 days. 2.  Allopurinol 100 mg daily. 3.  Aspirin 325 daily. 4.  Casodex 50 mg daily. 5.  Cyclosporine Restasis 0.05 two drops each eye daily. 6.  Azithromycin 100 mg daily. 7.  Omega-3 fish oil 1000 mg daily. 8.  Rapiflo 8 mg daily. 9.  Telmisartan 80 mg daily. ADDENDUM:  He was also notable for the fact that he developed severe right knee pain and was seen in consultation by Orthopedics Dr. Pearson Lesches at which time he did have aspiration and injunction with cortisone with marked improvement of his knee from that point on. The crystals did return positive for gout crystals. Thirty five minutes spent in direct care of this patient at the time of discharge today.       Brian Pardo MD      [id number]  D:  09/22/2022 7:54  T:  09/23/2022 2:13  JOB #:  [id number]  CC:  Nathanel Jude, MD Tyree Curry, DO Jewel Lefort, MD       [facility]

## 2022-09-23 NOTE — PROGRESS NOTES
Care Transitions Initial Call    Call within 2 business days of discharge: Yes     Patient: Karthik Kidney Patient : 1934 MRN: [id number]    Last Discharge [address]       Date Complaint Diagnosis Description Type Department Provider    22 Hematuria Persistent gross hematuria . .. ED to Hosp-Admission (Discharged) (ADMIT) [facility] Mau Shepard MD; Rayne Severance. .. Was this an external facility discharge? No     Challenges to be reviewed by the provider   Additional needs identified to be addressed with provider: yes  advance care planning- Does not have an ACP or HIPAA on file   Has ACP at home and asked to bring into appt with PCP on 22. On Levaquin for this already. Contains abnormal data CULTURE, URINE  Order: [id number]  Collected 2022 23:37    Status: Final result    Specimen Information: Urine   0 Result Notes  Component Ref Range & Units 22 2157  Resulting Agency   Special Requests:   NO SPECIAL REQUESTS   Reflexed from [id number]  [facility]   [place] Count   >100,000   COLONIES/mL  [address]   Culture result:   KLEBSIELLA PNEUMONIAE Abnormal   [facility]        Susceptibility     Klebsiella pneumoniae     KIARRA     Amikacin ($) Susceptible     Ampicillin ($) Resistant     Ampicillin/sulbactam ($) Resistant     Cefazolin ($) Susceptible     Cefepime ($$) Susceptible     Cefoxitin Susceptible     Ceftazidime ($) Susceptible     Ceftriaxone ($) Susceptible     Ciprofloxacin ($) Susceptible     Gentamicin ($) Susceptible     Levofloxacin ($) Susceptible     Meropenem ($$) Susceptible     Nitrofurantoin Intermediate     Piperacillin/Tazobac ($) Susceptible     Tobramycin ($) Susceptible     Trimeth/Sulfa Susceptible                            Method of communication with provider : none    Discussed COVID-19 related testing which was not done at this time. Test results were not done. Patient informed of results, if available?  no     Advance Care Planning:   Does patient have an Advance Directive: decision makers updated, not on file; education provided. Inpatient Readmission Risk score: Unplanned Readmit Risk Score: 9.5    Was this a readmission? no   Patient stated reason for the admission: UTI    Patients top risk factors for readmission: medical condition-CVA, hematuria, sepsis    Interventions to address risk factors: Scheduled appointment with PCP-Dr Johnny Estrada 22 at 2pm, Scheduled appointment with Specialist-Urology 22 at [address] [place] [address], and Assessment and support for treatment adherence and medication management-CVA, Hematuria, sepsis    Care Transition Nurse (CTN) contacted the family by telephone to perform post hospital discharge assessment. Verified name and  with family as identifiers. Provided introduction to self, and explanation of the CTN role. Patient is hard of hearing, patient wanted me to speak to wife on phone but he was in back ground identified by name and birth date. CTN reviewed discharge instructions, medical action plan and red flags with family who verbalized understanding. Were discharge instructions available to patient? yes. Reviewed appropriate site of care based on symptoms and resources available to patient including: PCP and Specialist. Family given an opportunity to ask questions and does not have any further questions or concerns at this time. The family agrees to contact the PCP office for questions related to their healthcare. Medication reconciliation was performed with family, who verbalizes understanding of administration of home medications. Referral to Pharm D needed: no     Home Health/Outpatient orders at discharge: PT  [facility]  To see patient today      Durable Medical Equipment ordered at discharge: None  Was patient discharged with a pulse oximeter? no    Discussed follow-up appointments. If no appointment was previously scheduled, appointment scheduling offered: yes.  Is follow up appointment scheduled within 7 days of discharge? yes. [address] follow up appointment(s):   Future Appointments   Date Time Provider Maximino Teodora   9/23/2022  1:30 PM Ambika Campbell [facility] [place] [address]   9/26/2022  2:00 PM MD IRMA Gary BS AMB   9/26/2022 To Be Determined Sunil MendezKAREN [facility] Jeremy Melchor [facility]   9/28/2022  4:45 PM Vedia Officer, DO [facility] BS AMB   10/3/2022  9:10 AM MD IRMA Gary BS AMB   12/21/2022  9:10 AM MD IRMA Gary BS AMB     Non-BS follow up appointment(s): [facility] -9/28/22 at 8 am  Plan for follow-up call in 5-7 days based on severity of symptoms and risk factors. CTN provided contact information for future needs. Goals Addressed                   This Visit's Progress     Attends follow-up appointments as directed. 09/23/22   Patient has an appt with PCP Dr Devin Meza on 9/26/22 at 2 pm.  Patient has an appt with Urology on 9/28/22 at 8 am.  Monitor status of these appt on next call. Syeda Hayward MSN, RN, CCM / Care Transition Nurse / [phone]        Prevent complications post hospitalization. 09/23/22   Spoke with Mrs Fernando Jovel, denies any fever, urine is clear, does have some urgency. Is a little constipated and did eat 2 prunes today. Patient is up with use of walker, to start PT today with [facility]. Eating and drinking well. Remains on Levaquin for 6 more days. Patient does have an ACP asked to bring in to be scanned at Dr Devin Meza appt. Monitor if finished Levaquin, any fever, hematuria, pain or urgency with urination or constipation on next call. How is PT going? Bring in ACP to be scanned?     Syeda Hayward MSN, RN, CCM / Care Transition Nurse / [phone]

## 2022-09-24 PROCEDURE — 3331090002 HH PPS REVENUE DEBIT

## 2022-09-24 PROCEDURE — 3331090001 HH PPS REVENUE CREDIT

## 2022-09-25 VITALS
OXYGEN SATURATION: 98 % | DIASTOLIC BLOOD PRESSURE: 78 MMHG | HEART RATE: 70 BPM | TEMPERATURE: 97.6 F | SYSTOLIC BLOOD PRESSURE: 140 MMHG | RESPIRATION RATE: 18 BRPM

## 2022-09-25 PROBLEM — N39.0 UTI (URINARY TRACT INFECTION): Status: ACTIVE | Noted: 2022-09-25

## 2022-09-25 PROCEDURE — 3331090002 HH PPS REVENUE DEBIT

## 2022-09-25 PROCEDURE — 3331090001 HH PPS REVENUE CREDIT

## 2022-09-26 ENCOUNTER — HOME CARE VISIT (OUTPATIENT)
Dept: HOME HEALTH SERVICES | Facility: HOME HEALTH | Age: 87
End: 2022-09-26
Payer: MEDICARE

## 2022-09-26 ENCOUNTER — OFFICE VISIT (OUTPATIENT)
Dept: INTERNAL MEDICINE CLINIC | Age: 87
End: 2022-09-26
Payer: MEDICARE

## 2022-09-26 VITALS
BODY MASS INDEX: 24.54 KG/M2 | OXYGEN SATURATION: 98 % | DIASTOLIC BLOOD PRESSURE: 67 MMHG | HEIGHT: 74 IN | SYSTOLIC BLOOD PRESSURE: 122 MMHG | WEIGHT: 191.2 LBS | HEART RATE: 75 BPM | TEMPERATURE: 98.6 F

## 2022-09-26 DIAGNOSIS — M10.9 GOUT, UNSPECIFIED CAUSE, UNSPECIFIED CHRONICITY, UNSPECIFIED SITE: ICD-10-CM

## 2022-09-26 DIAGNOSIS — N18.30 STAGE 3 CHRONIC KIDNEY DISEASE, UNSPECIFIED WHETHER STAGE 3A OR 3B CKD (HCC): ICD-10-CM

## 2022-09-26 DIAGNOSIS — Z85.46 HISTORY OF PROSTATE CANCER: ICD-10-CM

## 2022-09-26 DIAGNOSIS — N39.0 URINARY TRACT INFECTION WITH HEMATURIA, SITE UNSPECIFIED: ICD-10-CM

## 2022-09-26 DIAGNOSIS — Z09 HOSPITAL DISCHARGE FOLLOW-UP: ICD-10-CM

## 2022-09-26 DIAGNOSIS — I63.9 CEREBROVASCULAR ACCIDENT (CVA), UNSPECIFIED MECHANISM (HCC): ICD-10-CM

## 2022-09-26 DIAGNOSIS — R31.9 URINARY TRACT INFECTION WITH HEMATURIA, SITE UNSPECIFIED: ICD-10-CM

## 2022-09-26 DIAGNOSIS — I12.9 HYPERTENSION WITH RENAL DISEASE: ICD-10-CM

## 2022-09-26 DIAGNOSIS — D64.9 ANEMIA, UNSPECIFIED TYPE: ICD-10-CM

## 2022-09-26 DIAGNOSIS — R31.0 GROSS HEMATURIA: Primary | ICD-10-CM

## 2022-09-26 DIAGNOSIS — M15.9 PRIMARY OSTEOARTHRITIS INVOLVING MULTIPLE JOINTS: ICD-10-CM

## 2022-09-26 PROCEDURE — 99496 TRANSJ CARE MGMT HIGH F2F 7D: CPT | Performed by: INTERNAL MEDICINE

## 2022-09-26 PROCEDURE — 3331090001 HH PPS REVENUE CREDIT

## 2022-09-26 PROCEDURE — G8427 DOCREV CUR MEDS BY ELIG CLIN: HCPCS | Performed by: INTERNAL MEDICINE

## 2022-09-26 PROCEDURE — 1111F DSCHRG MED/CURRENT MED MERGE: CPT | Performed by: INTERNAL MEDICINE

## 2022-09-26 PROCEDURE — 3331090002 HH PPS REVENUE DEBIT

## 2022-09-26 RX ORDER — TELMISARTAN 80 MG/1
40 TABLET ORAL DAILY
Qty: 30 TABLET | Refills: 5 | OUTPATIENT
Start: 2022-09-26 | End: 2022-10-03 | Stop reason: SDUPTHER

## 2022-09-26 NOTE — PROGRESS NOTES
Chief Complaint   Patient presents with    Transitions Of Care     Transition of care    1. Have you been to the ER, urgent care clinic since your last visit? Hospitalized since your last visit? 2. Have you seen or consulted any other health care providers outside of the [address] [place] [address] since your last visit? Include any pap smears or colon screening.  Discharged from [address]

## 2022-09-26 NOTE — PROGRESS NOTES
Transitions Of Care (Transition of care)       HPI:  Christine Archibald is a 80y.o. year old male who is here for a follow up visit for hospitalization transition of care. He was last seen by me on 7/1/2022 office and on 9/22/2022 at time of hospital discharge. Discharged on: 9/22/2022    Diagnosis in hospital:  1. Gross hematuria with clots  2. Klebsiella urinary tract infection  3. CKD stage III   4. History of prostate cancer status post radiation therapy   5. Prior CVA on chronic aspirin as well as Plavix and admission currently Plavix on hold  6. Labile hypertension  7. Anemia hemoglobin 9.9 at hospital discharge  8   Gout right knee during hospitalization treated with aspiration and injection  9. DJD    Complications in hospital: No complications    Medication changes: Plavix currently on hold, start Levaquin 500 daily for an additional 7 days from hospital discharge    Discharge Summary reviewed. Today 9/26/2022      He reports the following: Since hospital discharge he has some urinary urgency that persist but he is noted no blood in his urine no clots. He has had no difficulty passing his urine other than the fact that if he has to go he has to go right then. He does have 2 more days of the Levaquin left and has follow-up with urology 2 days from now. He denies any back pain abdominal pain. He does note he is got minimal residual discomfort with his knee where he had a cortisone shot in the hospital.  He also notes that he is got some neck discomfort in the upper thoracic/lower cervical region that does not radiate. He notes no numbness or paresthesias except he does have a little numbness in his right hand and the right ulnar distribution but that is getting a little better.   He has been noting he is lightheaded and he checked his blood pressure at home with his diastolic blood pressure running in the 50s and low 60s so he as of this morning decrease his telmisartan dose from 80 mg daily to 40 mg daily. He denies any chest pain, shortness of breath, palpitations, PND, orthopnea or other cardiac or respiratory complaints. He notes no GI complaints. He has no headaches and no other neurologic complaints other than the lightheaded dizzy which I think is related from his blood pressure. Remainder complete review of systems is negative.           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    CKD (chronic kidney disease), stage III ([address] 8/19/2017    CVA (cerebrovascular accident) ([address] 2009    DJD (degenerative joint disease) 8/19/2017    Gout     Hyperlipidemia 8/19/2017    Hypertension     Hypertension with renal disease 8/19/2017    Meniere disease 8/19/2017    Nausea & vomiting     On statin therapy 8/19/2017    PAC (premature atrial contraction) 8/19/2017    Palpitation 8/19/2017    Prostate CA ([address] 8/19/2017    Pseudophakia of both eyes 10/16/2018    Rosacea 8/19/2017    Testosterone deficiency 8/19/2017       Past Surgical History:   Procedure Laterality Date    COLORECTAL SCRN; HI RISK IND  5/3/2016         HX HEENT  1970    tonsillectomy    HX HEENT  1980    basal cell carcinoma right ear    HX OTHER SURGICAL Right 01/21/2018    EXCISION RIGHT NECK MASS     HX PROSTATECTOMY      radiation only    NH ABDOMEN SURGERY PROC UNLISTED      bilateral inguinal hernia repair    [facility] COLONOSCOPY FLX DX W/COLLJ SPEC WHEN PFRMD  1/19/2011            Outpatient Encounter Medications as of 9/26/2022   Medication Sig Dispense Refill    acetaminophen (TYLENOL) 500 mg tablet Take 500 mg by mouth every six (6) hours as needed for Pain.      levoFLOXacin (LEVAQUIN) 500 mg tablet Take 1 Tablet by mouth daily. 7 Tablet 0    allopurinoL (ZYLOPRIM) 100 mg tablet TAKE ONE TABLET BY MOUTH DAILY 90 Tablet 3    cycloSPORINE (Restasis) 0.05 % dpet INSTILL 2 DROPS IN EACH EYE DAILY AS DIRECTED 60 Each 5    telmisartan (MICARDIS) 80 mg tablet Take 1 Tablet by mouth daily. 30 Tablet prn    doxycycline (VIBRAMYCIN) 100 mg capsule TAKE ONE CAPSULE BY MOUTH DAILY 90 Capsule 2    bicalutamide (CASODEX) 50 mg tablet       aspirin (ASPIRIN) 325 mg tablet Take 325 mg by mouth daily. silodosin (RAPAFLO) 8 mg capsule Take 8 mg by mouth daily (with breakfast). [DISCONTINUED] omega-3 fatty acids 1,000 mg cap Take  by mouth. (Patient not taking: Reported on 9/23/2022)       No facility-administered encounter medications on file as of 9/26/2022.         Allergies   Allergen Reactions    Sulfanilamide Rash    Sulfa (Sulfonamide Antibiotics) Rash     Very mild rash several years ago        Social History     Socioeconomic History    Marital status:      Spouse name: Not on file    Number of children: Not on file    Years of education: Not on file    Highest education level: Not on file   Occupational History    Not on file   Tobacco Use    Smoking status: Never    Smokeless tobacco: Never   Vaping Use    Vaping Use: Never used   Substance and Sexual Activity    Alcohol use: No    Drug use: No    Sexual activity: Never   Other Topics Concern    Not on file   Social History Narrative    Not on file     Social Determinants of Health     Financial Resource Strain: Not on file   Food Insecurity: Not on file   Transportation Needs: Not on file   Physical Activity: Not on file   Stress: Not on file   Social Connections: Not on file   Intimate Partner Violence: Not on file   Housing Stability: Not on file      REVIEW OF SYSTEMS:  General: negative for - chills or fever  ENT: negative for - headaches, nasal congestion or tinnitus  Eyes: no blurred or visual changes  Neck: No stiffness or swollen nodes  Respiratory: negative for - cough, hemoptysis, shortness of breath or wheezing  Cardiovascular : negative for - chest pain, edema, palpitations or shortness of breath  Gastrointestinal: negative for - abdominal pain, blood in stools, heartburn or nausea/vomiting  Genito-Urinary: no dysuria, trouble voiding, or hematuria  Musculoskeletal: negative for - gait disturbance, joint pain, joint stiffness or joint swelling  Neurological: no TIA or stroke symptoms  Hematologic: no bruises, no bleeding  Lymphatic: no swollen glands  Integument: no lumps, mole changes, nail changes or rash  Endocrine:no malaise/lethargy poly uria or polydipsia or unexpected weight changes      Visit Vitals  /67 (BP 1 Location: Left arm, BP Patient Position: Sitting, BP Cuff Size: Adult)   Pulse 75   Temp 98.6 °F (37 °C)   Ht 6' 2\" (1.88 m)   Wt 191 lb 3.2 oz (86.7 kg)   SpO2 98%   BMI 24.55 kg/m²     CONSTITUTIONAL: well , well nourished, appears age appropriate  HEAD: normocephalic, atraumatic  EYES: sclera anicteric, PERRL, EOMI  ENMT:moist mucous membranes, pharynx clear          Nares: w/o erythema or edema  NECK: supple. Thyroid normal, No JVD or bruits  RESPIRATORY: Chest: clear to ascultation and percussion   CARDIOVASCULAR: Heart: regular rate and rhythm no murmurs, rubs or gallops, PMI not displaced, no thrill  GASTROINTESTINAL: Abdomen: non distended, soft, non-tender, bowel sounds normal  HEMATOLOGIC: no petechiae or purpura  LYMPHATIC: no lymphadenopathy  MUSCULOSKELETAL: Extremities: no edema or active synovitis, pulse 1+   BACK; no point or CVAT  INTEGUMENT: No unusual rashes or suspicious skin lesions noted. Nails appear normal.  NEUROLOGIC: non-focal exam   MENTAL STATUS: alert and oriented, appropriate affect   PSYCHIATRIC: normal affect    Assessment/Plan:         1. Gross hematuria    2.  Urinary tract infection with hematuria, site unspecified    3. Cerebrovascular accident (CVA), unspecified mechanism ([facility]    4. Stage 3 chronic kidney disease, unspecified whether stage 3a or 3b CKD ([facility]    5. Hypertension with renal disease    6. History of prostate cancer    7. Anemia, unspecified type    8. Hospital discharge follow-up    9. Primary osteoarthritis involving multiple joints    10. Gout, unspecified cause, unspecified chronicity, unspecified site        Impressions/Plan:  Impression  1. Gross hematuria that has resolved. Hopefully that was all related to hemorrhagic cystitis from a urinary tract infection but he does have urology follow-up in 2 days and hopefully will proceed with a cystoscopy. 2.  Urinary tract infection with Klebsiella he has 2 more days of antibiotic leading up until his appointment with urology and I told he and his wife that I will let urology decide at that point whether he needs continued antibiotics based upon what his urine looks like and bladder if they do a cystoscopy  3. Prior CVA currently on aspirin off Plavix but will need to be resumed on Plavix once complete evaluation of the bladder is completed. 4.  CKD stage III we will see what that status is today  5. Hypertension blood pressure is labile but he is dizzy with his blood pressure on the low side so I am decreasing telmisartan to 40 mg daily and thus he will take a half of his 80 mg tablets for now. 6.  History of prostate cancer he could have some aspect of radiation cystitis a played a role in hematuria  7. Anemia hemoglobin was down to 9.9 in hospital discharge  8. DJD involving his neck for which I have commended as needed Tylenol for this and heat  9. Gout right knee that seems to have resolved after the cortisone shot and he is on current long-term allopurinol baseline  Hospital discharge follow-up completed today. High complexity patient with high risk for repeat hospitalization.   Follow-up with me again in 1 week or sooner should there be a problem. Orders Placed This Encounter    CBC WITH AUTOMATED DIFF    METABOLIC PANEL, BASIC       Follow-up and Dispositions    Return in about 1 week (around 10/3/2022). No results found for any visits on 09/26/22. Moriah Esquivel MD    The patient was given after the visit summary the patient verbalized an understanding of the plans and problems as explained. Orders Placed This Encounter    CBC WITH AUTOMATED DIFF     Standing Status:   Future     Number of Occurrences:   1     Standing Expiration Date:   3/48/7740    METABOLIC PANEL, BASIC     Standing Status:   Future     Number of Occurrences:   1     Standing Expiration Date:   9/26/2023    [place] DISCHARGE MEDS RECONCILED W/ CURRENT OUTPATIENT MED LIST          No results found for any visits on 09/26/22. I have reviewed the patient's medical history in detail and updated the computerized patient record. We had a prolonged discussion about these complex clinical issues and went over the various important aspects to consider. All questions were answered. Advised him to call back or return to office if symptoms do not improve, change in nature, or persist.    He was given an after visit summary or informed of [employer] which includes patient instructions, diagnoses, current medications, & vitals. He expressed understanding with the diagnosis and plan.

## 2022-09-27 LAB
ANION GAP SERPL CALC-SCNC: 7 MMOL/L (ref 5–15)
BASOPHILS # BLD: 0 K/UL (ref 0–0.1)
BASOPHILS NFR BLD: 0 % (ref 0–1)
BUN SERPL-MCNC: 29 MG/DL (ref 6–20)
BUN/CREAT SERPL: 22 (ref 12–20)
CALCIUM SERPL-MCNC: 9 MG/DL (ref 8.5–10.1)
CHLORIDE SERPL-SCNC: 103 MMOL/L (ref 97–108)
CO2 SERPL-SCNC: 27 MMOL/L (ref 21–32)
CREAT SERPL-MCNC: 1.32 MG/DL (ref 0.7–1.3)
DIFFERENTIAL METHOD BLD: ABNORMAL
EOSINOPHIL # BLD: 0 K/UL (ref 0–0.4)
EOSINOPHIL NFR BLD: 0 % (ref 0–7)
ERYTHROCYTE [DISTWIDTH] IN BLOOD BY AUTOMATED COUNT: 13.2 % (ref 11.5–14.5)
GLUCOSE SERPL-MCNC: 147 MG/DL (ref 65–100)
HCT VFR BLD AUTO: 37.1 % (ref 36.6–50.3)
HGB BLD-MCNC: 11.9 G/DL (ref 12.1–17)
IMM GRANULOCYTES # BLD AUTO: 0.1 K/UL (ref 0–0.04)
IMM GRANULOCYTES NFR BLD AUTO: 1 % (ref 0–0.5)
LYMPHOCYTES # BLD: 1.4 K/UL (ref 0.8–3.5)
LYMPHOCYTES NFR BLD: 17 % (ref 12–49)
MCH RBC QN AUTO: 33.4 PG (ref 26–34)
MCHC RBC AUTO-ENTMCNC: 32.1 G/DL (ref 30–36.5)
MCV RBC AUTO: 104.2 FL (ref 80–99)
MONOCYTES # BLD: 0.8 K/UL (ref 0–1)
MONOCYTES NFR BLD: 9 % (ref 5–13)
NEUTS SEG # BLD: 6.1 K/UL (ref 1.8–8)
NEUTS SEG NFR BLD: 73 % (ref 32–75)
NRBC # BLD: 0 K/UL (ref 0–0.01)
NRBC BLD-RTO: 0 PER 100 WBC
PLATELET # BLD AUTO: 368 K/UL (ref 150–400)
PMV BLD AUTO: 10.6 FL (ref 8.9–12.9)
POTASSIUM SERPL-SCNC: 4.6 MMOL/L (ref 3.5–5.1)
RBC # BLD AUTO: 3.56 M/UL (ref 4.1–5.7)
SODIUM SERPL-SCNC: 137 MMOL/L (ref 136–145)
WBC # BLD AUTO: 8.5 K/UL (ref 4.1–11.1)

## 2022-09-27 PROCEDURE — 3331090001 HH PPS REVENUE CREDIT

## 2022-09-27 PROCEDURE — 3331090002 HH PPS REVENUE DEBIT

## 2022-09-28 ENCOUNTER — VIRTUAL VISIT (OUTPATIENT)
Dept: ORTHOPEDIC SURGERY | Age: 87
End: 2022-09-28
Payer: MEDICARE

## 2022-09-28 DIAGNOSIS — M17.12 UNILATERAL PRIMARY OSTEOARTHRITIS, LEFT KNEE: ICD-10-CM

## 2022-09-28 DIAGNOSIS — Z47.89 ORTHOPEDIC AFTERCARE: Primary | ICD-10-CM

## 2022-09-28 PROCEDURE — 3331090002 HH PPS REVENUE DEBIT

## 2022-09-28 PROCEDURE — 3331090001 HH PPS REVENUE CREDIT

## 2022-09-28 PROCEDURE — 99441 PR PHYS/QHP TELEPHONE EVALUATION 5-10 MIN: CPT | Performed by: ORTHOPAEDIC SURGERY

## 2022-09-28 NOTE — PROGRESS NOTES
9/28/2022      CC: left knee pain    HPI:      This is a 80y.o. year old male who presents for follow up of their left knee injection. The patient states that they have had very good relief of their pain. The time since injection has been approximately a week. PMH:  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    CKD (chronic kidney disease), stage III ([address] 8/19/2017    CVA (cerebrovascular accident) ([address] 2009    DJD (degenerative joint disease) 8/19/2017    Gout     Hyperlipidemia 8/19/2017    Hypertension     Hypertension with renal disease 8/19/2017    Meniere disease 8/19/2017    Nausea & vomiting     On statin therapy 8/19/2017    PAC (premature atrial contraction) 8/19/2017    Palpitation 8/19/2017    Prostate CA ([address] 8/19/2017    Pseudophakia of both eyes 10/16/2018    Rosacea 8/19/2017    Testosterone deficiency 8/19/2017       PSxHx:  Past Surgical History:   Procedure Laterality Date    COLORECTAL SCRN; HI RISK IND  5/3/2016         HX HEENT  1970    tonsillectomy    HX HEENT  1980    basal cell carcinoma right ear    HX OTHER SURGICAL Right 01/21/2018    EXCISION RIGHT NECK MASS     HX PROSTATECTOMY      radiation only    MO ABDOMEN SURGERY PROC UNLISTED      bilateral inguinal hernia repair    MO COLONOSCOPY FLX DX W/COLLJ SPEC WHEN PFRMD  1/19/2011            Meds:    Current Outpatient Medications:     telmisartan (MICARDIS) 80 mg tablet, Take 0.5 Tablets by mouth daily. , Disp: 30 Tablet, Rfl: 5    acetaminophen (TYLENOL) 500 mg tablet, Take 500 mg by mouth every six (6) hours as needed for Pain., Disp: , Rfl:     levoFLOXacin (LEVAQUIN) 500 mg tablet, Take 1 Tablet by mouth daily. , Disp: 7 Tablet, Rfl: 0    allopurinoL (ZYLOPRIM) 100 mg tablet, TAKE ONE TABLET BY MOUTH DAILY, Disp: 90 Tablet, Rfl: 3    cycloSPORINE (Restasis) 0.05 % dpet, INSTILL 2 DROPS IN EACH EYE DAILY AS DIRECTED, Disp: 60 Each, Rfl: 5    doxycycline (VIBRAMYCIN) 100 mg capsule, TAKE ONE CAPSULE BY MOUTH DAILY, Disp: 90 Capsule, Rfl: 2    bicalutamide (CASODEX) 50 mg tablet, , Disp: , Rfl:     aspirin (ASPIRIN) 325 mg tablet, Take 325 mg by mouth daily. , Disp: , Rfl:     silodosin (RAPAFLO) 8 mg capsule, Take 8 mg by mouth daily (with breakfast). , Disp: , Rfl:     All:  Allergies   Allergen Reactions    Sulfanilamide Rash    Sulfa (Sulfonamide Antibiotics) Rash     Very mild rash several years ago       Social Hx:  Social History     Socioeconomic History    Marital status:    Tobacco Use    Smoking status: Never    Smokeless tobacco: Never   Vaping Use    Vaping Use: Never used   Substance and Sexual Activity    Alcohol use: No    Drug use: No    Sexual activity: Never       Family Hx:  Family History   Problem Relation Age of Onset    Cancer Mother         uterine, cervical    Cancer Father         colon ca         Review of Systems:       General: Denies headache, lethargy, fever, weight loss  Ears/Nose/Throat: Denies ear discharge, drainage, nosebleeds, hoarse voice, dental problems  Cardiovascular: Denies chest pain, shortness of breath  Lungs: Denies chest pain, breathing problems, wheezing, pneumonia  Stomach: Denies stomach pain, heartburn, constipation, irritable bowel  Skin: Denies rash, sores, open wounds  Musculoskeletal: left knee pain - resolved  Genitourinary: Denies dysuria, hematuria, polyuria  Gastrointestinal: Denies constipation, obstipation, diarrhea  Neurological: Denies changes in sight, smell, hearing, taste, seizures. Denies loss of consciousness.   Psychiatric: Denies depression, sleep pattern changes, anxiety, change in personality  Endocrine: Denies mood swings, heat or cold intolerance  Hematologic/Lymphatic: Denies anemia, purpura, petechia  Allergic/Immunologic: Denies swelling of throat, pain or swelling at lymph nodes      Physical Examination:    There were no vitals taken for this visit. General: AOX3, no apparent distress  Psychiatric: mood and affect appropriate        Diagnostics:    Pertinent Diagnostics: none    Assessment: Status post left knee injection  Plan: This patient and I discussed the normal course for injections, we discussed that pain relief will likely be temporary to some degree, but I cannot predict the longevity of relief. We also discussed the limitations of injections, and that I cannot inject the same area any more often than every three months. We will proceed with continued observation. Patient was in [place] at the time of consultation. I was in the office while conducting this encounter. Consent:  He and/or his healthcare decision maker is aware that this patient-initiated Telehealth encounter is a billable service, with coverage as determined by his insurance carrier. He is aware that he may receive a bill and has provided verbal consent to proceed: Yes    This virtual visit was conducted telephone encounter only. -  I affirm this is a Patient Initiated Episode with an Established Patient who has not had a related appointment within my department in the past 7 days or scheduled within the next 24 hours. Note: this encounter is not billable if this call serves to triage the patient into an appointment for the relevant concern. Total Time: minutes: 5-10 minutes. Mr. Aleksander Vaughan has a reminder for a \"due or due soon\" health maintenance. I have asked that he contact his primary care provider for follow-up on this health maintenance.

## 2022-09-28 NOTE — PROGRESS NOTES
Physician Progress Note      PATIENTBrodie Soulier, DAVID  CSN #:                  [id number]  :                       1934  ADMIT DATE:       2022 9:57 PM  [address] DATE:        2022 9:29 AM  RESPONDING  PROVIDER #:        Kari Ferro MD          QUERY TEXT:    Dr. Joshua Gao:    Patient admitted with Hematuria, noted to have Klebsiella urinary tract infection in Dc Summary dated . Patient noted to have continuous bladder irrigation for 3 days until hematuria resolved. For clarification purposes can the etiology of the hematuria be further specified in progress notes and discharge summary:    The medical record reflects the following:  Risk Factors: Hx: BPH; CKD stage III; CVA; HTN; HLD; Prostate CA. ......... C/o Hematuria  Clinical Indicators: H/H: 11.2/32.2. Pamalee Bucker Pamalee Bucker ..^ 9.7/28.0... Pamalee Bucker Pamalee Bucker Pamalee Bucker ^ 11.9/37.0..... Pamalee Bucker CT Abd/Pelvis: Well-distended urinary bladder with an area of density in the bladder that  may represent blood products. An underlying mass lesion is not excluded. No hydronephrosis. ..... Andrew Momin Noted per Urology: Gross hematuria secondary to history of radiation aggravated by chronic AC use    Treatment: CT Abd/Pelvis; Urology consult; hold DVT chemoprophylaxis for possible cystoscopy; Continue Schaffer catheter with intermittent flushing; Bladder checks; Trend CBC and BMP; Continue PTA: Telmisartan, allopurinol    Thank you,    Cody Quintanilla  CDI  Options provided:  -- Hematuria due to Klebsiella urinary tract infection  -- Hematuria unrelated to Klebsiella urinary tract infection  -- Hematuria due to (specify etiology if known): This patient has hematuria due to (specify etiology if known).   -- Other - I will add my own diagnosis  -- Disagree - Not applicable / Not valid  -- Disagree - Clinically unable to determine / Unknown  -- Refer to Clinical Documentation Reviewer    PROVIDER RESPONSE TEXT:    Cause od hematuria not determined as Urology deferred Cystocopy to be done out patient post D/C.    Query created by: Mary Roth on 9/28/2022 10:53 AM      Electronically signed by:  Casi Veras MD 9/28/2022 1:00 PM

## 2022-09-29 ENCOUNTER — HOME CARE VISIT (OUTPATIENT)
Dept: SCHEDULING | Facility: HOME HEALTH | Age: 87
End: 2022-09-29
Payer: MEDICARE

## 2022-09-29 PROCEDURE — G0151 HHCP-SERV OF PT,EA 15 MIN: HCPCS

## 2022-09-29 PROCEDURE — 3331090001 HH PPS REVENUE CREDIT

## 2022-09-29 PROCEDURE — 3331090003 HH PPS REVENUE ADJ

## 2022-09-29 PROCEDURE — 3331090002 HH PPS REVENUE DEBIT

## 2022-09-29 NOTE — PROGRESS NOTES
Per provider instructions, contacted patient with results: Two pt identifiers confirmed by wife. Called and spoke to patient's wife. Please call the patient regarding his abnormal result. Hemoglobin is improved and kidneys are without significant change so continue current treatment. Wife verbalized understanding of information discussed  with no further questions at this time.

## 2022-09-30 ENCOUNTER — PATIENT OUTREACH (OUTPATIENT)
Dept: CASE MANAGEMENT | Age: 87
End: 2022-09-30

## 2022-09-30 VITALS
OXYGEN SATURATION: 98 % | SYSTOLIC BLOOD PRESSURE: 140 MMHG | TEMPERATURE: 97.5 F | DIASTOLIC BLOOD PRESSURE: 82 MMHG | HEART RATE: 82 BPM | RESPIRATION RATE: 18 BRPM

## 2022-09-30 NOTE — PROGRESS NOTES
Goals Addressed                   This Visit's Progress     Attends follow-up appointments as directed. On track     09/23/22   Patient has an appt with PCP Dr Melyssa Edwards on 9/26/22 at 2 pm.  Patient has an appt with Urology on 9/28/22 at 8 am.  Monitor status of these appt on next call. Alfreda OVALLES, RN, CCM / Care Transition Nurse / [phone]     09/30/22   Patient did attend his PCP appt on 9/26/22. Did not get to ask about Urology. Getting hair cut. Monitor status of Urology on next call. Alfreda OVALLES, RN, CCM / Care Transition Nurse / [phone]            Prevent complications post hospitalization. On track     09/23/22   Spoke with Mrs [address] [facility], denies any fever, urine is clear, does have some urgency. Is a little constipated and did eat 2 prunes today. Patient is up with use of walker, to start PT today with [facility]. Eating and drinking well. Remains on Levaquin for 6 more days. Patient does have an ACP asked to bring in to be scanned at Dr Melyssa Edwards appt. Monitor if finished Levaquin, any fever, hematuria, pain or urgency with urination or constipation on next call. How is PT going? Bring in ACP to be scanned? Alfreda OVALLES, RN, CCM / Care Transition Nurse / [phone]     09/30/22   Very short call getting hair cut and is doing OK.      Alfreda OVALLES, RN, CCM / Care Transition Nurse / [phone]

## 2022-10-02 PROBLEM — D50.0 BLOOD LOSS ANEMIA: Status: ACTIVE | Noted: 2022-10-02

## 2022-10-02 PROBLEM — D64.9 ANEMIA: Status: ACTIVE | Noted: 2022-10-02

## 2022-10-02 NOTE — PROGRESS NOTES
Chief Complaint   Patient presents with    Hypertension     3 month follow up    Chronic Kidney Disease    Cholesterol Problem       SUBJECTIVE:    Emilee Menjivar is a 80 y.o. male who returns in follow-up his medical problems include hypertension with labile component, hyperlipidemia, CKD stage III, prior CVA, gout, DJD and other medical problems. He also had a recent hospitalization secondary to gross hematuria with a urinary tract infection and has since had follow-up with his urologist which time he had a cystoscopy revealing no abnormalities so it was presumed that his problems were related to the urinary tract infection. He currently denies any chest pain, shortness of breath, palpitations, PND, orthopnea or other cardiac or respiratory complaints. He notes no GI or  complaints. He has no headaches, dizziness or neurologic complaints. He has no current active change of his chronic arthritic complaints. He has been monitoring his blood pressures very closely and his blood pressures have been all within acceptable range from 126/68 as high as 151/82 at the very highest.      Current Outpatient Medications   Medication Sig Dispense Refill    telmisartan (MICARDIS) 40 mg tablet Take 1 Tablet by mouth daily. 90 Tablet 3    acetaminophen (TYLENOL) 500 mg tablet Take 500 mg by mouth every six (6) hours as needed for Pain. allopurinoL (ZYLOPRIM) 100 mg tablet TAKE ONE TABLET BY MOUTH DAILY 90 Tablet 3    cycloSPORINE (Restasis) 0.05 % dpet INSTILL 2 DROPS IN EACH EYE DAILY AS DIRECTED 60 Each 5    doxycycline (VIBRAMYCIN) 100 mg capsule TAKE ONE CAPSULE BY MOUTH DAILY 90 Capsule 2    bicalutamide (CASODEX) 50 mg tablet Take 50 mg by mouth daily. aspirin (ASPIRIN) 325 mg tablet Take 325 mg by mouth daily. silodosin (RAPAFLO) 8 mg capsule Take 8 mg by mouth daily (with breakfast).        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    CKD (chronic kidney disease), stage III ([address] 8/19/2017    CVA (cerebrovascular accident) ([address] 2009    DJD (degenerative joint disease) 8/19/2017    Gout     Hyperlipidemia 8/19/2017    Hypertension     Hypertension with renal disease 8/19/2017    Meniere disease 8/19/2017    Nausea & vomiting     On statin therapy 8/19/2017    PAC (premature atrial contraction) 8/19/2017    Palpitation 8/19/2017    Prostate CA ([address] 8/19/2017    Pseudophakia of both eyes 10/16/2018    Rosacea 8/19/2017    Testosterone deficiency 8/19/2017     Past Surgical History:   Procedure Laterality Date    COLORECTAL SCRN; HI RISK IND  5/3/2016         HX HEENT  1970    tonsillectomy    HX HEENT  1980    basal cell carcinoma right ear    HX OTHER SURGICAL Right 01/21/2018    EXCISION RIGHT NECK MASS     HX PROSTATECTOMY      radiation only    SC ABDOMEN SURGERY PROC UNLISTED      bilateral inguinal hernia repair    SC COLONOSCOPY FLX DX W/COLLJ SPEC WHEN PFRMD  1/19/2011          Allergies   Allergen Reactions    Sulfanilamide Rash    Sulfa (Sulfonamide Antibiotics) Rash     Very mild rash several years ago       REVIEW OF SYSTEMS:  General: negative for - chills or fever, or weight loss or gain  ENT: negative for - headaches, nasal congestion or tinnitus  Eyes: no blurred or visual changes  Neck: No stiffness or swollen nodes  Respiratory: negative for - cough, hemoptysis, shortness of breath or wheezing  Cardiovascular : negative for - chest pain, edema, palpitations or shortness of breath  Gastrointestinal: negative for - abdominal pain, blood in stools, heartburn or nausea/vomiting  Genito-Urinary: no dysuria, trouble voiding, or hematuria  Musculoskeletal: negative for - gait disturbance, joint pain, joint stiffness or joint swelling  Neurological: no TIA or stroke symptoms  Hematologic: no bruises, no bleeding  Lymphatic: no swollen glands  Integument: no lumps, mole changes, nail changes or rash  Endocrine:no malaise/lethargy poly uria or polydipsia or unexpected weight changes        Social History     Socioeconomic History    Marital status:    Tobacco Use    Smoking status: Never    Smokeless tobacco: Never   Vaping Use    Vaping Use: Never used   Substance and Sexual Activity    Alcohol use: No    Drug use: No    Sexual activity: Never     Family History   Problem Relation Age of Onset    Cancer Mother         uterine, cervical    Cancer Father         colon ca       OBJECTIVE:     Visit Vitals  /70 (BP 1 Location: Right upper arm, BP Patient Position: Sitting, BP Cuff Size: Adult)   Pulse (!) 58   Temp 97.7 °F (36.5 °C) (Oral)   Resp 16   Ht 6' 2\" (1.88 m)   Wt 196 lb (88.9 kg)   SpO2 98%   BMI 25.16 kg/m²     CONSTITUTIONAL:   well nourished, appears age appropriate  EYES: sclera anicteric, PERRL, EOMI  ENMT:nares clear, moist mucous membranes, pharynx clear  NECK: supple. Thyroid normal, No JVD or bruits  RESPIRATORY: Chest: clear to ascultation and percussion, normal inspiratory effort  CARDIOVASCULAR: Heart: regular rate and rhythm no murmurs, rubs or gallops, PMI not displaced, No thrills, no peripheral edema  GASTROINTESTINAL: Abdomen: non distended, soft, non tender, bowel sounds normal  HEMATOLOGIC: no purpura, petechiae or bruising  LYMPHATIC: No lymph node enlargemant  MUSCULOSKELETAL: Extremities: no active synovitis, pulse 1+   INTEGUMENT: No unusual rashes or suspicious skin lesions noted. Nails appear normal.  PERIPHERAL VASCULAR: normal pulses femoral, PT and DP  NEUROLOGIC: non-focal exam, A & O X 3  PSYCHIATRIC:, appropriate affect     ASSESSMENT:   1. Hypertension with renal disease    2. Mixed hyperlipidemia    3.  Cerebrovascular accident (CVA), unspecified mechanism ([address]    4. Stage 3 chronic kidney disease, unspecified whether stage 3a or 3b CKD ([facility]    5. Primary osteoarthritis involving multiple joints    6. Gout, unspecified cause, unspecified chronicity, unspecified site    7. Gross hematuria    8. Blood loss anemia      Impression  1. Hypertension labile component that is adequately controlled so continue current therapy I renewed his telmisartan at 40 mg daily  2. Hyperlipidemia prior lab reviewed repeat status pending   3 prior CVA continue aspirin currently Plavix on hold  4. CKD stage III repeat status pending next Umber 5 DJD that is stable  6. Gout no recent flares  7. Gross hematuria related to recent urinary tract infection follow-up urine pending  8 blood loss anemia repeat status pending  I will call with lab and follow stable continue same and follow-up in 3 months or sooner if there is a problem. PLAN:  .  Orders Placed This Encounter    CULTURE, URINE    METABOLIC PANEL, COMPREHENSIVE    LIPID PANEL    CK    CBC WITH AUTOMATED DIFF    URINALYSIS W/MICROSCOPIC    telmisartan (MICARDIS) 40 mg tablet         ATTENTION:   This medical record was transcribed using an electronic medical records system. Although proofread, it may and can contain electronic and spelling errors. Other human spelling and other errors may be present. Corrections may be executed at a later time. Please feel free to contact us for any clarifications as needed. Follow-up and Dispositions    Return in about 3 months (around 1/3/2023). No results found for any visits on 10/03/22. Margareth Rehman MD    The patient verbalized understanding of the problems and plans as explained.

## 2022-10-03 ENCOUNTER — OFFICE VISIT (OUTPATIENT)
Dept: INTERNAL MEDICINE CLINIC | Age: 87
End: 2022-10-03
Payer: MEDICARE

## 2022-10-03 VITALS
HEIGHT: 74 IN | DIASTOLIC BLOOD PRESSURE: 70 MMHG | BODY MASS INDEX: 25.15 KG/M2 | TEMPERATURE: 97.7 F | RESPIRATION RATE: 16 BRPM | HEART RATE: 58 BPM | SYSTOLIC BLOOD PRESSURE: 118 MMHG | WEIGHT: 196 LBS | OXYGEN SATURATION: 98 %

## 2022-10-03 DIAGNOSIS — N18.30 STAGE 3 CHRONIC KIDNEY DISEASE, UNSPECIFIED WHETHER STAGE 3A OR 3B CKD (HCC): ICD-10-CM

## 2022-10-03 DIAGNOSIS — R31.0 GROSS HEMATURIA: ICD-10-CM

## 2022-10-03 DIAGNOSIS — M10.9 GOUT, UNSPECIFIED CAUSE, UNSPECIFIED CHRONICITY, UNSPECIFIED SITE: ICD-10-CM

## 2022-10-03 DIAGNOSIS — E78.2 MIXED HYPERLIPIDEMIA: ICD-10-CM

## 2022-10-03 DIAGNOSIS — I12.9 HYPERTENSION WITH RENAL DISEASE: Primary | ICD-10-CM

## 2022-10-03 DIAGNOSIS — M15.9 PRIMARY OSTEOARTHRITIS INVOLVING MULTIPLE JOINTS: ICD-10-CM

## 2022-10-03 DIAGNOSIS — I63.9 CEREBROVASCULAR ACCIDENT (CVA), UNSPECIFIED MECHANISM (HCC): ICD-10-CM

## 2022-10-03 DIAGNOSIS — D50.0 BLOOD LOSS ANEMIA: ICD-10-CM

## 2022-10-03 PROCEDURE — 99214 OFFICE O/P EST MOD 30 MIN: CPT | Performed by: INTERNAL MEDICINE

## 2022-10-03 PROCEDURE — 1101F PT FALLS ASSESS-DOCD LE1/YR: CPT | Performed by: INTERNAL MEDICINE

## 2022-10-03 PROCEDURE — G8417 CALC BMI ABV UP PARAM F/U: HCPCS | Performed by: INTERNAL MEDICINE

## 2022-10-03 PROCEDURE — G8427 DOCREV CUR MEDS BY ELIG CLIN: HCPCS | Performed by: INTERNAL MEDICINE

## 2022-10-03 PROCEDURE — G8536 NO DOC ELDER MAL SCRN: HCPCS | Performed by: INTERNAL MEDICINE

## 2022-10-03 PROCEDURE — G8432 DEP SCR NOT DOC, RNG: HCPCS | Performed by: INTERNAL MEDICINE

## 2022-10-03 PROCEDURE — 1123F ACP DISCUSS/DSCN MKR DOCD: CPT | Performed by: INTERNAL MEDICINE

## 2022-10-03 PROCEDURE — 1111F DSCHRG MED/CURRENT MED MERGE: CPT | Performed by: INTERNAL MEDICINE

## 2022-10-03 RX ORDER — TELMISARTAN 40 MG/1
40 TABLET ORAL DAILY
Qty: 90 TABLET | Refills: 3 | Status: SHIPPED | OUTPATIENT
Start: 2022-10-03 | End: 2022-10-17 | Stop reason: SDUPTHER

## 2022-10-03 NOTE — PROGRESS NOTES
Julissa Escobar is a 80 y.o. male     Chief Complaint   Patient presents with    Hypertension     3 month follow up    Chronic Kidney Disease    Cholesterol Problem       Visit Vitals  /70 (BP 1 Location: Right upper arm, BP Patient Position: Sitting, BP Cuff Size: Adult)   Pulse (!) 58   Temp 97.7 °F (36.5 °C) (Oral)   Resp 16   Ht 6' 2\" (1.88 m)   Wt 196 lb (88.9 kg)   SpO2 98%   BMI 25.16 kg/m²       Health Maintenance Due   Topic Date Due    DTaP/Tdap/Td series (1 - Tdap) 06/22/2013    COVID-19 Vaccine (3 - Booster for Pfizer series) 09/01/2021    Flu Vaccine (1) 08/01/2022         1. \"Have you been to the ER, urgent care clinic since your last visit? Hospitalized since your last visit? \"  [address] 9/13/22    2. \"Have you seen or consulted any other health care providers outside of the [address] [place] [address] since your last visit? \" No     3. For patients aged 39-70: Has the patient had a colonoscopy / FIT/ Cologuard? NA - based on age      If the patient is female:    4. For patients aged 41-77: Has the patient had a mammogram within the past 2 years? NA - based on age or sex      11. For patients aged 21-65: Has the patient had a pap smear?  NA - based on age or sex

## 2022-10-04 LAB
ALBUMIN SERPL-MCNC: 3.2 G/DL (ref 3.5–5)
ALBUMIN/GLOB SERPL: 0.9 {RATIO} (ref 1.1–2.2)
ALP SERPL-CCNC: 112 U/L (ref 45–117)
ALT SERPL-CCNC: 15 U/L (ref 12–78)
ANION GAP SERPL CALC-SCNC: 2 MMOL/L (ref 5–15)
APPEARANCE UR: CLEAR
AST SERPL-CCNC: 13 U/L (ref 15–37)
BACTERIA URNS QL MICRO: NEGATIVE /HPF
BASOPHILS # BLD: 0 K/UL (ref 0–0.1)
BASOPHILS NFR BLD: 1 % (ref 0–1)
BILIRUB SERPL-MCNC: 0.5 MG/DL (ref 0.2–1)
BILIRUB UR QL: NEGATIVE
BUN SERPL-MCNC: 24 MG/DL (ref 6–20)
BUN/CREAT SERPL: 23 (ref 12–20)
CALCIUM SERPL-MCNC: 8.9 MG/DL (ref 8.5–10.1)
CHLORIDE SERPL-SCNC: 108 MMOL/L (ref 97–108)
CHOLEST SERPL-MCNC: 110 MG/DL
CK SERPL-CCNC: 25 U/L (ref 39–308)
CO2 SERPL-SCNC: 27 MMOL/L (ref 21–32)
COLOR UR: NORMAL
CREAT SERPL-MCNC: 1.04 MG/DL (ref 0.7–1.3)
DIFFERENTIAL METHOD BLD: ABNORMAL
EOSINOPHIL # BLD: 0.1 K/UL (ref 0–0.4)
EOSINOPHIL NFR BLD: 1 % (ref 0–7)
EPITH CASTS URNS QL MICRO: NORMAL /LPF
ERYTHROCYTE [DISTWIDTH] IN BLOOD BY AUTOMATED COUNT: 13.4 % (ref 11.5–14.5)
GLOBULIN SER CALC-MCNC: 3.4 G/DL (ref 2–4)
GLUCOSE SERPL-MCNC: 99 MG/DL (ref 65–100)
GLUCOSE UR STRIP.AUTO-MCNC: NEGATIVE MG/DL
HCT VFR BLD AUTO: 33.6 % (ref 36.6–50.3)
HDLC SERPL-MCNC: 42 MG/DL
HDLC SERPL: 2.6 {RATIO} (ref 0–5)
HGB BLD-MCNC: 10.8 G/DL (ref 12.1–17)
HGB UR QL STRIP: NEGATIVE
HYALINE CASTS URNS QL MICRO: NORMAL /LPF (ref 0–5)
IMM GRANULOCYTES # BLD AUTO: 0 K/UL (ref 0–0.04)
IMM GRANULOCYTES NFR BLD AUTO: 1 % (ref 0–0.5)
KETONES UR QL STRIP.AUTO: NEGATIVE MG/DL
LDLC SERPL CALC-MCNC: 57.4 MG/DL (ref 0–100)
LEUKOCYTE ESTERASE UR QL STRIP.AUTO: NEGATIVE
LYMPHOCYTES # BLD: 1 K/UL (ref 0.8–3.5)
LYMPHOCYTES NFR BLD: 17 % (ref 12–49)
MCH RBC QN AUTO: 33.4 PG (ref 26–34)
MCHC RBC AUTO-ENTMCNC: 32.1 G/DL (ref 30–36.5)
MCV RBC AUTO: 104 FL (ref 80–99)
MONOCYTES # BLD: 0.6 K/UL (ref 0–1)
MONOCYTES NFR BLD: 11 % (ref 5–13)
NEUTS SEG # BLD: 3.9 K/UL (ref 1.8–8)
NEUTS SEG NFR BLD: 69 % (ref 32–75)
NITRITE UR QL STRIP.AUTO: NEGATIVE
NRBC # BLD: 0 K/UL (ref 0–0.01)
NRBC BLD-RTO: 0 PER 100 WBC
PH UR STRIP: 6.5 [PH] (ref 5–8)
PLATELET # BLD AUTO: 241 K/UL (ref 150–400)
PMV BLD AUTO: 10.5 FL (ref 8.9–12.9)
POTASSIUM SERPL-SCNC: 5.1 MMOL/L (ref 3.5–5.1)
PROT SERPL-MCNC: 6.6 G/DL (ref 6.4–8.2)
PROT UR STRIP-MCNC: NEGATIVE MG/DL
RBC # BLD AUTO: 3.23 M/UL (ref 4.1–5.7)
RBC #/AREA URNS HPF: NORMAL /HPF (ref 0–5)
SODIUM SERPL-SCNC: 137 MMOL/L (ref 136–145)
SP GR UR REFRACTOMETRY: 1.01 (ref 1–1.03)
TRIGL SERPL-MCNC: 53 MG/DL (ref ?–150)
UROBILINOGEN UR QL STRIP.AUTO: 0.2 EU/DL (ref 0.2–1)
VLDLC SERPL CALC-MCNC: 10.6 MG/DL
WBC # BLD AUTO: 5.6 K/UL (ref 4.1–11.1)
WBC URNS QL MICRO: NORMAL /HPF (ref 0–4)

## 2022-10-05 LAB
BACTERIA SPEC CULT: NORMAL
SERVICE CMNT-IMP: NORMAL

## 2022-10-11 ENCOUNTER — PATIENT OUTREACH (OUTPATIENT)
Dept: CASE MANAGEMENT | Age: 87
End: 2022-10-11

## 2022-10-11 NOTE — PROGRESS NOTES
Goals Addressed                   This Visit's Progress     Attends follow-up appointments as directed. 09/23/22   Patient has an appt with PCP Dr Yenny Ramirez on 9/26/22 at 2 pm.  Patient has an appt with Urology on 9/28/22 at 8 am.  Monitor status of these appt on next call. Theresa OVALLES, RN, CCM / Care Transition Nurse / [phone]     09/30/22   Patient did attend his PCP appt on 9/26/22. Did not get to ask about Urology. Getting hair cut. Monitor status of Urology on next call. Theresa OVALLES, RN, CCM / Care Transition Nurse / [phone]     10/11/22   Care Transitions Outreach Attempt-LMTCB. Theresa OVALLES, RN, CCM / Care Transition Nurse / [phone]             Prevent complications post hospitalization. 09/23/22   Spoke with Mrs Teddy Magana, denies any fever, urine is clear, does have some urgency. Is a little constipated and did eat 2 prunes today. Patient is up with use of walker, to start PT today with [facility]. Eating and drinking well. Remains on Levaquin for 6 more days. Patient does have an ACP asked to bring in to be scanned at Dr Yenny Ramirez appt. Monitor if finished Levaquin, any fever, hematuria, pain or urgency with urination or constipation on next call. How is PT going? Bring in ACP to be scanned? Theresa OVALLES, RN, CCM / Care Transition Nurse / [phone]     09/30/22   Very short call getting hair cut and is doing OK. Theresa OVALLES, RN, CCM / Care Transition Nurse / [phone]     10/11/22   Care Transitions Outreach Attempt-LMTCB.     Theresa OVALLES, RN, CCM / Care Transition Nurse / [phone]

## 2022-10-12 NOTE — PROGRESS NOTES
Goals Addressed                   This Visit's Progress     Attends follow-up appointments as directed. 09/23/22   Patient has an appt with PCP Dr Phani Cheney on 9/26/22 at 2 pm.  Patient has an appt with Urology on 9/28/22 at 8 am.  Monitor status of these appt on next call. Callie OVALLES, RN, [facility] / Care Transition Nurse / [phone]     09/30/22   Patient did attend his PCP appt on 9/26/22. Did not get to ask about Urology. Getting hair cut. Monitor status of Urology on next call. Callie OVALLES, RN, [facility] / Care Transition Nurse / [phone]     10/11/22   Care Transitions Outreach Attempt-LMTCB. Callie OVALLES, RN, [facility] / Care Transition Nurse / [phone]             Prevent complications post hospitalization. 09/23/22   Spoke with Mrs Areli Mendoza, denies any fever, urine is clear, does have some urgency. Is a little constipated and did eat 2 prunes today. Patient is up with use of walker, to start PT today with [facility]. Eating and drinking well. Remains on Levaquin for 6 more days. Patient does have an ACP asked to bring in to be scanned at Dr Phani Cheney appt. Monitor if finished Levaquin, any fever, hematuria, pain or urgency with urination or constipation on next call. How is PT going? Bring in ACP to be scanned? Callie OVALLES, RN, [facility] / Care Transition Nurse / [phone]     09/30/22   Very short call getting hair cut and is doing OK. Callie OVALLES, RN, [facility] / Care Transition Nurse / [phone]     10/11/22   Care Transitions Outreach Attempt-LMTCB. Callie OVALLES, RN, [facility] / Care Transition Nurse / [phone]      10/12/22   Patient has left message on 10/11/22 that he was doing well with no concerns.   Callie OVALLES, RN, [facility] / Care Transition Nurse / [phone]

## 2022-10-14 ENCOUNTER — TELEPHONE (OUTPATIENT)
Dept: INTERNAL MEDICINE CLINIC | Age: 87
End: 2022-10-14

## 2022-10-14 NOTE — TELEPHONE ENCOUNTER
Patient's wife called regarding patient's blood pressure being elevated the past few days. Last night took an extra 40 mg of the telmisartin and the BP was still running about 187/100. Also concerned regarding symptom of urinary urgency. Reviewed information with Dr. Cindy Bernstein and urged patient to go to [facility] evaluation.

## 2022-10-17 ENCOUNTER — OFFICE VISIT (OUTPATIENT)
Dept: INTERNAL MEDICINE CLINIC | Age: 87
End: 2022-10-17
Payer: MEDICARE

## 2022-10-17 VITALS
WEIGHT: 196.6 LBS | OXYGEN SATURATION: 97 % | HEART RATE: 66 BPM | TEMPERATURE: 97.9 F | SYSTOLIC BLOOD PRESSURE: 162 MMHG | BODY MASS INDEX: 25.23 KG/M2 | HEIGHT: 74 IN | RESPIRATION RATE: 17 BRPM | DIASTOLIC BLOOD PRESSURE: 82 MMHG

## 2022-10-17 DIAGNOSIS — I63.9 CEREBROVASCULAR ACCIDENT (CVA), UNSPECIFIED MECHANISM (HCC): ICD-10-CM

## 2022-10-17 DIAGNOSIS — I12.9 HYPERTENSION WITH RENAL DISEASE: Primary | ICD-10-CM

## 2022-10-17 DIAGNOSIS — Z23 NEEDS FLU SHOT: ICD-10-CM

## 2022-10-17 PROCEDURE — 90694 VACC AIIV4 NO PRSRV 0.5ML IM: CPT | Performed by: INTERNAL MEDICINE

## 2022-10-17 PROCEDURE — G8427 DOCREV CUR MEDS BY ELIG CLIN: HCPCS | Performed by: INTERNAL MEDICINE

## 2022-10-17 PROCEDURE — 1101F PT FALLS ASSESS-DOCD LE1/YR: CPT | Performed by: INTERNAL MEDICINE

## 2022-10-17 PROCEDURE — G8536 NO DOC ELDER MAL SCRN: HCPCS | Performed by: INTERNAL MEDICINE

## 2022-10-17 PROCEDURE — 1123F ACP DISCUSS/DSCN MKR DOCD: CPT | Performed by: INTERNAL MEDICINE

## 2022-10-17 PROCEDURE — G8510 SCR DEP NEG, NO PLAN REQD: HCPCS | Performed by: INTERNAL MEDICINE

## 2022-10-17 PROCEDURE — G8417 CALC BMI ABV UP PARAM F/U: HCPCS | Performed by: INTERNAL MEDICINE

## 2022-10-17 PROCEDURE — 1111F DSCHRG MED/CURRENT MED MERGE: CPT | Performed by: INTERNAL MEDICINE

## 2022-10-17 PROCEDURE — 99213 OFFICE O/P EST LOW 20 MIN: CPT | Performed by: INTERNAL MEDICINE

## 2022-10-17 PROCEDURE — G0008 ADMIN INFLUENZA VIRUS VAC: HCPCS | Performed by: INTERNAL MEDICINE

## 2022-10-17 RX ORDER — TELMISARTAN 80 MG/1
80 TABLET ORAL DAILY
Qty: 90 TABLET | Refills: 3 | OUTPATIENT
Start: 2022-10-17

## 2022-10-17 RX ORDER — AMLODIPINE BESYLATE 5 MG/1
5 TABLET ORAL DAILY
Qty: 90 TABLET | OUTPATIENT
Start: 2022-10-17

## 2022-10-17 NOTE — PROGRESS NOTES
Chief Complaint   Patient presents with    Elevated Blood Pressure     Patient reports elevated bp readings at night x 1 week     Visit Vitals  BP (!) 176/96 (BP 1 Location: Left upper arm, BP Patient Position: Sitting, BP Cuff Size: Adult)   Pulse 66   Temp 97.9 °F (36.6 °C) (Oral)   Resp 17   Ht 6' 2\" (1.88 m)   Wt 196 lb 9.6 oz (89.2 kg)   SpO2 97%   BMI 25.24 kg/m²     1. Have you been to the ER, urgent care clinic since your last visit? Hospitalized since your last visit? No    2. Have you seen or consulted any other health care providers outside of the [address] [place] [address] since your last visit? Include any pap smears or colon screening. No      Per verbal orders from Dr. Pravin Blevins, patient received Fluad flu vaccine given IM in right deltoid given by Terry Tripp. Vaccine was verified by Milana Tr. Patient was observed for 15 minutes following injection with no complications noted. VIS was given.

## 2022-10-17 NOTE — PROGRESS NOTES
Subjective:   Luz Levy is a 80 y.o. male      Chief Complaint   Patient presents with    Elevated Blood Pressure     Patient reports elevated bp readings at night x 1 week        History of present illness: He presents today accompanied by his wife for evaluation of elevated blood pressure readings at night over the past week. He normally has been taking telmisartan 40 mg every morning but is his blood pressure readings started to go up increase that dose to 80 mg. He has been taking some afternoon amlodipine based upon what his blood pressure readings were and recently has been taking 5 mg which he increased to 10 mg yesterday and he feels the best he is felt for some time today. He denies any headaches nosebleeds or any other neurologic complaints or cardiovascular complaints. He did bring a list of multiple blood pressure readings that he has conducted himself from October 8 through today. They were all reviewed by me.     Patient Active Problem List   Diagnosis Code    Meniere disease H81.09    Testosterone deficiency E34.9    Rosacea L71.9    Palpitation R00.2    PAC (premature atrial contraction) I49.1    Hypertension with renal disease I12.9    Mixed hyperlipidemia E78.2    Gout M10.9    Primary osteoarthritis involving multiple joints M15.9    CVA (cerebrovascular accident) ([address] I63.9    CKD (chronic kidney disease), stage III ([address] N18.30    Non-seasonal allergic rhinitis J30.89    Lymph node enlargement R59.9    Syncope R55    Alcohol screening Z13.39    History of prostate cancer Z85.46    Cellulitis of face L03.211    Vitamin D deficiency E55.9    Edema R60.9    COVID-19 U07.1    Hematuria R31.9    Labile hypertension R09.89    UTI (urinary tract infection) N39.0    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    CKD (chronic kidney disease), stage III ([facility] 8/19/2017    CVA (cerebrovascular accident) ([facility] 2009    DJD (degenerative joint disease) 8/19/2017    Gout     Hyperlipidemia 8/19/2017    Hypertension     Hypertension with renal disease 8/19/2017    Meniere disease 8/19/2017    Nausea & vomiting     On statin therapy 8/19/2017    PAC (premature atrial contraction) 8/19/2017    Palpitation 8/19/2017    Prostate CA ([facility] 8/19/2017    Pseudophakia of both eyes 10/16/2018    Rosacea 8/19/2017    Testosterone deficiency 8/19/2017      Allergies   Allergen Reactions    Sulfanilamide Rash    Sulfa (Sulfonamide Antibiotics) Rash     Very mild rash several years ago      Family History   Problem Relation Age of Onset    Cancer Mother         uterine, cervical    Cancer Father         colon ca      Social History     Socioeconomic History    Marital status:      Spouse name: Not on file    Number of children: Not on file    Years of education: Not on file    Highest education level: Not on file   Occupational History    Not on file   Tobacco Use    Smoking status: Never    Smokeless tobacco: Never   Vaping Use    Vaping Use: Never used   Substance and Sexual Activity    Alcohol use: No    Drug use: No    Sexual activity: Never   Other Topics Concern    Not on file   Social History Narrative    Not on file     Social Determinants of Health     Financial Resource Strain: Not on file   Food Insecurity: Not on file   Transportation Needs: Not on file   Physical Activity: Not on file   Stress: Not on file   Social Connections: Not on file   Intimate Partner Violence: Not on file   Housing Stability: Not on file     Prior to Admission medications    Medication Sig Start Date End Date Taking? Authorizing Provider   telmisartan (MICARDIS) 40 mg tablet Take 1 Tablet by mouth daily.  10/3/22  Yes Geoff Anderson MD   acetaminophen (TYLENOL) 500 mg tablet Take 500 mg by mouth every six (6) hours as needed for Pain. 9/23/22  Yes Mayra De La Fuente MD   allopurinoL (ZYLOPRIM) 100 mg tablet TAKE ONE TABLET BY MOUTH DAILY 9/12/22  Yes Mayra De La Fuente MD   cycloSPORINE (Restasis) 0.05 % dpet INSTILL 2 DROPS IN [facility] DAILY AS DIRECTED 7/11/22  Yes Mayra De La Fuente MD   doxycycline (VIBRAMYCIN) 100 mg capsule TAKE ONE CAPSULE BY MOUTH DAILY 10/12/21  Yes Mayra De La Fuente MD   bicalutamide (CASODEX) 50 mg tablet Take 50 mg by mouth daily. 8/7/19  Yes Provider, Historical   aspirin (ASPIRIN) 325 mg tablet Take 325 mg by mouth daily. Yes Provider, Historical   silodosin (RAPAFLO) 8 mg capsule Take 8 mg by mouth daily (with breakfast). Yes Provider, Historical        Review of Systems              Constitutional:  He denies fever, weight loss, sweats or fatigue. EYES: No blurred or double vision,               ENT: no nasal congestion, no headache or dizziness. No difficulty with               swallowing, taste, speech or smell. Respiratory:  No cough, wheezing or shortness of breath. No sputum production. Cardiac:  Denies chest pain, palpitations, unexplained indigestion, syncope, edema, PND or orthopnea. GI:  No changes in bowel movements, no abdominal pain, no bloating, anorexia, nausea, vomiting or heartburn. :  No frequency or dysuria. Denies incontinence or sexual dysfunction. Extremities:  No joint pain, stiffness or swelling  Back:.no pain or soreness  Skin:  No recent rashes or mole changes. Neurological:  No numbness, tingling, burning paresthesias or loss of motor strength. No syncope, dizziness, frequent headaches or memory loss.   Hematologic:  No easy bruising  Lymphatic: No lymph node enlargement    Objective:     Vitals:    10/17/22 1605 10/17/22 1637   BP: (!) 176/96 (!) 162/82   Pulse: 66    Resp: 17    Temp: 97.9 °F (36.6 °C)    TempSrc: Oral    SpO2: 97%    Weight: 196 lb 9.6 oz (89.2 kg)    Height: 6' 2\" (1.88 m)    PainSc:   0 - No pain        Body mass index is 25.24 kg/m². Physical Examination:              General Appearance:  Well-developed, well-nourished, no acute distress. HEENT:      Ears:  The TMs and ear canals were clear. Eyes:  The pupillary responses were normal.  Extraocular muscle function intact. Lids and conjunctiva not injected. Funduscopic exam revealed sharp disc margins. Nares: Clear w/o edema or erythema  Pharynx:  Clear with teeth in good repair. No masses were noted. Neck:  Supple without thyromegaly or adenopathy. No JVD noted. No carotid                bruits. Lungs:  Clear to auscultation and percussion. Cardiac:  Regular rate and rhythm without murmur. PMI not displaced. No gallop, rub or click. Abdominal: Soft, non-tender, no hepata-spleenomegally or masses  Extremities:  No clubbing, cyanosis or edema. Skin:  No rash or unusual mole changes noted. Lymph Nodes:  None felt in the cervical, supraclavicular, axillary or inguinal region. Neurological: . DTRs 2+ and symmetric. Station and gait normal.   Hematologic:   No purpura or petechiae        Assessment/Plan:         1. Hypertension with renal disease    2. Cerebrovascular accident (CVA), unspecified mechanism ([address]    3. Needs flu shot        Impressions/Plan:  Impression  1. Labile blood pressure at this point I think he needs to take telmisartan 80 mg each morning. I clearly think he needs Norvasc in the afternoon and I told him to take 5 mg daily unless his systolic blood pressures greater than 160 I would take 10 mg of Norvasc rather than 5 mg. Prior CVA stresses the importance of controlling his blood pressure  Flu shot given today  This is all discussed in detail with his wife present with him today. Orders Placed This Encounter    Influenza, FLUAD, (age 72 y+), IM, PF, 0.5 mL           No results found for any visits on 10/17/22.       Margareth Rehman MD    The patient was given after the visit summary the patient verbalized an understanding of the plans and problems as explained.

## 2022-10-25 PROBLEM — N39.0 UTI (URINARY TRACT INFECTION): Status: RESOLVED | Noted: 2022-09-25 | Resolved: 2022-10-25

## 2022-10-31 ENCOUNTER — PATIENT OUTREACH (OUTPATIENT)
Dept: CASE MANAGEMENT | Age: 87
End: 2022-10-31

## 2022-10-31 NOTE — PROGRESS NOTES
Patient has graduated from the Transitions of Care Coordination  program on 10/31/22. Patient/family has the ability to self-manage at this time Care management goals have been completed. Patient was not referred to the [facility] team for further management. Goals Addressed                   This Visit's Progress     COMPLETED: Attends follow-up appointments as directed. 09/23/22   Patient has an appt with PCP Dr Vikash Caraballo on 9/26/22 at 2 pm.  Patient has an appt with Urology on 9/28/22 at 8 am.  Monitor status of these appt on next call. Elaine OVALLES, RN, CCM / Care Transition Nurse / [phone]     09/30/22   Patient did attend his PCP appt on 9/26/22. Did not get to ask about Urology. Getting hair cut. Monitor status of Urology on next call. Elaine OVALLES, RN, CCM / Care Transition Nurse / [phone]     10/11/22   Care Transitions Outreach Attempt-[facility]. Elaine OVALLES, RN, CCM / Care Transition Nurse / [phone]      10/31/22   Patient seen by PCP on 10/17/22. Elaine OVALLES, RN, CCM / Care Transition Nurse / [phone]              COMPLETED: Prevent complications post hospitalization. 09/23/22   Spoke with Mrs Salvador Sutherland, denies any fever, urine is clear, does have some urgency. Is a little constipated and did eat 2 prunes today. Patient is up with use of walker, to start PT today with [facility]. Eating and drinking well. Remains on Levaquin for 6 more days. Patient does have an ACP asked to bring in to be scanned at Dr Vikash Caraballo appt. Monitor if finished Levaquin, any fever, hematuria, pain or urgency with urination or constipation on next call. How is PT going? Bring in ACP to be scanned? Elaine OVALLES, RN, CCM / Care Transition Nurse / [phone]     09/30/22   Very short call getting hair cut and is doing OK. Elaine OVALLES, RN, CCM / Care Transition Nurse / [phone]     10/11/22   Care Transitions Outreach Attempt-LMTCB.     Elaine OVALLES, RN, [facility] / Care Transition Nurse / [phone]      10/12/22   Patient has left message on 10/11/22 that he was doing well with no concerns. Harmony Garibay MSN, RN, [facility] / Care Transition Nurse / [phone]     10/31/22   [facility] OV[facility], RN, [facility] / Care Transition Nurse / [phone]                  Patient has Care Transition Nurse's contact information for any further questions, concerns, or needs.   Patients upcoming visits:    Future Appointments   Date Time Provider Maximino Araiza   12/21/2022  9:10 AM Gagandeep Tavarez MD [facility]

## 2022-12-20 PROBLEM — Z00.00 MEDICARE ANNUAL WELLNESS VISIT, SUBSEQUENT: Status: ACTIVE | Noted: 2018-01-05

## 2022-12-20 PROBLEM — R09.89 LABILE HYPERTENSION: Status: RESOLVED | Noted: 2022-09-14 | Resolved: 2022-12-20

## 2022-12-20 NOTE — PROGRESS NOTES
This is a Subsequent Medicare Annual Wellness Visit providing Personalized Prevention Plan Services (PPPS) (Performed 12 months after initial AWV and PPPS )    I have reviewed the patient's medical history in detail and updated the computerized patient record. He presents today for his Medicare subsequent annual wellness examination screening questionnaire accompanied by his wife. He currently denies any chest pain, shortness of breath, palpitations, PND, orthopnea or other cardiac or respiratory complaints. There are no GI or  complaints. He has no headaches, dizziness or neurologic complaints. The no current active arthritic complaints. He does have his chronic joint aches and pains. He has noted a skin rash in his right wrist which she is try topical cream that has not been beneficial.  He also notes his blood pressures been somewhat labile recently. He notes no headaches, nosebleeds or sequela of uncontrolled blood pressure. There are no other complaints noted.     History     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    CKD (chronic kidney disease), stage III ([address] 8/19/2017    CVA (cerebrovascular accident) ([address] 2009    DJD (degenerative joint disease) 8/19/2017    Gout     Hyperlipidemia 8/19/2017    Hypertension     Hypertension with renal disease 8/19/2017    Meniere disease 8/19/2017    Nausea & vomiting     On statin therapy 8/19/2017    PAC (premature atrial contraction) 8/19/2017    Palpitation 8/19/2017    Prostate CA ([address] 8/19/2017    Pseudophakia of both eyes 10/16/2018    Rosacea 8/19/2017    Testosterone deficiency 8/19/2017      Past Surgical History:   Procedure Laterality Date    COLORECTAL SCRN; HI RISK IND  5/3/2016         [address] tonsillectomy    HX HEENT  1980    basal cell carcinoma right ear    HX OTHER SURGICAL Right 01/21/2018    EXCISION RIGHT NECK MASS     HX PROSTATECTOMY      radiation only    RI ABDOMEN SURGERY PROC UNLISTED      bilateral inguinal hernia repair    RI COLONOSCOPY FLX DX W/COLLJ SPEC WHEN PFRMD  1/19/2011          Social History     Tobacco Use    Smoking status: Never    Smokeless tobacco: Never   Vaping Use    Vaping Use: Never used   Substance Use Topics    Alcohol use: No    Drug use: No     Current Outpatient Medications   Medication Sig Dispense Refill    mometasone (ELOCON) 0.1 % topical cream Apply  to affected area two (2) times a day. 15 g 0    telmisartan (MICARDIS) 80 mg tablet Take 1 Tablet by mouth daily. 90 Tablet 3    amLODIPine (NORVASC) 5 mg tablet Take 1 Tablet by mouth daily. 90 Tablet PRN    acetaminophen (TYLENOL) 500 mg tablet Take 500 mg by mouth every six (6) hours as needed for Pain. allopurinoL (ZYLOPRIM) 100 mg tablet TAKE ONE TABLET BY MOUTH DAILY 90 Tablet 3    cycloSPORINE (Restasis) 0.05 % dpet INSTILL 2 DROPS IN EACH EYE DAILY AS DIRECTED 60 Each 5    doxycycline (VIBRAMYCIN) 100 mg capsule TAKE ONE CAPSULE BY MOUTH DAILY 90 Capsule 2    bicalutamide (CASODEX) 50 mg tablet Take 50 mg by mouth daily. aspirin (ASPIRIN) 325 mg tablet Take 325 mg by mouth daily. silodosin (RAPAFLO) 8 mg capsule Take 8 mg by mouth daily (with breakfast).        Allergies   Allergen Reactions    Sulfanilamide Rash    Sulfa (Sulfonamide Antibiotics) Rash     Very mild rash several years ago     Family History   Problem Relation Age of Onset    Cancer Mother         uterine, cervical    Cancer Father         colon ca       Patient Active Problem List    Diagnosis    Hypertension with renal disease    Mixed hyperlipidemia    Gout    Primary osteoarthritis involving multiple joints    CVA (cerebrovascular accident) [facility]     L temporoparietal 3/7/2009      CKD (chronic kidney disease), stage III [facility]    Rosacea    PAC (premature atrial contraction)    Non-seasonal allergic rhinitis    Dermatitis    Blood loss anemia    UTI (urinary tract infection)    Hematuria    COVID-19     Tested + May 29, 2022. Did not have severe symptoms      Edema    Vitamin D deficiency    Cellulitis of face    History of prostate cancer     Treated with radiation therapy      Alcohol screening    Syncope    Medicare annual wellness visit, subsequent    Lymph node enlargement    Meniere disease    Testosterone deficiency    Palpitation       Patient Care Team:  Gagandeep Tavarez MD as PCP - General (Internal Medicine Physician)  Gagandeep Tavarez MD as PCP - [facility] Provider    Depression Risk Factor Screening:     3 most recent PHQ Screens 12/21/2022   Little interest or pleasure in doing things Not at all   Feeling down, depressed, irritable, or hopeless Not at all   Total Score PHQ 2 0     Alcohol Risk Factor Screening: You do not drink alcohol or very rarely. Functional Ability and Level of Safety:     Fall Risk     Fall Risk Assessment, last 12 mths 12/21/2022   Able to walk? Yes   Fall in past 12 months? 0   Do you feel unsteady? 0   Are you worried about falling 0       Hearing Loss   mild    Activities of Daily Living   Self-care.    ADL Assessment 12/21/2022   Feeding yourself No Help Needed   Getting from bed to chair No Help Needed   Getting dressed No Help Needed   Bathing or showering No Help Needed   Walk across the room (includes cane/walker) No Help Needed   Using the telphone No Help Needed   Taking your medications No Help Needed   Preparing meals No Help Needed   Managing money (expenses/bills) No Help Needed   Moderately strenuous housework (laundry) No Help Needed   Shopping for personal items (toiletries/medicines) No Help Needed   Shopping for groceries No Help Needed   Driving No Help Needed   Climbing a flight of stairs No Help Needed   Getting to places beyond walking distances No Help Needed       Abuse Screen   Patient is not abused    Social History     Social History Narrative    Not on file       Review of Systems      ROS:    Constitutional: He denies fevers, weight loss, sweats. Eyes: No blurred or double vision. ENT: No difficulty with swallowing, taste, speech or smell. Neck: no stiffness or swelling  Respiratory: No cough wheezing or shortness of breath. Cardiovascular: Denies chest pain, palpitations, unexplained indigestion or syncope. Gastrointestinal:  No changes in bowel movements, no abdominal pain, no bloating. Genitourinary:  He denies frequency, nocturia or stranguria. Extremities: No joint pain, stiffness or swelling. Neurological:  No numbness, tingling, burring paresthesias or loss of motor strength. No syncope, dizziness or frequent headache  Lymphatic: no adenopathy noted  Hematologic: no easy bruising or bleeding gums  Skin:  No recent rashes or mole changes. Pruritic rash right wrist  Psychiatric/Behavioral:  Negative for depression. Physical Examination     Evaluation of Cognitive Function:  Mood/affect:  happy  Appearance: age appropriate  Family member/caregiver input:  wife    Vitals:    12/21/22 0954 12/21/22 1021   BP: 124/80 (!) 154/86   Pulse: 70    Resp: 16    Temp: 98.4 °F (36.9 °C)    TempSrc: Oral    SpO2: 97%    Weight: 205 lb 8 oz (93.2 kg)    Height: 6' 2\" (1.88 m)    PainSc:   0 - No pain         PHYSICAL EXAM:    General appearance - alert, well appearing, and in no distress  Mental status - alert, oriented to person, place, and time  HEENT:  Ears - bilateral TM's and external ear canals clear  Eyes - pupillary responses were normal.  Extraocular muscle function intact. Lids and conjunctiva not injected. Fundoscopic exam revealed sharp disc margins. eye movements intact  Pharynx- clear with teeth in good repair. No masses were noted  Neck - supple without thyromegaly or burit.   No JVD noted  Lungs - clear to auscultation and percussion  Cardiac- normal rate, regular rhythm without murmurs. PMI not displaced. No gallop, rub or click  Abdomen - flat, soft, non-tender without palpable organomegaly or mass. No pulsatile mass was felt, and not bruit was heard. Bowel sounds were active  : Circumcised, Testes descended w/o masses  Rectal: normal sphincter tone, prostate 1+ enlarged, smooth and nontender without nodules, no masses, stool brown and hemacult negative  Extremities -  no clubbing cyanosis or edema  Lymphatics - no palpable lymphadenopathy, no hepatosplenomegaly  Hematologic: no petechiae or purpura  Peripheral vascular -Femoral, Dorsalis pedis and posterior tibial pulses felt without difficulty  Skin - no rash or unusual mole change noted. Rash right wrist consistent with eczema  Neurological - Cranial nerves II-XII grossly intact. Motor strength 5/5. DTR's 2+ and symmetric. Station and gait normal  Back exam - full range of motion, no tenderness, palpable spasm or pain on motion  Musculoskeletal - no joint tenderness, deformity or swelling       Results for orders placed or performed in visit on 10/03/22   CULTURE, URINE    Specimen: Clean catch; Urine   Result Value Ref Range    Special Requests: NO SPECIAL REQUESTS      Culture result: No growth (<1,000 CFU/ML)     CBC WITH AUTOMATED DIFF   Result Value Ref Range    WBC 5.6 4.1 - 11.1 K/uL    RBC 3.23 (L) 4.10 - 5.70 M/uL    HGB 10.8 (L) 12.1 - 17.0 g/dL    HCT 33.6 (L) 36.6 - 50.3 %    .0 (H) 80.0 - 99.0 FL    MCH 33.4 26.0 - 34.0 PG    MCHC 32.1 30.0 - 36.5 g/dL    RDW 13.4 11.5 - 14.5 %    PLATELET 392 324 - 045 K/uL    MPV 10.5 8.9 - 12.9 FL    NRBC 0.0 0  WBC    ABSOLUTE NRBC 0.00 0.00 - 0.01 K/uL    NEUTROPHILS 69 32 - 75 %    LYMPHOCYTES 17 12 - 49 %    MONOCYTES 11 5 - 13 %    EOSINOPHILS 1 0 - 7 %    BASOPHILS 1 0 - 1 %    IMMATURE GRANULOCYTES 1 (H) 0.0 - 0.5 %    ABS. NEUTROPHILS 3.9 1.8 - 8.0 K/UL    ABS. LYMPHOCYTES 1.0 0.8 - 3.5 K/UL    ABS. MONOCYTES 0.6 0.0 - 1.0 K/UL    ABS. EOSINOPHILS 0.1 0.0 - 0.4 K/UL    ABS. BASOPHILS 0.0 0.0 - 0.1 K/UL    ABS. IMM. GRANS. 0.0 0.00 - 0.04 K/UL    DF AUTOMATED     CK   Result Value Ref Range    CK 25 (L) 39 - 308 U/L   LIPID PANEL   Result Value Ref Range    Cholesterol, total 110 <200 MG/DL    Triglyceride 53 <150 MG/DL    HDL Cholesterol 42 MG/DL    LDL, calculated 57.4 0 - 100 MG/DL    VLDL, calculated 10.6 MG/DL    CHOL/HDL Ratio 2.6 0.0 - 5.0     METABOLIC PANEL, COMPREHENSIVE   Result Value Ref Range    Sodium 137 136 - 145 mmol/L    Potassium 5.1 3.5 - 5.1 mmol/L    Chloride 108 97 - 108 mmol/L    CO2 27 21 - 32 mmol/L    Anion gap 2 (L) 5 - 15 mmol/L    Glucose 99 65 - 100 mg/dL    BUN 24 (H) 6 - 20 MG/DL    Creatinine 1.04 0.70 - 1.30 MG/DL    BUN/Creatinine ratio 23 (H) 12 - 20      GFR est AA >60 >60 ml/min/1.73m2    GFR est non-AA >60 >60 ml/min/1.73m2    Calcium 8.9 8.5 - 10.1 MG/DL    Bilirubin, total 0.5 0.2 - 1.0 MG/DL    ALT (SGPT) 15 12 - 78 U/L    AST (SGOT) 13 (L) 15 - 37 U/L    Alk.  phosphatase 112 45 - 117 U/L    Protein, total 6.6 6.4 - 8.2 g/dL    Albumin 3.2 (L) 3.5 - 5.0 g/dL    Globulin 3.4 2.0 - 4.0 g/dL    A-G Ratio 0.9 (L) 1.1 - 2.2     URINALYSIS W/MICROSCOPIC   Result Value Ref Range    Color YELLOW/STRAW      Appearance CLEAR CLEAR      Specific gravity 1.013 1.003 - 1.030      pH (UA) 6.5 5.0 - 8.0      Protein Negative Negative mg/dL    Glucose Negative Negative mg/dL    Ketone Negative Negative mg/dL    Bilirubin Negative Negative      Blood Negative Negative      Urobilinogen 0.2 0.2 - 1.0 EU/dL    Nitrites Negative Negative      Leukocyte Esterase Negative Negative      WBC 0-4 0 - 4 /hpf    RBC 0-5 0 - 5 /hpf    Epithelial cells FEW FEW /lpf    Bacteria Negative Negative /hpf    Hyaline cast 0-2 0 - 5 /lpf       Advice/Referrals/Counseling   Education and counseling provided:  Are appropriate based on today's review and evaluation  End-of-Life planning (with patient's consent)  Pneumococcal Vaccine  Influenza Vaccine  Colorectal cancer screening tests      Assessment/Plan     ASSESSMENT:   1. Hypertension with renal disease    2. Mixed hyperlipidemia    3. Primary osteoarthritis involving multiple joints    4. Stage 3 chronic kidney disease, unspecified whether stage 3a or 3b CKD ([facility]    5. Cerebrovascular accident (CVA), unspecified mechanism ([facility] [facility]    6. Gout, unspecified cause, unspecified chronicity, unspecified site    7. Non-seasonal allergic rhinitis due to other allergic trigger    8. PAC (premature atrial contraction)    9. Rosacea    10. History of prostate cancer    11. Palpitation    12. Vitamin D deficiency    13. Alcohol screening    14. Medicare annual wellness visit, subsequent    15. Dermatitis      Impression  1 hypertension which is very labile. We will telmisartan 80 mg his blood pressure is too low and with 40 mg it is too high. He does take Norvasc with both of those. I did review his list of blood pressures that he brought in with him through the month of December which are highly variable. At this point I asked him to continue to monitor his blood pressure. I think maybe we can go with telmisartan 80 mg 1 day alternating with 40 the next day as his blood pressure seems to be relatively stable in that situation. We will continue Norvasc 5 mg daily. 2.  Hyperlipidemia prior lab reviewed repeat status is pending  3. DJD that is stable  4. Chronic kidney disease stage III repeat status pending   5. History of CVA so we will try to control his blood pressure as well as we can. 6.  Gout stable no recent flares  7. Allergic rhinitis stable  8. History of PACs currently not a problem  9. Rosacea stable  10. History of prostate cancer post prostatectomy many years ago no evidence of recurrence. PSA pending  11. Palpitations not currently active  12 vitamin D deficiency repeat status pending   13.   Annual alcohol screening is done at this point he claims to drink no alcohol at all. We did spend 8 minutes discussing excessive alcohol intake in males who average more than 2 drinks per day with increased cardiovascular risk and increased risk of liver disease and other GI problems. 14.  Dermatitis we will try topical Elocon for this which appears to be eczema  Medicare subsequent annual wellness examination screening questionnaire is completed today. The results were reviewed with he and his wife and the questions were answered. Lifestyle recommendations modifications discussed and made. Follow-up 3 months or sooner if there is a problem. I will call with today's lab results. PLAN:  .  Orders Placed This Encounter    METABOLIC PANEL, COMPREHENSIVE    CBC WITH AUTOMATED DIFF    LIPID PANEL    CK    T4 (THYROXINE)    TSH 3RD GENERATION    URINALYSIS W/ REFLEX CULTURE    VITAMIN D, 25 HYDROXY    PSA SCREENING (SCREENING)    mometasone (ELOCON) 0.1 % topical cream         ATTENTION:   This medical record was transcribed using an electronic medical records system. Although proofread, it may and can contain electronic and spelling errors. Other human spelling and other errors may be present. Corrections may be executed at a later time. Please feel free to contact us for any clarifications as needed. Angela Enamorado MD       Recommended healthy diet low in carbohydrates, fats, sodium and cholesterol. Recommended regular cardiovascular exercise 3-6 times per week for 30-60 minutes daily. Current Outpatient Medications   Medication Sig Dispense Refill    mometasone (ELOCON) 0.1 % topical cream Apply  to affected area two (2) times a day. 15 g 0    telmisartan (MICARDIS) 80 mg tablet Take 1 Tablet by mouth daily. 90 Tablet 3    amLODIPine (NORVASC) 5 mg tablet Take 1 Tablet by mouth daily. 90 Tablet PRN    acetaminophen (TYLENOL) 500 mg tablet Take 500 mg by mouth every six (6) hours as needed for Pain.       allopurinoL (ZYLOPRIM) 100 mg tablet TAKE ONE TABLET BY MOUTH DAILY 90 Tablet 3    cycloSPORINE (Restasis) 0.05 % dpet INSTILL 2 DROPS IN EACH EYE DAILY AS DIRECTED 60 Each 5    doxycycline (VIBRAMYCIN) 100 mg capsule TAKE ONE CAPSULE BY MOUTH DAILY 90 Capsule 2    bicalutamide (CASODEX) 50 mg tablet Take 50 mg by mouth daily. aspirin (ASPIRIN) 325 mg tablet Take 325 mg by mouth daily. silodosin (RAPAFLO) 8 mg capsule Take 8 mg by mouth daily (with breakfast). No results found for any visits on 12/21/22. Verbal and written instructions (see AVS) provided. Patient expresses understanding of diagnosis and treatment plan.     Luz Sanchez MD

## 2022-12-21 ENCOUNTER — OFFICE VISIT (OUTPATIENT)
Dept: INTERNAL MEDICINE CLINIC | Age: 87
End: 2022-12-21
Payer: MEDICARE

## 2022-12-21 VITALS
TEMPERATURE: 98.4 F | OXYGEN SATURATION: 97 % | HEART RATE: 70 BPM | HEIGHT: 74 IN | WEIGHT: 205.5 LBS | SYSTOLIC BLOOD PRESSURE: 154 MMHG | RESPIRATION RATE: 16 BRPM | BODY MASS INDEX: 26.37 KG/M2 | DIASTOLIC BLOOD PRESSURE: 86 MMHG

## 2022-12-21 DIAGNOSIS — M10.9 GOUT, UNSPECIFIED CAUSE, UNSPECIFIED CHRONICITY, UNSPECIFIED SITE: ICD-10-CM

## 2022-12-21 DIAGNOSIS — J30.89 NON-SEASONAL ALLERGIC RHINITIS DUE TO OTHER ALLERGIC TRIGGER: ICD-10-CM

## 2022-12-21 DIAGNOSIS — I49.1 PAC (PREMATURE ATRIAL CONTRACTION): ICD-10-CM

## 2022-12-21 DIAGNOSIS — R00.2 PALPITATION: ICD-10-CM

## 2022-12-21 DIAGNOSIS — L30.9 DERMATITIS: ICD-10-CM

## 2022-12-21 DIAGNOSIS — Z00.00 MEDICARE ANNUAL WELLNESS VISIT, SUBSEQUENT: ICD-10-CM

## 2022-12-21 DIAGNOSIS — E55.9 VITAMIN D DEFICIENCY: ICD-10-CM

## 2022-12-21 DIAGNOSIS — I63.9 CEREBROVASCULAR ACCIDENT (CVA), UNSPECIFIED MECHANISM (HCC): ICD-10-CM

## 2022-12-21 DIAGNOSIS — L71.9 ROSACEA: ICD-10-CM

## 2022-12-21 DIAGNOSIS — Z13.39 ALCOHOL SCREENING: ICD-10-CM

## 2022-12-21 DIAGNOSIS — M15.9 PRIMARY OSTEOARTHRITIS INVOLVING MULTIPLE JOINTS: ICD-10-CM

## 2022-12-21 DIAGNOSIS — E78.2 MIXED HYPERLIPIDEMIA: ICD-10-CM

## 2022-12-21 DIAGNOSIS — Z85.46 HISTORY OF PROSTATE CANCER: ICD-10-CM

## 2022-12-21 DIAGNOSIS — N18.30 STAGE 3 CHRONIC KIDNEY DISEASE, UNSPECIFIED WHETHER STAGE 3A OR 3B CKD (HCC): ICD-10-CM

## 2022-12-21 DIAGNOSIS — I12.9 HYPERTENSION WITH RENAL DISEASE: Primary | ICD-10-CM

## 2022-12-21 PROCEDURE — 1123F ACP DISCUSS/DSCN MKR DOCD: CPT | Performed by: INTERNAL MEDICINE

## 2022-12-21 PROCEDURE — 99214 OFFICE O/P EST MOD 30 MIN: CPT | Performed by: INTERNAL MEDICINE

## 2022-12-21 PROCEDURE — G8536 NO DOC ELDER MAL SCRN: HCPCS | Performed by: INTERNAL MEDICINE

## 2022-12-21 PROCEDURE — G8427 DOCREV CUR MEDS BY ELIG CLIN: HCPCS | Performed by: INTERNAL MEDICINE

## 2022-12-21 PROCEDURE — G0439 PPPS, SUBSEQ VISIT: HCPCS | Performed by: INTERNAL MEDICINE

## 2022-12-21 PROCEDURE — G8417 CALC BMI ABV UP PARAM F/U: HCPCS | Performed by: INTERNAL MEDICINE

## 2022-12-21 PROCEDURE — 1101F PT FALLS ASSESS-DOCD LE1/YR: CPT | Performed by: INTERNAL MEDICINE

## 2022-12-21 PROCEDURE — G8510 SCR DEP NEG, NO PLAN REQD: HCPCS | Performed by: INTERNAL MEDICINE

## 2022-12-21 PROCEDURE — G0442 ANNUAL ALCOHOL SCREEN 15 MIN: HCPCS | Performed by: INTERNAL MEDICINE

## 2022-12-21 RX ORDER — MOMETASONE FUROATE 1 MG/G
CREAM TOPICAL 2 TIMES DAILY
Qty: 15 G | Refills: 0 | Status: SHIPPED | OUTPATIENT
Start: 2022-12-21

## 2022-12-21 NOTE — PATIENT INSTRUCTIONS
Medicare Wellness Visit, Male    The best way to live healthy is to have a lifestyle where you eat a well-balanced diet, exercise regularly, limit alcohol use, and quit all forms of tobacco/nicotine, if applicable. Regular preventive services are another way to keep healthy. Preventive services (vaccines, screening tests, monitoring & exams) can help personalize your care plan, which helps you manage your own care. Screening tests can find health problems at the earliest stages, when they are easiest to treat. [facility] follows the current, evidence-based guidelines published by the [facility] ([facility]) when recommending preventive services for our patients. Because we follow these guidelines, sometimes recommendations change over time as research supports it. (For example, a prostate screening blood test is no longer routinely recommended for men with no symptoms). Of course, you and your doctor may decide to screen more often for some diseases, based on your risk and co-morbidities (chronic disease you are already diagnosed with). Preventive services for you include:  - Medicare offers their members a free annual wellness visit, which is time for you and your primary care provider to discuss and plan for your preventive service needs.  Take advantage of this benefit every year!    -Over the age of 72 should receive the recommended pneumonia vaccines.   -All adults should have a flu vaccine yearly.  -All adults should receive a tetanus vaccine every 10 years.  -Over the age of 48 should receive the shingles vaccines.    -All adults should be screened once for Hepatitis C.  -All adults age 38-68 who are overweight should have a diabetes screening test once every three years.   -Other screening tests & preventive services for persons with diabetes include: an eye exam to screen for diabetic retinopathy, a kidney function test, a foot exam, and stricter control over your cholesterol.   -Cardiovascular screening for adults with routine risk involves an electrocardiogram (ECG) at intervals determined by the provider.     -Colorectal cancer screening should be done for adults age 43-69 with no increased risk factors for colorectal cancer. There are a number of acceptable methods of screening for this type of cancer. Each test has its own benefits and drawbacks. Discuss with your provider what is most appropriate for you during your annual wellness visit. The different tests include: colonoscopy (considered the best screening method), a fecal occult blood test, a fecal DNA test, and sigmoidoscopy.    -Lung cancer screening is recommended annually with a low dose CT scan for adults between age 54 and 68, who have smoked at least 30 pack years (equivalent of 1 pack per day for 30 days), and who is a current smoker or quit less than 15 years ago. -An Abdominal Aortic Aneurysm (AAA) Screening is recommended for men age 73-68 who has ever smoked in their lifetime.      Here is a list of your current Health Maintenance items (your personalized list of preventive services) with a due date:  Health Maintenance Due   Topic Date Due    DTaP/Tdap/Td  (1 - Tdap) 06/22/2013    COVID-19 Vaccine (3 - Booster for [employer] series) 05/27/2021    Annual Well Visit  12/21/2022

## 2022-12-21 NOTE — PROGRESS NOTES
HIPAA verified by two patient identifiers. Esthela Khoury is a 80 y.o. male    Chief Complaint   Patient presents with    Annual Wellness Visit       Visit Vitals  /80 (BP 1 Location: Left upper arm, BP Patient Position: Sitting, BP Cuff Size: Adult long)   Pulse 70   Temp 98.4 °F (36.9 °C) (Oral)   Resp 16   Ht 6' 2\" (1.88 m)   Wt 205 lb 8 oz (93.2 kg)   SpO2 97%   BMI 26.38 kg/m²       Pain Scale: 0 - No pain/10  Pain Location:       Health Maintenance Due   Topic Date Due    DTaP/Tdap/Td series (1 - Tdap) 06/22/2013    COVID-19 Vaccine (3 - Booster for [employer] series) 05/27/2021    Medicare Yearly Exam  12/21/2022         Coordination of Care Questionnaire:  :   1) Have you been to an emergency room, urgent care, or hospitalized since your last visit? If yes, where when, and reason for visit? no       2. Have seen or consulted any other health care provider since your last visit? If yes, where when, and reason for visit? NO      Patient is accompanied by self I have received verbal consent from Esthela Khoury to discuss any/all medical information while they are present in the room.

## 2022-12-22 LAB
25(OH)D3 SERPL-MCNC: 49.3 NG/ML (ref 30–100)
ALBUMIN SERPL-MCNC: 4.2 G/DL (ref 3.5–5)
ALBUMIN/GLOB SERPL: 1.2 {RATIO} (ref 1.1–2.2)
ALP SERPL-CCNC: 115 U/L (ref 45–117)
ALT SERPL-CCNC: 18 U/L (ref 12–78)
ANION GAP SERPL CALC-SCNC: 4 MMOL/L (ref 5–15)
APPEARANCE UR: CLEAR
AST SERPL-CCNC: 21 U/L (ref 15–37)
BACTERIA URNS QL MICRO: NEGATIVE /HPF
BASOPHILS # BLD: 0 K/UL (ref 0–0.1)
BASOPHILS NFR BLD: 1 % (ref 0–1)
BILIRUB SERPL-MCNC: 0.5 MG/DL (ref 0.2–1)
BILIRUB UR QL: NEGATIVE
BUN SERPL-MCNC: 20 MG/DL (ref 6–20)
BUN/CREAT SERPL: 21 (ref 12–20)
CALCIUM SERPL-MCNC: 9.3 MG/DL (ref 8.5–10.1)
CHLORIDE SERPL-SCNC: 106 MMOL/L (ref 97–108)
CHOLEST SERPL-MCNC: 169 MG/DL
CK SERPL-CCNC: 75 U/L (ref 39–308)
CO2 SERPL-SCNC: 29 MMOL/L (ref 21–32)
COLOR UR: NORMAL
CREAT SERPL-MCNC: 0.95 MG/DL (ref 0.7–1.3)
DIFFERENTIAL METHOD BLD: NORMAL
EOSINOPHIL # BLD: 0.1 K/UL (ref 0–0.4)
EOSINOPHIL NFR BLD: 3 % (ref 0–7)
EPITH CASTS URNS QL MICRO: NORMAL /LPF
ERYTHROCYTE [DISTWIDTH] IN BLOOD BY AUTOMATED COUNT: 14.4 % (ref 11.5–14.5)
GLOBULIN SER CALC-MCNC: 3.4 G/DL (ref 2–4)
GLUCOSE SERPL-MCNC: 109 MG/DL (ref 65–100)
GLUCOSE UR STRIP.AUTO-MCNC: NEGATIVE MG/DL
HCT VFR BLD AUTO: 40.8 % (ref 36.6–50.3)
HDLC SERPL-MCNC: 62 MG/DL
HDLC SERPL: 2.7 {RATIO} (ref 0–5)
HGB BLD-MCNC: 13.3 G/DL (ref 12.1–17)
HGB UR QL STRIP: NEGATIVE
HYALINE CASTS URNS QL MICRO: NORMAL /LPF (ref 0–5)
IMM GRANULOCYTES # BLD AUTO: 0 K/UL (ref 0–0.04)
IMM GRANULOCYTES NFR BLD AUTO: 0 % (ref 0–0.5)
KETONES UR QL STRIP.AUTO: NEGATIVE MG/DL
LDLC SERPL CALC-MCNC: 88.6 MG/DL (ref 0–100)
LEUKOCYTE ESTERASE UR QL STRIP.AUTO: NEGATIVE
LYMPHOCYTES # BLD: 1.5 K/UL (ref 0.8–3.5)
LYMPHOCYTES NFR BLD: 34 % (ref 12–49)
MCH RBC QN AUTO: 30.1 PG (ref 26–34)
MCHC RBC AUTO-ENTMCNC: 32.6 G/DL (ref 30–36.5)
MCV RBC AUTO: 92.3 FL (ref 80–99)
MONOCYTES # BLD: 0.5 K/UL (ref 0–1)
MONOCYTES NFR BLD: 11 % (ref 5–13)
NEUTS SEG # BLD: 2.3 K/UL (ref 1.8–8)
NEUTS SEG NFR BLD: 51 % (ref 32–75)
NITRITE UR QL STRIP.AUTO: NEGATIVE
NRBC # BLD: 0 K/UL (ref 0–0.01)
NRBC BLD-RTO: 0 PER 100 WBC
PH UR STRIP: 6.5 [PH] (ref 5–8)
PLATELET # BLD AUTO: 216 K/UL (ref 150–400)
PMV BLD AUTO: 11.3 FL (ref 8.9–12.9)
POTASSIUM SERPL-SCNC: 4.3 MMOL/L (ref 3.5–5.1)
PROT SERPL-MCNC: 7.6 G/DL (ref 6.4–8.2)
PROT UR STRIP-MCNC: NEGATIVE MG/DL
PSA SERPL-MCNC: 3.7 NG/ML (ref 0.01–4)
RBC # BLD AUTO: 4.42 M/UL (ref 4.1–5.7)
RBC #/AREA URNS HPF: NORMAL /HPF (ref 0–5)
SODIUM SERPL-SCNC: 139 MMOL/L (ref 136–145)
SP GR UR REFRACTOMETRY: 1.01 (ref 1–1.03)
T4 SERPL-MCNC: 9.9 UG/DL (ref 4.5–12.1)
TRIGL SERPL-MCNC: 92 MG/DL (ref ?–150)
TSH SERPL DL<=0.05 MIU/L-ACNC: 3.56 UIU/ML (ref 0.36–3.74)
UA: UC IF INDICATED,UAUC: NORMAL
UROBILINOGEN UR QL STRIP.AUTO: 0.2 EU/DL (ref 0.2–1)
VLDLC SERPL CALC-MCNC: 18.4 MG/DL
WBC # BLD AUTO: 4.4 K/UL (ref 4.1–11.1)
WBC URNS QL MICRO: NORMAL /HPF (ref 0–4)

## 2022-12-23 NOTE — PROGRESS NOTES
Called and spoke to patient  Two pt identifiers confirmed  Informed patient per Dr. Emmanuel Hence that labs are stable but PSA is trending upward and to follow up with his urologist.  Pt stated he will call his urologist to see if they need to see him earlier. He has a urology appt scheduled for 2/2023  Patient verbalized understanding of information discussed  with no further questions at this time.

## 2023-01-19 PROBLEM — Z00.00 MEDICARE ANNUAL WELLNESS VISIT, SUBSEQUENT: Status: RESOLVED | Noted: 2018-01-05 | Resolved: 2023-01-19

## 2023-03-13 RX ORDER — DOXYCYCLINE 100 MG/1
CAPSULE ORAL
Qty: 90 CAPSULE | Refills: 2 | Status: SHIPPED | OUTPATIENT
Start: 2023-03-13

## 2023-03-13 NOTE — TELEPHONE ENCOUNTER
RX refill request from the patient/pharmacy. Patient last seen 12- with labs, and next appt. scheduled for 04-  Requested Prescriptions     Pending Prescriptions Disp Refills    doxycycline (VIBRAMYCIN) 100 mg capsule [Pharmacy Med Name: DOXYCYCLINE HYCLATE 100 MG CAP] 90 Capsule 2     Sig: TAKE ONE CAPSULE BY MOUTH DAILY    .

## 2023-04-05 NOTE — PROGRESS NOTES
Chief Complaint   Patient presents with    Hypertension     3 month f/up    Chronic Kidney Disease    Cholesterol Problem       SUBJECTIVE:    Scott Kent is a 80 y.o. male who returns in follow-up of his medical problems include hypertension, hyperlipidemia, prior CVA, DJD and other multiple medical problems. He is taking his medication trying to follow his diet remains physically active. He has been monitoring his blood pressure on a daily basis and brought in those values for many review. He currently denies any chest pain, shortness of breath or cardiac respiratory complaints. There are no current GI or  complaints. He has no headaches, dizziness or neurologic complaints. He has no current changes of chronic arthritic complaints and there are no other complaints on complete review of systems. Current Outpatient Medications   Medication Sig Dispense Refill    relugolix (Orgovyx) 120 mg tab Take  by mouth daily. doxycycline (VIBRAMYCIN) 100 mg capsule TAKE ONE CAPSULE BY MOUTH DAILY 90 Capsule 2    mometasone (ELOCON) 0.1 % topical cream Apply  to affected area two (2) times a day. 15 g 0    telmisartan (MICARDIS) 80 mg tablet Take 1 Tablet by mouth daily. 90 Tablet 3    amLODIPine (NORVASC) 5 mg tablet Take 1 Tablet by mouth daily. 90 Tablet PRN    acetaminophen (TYLENOL) 500 mg tablet Take 1 Tablet by mouth every six (6) hours as needed for Pain. allopurinoL (ZYLOPRIM) 100 mg tablet TAKE ONE TABLET BY MOUTH DAILY 90 Tablet 3    cycloSPORINE (Restasis) 0.05 % dpet INSTILL 2 DROPS IN EACH EYE DAILY AS DIRECTED 60 Each 5    aspirin (ASPIRIN) 325 mg tablet Take 1 Tablet by mouth daily. silodosin (RAPAFLO) 8 mg capsule Take 1 Capsule by mouth daily (with breakfast).        Past Medical History:   Diagnosis Date    Adverse effect of anesthesia     very bad gag reflex    Allergic rhinitis 8/19/2017    BPH (benign prostatic hypertrophy) 8/19/2017    Cancer ([facility] [address]     basal cell carcinoma right ear    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    CKD (chronic kidney disease), stage III ([facility] [address] 8/19/2017    CVA (cerebrovascular accident) ([facility] [address] 2009    DJD (degenerative joint disease) 8/19/2017    Gout     Hyperlipidemia 8/19/2017    Hypertension     Hypertension with renal disease 8/19/2017    Meniere disease 8/19/2017    Nausea & vomiting     On statin therapy 8/19/2017    PAC (premature atrial contraction) 8/19/2017    Palpitation 8/19/2017    Prostate CA ([facility] [address] 8/19/2017    Pseudophakia of both eyes 10/16/2018    Rosacea 8/19/2017    Testosterone deficiency 8/19/2017     Past Surgical History:   Procedure Laterality Date    COLORECTAL SCRN; HI RISK IND  5/3/2016         HX HEENT  1970    tonsillectomy    HX HEENT  1980    basal cell carcinoma right ear    HX OTHER SURGICAL Right 01/21/2018    EXCISION RIGHT NECK MASS     HX PROSTATECTOMY      radiation only    DE COLONOSCOPY FLX DX W/COLLJ SPEC WHEN PFRMD  1/19/2011         DE UNLISTED PROCEDURE ABDOMEN PERITONEUM & OMENTUM      bilateral inguinal hernia repair     Allergies   Allergen Reactions    Sulfanilamide Rash    Sulfa (Sulfonamide Antibiotics) Rash     Very mild rash several years ago       REVIEW OF SYSTEMS:  General: negative for - chills or fever, or weight loss or gain  ENT: negative for - headaches, nasal congestion or tinnitus  Eyes: no blurred or visual changes  Neck: No stiffness or swollen nodes  Respiratory: negative for - cough, hemoptysis, shortness of breath or wheezing  Cardiovascular : negative for - chest pain, edema, palpitations or shortness of breath  Gastrointestinal: negative for - abdominal pain, blood in stools, heartburn or nausea/vomiting  Genito-Urinary: no dysuria, trouble voiding, or hematuria  Musculoskeletal: negative for - gait disturbance, joint pain, joint stiffness or joint swelling  Neurological: no TIA or stroke symptoms  Hematologic: no bruises, no bleeding  Lymphatic: no swollen glands  Integument: no lumps, mole changes, nail changes or rash  Endocrine:no malaise/lethargy poly uria or polydipsia or unexpected weight changes        Social History     Socioeconomic History    Marital status:    Tobacco Use    Smoking status: Never    Smokeless tobacco: Never   Vaping Use    Vaping Use: Never used   Substance and Sexual Activity    Alcohol use: No    Drug use: No    Sexual activity: Never     Family History   Problem Relation Age of Onset    Cancer Mother         uterine, cervical    Cancer Father         colon ca       OBJECTIVE:     Visit Vitals  BP (!) 154/82   Pulse (!) 58   Temp 97.7 °F (36.5 °C)   Ht 6' 2\" (1.88 m)   Wt 207 lb 8 oz (94.1 kg)   SpO2 97%   BMI 26.64 kg/m²     CONSTITUTIONAL:   well nourished, appears age appropriate  EYES: sclera anicteric, PERRL, EOMI  ENMT:nares clear, moist mucous membranes, pharynx clear  NECK: supple. Thyroid normal, No JVD or bruits  RESPIRATORY: Chest: clear to ascultation and percussion, normal inspiratory effort  CARDIOVASCULAR: Heart: regular rate and rhythm no murmurs, rubs or gallops, PMI not displaced, No thrills, no peripheral edema  GASTROINTESTINAL: Abdomen: non distended, soft, non tender, bowel sounds normal  HEMATOLOGIC: no purpura, petechiae or bruising  LYMPHATIC: No lymph node enlargemant  MUSCULOSKELETAL: Extremities: no active synovitis, pulse 1+   INTEGUMENT: No unusual rashes or suspicious skin lesions noted. Nails appear normal.  PERIPHERAL VASCULAR: normal pulses femoral, PT and DP  NEUROLOGIC: non-focal exam, A & O X 3  PSYCHIATRIC:, appropriate affect     ASSESSMENT:   1. Hypertension with renal disease    2. Mixed hyperlipidemia    3. Stage 3 chronic kidney disease, unspecified whether stage 3a or 3b CKD ([address]    4. Cerebrovascular accident (CVA), unspecified mechanism ([address]    5. Primary osteoarthritis involving multiple joints    6. Gout, unspecified cause, unspecified chronicity, unspecified site      Impression  1.   Hypertension is controlled so continue current therapy reviewed with him. 2.  Hyperlipidemia prior lab reviewed repeat status pending  3. CKD stage III repeat status pending  4. Prior CVA continue aspirin daily  5. DJD chronic but stable next Umber 6 gout no recent flares  I will call with lab and follow stable continue same follow-up with me again in 3 months or sooner if there is a problem. PLAN:  .  Orders Placed This Encounter    METABOLIC PANEL, COMPREHENSIVE    LIPID PANEL    CK    relugolix (Orgovyx) 120 mg tab         ATTENTION:   This medical record was transcribed using an electronic medical records system. Although proofread, it may and can contain electronic and spelling errors. Other human spelling and other errors may be present. Corrections may be executed at a later time. Please feel free to contact us for any clarifications as needed. Follow-up and Dispositions    Return in about 3 months (around 7/6/2023). No results found for any visits on 04/06/23. Yan Costa MD    The patient verbalized understanding of the problems and plans as explained.

## 2023-04-06 ENCOUNTER — OFFICE VISIT (OUTPATIENT)
Dept: INTERNAL MEDICINE CLINIC | Age: 88
End: 2023-04-06

## 2023-04-06 NOTE — PROGRESS NOTES
Chief Complaint   Patient presents with    Hypertension     3 month f/up    Chronic Kidney Disease    Cholesterol Problem      1. Have you been to the ER, urgent care clinic since your last visit? Hospitalized since your last visit? No    2. Have you seen or consulted any other health care providers outside of the [address] [place] [address] since your last visit? Include any pap smears or colon screening. Saw dermatologist who referred to specialist for removal of basal cell ca behind his left ear.

## 2023-06-19 RX ORDER — TELMISARTAN 80 MG/1
TABLET ORAL
Qty: 90 TABLET | Refills: 1 | Status: SHIPPED | OUTPATIENT
Start: 2023-06-19

## 2023-06-19 NOTE — TELEPHONE ENCOUNTER
RX refill request from the patient/pharmacy. Patient last seen 04- with labs, and next appt. scheduled for 07-  Requested Prescriptions     Pending Prescriptions Disp Refills    telmisartan (MICARDIS) 80 MG tablet [Pharmacy Med Name: TELMISARTAN 80 MG TABLET] 90 tablet 1     Sig: TAKE ONE TABLET BY MOUTH DAILY    .

## 2023-07-13 PROBLEM — E78.2 MIXED HYPERLIPIDEMIA: Status: ACTIVE | Noted: 2017-08-19

## 2023-07-13 PROBLEM — R00.2 PALPITATION: Status: ACTIVE | Noted: 2017-08-19

## 2023-07-13 PROBLEM — E55.9 VITAMIN D DEFICIENCY: Status: ACTIVE | Noted: 2021-12-17

## 2023-07-13 PROBLEM — M10.9 GOUT: Status: ACTIVE | Noted: 2017-08-19

## 2023-07-13 PROBLEM — L03.211 CELLULITIS OF FACE: Status: RESOLVED | Noted: 2021-05-06 | Resolved: 2023-07-13

## 2023-07-13 PROBLEM — N18.30 CKD (CHRONIC KIDNEY DISEASE), STAGE III (HCC): Status: ACTIVE | Noted: 2017-08-19

## 2023-07-13 PROBLEM — M15.9 PRIMARY OSTEOARTHRITIS INVOLVING MULTIPLE JOINTS: Status: ACTIVE | Noted: 2017-08-19

## 2023-07-13 PROBLEM — Z85.46 HISTORY OF PROSTATE CANCER: Status: ACTIVE | Noted: 2020-03-30

## 2023-07-13 PROBLEM — M15.0 PRIMARY OSTEOARTHRITIS INVOLVING MULTIPLE JOINTS: Status: ACTIVE | Noted: 2017-08-19

## 2023-07-13 PROBLEM — J30.89 NON-SEASONAL ALLERGIC RHINITIS: Status: ACTIVE | Noted: 2017-08-19

## 2023-07-13 PROBLEM — L71.9 ROSACEA: Status: ACTIVE | Noted: 2017-08-19

## 2023-07-13 PROBLEM — I12.9 HYPERTENSION WITH RENAL DISEASE: Status: ACTIVE | Noted: 2017-08-19

## 2023-07-13 PROBLEM — I63.9 CVA (CEREBROVASCULAR ACCIDENT) (HCC): Status: ACTIVE | Noted: 2017-08-19

## 2023-07-13 NOTE — PROGRESS NOTES
Chief Complaint   Patient presents with    Follow-up     3 month fu       SUBJECTIVE:    Leeann Nguyen is a 80 y.o. male who returns in follow-up regarding his medical problems include hypertension, labile component of that, hyperlipidemia, DJD, allergic rhinitis, CKD stage III and other medical problems. He did hit his head yesterday but that is not the first time that is happened. He noted no dizziness associated with it and does not note any headache today. He also has been monitoring his blood pressure regularly and gave me that list to look at it looks like his systolics have varied from the 120s to 970 over the diastolics of 32H and 23S. He does note some back pain has been bothering for few weeks he did some lifting about 3 to 4 days ago did seem to exacerbate it. He notes no radiation in the legs. He notes no chest pain, shortness of breath or cardiorespiratory complaints. There are no GI or  complaints. Other than that noted there are no other arthritic complaints he notes no other complaints on review of systems.       Current Outpatient Medications   Medication Sig Dispense Refill    ORGOVYX 120 MG TABS       Vitamin D, Cholecalciferol, 25 MCG (1000 UT) TABS Take by mouth      telmisartan (MICARDIS) 80 MG tablet TAKE ONE TABLET BY MOUTH DAILY 90 tablet 1    allopurinol (ZYLOPRIM) 100 MG tablet TAKE ONE TABLET BY MOUTH DAILY      amLODIPine (NORVASC) 5 MG tablet Take 1 tablet by mouth daily      aspirin 325 MG tablet Take 1 tablet by mouth daily      cycloSPORINE (RESTASIS) 0.05 % ophthalmic emulsion INSTILL 2 DROPS IN EACH EYE DAILY AS DIRECTED      doxycycline hyclate (VIBRAMYCIN) 100 MG capsule TAKE ONE CAPSULE BY MOUTH DAILY      mometasone (ELOCON) 0.1 % cream Apply topically 2 times daily      silodosin (RAPAFLO) 8 MG CAPS Take 1 capsule by mouth daily (with breakfast)      predniSONE 5 MG (21) TBPK Take 1 5 mg 6-day prednisone Dosepak as directed 1 each 0     No current

## 2023-07-14 ENCOUNTER — OFFICE VISIT (OUTPATIENT)
Facility: CLINIC | Age: 88
End: 2023-07-14

## 2023-07-14 VITALS
RESPIRATION RATE: 16 BRPM | OXYGEN SATURATION: 98 % | TEMPERATURE: 98.1 F | SYSTOLIC BLOOD PRESSURE: 152 MMHG | HEIGHT: 74 IN | BODY MASS INDEX: 26.39 KG/M2 | DIASTOLIC BLOOD PRESSURE: 72 MMHG | WEIGHT: 205.6 LBS | HEART RATE: 58 BPM

## 2023-07-14 DIAGNOSIS — M15.9 PRIMARY OSTEOARTHRITIS INVOLVING MULTIPLE JOINTS: ICD-10-CM

## 2023-07-14 DIAGNOSIS — J30.89 NON-SEASONAL ALLERGIC RHINITIS, UNSPECIFIED TRIGGER: ICD-10-CM

## 2023-07-14 DIAGNOSIS — I63.9 CEREBROVASCULAR ACCIDENT (CVA), UNSPECIFIED MECHANISM (HCC): ICD-10-CM

## 2023-07-14 DIAGNOSIS — I12.9 HYPERTENSION WITH RENAL DISEASE: Primary | ICD-10-CM

## 2023-07-14 DIAGNOSIS — N18.30 STAGE 3 CHRONIC KIDNEY DISEASE, UNSPECIFIED WHETHER STAGE 3A OR 3B CKD (HCC): ICD-10-CM

## 2023-07-14 DIAGNOSIS — E78.2 MIXED HYPERLIPIDEMIA: ICD-10-CM

## 2023-07-14 RX ORDER — RELUGOLIX 120 MG/1
TABLET, FILM COATED ORAL
COMMUNITY
Start: 2023-06-29

## 2023-07-14 RX ORDER — PREDNISONE 5 MG/1
TABLET ORAL
Qty: 1 EACH | Refills: 0 | Status: SHIPPED | OUTPATIENT
Start: 2023-07-14

## 2023-07-14 RX ORDER — MULTIVIT-MIN/IRON/FOLIC ACID/K 18-600-40
CAPSULE ORAL
COMMUNITY

## 2023-07-14 ASSESSMENT — PATIENT HEALTH QUESTIONNAIRE - PHQ9
SUM OF ALL RESPONSES TO PHQ QUESTIONS 1-9: 0
2. FEELING DOWN, DEPRESSED OR HOPELESS: 0
1. LITTLE INTEREST OR PLEASURE IN DOING THINGS: 0
SUM OF ALL RESPONSES TO PHQ QUESTIONS 1-9: 0
SUM OF ALL RESPONSES TO PHQ9 QUESTIONS 1 & 2: 0

## 2023-07-15 LAB
ALBUMIN SERPL-MCNC: 4 G/DL (ref 3.5–5)
ALBUMIN/GLOB SERPL: 1.1 (ref 1.1–2.2)
ALP SERPL-CCNC: 96 U/L (ref 45–117)
ALT SERPL-CCNC: 36 U/L (ref 12–78)
ANION GAP SERPL CALC-SCNC: 5 MMOL/L (ref 5–15)
AST SERPL-CCNC: 24 U/L (ref 15–37)
BILIRUB SERPL-MCNC: 0.5 MG/DL (ref 0.2–1)
BUN SERPL-MCNC: 21 MG/DL (ref 6–20)
BUN/CREAT SERPL: 19 (ref 12–20)
CALCIUM SERPL-MCNC: 10 MG/DL (ref 8.5–10.1)
CHLORIDE SERPL-SCNC: 106 MMOL/L (ref 97–108)
CHOLEST SERPL-MCNC: 154 MG/DL
CK SERPL-CCNC: 64 U/L (ref 39–308)
CO2 SERPL-SCNC: 28 MMOL/L (ref 21–32)
CREAT SERPL-MCNC: 1.1 MG/DL (ref 0.7–1.3)
GLOBULIN SER CALC-MCNC: 3.5 G/DL (ref 2–4)
GLUCOSE SERPL-MCNC: 115 MG/DL (ref 65–100)
HDLC SERPL-MCNC: 44 MG/DL
HDLC SERPL: 3.5 (ref 0–5)
LDLC SERPL CALC-MCNC: 78.8 MG/DL (ref 0–100)
POTASSIUM SERPL-SCNC: 4.9 MMOL/L (ref 3.5–5.1)
PROT SERPL-MCNC: 7.5 G/DL (ref 6.4–8.2)
SODIUM SERPL-SCNC: 139 MMOL/L (ref 136–145)
TRIGL SERPL-MCNC: 156 MG/DL
VLDLC SERPL CALC-MCNC: 31.2 MG/DL

## 2023-07-17 NOTE — PROGRESS NOTES
INTERNAL MEDICINE PROGRESS NOTE    NAME:  Mona Costello   :   1934   MRN:   [id number]     Date/Time:  9/15/2022 6:13 AM  Subjective:   History:  Chart reviewed and patient seen and examined and D/W his nurse and his wife this AM and all events noted. 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 He was in his usual state of health working in the yard on  and then around 6 PM passed significant amount of blood in his urine with clots and presented to the emergency room. In the emergency room CT revealed question of a mass versus clot in the bladder and the fact that he was still passing bloody urine with clots he was admitted to the hospital for further work-up and evaluation. This AM he notes no abdominal pain or nausea vomiting has no back pain. He now has no UO with the CBI except constant leakage around the catheter indicating Clot obstruction of catheter. He denies any headaches, dizziness or neurologic complaints. He notes no chest pain, shortness of breath or cardiac or respiratory complaints. He notes no change of his chronic arthritic complaints and no other complaints on complete your systems.     Medications reviewed:  Current Facility-Administered Medications   Medication Dose Route Frequency    allopurinoL (ZYLOPRIM) tablet 100 mg  100 mg Oral DAILY    valsartan (DIOVAN) tablet 160 mg  160 mg Oral DAILY    sodium chloride (NS) flush 5-40 mL  5-40 mL IntraVENous Q8H    sodium chloride (NS) flush 5-40 mL  5-40 mL IntraVENous PRN    acetaminophen (TYLENOL) tablet 650 mg  650 mg Oral Q6H PRN    Or    acetaminophen (TYLENOL) suppository 650 mg  650 mg Rectal Q6H PRN    polyethylene glycol (MIRALAX) packet 17 g  17 g Oral DAILY PRN    ondansetron (ZOFRAN ODT) tablet 4 mg  4 mg Oral Q8H PRN    Or    ondansetron (ZOFRAN) injection 4 mg  4 mg IntraVENous Q6H PRN        Objective:   Vitals:  Visit Vitals  BP (!) 161/66   Pulse 66   Temp 98 °F (36.7 °C)   Resp 19   Ht 6' 2\" (1.88 m)   Wt 199 lb 11.8 oz (90.6 kg)   SpO2 97%   BMI 25.64 kg/m²      O2 Device: None (Room air) Temp (24hrs), Av °F (36.7 °C), Min:97.3 °F (36.3 °C), Max:98.4 °F (36.9 °C)      Last 24hr Input/Output:    Intake/Output Summary (Last 24 hours) at 9/15/2022 5327  Last data filed at 9/15/2022 0550  Gross per 24 hour   Intake 24252 ml   Output 30434 ml   Net -4460 ml          PHYSICAL EXAM:  General:     Alert, cooperative, no distress, appears stated age. Head:    Normocephalic, without obvious abnormality, atraumatic. Eyes:    Conjunctivae/corneas clear. PERRLA  Nose:   Nares normal. No drainage or sinus tenderness. Throat:     Lips, mucosa, and tongue normal.  No Thrush  Neck:   Supple, symmetrical,  no adenopathy, thyroid: non tender     no carotid bruit and no JVD. Back:     Symmetric,  No CVA tenderness. Lungs:    Clear to auscultation bilaterally. No Wheezing or Rhonchi. No rales. Heart:    Regular rate and rhythm,  no murmur, rub or gallop. Abdomen:    Soft, non-tender. Not distended. Bowel sounds normal. No masses. Schaffer in for CBI now w/o output  Extremities:  Extremities normal, atraumatic, No cyanosis. No edema. No clubbing  Lymph nodes:  Cervical, supraclavicular normal.  Neurologic:  Normal strength, Alert and oriented X 3.    Skin:                No rash      Lab Data Reviewed:    Recent Results (from the past 24 hour(s))   METABOLIC PANEL, BASIC    Collection Time: 09/15/22  4:16 AM   Result Value Ref Range    Sodium 136 136 - 145 mmol/L    Potassium 3.9 3.5 - 5.1 mmol/L    Chloride 103 97 - 108 mmol/L    CO2 26 21 - 32 mmol/L    Anion gap 7 5 - 15 mmol/L    Glucose 121 (H) 65 - 100 mg/dL    BUN 26 (H) 6 - 20 MG/DL    Creatinine 1.00 0.70 - 1.30 MG/DL    BUN/Creatinine ratio 26 (H) 12 - 20      GFR est AA >60 >60 ml/min/1.73m2    GFR est non-AA >60 >60 ml/min/1.73m2    Calcium 8.8 8.5 - 10.1 MG/DL   CBC WITH AUTOMATED DIFF    Collection Time: 09/15/22  4:16 AM   Result Value Ref Range    WBC 8.3 4.1 - 11.1 K/uL    RBC 3.28 (L) 4.10 - 5.70 M/uL    HGB 11.2 (L) 12.1 - 17.0 g/dL    HCT 32.2 (L) 36.6 - 50.3 %    MCV 98.2 80.0 - 99.0 FL    MCH 34.1 (H) 26.0 - 34.0 PG    MCHC 34.8 30.0 - 36.5 g/dL    RDW 12.6 11.5 - 14.5 %    PLATELET 228 557 - 556 K/uL    MPV 9.5 8.9 - 12.9 FL    NRBC 0.0 0  WBC    ABSOLUTE NRBC 0.00 0.00 - 0.01 K/uL    NEUTROPHILS 68 32 - 75 %    LYMPHOCYTES 19 12 - 49 %    MONOCYTES 11 5 - 13 %    EOSINOPHILS 1 0 - 7 %    BASOPHILS 0 0 - 1 %    IMMATURE GRANULOCYTES 1 (H) 0.0 - 0.5 %    ABS. NEUTROPHILS 5.8 1.8 - 8.0 K/UL    ABS. LYMPHOCYTES 1.5 0.8 - 3.5 K/UL    ABS. MONOCYTES 0.9 0.0 - 1.0 K/UL    ABS. EOSINOPHILS 0.1 0.0 - 0.4 K/UL    ABS. BASOPHILS 0.0 0.0 - 0.1 K/UL    ABS. IMM. GRANS. 0.0 0.00 - 0.04 K/UL    DF AUTOMATED           Assessment/Plan:     Principal Problem:    Hematuria (9/14/2022)    Active Problems:    CVA (cerebrovascular accident) ([facility] (8/19/2017)      Overview: L temporoparietal 3/7/2009      CKD (chronic kidney disease), stage III ([facility] [address] (8/19/2017)      History of prostate cancer (3/30/2020)      Labile hypertension (9/14/2022)     ___________________________________________________  PLAN:    1. Continuous bladder irrigation per urology discussion with nurse practitioner yesterday morning, Eduardo clotted so obviously needs irrigation and I think he need cystoscopy (HOPEFULLY TODAY)  2. Holding aspirin and Plavix for now but will need to resume aspirin once hematuria controlled with previous history of CVA  3. Follow hemoglobin which was 13.8 on admission and 11.2 this a.m.  4.  Follow renal function, 19/1.27 admission, now 26/1.0  5. Follow blood pressure closely with known labile hypertension.   Continue Valsartan 160 mg daily which is equivalent to one half of his home dose (At home Telmisartan 80 every day), BP variable which I am certain is due to worthless digital BP cuffs  6. Continue allopurinol with history of gout  7. Hold fish oil as could exacerbate bleeding  8   Holding Rapaflo as Schaffer catheter in now  9. Urine culture sent which I will follow-up on, GNR so I will start Levaquin IV      40 Minutes spent today in direct care of this high complexity patient with greater than 50% in counseling and coordination of care.     If need to contact me use hospital  [phone], DO NOT USE PERFECT SERVE    ___________________________________________________    Attending Physician: Crissy Mccullough MD Dutasteride Pregnancy And Lactation Text: This medication is absolutely contraindicated in women, especially during pregnancy and breast feeding. Feminization of male fetuses is possible if taking while pregnant.

## 2023-07-26 ENCOUNTER — OFFICE VISIT (OUTPATIENT)
Facility: CLINIC | Age: 88
End: 2023-07-26
Payer: MEDICARE

## 2023-07-26 VITALS
HEIGHT: 74 IN | OXYGEN SATURATION: 100 % | WEIGHT: 204.2 LBS | BODY MASS INDEX: 26.21 KG/M2 | TEMPERATURE: 98.4 F | HEART RATE: 68 BPM | SYSTOLIC BLOOD PRESSURE: 152 MMHG | DIASTOLIC BLOOD PRESSURE: 84 MMHG | RESPIRATION RATE: 16 BRPM

## 2023-07-26 DIAGNOSIS — I12.9 HYPERTENSION WITH RENAL DISEASE: Primary | ICD-10-CM

## 2023-07-26 DIAGNOSIS — M15.9 PRIMARY OSTEOARTHRITIS INVOLVING MULTIPLE JOINTS: ICD-10-CM

## 2023-07-26 PROCEDURE — 99214 OFFICE O/P EST MOD 30 MIN: CPT | Performed by: INTERNAL MEDICINE

## 2023-07-26 PROCEDURE — G8419 CALC BMI OUT NRM PARAM NOF/U: HCPCS | Performed by: INTERNAL MEDICINE

## 2023-07-26 PROCEDURE — 1036F TOBACCO NON-USER: CPT | Performed by: INTERNAL MEDICINE

## 2023-07-26 PROCEDURE — 1123F ACP DISCUSS/DSCN MKR DOCD: CPT | Performed by: INTERNAL MEDICINE

## 2023-07-26 PROCEDURE — G8427 DOCREV CUR MEDS BY ELIG CLIN: HCPCS | Performed by: INTERNAL MEDICINE

## 2023-07-26 RX ORDER — TRAMADOL HYDROCHLORIDE 50 MG/1
50 TABLET ORAL EVERY 6 HOURS PRN
Qty: 20 TABLET | Refills: 0 | Status: SHIPPED | OUTPATIENT
Start: 2023-07-26 | End: 2023-07-31

## 2023-07-26 RX ORDER — METHYLPREDNISOLONE 4 MG/1
TABLET ORAL
Qty: 1 KIT | Refills: 0 | Status: SHIPPED | OUTPATIENT
Start: 2023-07-26 | End: 2023-08-01

## 2023-07-26 NOTE — PROGRESS NOTES
Subjective:   Gali Carter is a 80 y.o. male      Chief Complaint   Patient presents with    Hip Pain     Patient states that he is having some hip pain         History of present illness: He presents today in follow-up for his hypertension that is known labile hypertension that he has had for years as well as follow-up of his back/hip pain. He was seen by the orthopedic PA yesterday and blood pressure was elevated there which is not surprising since he was having hip pain and he also has a well-documented labile whitecoat hypertension. He was sent here for evaluation of his blood pressure and upon presentation today he brings in a list of his blood pressures that he has had for the past 2 weeks multiple times per day which I reviewed with him in detail here in office today. Blood pressures are normal at home for the most part occasionally going into the 511 systolic range which is not unacceptable in an 80year-old. He does note that the main thing is his pain in his hip and back is become so severe he really cannot do much and is spending more time sitting nowadays. Tylenol has not been beneficial.  I previously gave him a prednisone Dosepak which did not seem to do a whole lot. He notes that topical analgesics seem to help as much as anything. He notes no history of falls or trauma.   He did have x-rays done at the orthopedic office yesterday only showing arthritic changes and they were thinking sciatica although his symptoms were classic for sciatica    Patient Active Problem List   Diagnosis    Non-seasonal allergic rhinitis    Syncope    Gout    History of prostate cancer    Meniere disease    Mixed hyperlipidemia    Palpitation    Edema    Alcohol screening    Vitamin D deficiency    CVA (cerebrovascular accident) ([address])    PAC (premature atrial contraction)    Primary osteoarthritis involving multiple joints    Rosacea    Testosterone deficiency    Hypertension with renal disease    Lymph node

## 2023-08-10 ENCOUNTER — HOSPITAL ENCOUNTER (OUTPATIENT)
Facility: HOSPITAL | Age: 88
Discharge: HOME | End: 2023-08-10
Payer: MEDICARE

## 2023-08-10 DIAGNOSIS — M51.36 DDD (DEGENERATIVE DISC DISEASE), LUMBAR: ICD-10-CM

## 2023-08-10 DIAGNOSIS — M54.50 LUMBAR PAIN: ICD-10-CM

## 2023-08-10 DIAGNOSIS — M54.16 RADICULOPATHY, LUMBAR REGION: ICD-10-CM

## 2023-08-10 PROCEDURE — 72148 MRI LUMBAR SPINE W/O DYE: CPT

## 2023-09-05 NOTE — TELEPHONE ENCOUNTER
RX refill request from the patient/pharmacy. Patient last seen 07- with labs, and next appt. scheduled for 10-  Requested Prescriptions     Pending Prescriptions Disp Refills    allopurinoL (ZYLOPRIM) 100 mg tablet [Pharmacy Med Name: ALLOPURINOL 100 MG TABLET] 90 Tablet 3     Sig: TAKE ONE TABLET BY MOUTH DAILY    . Partial Purse String (Intermediate) Text: Given the location of the defect and the characteristics of the surrounding skin an intermediate purse string closure was deemed most appropriate.  Undermining was performed circumferentially around the surgical defect.  A purse string suture was then placed and tightened. Wound tension only allowed a partial closure of the circular defect.

## 2023-09-15 RX ORDER — ALLOPURINOL 100 MG/1
TABLET ORAL
Qty: 90 TABLET | Refills: 3 | Status: SHIPPED | OUTPATIENT
Start: 2023-09-15

## 2023-09-15 NOTE — TELEPHONE ENCOUNTER
Requested Prescriptions     Pending Prescriptions Disp Refills    allopurinol (ZYLOPRIM) 100 MG tablet [Pharmacy Med Name: ALLOPURINOL 100 MG TABLET] 90 tablet 3     Sig: TAKE ONE TABLET BY MOUTH DAILY       RX refill request from the patient/pharmacy. Patient last seen 7/26/23 without labs, and next appt. scheduled for 10/16/23.

## 2023-09-29 RX ORDER — CYCLOSPORINE 0.5 MG/ML
EMULSION OPHTHALMIC
Qty: 60 EACH | Refills: 5 | Status: SHIPPED | OUTPATIENT
Start: 2023-09-29

## 2023-09-29 NOTE — TELEPHONE ENCOUNTER
RX refill request from the patient/pharmacy. Patient last seen 07- with labs, and next appt. scheduled for 10-  Requested Prescriptions     Pending Prescriptions Disp Refills    RESTASIS 0.05 % ophthalmic emulsion [Pharmacy Med Name: RESTASIS 0.05% EYE EMULSION - 60 VL] 60 each 5     Sig: INSTILL TWO DROPS IN EACH EYE DAILY AS DIRECTED    .

## 2023-10-09 RX ORDER — AMLODIPINE BESYLATE 5 MG/1
5 TABLET ORAL 2 TIMES DAILY
Qty: 180 TABLET | Refills: 3 | Status: SHIPPED | OUTPATIENT
Start: 2023-10-09

## 2023-10-09 NOTE — TELEPHONE ENCOUNTER
RX refill request from the patient/pharmacy. Patient last seen 07- with labs, and next appt. scheduled for 10-  Requested Prescriptions     Pending Prescriptions Disp Refills    amLODIPine (NORVASC) 5 MG tablet [Pharmacy Med Name: amLODIPine BESYLATE 5 MG TAB] 180 tablet 3     Sig: TAKE ONE TABLET BY MOUTH TWICE A DAY    .

## 2023-10-13 NOTE — PROGRESS NOTES
Chief Complaint   Patient presents with    Follow-up     3 month f/u       SUBJECTIVE:    Venancio Schaffer is a 80 y.o. male who returns in follow-up regarding his medical issues to include hypertension with a labile component hyperlipidemia, prior CVA, DJD, CKD stage III, prostate cancer followed by urology and other multimedical problems. His PSA had gone up on his last check and the urologist did recommend that he see a radiation therapist but since he is almost 80years old, birthday tomorrow he would prefer to see what his PSA looks like when he sees him back in December before going to radiation therapy again. He notes he continued with back pain after he was seen by me on last visit and did see orthopedics at which time he had an epidural cortisone shot and scheduled for another 1 on the 31st of this month. That is helped back a little bit. He notes no chest pain, shortness of breath or cardiorespiratory complaints. There are no GI or  complaints. There are no headaches, dizziness or neurologic complaints. His arthritic complaints seem to be related to the back he has no other complaints on complete review of systems. Current Outpatient Medications   Medication Sig Dispense Refill    gabapentin (NEURONTIN) 100 MG capsule Take 1 capsule by mouth 3 times daily.       amLODIPine (NORVASC) 5 MG tablet TAKE ONE TABLET BY MOUTH TWICE A  tablet 3    RESTASIS 0.05 % ophthalmic emulsion INSTILL TWO DROPS IN EACH EYE DAILY AS DIRECTED 60 each 5    allopurinol (ZYLOPRIM) 100 MG tablet TAKE ONE TABLET BY MOUTH DAILY 90 tablet 3    ORGOVYX 120 MG TABS       Vitamin D, Cholecalciferol, 25 MCG (1000 UT) TABS Take by mouth      telmisartan (MICARDIS) 80 MG tablet TAKE ONE TABLET BY MOUTH DAILY 90 tablet 1    aspirin 325 MG tablet Take 1 tablet by mouth daily      doxycycline hyclate (VIBRAMYCIN) 100 MG capsule TAKE ONE CAPSULE BY MOUTH DAILY      mometasone (ELOCON) 0.1 % cream Apply topically 2 times

## 2023-10-14 SDOH — ECONOMIC STABILITY: INCOME INSECURITY: HOW HARD IS IT FOR YOU TO PAY FOR THE VERY BASICS LIKE FOOD, HOUSING, MEDICAL CARE, AND HEATING?: NOT VERY HARD

## 2023-10-14 SDOH — ECONOMIC STABILITY: HOUSING INSECURITY
IN THE LAST 12 MONTHS, WAS THERE A TIME WHEN YOU DID NOT HAVE A STEADY PLACE TO SLEEP OR SLEPT IN A SHELTER (INCLUDING NOW)?: NO

## 2023-10-14 SDOH — ECONOMIC STABILITY: FOOD INSECURITY: WITHIN THE PAST 12 MONTHS, THE FOOD YOU BOUGHT JUST DIDN'T LAST AND YOU DIDN'T HAVE MONEY TO GET MORE.: NEVER TRUE

## 2023-10-14 SDOH — ECONOMIC STABILITY: TRANSPORTATION INSECURITY
IN THE PAST 12 MONTHS, HAS LACK OF TRANSPORTATION KEPT YOU FROM MEETINGS, WORK, OR FROM GETTING THINGS NEEDED FOR DAILY LIVING?: NO

## 2023-10-14 SDOH — ECONOMIC STABILITY: FOOD INSECURITY: WITHIN THE PAST 12 MONTHS, YOU WORRIED THAT YOUR FOOD WOULD RUN OUT BEFORE YOU GOT MONEY TO BUY MORE.: NEVER TRUE

## 2023-10-16 ENCOUNTER — OFFICE VISIT (OUTPATIENT)
Facility: CLINIC | Age: 88
End: 2023-10-16
Payer: MEDICARE

## 2023-10-16 VITALS
DIASTOLIC BLOOD PRESSURE: 72 MMHG | OXYGEN SATURATION: 98 % | RESPIRATION RATE: 16 BRPM | HEART RATE: 53 BPM | BODY MASS INDEX: 26.85 KG/M2 | TEMPERATURE: 97.6 F | WEIGHT: 209.2 LBS | SYSTOLIC BLOOD PRESSURE: 130 MMHG | HEIGHT: 74 IN

## 2023-10-16 DIAGNOSIS — N18.30 STAGE 3 CHRONIC KIDNEY DISEASE, UNSPECIFIED WHETHER STAGE 3A OR 3B CKD (HCC): ICD-10-CM

## 2023-10-16 DIAGNOSIS — M15.9 PRIMARY OSTEOARTHRITIS INVOLVING MULTIPLE JOINTS: ICD-10-CM

## 2023-10-16 DIAGNOSIS — I12.9 HYPERTENSION WITH RENAL DISEASE: Primary | ICD-10-CM

## 2023-10-16 DIAGNOSIS — I63.9 CEREBROVASCULAR ACCIDENT (CVA), UNSPECIFIED MECHANISM (HCC): ICD-10-CM

## 2023-10-16 DIAGNOSIS — E78.2 MIXED HYPERLIPIDEMIA: ICD-10-CM

## 2023-10-16 LAB
ALBUMIN SERPL-MCNC: 3.8 G/DL (ref 3.5–5)
ALBUMIN/GLOB SERPL: 1.2 (ref 1.1–2.2)
ALP SERPL-CCNC: 92 U/L (ref 45–117)
ALT SERPL-CCNC: 40 U/L (ref 12–78)
ANION GAP SERPL CALC-SCNC: 4 MMOL/L (ref 5–15)
AST SERPL-CCNC: 32 U/L (ref 15–37)
BILIRUB SERPL-MCNC: 0.4 MG/DL (ref 0.2–1)
BUN SERPL-MCNC: 23 MG/DL (ref 6–20)
BUN/CREAT SERPL: 23 (ref 12–20)
CALCIUM SERPL-MCNC: 9.6 MG/DL (ref 8.5–10.1)
CHLORIDE SERPL-SCNC: 108 MMOL/L (ref 97–108)
CHOLEST SERPL-MCNC: 152 MG/DL
CK SERPL-CCNC: 82 U/L (ref 39–308)
CO2 SERPL-SCNC: 28 MMOL/L (ref 21–32)
CREAT SERPL-MCNC: 0.99 MG/DL (ref 0.7–1.3)
GLOBULIN SER CALC-MCNC: 3.3 G/DL (ref 2–4)
GLUCOSE SERPL-MCNC: 113 MG/DL (ref 65–100)
HDLC SERPL-MCNC: 46 MG/DL
HDLC SERPL: 3.3 (ref 0–5)
LDLC SERPL CALC-MCNC: 79.4 MG/DL (ref 0–100)
POTASSIUM SERPL-SCNC: 4.5 MMOL/L (ref 3.5–5.1)
PROT SERPL-MCNC: 7.1 G/DL (ref 6.4–8.2)
SODIUM SERPL-SCNC: 140 MMOL/L (ref 136–145)
TRIGL SERPL-MCNC: 133 MG/DL
VLDLC SERPL CALC-MCNC: 26.6 MG/DL

## 2023-10-16 PROCEDURE — 1036F TOBACCO NON-USER: CPT | Performed by: INTERNAL MEDICINE

## 2023-10-16 PROCEDURE — 99214 OFFICE O/P EST MOD 30 MIN: CPT | Performed by: INTERNAL MEDICINE

## 2023-10-16 PROCEDURE — 1123F ACP DISCUSS/DSCN MKR DOCD: CPT | Performed by: INTERNAL MEDICINE

## 2023-10-16 PROCEDURE — G0008 ADMIN INFLUENZA VIRUS VAC: HCPCS | Performed by: INTERNAL MEDICINE

## 2023-10-16 PROCEDURE — G8484 FLU IMMUNIZE NO ADMIN: HCPCS | Performed by: INTERNAL MEDICINE

## 2023-10-16 PROCEDURE — 90694 VACC AIIV4 NO PRSRV 0.5ML IM: CPT | Performed by: INTERNAL MEDICINE

## 2023-10-16 PROCEDURE — G8427 DOCREV CUR MEDS BY ELIG CLIN: HCPCS | Performed by: INTERNAL MEDICINE

## 2023-10-16 PROCEDURE — G8419 CALC BMI OUT NRM PARAM NOF/U: HCPCS | Performed by: INTERNAL MEDICINE

## 2023-10-16 RX ORDER — GABAPENTIN 100 MG/1
100 CAPSULE ORAL 3 TIMES DAILY
COMMUNITY
Start: 2023-08-02

## 2023-10-16 ASSESSMENT — PATIENT HEALTH QUESTIONNAIRE - PHQ9
1. LITTLE INTEREST OR PLEASURE IN DOING THINGS: 0
SUM OF ALL RESPONSES TO PHQ QUESTIONS 1-9: 0
SUM OF ALL RESPONSES TO PHQ QUESTIONS 1-9: 0
2. FEELING DOWN, DEPRESSED OR HOPELESS: 0
SUM OF ALL RESPONSES TO PHQ QUESTIONS 1-9: 0
SUM OF ALL RESPONSES TO PHQ QUESTIONS 1-9: 0
SUM OF ALL RESPONSES TO PHQ9 QUESTIONS 1 & 2: 0

## 2023-10-16 NOTE — PROGRESS NOTES
After obtaining written consent and per orders of Dr. Kelli Conley, injection of High Dose Flu Vaccine given by Nikki Stringer MA, CMA. Patient tolerated procedure well. VIS was given to them. No reactions noted.

## 2023-10-20 RX ORDER — TELMISARTAN 40 MG/1
40 TABLET ORAL DAILY
Qty: 90 TABLET | Refills: 3 | Status: SHIPPED | OUTPATIENT
Start: 2023-10-20

## 2023-10-20 NOTE — TELEPHONE ENCOUNTER
Requested Prescriptions     Pending Prescriptions Disp Refills    telmisartan (MICARDIS) 40 MG tablet [Pharmacy Med Name: TELMISARTAN 40 MG TABLET] 90 tablet 3     Sig: TAKE ONE TABLET BY MOUTH DAILY       RX refill request from the patient/pharmacy. Patient last seen 10/16/23 with labs, and next appt. scheduled for 12/21/23.

## 2023-12-20 PROBLEM — Z00.00 MEDICARE ANNUAL WELLNESS VISIT, SUBSEQUENT: Status: ACTIVE | Noted: 2018-01-05

## 2024-01-19 PROBLEM — Z00.00 MEDICARE ANNUAL WELLNESS VISIT, SUBSEQUENT: Status: RESOLVED | Noted: 2018-01-05 | Resolved: 2024-01-19

## 2024-01-24 RX ORDER — DOXYCYCLINE HYCLATE 100 MG/1
CAPSULE ORAL
Qty: 90 CAPSULE | Refills: 1 | Status: SHIPPED | OUTPATIENT
Start: 2024-01-24

## 2024-01-24 NOTE — TELEPHONE ENCOUNTER
PCP: Jaswinder Sandoval MD    Last appt: 10/16/2023    Future Appointments   Date Time Provider Department Center   2/1/2024  8:50 AM Jaswinder Sandoval MD [facility]       Requested Prescriptions     Pending Prescriptions Disp Refills    doxycycline hyclate (VIBRAMYCIN) 100 MG capsule [Pharmacy Med Name: DOXYCYCLINE HYCLATE 100 MG CAP] 90 capsule 1     Sig: TAKE ONE CAPSULE BY MOUTH DAILY

## 2024-02-01 ENCOUNTER — OFFICE VISIT (OUTPATIENT)
Facility: CLINIC | Age: 89
End: 2024-02-01
Payer: MEDICARE

## 2024-02-01 VITALS
TEMPERATURE: 98.2 F | OXYGEN SATURATION: 98 % | HEART RATE: 64 BPM | WEIGHT: 205 LBS | HEIGHT: 74 IN | SYSTOLIC BLOOD PRESSURE: 160 MMHG | DIASTOLIC BLOOD PRESSURE: 94 MMHG | BODY MASS INDEX: 26.31 KG/M2 | RESPIRATION RATE: 18 BRPM

## 2024-02-01 DIAGNOSIS — M10.09 IDIOPATHIC GOUT OF MULTIPLE SITES, UNSPECIFIED CHRONICITY: ICD-10-CM

## 2024-02-01 DIAGNOSIS — J30.89 NON-SEASONAL ALLERGIC RHINITIS, UNSPECIFIED TRIGGER: ICD-10-CM

## 2024-02-01 DIAGNOSIS — Z00.00 MEDICARE ANNUAL WELLNESS VISIT, SUBSEQUENT: ICD-10-CM

## 2024-02-01 DIAGNOSIS — N18.30 STAGE 3 CHRONIC KIDNEY DISEASE, UNSPECIFIED WHETHER STAGE 3A OR 3B CKD (HCC): ICD-10-CM

## 2024-02-01 DIAGNOSIS — I12.9 HYPERTENSION WITH RENAL DISEASE: Primary | ICD-10-CM

## 2024-02-01 DIAGNOSIS — M15.9 PRIMARY OSTEOARTHRITIS INVOLVING MULTIPLE JOINTS: ICD-10-CM

## 2024-02-01 DIAGNOSIS — L71.9 ROSACEA: ICD-10-CM

## 2024-02-01 DIAGNOSIS — Z85.46 HISTORY OF PROSTATE CANCER: ICD-10-CM

## 2024-02-01 DIAGNOSIS — E78.2 MIXED HYPERLIPIDEMIA: ICD-10-CM

## 2024-02-01 DIAGNOSIS — I63.9 CEREBROVASCULAR ACCIDENT (CVA), UNSPECIFIED MECHANISM (HCC): ICD-10-CM

## 2024-02-01 DIAGNOSIS — Z13.39 ALCOHOL SCREENING: ICD-10-CM

## 2024-02-01 DIAGNOSIS — R00.2 PALPITATION: ICD-10-CM

## 2024-02-01 DIAGNOSIS — E55.9 VITAMIN D DEFICIENCY: ICD-10-CM

## 2024-02-01 PROCEDURE — G0439 PPPS, SUBSEQ VISIT: HCPCS | Performed by: INTERNAL MEDICINE

## 2024-02-01 PROCEDURE — 1036F TOBACCO NON-USER: CPT | Performed by: INTERNAL MEDICINE

## 2024-02-01 PROCEDURE — G8484 FLU IMMUNIZE NO ADMIN: HCPCS | Performed by: INTERNAL MEDICINE

## 2024-02-01 PROCEDURE — 1123F ACP DISCUSS/DSCN MKR DOCD: CPT | Performed by: INTERNAL MEDICINE

## 2024-02-01 PROCEDURE — 99214 OFFICE O/P EST MOD 30 MIN: CPT | Performed by: INTERNAL MEDICINE

## 2024-02-01 PROCEDURE — G8427 DOCREV CUR MEDS BY ELIG CLIN: HCPCS | Performed by: INTERNAL MEDICINE

## 2024-02-01 PROCEDURE — G0442 ANNUAL ALCOHOL SCREEN 15 MIN: HCPCS | Performed by: INTERNAL MEDICINE

## 2024-02-01 PROCEDURE — G8419 CALC BMI OUT NRM PARAM NOF/U: HCPCS | Performed by: INTERNAL MEDICINE

## 2024-02-01 SDOH — ECONOMIC STABILITY: FOOD INSECURITY: WITHIN THE PAST 12 MONTHS, THE FOOD YOU BOUGHT JUST DIDN'T LAST AND YOU DIDN'T HAVE MONEY TO GET MORE.: NEVER TRUE

## 2024-02-01 SDOH — ECONOMIC STABILITY: FOOD INSECURITY: WITHIN THE PAST 12 MONTHS, YOU WORRIED THAT YOUR FOOD WOULD RUN OUT BEFORE YOU GOT MONEY TO BUY MORE.: NEVER TRUE

## 2024-02-01 SDOH — ECONOMIC STABILITY: INCOME INSECURITY: HOW HARD IS IT FOR YOU TO PAY FOR THE VERY BASICS LIKE FOOD, HOUSING, MEDICAL CARE, AND HEATING?: NOT HARD AT ALL

## 2024-02-01 ASSESSMENT — PATIENT HEALTH QUESTIONNAIRE - PHQ9
2. FEELING DOWN, DEPRESSED OR HOPELESS: 0
SUM OF ALL RESPONSES TO PHQ QUESTIONS 1-9: 0
2. FEELING DOWN, DEPRESSED OR HOPELESS: 0
1. LITTLE INTEREST OR PLEASURE IN DOING THINGS: 0
SUM OF ALL RESPONSES TO PHQ QUESTIONS 1-9: 0
1. LITTLE INTEREST OR PLEASURE IN DOING THINGS: 0
SUM OF ALL RESPONSES TO PHQ QUESTIONS 1-9: 0
SUM OF ALL RESPONSES TO PHQ9 QUESTIONS 1 & 2: 0
SUM OF ALL RESPONSES TO PHQ9 QUESTIONS 1 & 2: 0
SUM OF ALL RESPONSES TO PHQ QUESTIONS 1-9: 0

## 2024-02-01 ASSESSMENT — LIFESTYLE VARIABLES
HOW OFTEN DO YOU HAVE A DRINK CONTAINING ALCOHOL: NEVER
HOW MANY STANDARD DRINKS CONTAINING ALCOHOL DO YOU HAVE ON A TYPICAL DAY: PATIENT DOES NOT DRINK

## 2024-02-01 NOTE — PROGRESS NOTES
Medicare Annual Wellness Visit    Alen Renteria is here for Medicare AWV    Assessment & Plan   Hypertension with renal disease  -     Urinalysis with Microscopic; Future  -     TSH; Future  -     T4, Free; Future  -     Comprehensive Metabolic Panel; Future  -     CBC with Auto Differential; Future  Mixed hyperlipidemia  -     Lipid Panel; Future  -     CK; Future  Primary osteoarthritis involving multiple joints  Stage 3 chronic kidney disease, unspecified whether stage 3a or 3b CKD (HCC)  Cerebrovascular accident (CVA), unspecified mechanism (HCC)  Non-seasonal allergic rhinitis, unspecified trigger  Idiopathic gout of multiple sites, unspecified chronicity  History of prostate cancer  -     PSA Screening; Future  Palpitation  Vitamin D deficiency  -     Vitamin D 25 Hydroxy; Future  Rosacea  Alcohol screening  -     MD ANNUAL ALCOHOL SCREEN 15 MIN  Medicare annual wellness visit, subsequent    ASSESSMENT:   1. Hypertension with renal disease    2. Mixed hyperlipidemia    3. Primary osteoarthritis involving multiple joints    4. Stage 3 chronic kidney disease, unspecified whether stage 3a or 3b CKD (HCC)    5. Cerebrovascular accident (CVA), unspecified mechanism (HCC)    6. Non-seasonal allergic rhinitis, unspecified trigger    7. Idiopathic gout of multiple sites, unspecified chronicity    8. History of prostate cancer    9. Palpitation    10. Vitamin D deficiency    11. Rosacea    12. Alcohol screening    13. Medicare annual wellness visit, subsequent      Impression  1.  Hypertension that is labile but seems to be better controlled and he is taking medicine correctly.  I reviewed the numbers over the past month and a half that he brought in with him to the visit today.  2.  Hyperlipidemia prior lab reviewed repeat status pending I will adjust treatment if necessary.  3.  DJD chronic but stable  4 CKD stage III repeat status is pending  5 prior CVA currently seems to be stable no recent neurologic symptoms

## 2024-02-01 NOTE — PROGRESS NOTES
Room:  I have reviewed all needed documentation in preparation for visit. Verified patient by name and date of birth. Rooming procedures conducted per protocol. Reviewed allergies and medications with patient.   Alen Renteria is a 89 y.o. male for Medicare AWV       Vitals:    02/01/24 0911 02/01/24 0926   BP: (!) 172/100 (!) 160/94   Site: Left Upper Arm    Position: Sitting    Cuff Size: Large Adult    Pulse: 64    Resp: 18    Temp: 98.2 °F (36.8 °C)    TempSrc: Oral    SpO2: 98%    Weight: 93 kg (205 lb)    Height: 1.88 m (6' 2\")       Pain Score: Zero    Per protocol, two blood pressures were obtained which were elevated. Provider and patient notified of elevated blood pressure. Pt denies any cardiac symptoms to include chest pain, nausea, vomiting, diarrhea, dizziness, vertigo, shortness of breath, shortness of breath on exertion at this time.         Health Maintenance Due   Topic Date Due    Respiratory Syncytial Virus (RSV) Pregnant or age 60 yrs+ (1 - 1-dose 60+ series) Never done    DTaP/Tdap/Td vaccine (1 - Tdap) 06/22/2013    Pneumococcal 65+ years Vaccine (2 - PPSV23 or PCV20) 07/24/2016    COVID-19 Vaccine (3 - 2023-24 season) 09/01/2023    Prostate Specific Antigen (PSA) Screening or Monitoring  12/21/2023    Annual Wellness Visit (Medicare)  12/22/2023        \"Have you been to the ER, urgent care clinic since your last visit?  Hospitalized since your last visit?\"    NO    “Have you seen or consulted any other health care providers outside of [facility] since your last visit?”    NO    Recent Travel Screening and Travel History documentation:     Travel Screening       Question Response    Have you been in contact with someone who was sick? No / Unsure    Do you have any of the following new or worsening symptoms? None of these    Have you traveled internationally or domestically in the last month? No          Travel History   Travel since 01/01/24    No documented travel since

## 2024-02-03 LAB
25(OH)D3 SERPL-MCNC: 68.3 NG/ML (ref 30–100)
ALBUMIN SERPL-MCNC: 4 G/DL (ref 3.5–5)
ALBUMIN/GLOB SERPL: 1.3 (ref 1.1–2.2)
ALP SERPL-CCNC: 93 U/L (ref 45–117)
ALT SERPL-CCNC: 45 U/L (ref 12–78)
ANION GAP SERPL CALC-SCNC: 5 MMOL/L (ref 5–15)
APPEARANCE UR: CLEAR
AST SERPL-CCNC: 40 U/L (ref 15–37)
BACTERIA URNS QL MICRO: ABNORMAL /HPF
BASOPHILS # BLD: 0 K/UL (ref 0–0.1)
BASOPHILS NFR BLD: 1 % (ref 0–1)
BILIRUB SERPL-MCNC: 0.8 MG/DL (ref 0.2–1)
BILIRUB UR QL: NEGATIVE
BUN SERPL-MCNC: 22 MG/DL (ref 6–20)
BUN/CREAT SERPL: 22 (ref 12–20)
CALCIUM SERPL-MCNC: 9.4 MG/DL (ref 8.5–10.1)
CHLORIDE SERPL-SCNC: 108 MMOL/L (ref 97–108)
CHOLEST SERPL-MCNC: 147 MG/DL
CK SERPL-CCNC: 114 U/L (ref 39–308)
CO2 SERPL-SCNC: 28 MMOL/L (ref 21–32)
COLOR UR: ABNORMAL
CREAT SERPL-MCNC: 0.99 MG/DL (ref 0.7–1.3)
DIFFERENTIAL METHOD BLD: ABNORMAL
EOSINOPHIL # BLD: 0.2 K/UL (ref 0–0.4)
EOSINOPHIL NFR BLD: 4 % (ref 0–7)
EPITH CASTS URNS QL MICRO: ABNORMAL /LPF
ERYTHROCYTE [DISTWIDTH] IN BLOOD BY AUTOMATED COUNT: 14.3 % (ref 11.5–14.5)
GLOBULIN SER CALC-MCNC: 3.2 G/DL (ref 2–4)
GLUCOSE SERPL-MCNC: 116 MG/DL (ref 65–100)
GLUCOSE UR STRIP.AUTO-MCNC: NEGATIVE MG/DL
HCT VFR BLD AUTO: 41 % (ref 36.6–50.3)
HDLC SERPL-MCNC: 53 MG/DL
HDLC SERPL: 2.8 (ref 0–5)
HGB BLD-MCNC: 13.8 G/DL (ref 12.1–17)
HGB UR QL STRIP: NEGATIVE
IMM GRANULOCYTES # BLD AUTO: 0 K/UL (ref 0–0.04)
IMM GRANULOCYTES NFR BLD AUTO: 1 % (ref 0–0.5)
KETONES UR QL STRIP.AUTO: NEGATIVE MG/DL
LDLC SERPL CALC-MCNC: 70.8 MG/DL (ref 0–100)
LEUKOCYTE ESTERASE UR QL STRIP.AUTO: NEGATIVE
LYMPHOCYTES # BLD: 1.2 K/UL (ref 0.8–3.5)
LYMPHOCYTES NFR BLD: 31 % (ref 12–49)
MCH RBC QN AUTO: 32.5 PG (ref 26–34)
MCHC RBC AUTO-ENTMCNC: 33.7 G/DL (ref 30–36.5)
MCV RBC AUTO: 96.5 FL (ref 80–99)
MONOCYTES # BLD: 0.5 K/UL (ref 0–1)
MONOCYTES NFR BLD: 13 % (ref 5–13)
NEUTS SEG # BLD: 2 K/UL (ref 1.8–8)
NEUTS SEG NFR BLD: 50 % (ref 32–75)
NITRITE UR QL STRIP.AUTO: NEGATIVE
NRBC # BLD: 0 K/UL (ref 0–0.01)
NRBC BLD-RTO: 0 PER 100 WBC
PH UR STRIP: 6.5 (ref 5–8)
PLATELET # BLD AUTO: 182 K/UL (ref 150–400)
PMV BLD AUTO: 10.7 FL (ref 8.9–12.9)
POTASSIUM SERPL-SCNC: 4.4 MMOL/L (ref 3.5–5.1)
PROT SERPL-MCNC: 7.2 G/DL (ref 6.4–8.2)
PROT UR STRIP-MCNC: NEGATIVE MG/DL
PSA SERPL-MCNC: 0.4 NG/ML (ref 0.01–4)
RBC # BLD AUTO: 4.25 M/UL (ref 4.1–5.7)
RBC #/AREA URNS HPF: ABNORMAL /HPF (ref 0–5)
SODIUM SERPL-SCNC: 141 MMOL/L (ref 136–145)
SP GR UR REFRACTOMETRY: 1.01 (ref 1–1.03)
T4 FREE SERPL-MCNC: 1.1 NG/DL (ref 0.8–1.5)
TRIGL SERPL-MCNC: 116 MG/DL
TSH SERPL DL<=0.05 MIU/L-ACNC: 3.67 UIU/ML (ref 0.36–3.74)
UROBILINOGEN UR QL STRIP.AUTO: 0.2 EU/DL (ref 0.2–1)
VLDLC SERPL CALC-MCNC: 23.2 MG/DL
WBC # BLD AUTO: 3.9 K/UL (ref 4.1–11.1)
WBC URNS QL MICRO: ABNORMAL /HPF (ref 0–4)

## 2024-03-01 PROBLEM — Z00.00 MEDICARE ANNUAL WELLNESS VISIT, SUBSEQUENT: Status: RESOLVED | Noted: 2018-01-05 | Resolved: 2024-03-01

## 2024-04-01 ENCOUNTER — TRANSCRIBE ORDERS (OUTPATIENT)
Facility: HOSPITAL | Age: 89
End: 2024-04-01

## 2024-04-01 DIAGNOSIS — R97.21 RISING PSA FOLLOWING TREATMENT FOR MALIGNANT NEOPLASM OF PROSTATE: ICD-10-CM

## 2024-04-01 DIAGNOSIS — C61 PROSTATE CANCER (HCC): ICD-10-CM

## 2024-04-01 DIAGNOSIS — Z79.818 USE OF LEUPROLIDE ACETATE (LUPRON): ICD-10-CM

## 2024-04-01 DIAGNOSIS — M85.80 OSTEOPENIA, UNSPECIFIED LOCATION: Primary | ICD-10-CM

## 2024-04-04 ENCOUNTER — HOSPITAL ENCOUNTER (OUTPATIENT)
Age: 89
Discharge: HOME | End: 2024-04-04
Payer: MEDICARE

## 2024-04-04 DIAGNOSIS — Z79.818 USE OF LEUPROLIDE ACETATE (LUPRON): ICD-10-CM

## 2024-04-04 PROCEDURE — 77080 DXA BONE DENSITY AXIAL: CPT

## 2024-05-07 ENCOUNTER — OFFICE VISIT (OUTPATIENT)
Facility: CLINIC | Age: 89
End: 2024-05-07
Payer: MEDICARE

## 2024-05-07 VITALS
WEIGHT: 203 LBS | OXYGEN SATURATION: 97 % | SYSTOLIC BLOOD PRESSURE: 146 MMHG | DIASTOLIC BLOOD PRESSURE: 82 MMHG | BODY MASS INDEX: 26.05 KG/M2 | RESPIRATION RATE: 16 BRPM | HEART RATE: 61 BPM | HEIGHT: 74 IN | TEMPERATURE: 97.4 F

## 2024-05-07 DIAGNOSIS — E78.2 MIXED HYPERLIPIDEMIA: ICD-10-CM

## 2024-05-07 DIAGNOSIS — I12.9 HYPERTENSION WITH RENAL DISEASE: Primary | ICD-10-CM

## 2024-05-07 DIAGNOSIS — M15.9 PRIMARY OSTEOARTHRITIS INVOLVING MULTIPLE JOINTS: ICD-10-CM

## 2024-05-07 DIAGNOSIS — I63.9 CEREBROVASCULAR ACCIDENT (CVA), UNSPECIFIED MECHANISM (HCC): ICD-10-CM

## 2024-05-07 DIAGNOSIS — N18.30 STAGE 3 CHRONIC KIDNEY DISEASE, UNSPECIFIED WHETHER STAGE 3A OR 3B CKD (HCC): ICD-10-CM

## 2024-05-07 PROCEDURE — 1036F TOBACCO NON-USER: CPT | Performed by: INTERNAL MEDICINE

## 2024-05-07 PROCEDURE — 1123F ACP DISCUSS/DSCN MKR DOCD: CPT | Performed by: INTERNAL MEDICINE

## 2024-05-07 PROCEDURE — G8427 DOCREV CUR MEDS BY ELIG CLIN: HCPCS | Performed by: INTERNAL MEDICINE

## 2024-05-07 PROCEDURE — G8419 CALC BMI OUT NRM PARAM NOF/U: HCPCS | Performed by: INTERNAL MEDICINE

## 2024-05-07 PROCEDURE — 99214 OFFICE O/P EST MOD 30 MIN: CPT | Performed by: INTERNAL MEDICINE

## 2024-05-07 RX ORDER — DAROLUTAMIDE 300 MG/1
300 TABLET, FILM COATED ORAL 2 TIMES DAILY
COMMUNITY
Start: 2024-04-15

## 2024-05-07 NOTE — PROGRESS NOTES
Alen Renteria is a 89 y.o. male     Chief Complaint   Patient presents with    3 Month Follow-Up       BP (!) 160/80 (Site: Left Upper Arm, Position: Sitting, Cuff Size: Large Adult)   Pulse 61   Temp 97.4 °F (36.3 °C) (Oral)   Resp 16   Ht 1.88 m (6' 2\")   Wt 92.1 kg (203 lb)   SpO2 97%   BMI 26.06 kg/m²     Health Maintenance Due   Topic Date Due    Respiratory Syncytial Virus (RSV) Pregnant or age 60 yrs+ (1 - 1-dose 60+ series) Never done    DTaP/Tdap/Td vaccine (1 - Tdap) 06/22/2013    Pneumococcal 65+ years Vaccine (2 of 2 - PPSV23 or PCV20) 07/24/2016    COVID-19 Vaccine (3 - 2023-24 season) 09/01/2023         \"Have you been to the ER, urgent care clinic since your last visit?  Hospitalized since your last visit?\"    NO    “Have you seen or consulted any other health care providers outside of [facility] since your last visit?”    NO                    
nausea/vomiting  Genito-Urinary: no dysuria, trouble voiding, or hematuria  Musculoskeletal: negative for - gait disturbance, joint pain, joint stiffness or joint swelling  Neurological: no TIA or stroke symptoms  Hematologic: no bruises, no bleeding  Lymphatic: no swollen glands  Integument: no lumps, mole changes, nail changes or rash  Endocrine:no malaise/lethargy poly uria or polydipsia or unexpected weight changes        Social History     Socioeconomic History    Marital status:      Spouse name: None    Number of children: None    Years of education: None    Highest education level: None   Tobacco Use    Smoking status: Never    Smokeless tobacco: Never   Vaping Use    Vaping Use: Never used   Substance and Sexual Activity    Alcohol use: No    Drug use: No     Social Determinants of Health     Financial Resource Strain: Low Risk  (2/1/2024)    Overall Financial Resource Strain (CARDIA)     Difficulty of Paying Living Expenses: Not hard at all   Food Insecurity: No Food Insecurity (2/1/2024)    Hunger Vital Sign     Worried About Running Out of Food in the Last Year: Never true     Ran Out of Food in the Last Year: Never true   Transportation Needs: Unknown (2/1/2024)    PRAPARE - Transportation     Lack of Transportation (Non-Medical): No   Physical Activity: Inactive (2/1/2024)    Exercise Vital Sign     Days of Exercise per Week: 0 days     Minutes of Exercise per Session: 0 min   Housing Stability: Unknown (2/1/2024)    Housing Stability Vital Sign     Unstable Housing in the Last Year: No     Family History   Problem Relation Age of Onset    Cancer Father         colon ca    Cancer Mother         uterine, cervical       OBJECTIVE:     BP (!) 146/82   Pulse 61   Temp 97.4 °F (36.3 °C) (Oral)   Resp 16   Ht 1.88 m (6' 2\")   Wt 92.1 kg (203 lb)   SpO2 97%   BMI 26.06 kg/m²   CONSTITUTIONAL:   well nourished, appears age appropriate  EYES: sclera anicteric, PERRL, EOMI  ENMT:nares clear, moist

## 2024-05-08 LAB
ALBUMIN SERPL-MCNC: 3.8 G/DL (ref 3.5–5)
ALBUMIN/GLOB SERPL: 1.1 (ref 1.1–2.2)
ALP SERPL-CCNC: 100 U/L (ref 45–117)
ALT SERPL-CCNC: 38 U/L (ref 12–78)
ANION GAP SERPL CALC-SCNC: 4 MMOL/L (ref 5–15)
AST SERPL-CCNC: 32 U/L (ref 15–37)
BILIRUB SERPL-MCNC: 0.5 MG/DL (ref 0.2–1)
BUN SERPL-MCNC: 25 MG/DL (ref 6–20)
BUN/CREAT SERPL: 20 (ref 12–20)
CALCIUM SERPL-MCNC: 9.8 MG/DL (ref 8.5–10.1)
CHLORIDE SERPL-SCNC: 109 MMOL/L (ref 97–108)
CHOLEST SERPL-MCNC: 131 MG/DL
CK SERPL-CCNC: 102 U/L (ref 39–308)
CO2 SERPL-SCNC: 25 MMOL/L (ref 21–32)
CREAT SERPL-MCNC: 1.25 MG/DL (ref 0.7–1.3)
GLOBULIN SER CALC-MCNC: 3.5 G/DL (ref 2–4)
HDLC SERPL-MCNC: 50 MG/DL
HDLC SERPL: 2.6 (ref 0–5)
LDLC SERPL CALC-MCNC: 54.8 MG/DL (ref 0–100)
POTASSIUM SERPL-SCNC: 4.4 MMOL/L (ref 3.5–5.1)
PROT SERPL-MCNC: 7.3 G/DL (ref 6.4–8.2)
SODIUM SERPL-SCNC: 138 MMOL/L (ref 136–145)
TRIGL SERPL-MCNC: 131 MG/DL
VLDLC SERPL CALC-MCNC: 26.2 MG/DL

## 2024-06-03 ENCOUNTER — TELEPHONE (OUTPATIENT)
Facility: CLINIC | Age: 89
End: 2024-06-03

## 2024-06-03 NOTE — TELEPHONE ENCOUNTER
Patient states he has cut his generic Norvasc 5 mg in 1/2 as instructed and taking it BID.  Then started holding his telmistartin 40 mg and monitored his BP for about 8 days and it ranged from 135 to about 150.   States he feel much better.  Wants to continue this if okay to continue off of it.  He said that you and him discussed being concerned when his BP got to about 160.  Sounded like he had made his decision but did want your approval to continue off of it.

## 2024-09-03 NOTE — ADDENDUM NOTE
Addended by: Maggi Coreas on: 9/15/2021 09:46 AM     Modules accepted: Noris
[Return Date: _____] : as of [unfilled].  This has been discussed in detail with ~Pascale~ and ~he/she~ understands this.

## 2024-09-11 RX ORDER — ALLOPURINOL 100 MG/1
100 TABLET ORAL DAILY
Qty: 90 TABLET | Refills: 3 | Status: SHIPPED | OUTPATIENT
Start: 2024-09-11

## 2024-10-18 ENCOUNTER — OFFICE VISIT (OUTPATIENT)
Facility: CLINIC | Age: 89
End: 2024-10-18

## 2024-10-18 VITALS
HEART RATE: 65 BPM | HEIGHT: 74 IN | OXYGEN SATURATION: 97 % | RESPIRATION RATE: 16 BRPM | DIASTOLIC BLOOD PRESSURE: 86 MMHG | BODY MASS INDEX: 24.64 KG/M2 | WEIGHT: 192 LBS | SYSTOLIC BLOOD PRESSURE: 138 MMHG | TEMPERATURE: 98.7 F

## 2024-10-18 DIAGNOSIS — J06.9 ACUTE URI: Primary | ICD-10-CM

## 2024-10-18 RX ORDER — AZITHROMYCIN 250 MG/1
TABLET, FILM COATED ORAL
Qty: 6 TABLET | Refills: 0 | Status: SHIPPED | OUTPATIENT
Start: 2024-10-18 | End: 2024-10-28

## 2024-10-18 NOTE — PROGRESS NOTES
Chief Complaint   Patient presents with    Neck Pain     BP (!) 172/76   Pulse 65   Temp 98.7 °F (37.1 °C)   Resp 16   Ht 1.88 m (6' 2\")   Wt 87.1 kg (192 lb)   SpO2 97%   BMI 24.65 kg/m²   \"Have you been to the ER, urgent care clinic since your last visit?  Hospitalized since your last visit?\"    NO    “Have you seen or consulted any other health care providers outside our system since your last visit?”    NO

## 2024-10-18 NOTE — PROGRESS NOTES
Subjective:   Alen Renteria is a 89 y.o. male      Chief Complaint   Patient presents with    Neck Pain        History of present illness: He presents on a lot of upper respiratory symptoms with some purulent sputum that has become a little lighter over the last few days.  He is noticing neck pain and initially noted some swelling but the swelling seems of gone down.  He notes no fevers or chills.    Patient Active Problem List   Diagnosis    Non-seasonal allergic rhinitis    Syncope    Gout    History of prostate cancer    Meniere disease    Mixed hyperlipidemia    Palpitation    Edema    Alcohol screening    Vitamin D deficiency    CVA (cerebrovascular accident) ([facility])    PAC (premature atrial contraction)    Primary osteoarthritis involving multiple joints    Rosacea    Testosterone deficiency    Hypertension with renal disease    Lymph node enlargement    CKD (chronic kidney disease), stage III ([facility])    COVID-19    Hematuria    Blood loss anemia    Dermatitis    Acute URI      Past Medical History:   Diagnosis Date    Adverse effect of anesthesia     very bad gag reflex    Allergic rhinitis 8/19/2017    BPH (benign prostatic hypertrophy) 8/19/2017    Cancer ([facility])     basal cell carcinoma right ear    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    CKD (chronic kidney disease), stage III (HCC) 8/19/2017    CVA (cerebrovascular accident) ([facility]) 2009    DJD (degenerative joint disease) 8/19/2017    Gout     Hyperlipidemia 8/19/2017    Hypertension     Hypertension with renal disease 8/19/2017    Meniere disease 8/19/2017    Nausea & vomiting     On statin therapy 8/19/2017    PAC (premature atrial contraction) 8/19/2017    Palpitation 8/19/2017    Prostate CA (HCC) 8/19/2017    Pseudophakia of both eyes 10/16/2018    Rosacea 8/19/2017    Testosterone deficiency 8/19/2017      Allergies   Allergen Reactions    Sulfa Antibiotics Rash     Very mild rash several years ago        Family History   Problem Relation Age

## 2024-10-31 DIAGNOSIS — S16.1XXD STRAIN OF NECK MUSCLE, SUBSEQUENT ENCOUNTER: Primary | ICD-10-CM

## 2024-10-31 RX ORDER — METHYLPREDNISOLONE 4 MG/1
TABLET ORAL
Qty: 1 KIT | Refills: 0 | Status: SHIPPED | OUTPATIENT
Start: 2024-10-31 | End: 2024-11-06

## 2024-10-31 NOTE — PROGRESS NOTES
Patient called stating he is having continued neck discomfort.  He saw Dr. Sandoval on the 18th of this month and treated for upper respiratory infection with Zithromax.  Upper respiratory symptoms have improved.  He has tried using different pillow, used Tylenol and old gabapentin prescription with little relief.  He is driving out of town tomorrow    1. Strain of neck muscle, subsequent encounter  -     methylPREDNISolone (MEDROL DOSEPACK) 4 MG tablet; Take by mouth., Disp-1 kit, R-0Normal  Advised to follow-up in office if does not resolve.     TRINA MARIE, LAURI - NP

## 2024-11-18 ENCOUNTER — OFFICE VISIT (OUTPATIENT)
Facility: CLINIC | Age: 89
End: 2024-11-18
Payer: MEDICARE

## 2024-11-18 VITALS
HEART RATE: 74 BPM | BODY MASS INDEX: 25.27 KG/M2 | WEIGHT: 196.9 LBS | HEIGHT: 74 IN | DIASTOLIC BLOOD PRESSURE: 89 MMHG | TEMPERATURE: 97.7 F | OXYGEN SATURATION: 98 % | SYSTOLIC BLOOD PRESSURE: 143 MMHG | RESPIRATION RATE: 17 BRPM

## 2024-11-18 DIAGNOSIS — E78.2 MIXED HYPERLIPIDEMIA: ICD-10-CM

## 2024-11-18 DIAGNOSIS — I63.9 CEREBROVASCULAR ACCIDENT (CVA), UNSPECIFIED MECHANISM (HCC): ICD-10-CM

## 2024-11-18 DIAGNOSIS — Z23 NEEDS FLU SHOT: ICD-10-CM

## 2024-11-18 DIAGNOSIS — N18.30 STAGE 3 CHRONIC KIDNEY DISEASE, UNSPECIFIED WHETHER STAGE 3A OR 3B CKD (HCC): ICD-10-CM

## 2024-11-18 DIAGNOSIS — M15.0 PRIMARY OSTEOARTHRITIS INVOLVING MULTIPLE JOINTS: ICD-10-CM

## 2024-11-18 DIAGNOSIS — J30.89 NON-SEASONAL ALLERGIC RHINITIS, UNSPECIFIED TRIGGER: ICD-10-CM

## 2024-11-18 DIAGNOSIS — I12.9 HYPERTENSION WITH RENAL DISEASE: Primary | ICD-10-CM

## 2024-11-18 LAB
ALBUMIN SERPL-MCNC: 3.7 G/DL (ref 3.5–5)
ALBUMIN/GLOB SERPL: 1.1 (ref 1.1–2.2)
ALP SERPL-CCNC: 97 U/L (ref 45–117)
ALT SERPL-CCNC: 26 U/L (ref 12–78)
ANION GAP SERPL CALC-SCNC: 7 MMOL/L (ref 2–12)
AST SERPL-CCNC: 21 U/L (ref 15–37)
BILIRUB SERPL-MCNC: 0.6 MG/DL (ref 0.2–1)
BUN SERPL-MCNC: 26 MG/DL (ref 6–20)
BUN/CREAT SERPL: 24 (ref 12–20)
CALCIUM SERPL-MCNC: 9.5 MG/DL (ref 8.5–10.1)
CHLORIDE SERPL-SCNC: 106 MMOL/L (ref 97–108)
CHOLEST SERPL-MCNC: 129 MG/DL
CK SERPL-CCNC: 61 U/L (ref 39–308)
CO2 SERPL-SCNC: 28 MMOL/L (ref 21–32)
CREAT SERPL-MCNC: 1.07 MG/DL (ref 0.7–1.3)
GLOBULIN SER CALC-MCNC: 3.3 G/DL (ref 2–4)
GLUCOSE SERPL-MCNC: 111 MG/DL (ref 65–100)
HDLC SERPL-MCNC: 62 MG/DL
HDLC SERPL: 2.1 (ref 0–5)
LDLC SERPL CALC-MCNC: 48.8 MG/DL (ref 0–100)
POTASSIUM SERPL-SCNC: 4.5 MMOL/L (ref 3.5–5.1)
PROT SERPL-MCNC: 7 G/DL (ref 6.4–8.2)
SODIUM SERPL-SCNC: 141 MMOL/L (ref 136–145)
TRIGL SERPL-MCNC: 91 MG/DL
VLDLC SERPL CALC-MCNC: 18.2 MG/DL

## 2024-11-18 PROCEDURE — 1123F ACP DISCUSS/DSCN MKR DOCD: CPT | Performed by: INTERNAL MEDICINE

## 2024-11-18 PROCEDURE — 99214 OFFICE O/P EST MOD 30 MIN: CPT | Performed by: INTERNAL MEDICINE

## 2024-11-18 PROCEDURE — G0008 ADMIN INFLUENZA VIRUS VAC: HCPCS | Performed by: INTERNAL MEDICINE

## 2024-11-18 PROCEDURE — G8482 FLU IMMUNIZE ORDER/ADMIN: HCPCS | Performed by: INTERNAL MEDICINE

## 2024-11-18 PROCEDURE — 1036F TOBACCO NON-USER: CPT | Performed by: INTERNAL MEDICINE

## 2024-11-18 PROCEDURE — 1126F AMNT PAIN NOTED NONE PRSNT: CPT | Performed by: INTERNAL MEDICINE

## 2024-11-18 PROCEDURE — 90653 IIV ADJUVANT VACCINE IM: CPT | Performed by: INTERNAL MEDICINE

## 2024-11-18 PROCEDURE — 1159F MED LIST DOCD IN RCRD: CPT | Performed by: INTERNAL MEDICINE

## 2024-11-18 PROCEDURE — 1160F RVW MEDS BY RX/DR IN RCRD: CPT | Performed by: INTERNAL MEDICINE

## 2024-11-18 PROCEDURE — G8419 CALC BMI OUT NRM PARAM NOF/U: HCPCS | Performed by: INTERNAL MEDICINE

## 2024-11-18 PROCEDURE — G8427 DOCREV CUR MEDS BY ELIG CLIN: HCPCS | Performed by: INTERNAL MEDICINE

## 2024-11-18 NOTE — PROGRESS NOTES
Alen Renteria is a 89 y.o. male     Chief Complaint   Patient presents with    Hypertension     3 month f/u    Cholesterol Problem    Chronic Kidney Disease       BP (!) 178/68 (Site: Left Upper Arm, Position: Sitting, Cuff Size: Large Adult)   Pulse 74   Temp 97.7 °F (36.5 °C) (Oral)   Resp 17   Ht 1.88 m (6' 2\")   Wt 89.3 kg (196 lb 14.4 oz)   SpO2 98%   BMI 25.28 kg/m²     Health Maintenance Due   Topic Date Due    Respiratory Syncytial Virus (RSV) Pregnant or age 60 yrs+ (1 - 1-dose 75+ series) Never done    DTaP/Tdap/Td vaccine (1 - Tdap) 06/22/2013    Pneumococcal 65+ years Vaccine (2 of 2 - PPSV23 or PCV20) 07/24/2016    Flu vaccine (1) 08/01/2024    COVID-19 Vaccine (3 - 2023-24 season) 09/01/2024         \"Have you been to the ER, urgent care clinic since your last visit?  Hospitalized since your last visit?\"    NO    “Have you seen or consulted any other health care providers outside of [facility] since your last visit?”    NO                    
transcribed using an electronic medical records system.  Although proofread, it may and can contain electronic and spelling errors.  Other human spelling and other errors may be present.  Corrections may be executed at a later time.  Please feel free to contact us for any clarifications as needed.      No follow-up provider specified.    No results found for any visits on 11/18/24.    Jaswinder Sandoval MD    The patient verbalized understanding of the problems and plans as explained.

## 2024-12-26 RX ORDER — DOXYCYCLINE 100 MG/1
100 CAPSULE ORAL DAILY
Qty: 90 CAPSULE | Refills: 1 | Status: SHIPPED | OUTPATIENT
Start: 2024-12-26

## 2024-12-26 NOTE — TELEPHONE ENCOUNTER
PCP: Jaswinder Sandoval MD    Last appt: 11/18/2024    Future Appointments   Date Time Provider Department Center   2/18/2025  9:40 AM Jaswinder Sandoval MD [facility] DEP       Requested Prescriptions     Pending Prescriptions Disp Refills    doxycycline hyclate (VIBRAMYCIN) 100 MG capsule [Pharmacy Med Name: DOXYCYCLINE HYCLATE 100 MG CAP] 90 capsule 1     Sig: TAKE 1 CAPSULE BY MOUTH DAILY

## 2025-02-17 PROBLEM — I63.9 CVA (CEREBROVASCULAR ACCIDENT) (HCC): Status: RESOLVED | Noted: 2017-08-19 | Resolved: 2025-02-17

## 2025-02-17 PROBLEM — Z86.73 HISTORY OF CVA (CEREBROVASCULAR ACCIDENT): Status: ACTIVE | Noted: 2025-02-17

## 2025-02-17 SDOH — ECONOMIC STABILITY: FOOD INSECURITY: WITHIN THE PAST 12 MONTHS, THE FOOD YOU BOUGHT JUST DIDN'T LAST AND YOU DIDN'T HAVE MONEY TO GET MORE.: NEVER TRUE

## 2025-02-17 SDOH — HEALTH STABILITY: PHYSICAL HEALTH: ON AVERAGE, HOW MANY DAYS PER WEEK DO YOU ENGAGE IN MODERATE TO STRENUOUS EXERCISE (LIKE A BRISK WALK)?: 5 DAYS

## 2025-02-17 SDOH — ECONOMIC STABILITY: TRANSPORTATION INSECURITY
IN THE PAST 12 MONTHS, HAS THE LACK OF TRANSPORTATION KEPT YOU FROM MEDICAL APPOINTMENTS OR FROM GETTING MEDICATIONS?: NO

## 2025-02-17 SDOH — ECONOMIC STABILITY: INCOME INSECURITY: IN THE LAST 12 MONTHS, WAS THERE A TIME WHEN YOU WERE NOT ABLE TO PAY THE MORTGAGE OR RENT ON TIME?: NO

## 2025-02-17 SDOH — HEALTH STABILITY: PHYSICAL HEALTH: ON AVERAGE, HOW MANY MINUTES DO YOU ENGAGE IN EXERCISE AT THIS LEVEL?: PATIENT DECLINED

## 2025-02-17 SDOH — ECONOMIC STABILITY: FOOD INSECURITY: WITHIN THE PAST 12 MONTHS, YOU WORRIED THAT YOUR FOOD WOULD RUN OUT BEFORE YOU GOT MONEY TO BUY MORE.: NEVER TRUE

## 2025-02-17 ASSESSMENT — PATIENT HEALTH QUESTIONNAIRE - PHQ9
SUM OF ALL RESPONSES TO PHQ QUESTIONS 1-9: 0
2. FEELING DOWN, DEPRESSED OR HOPELESS: NOT AT ALL
SUM OF ALL RESPONSES TO PHQ QUESTIONS 1-9: 0
SUM OF ALL RESPONSES TO PHQ9 QUESTIONS 1 & 2: 0
1. LITTLE INTEREST OR PLEASURE IN DOING THINGS: NOT AT ALL
SUM OF ALL RESPONSES TO PHQ QUESTIONS 1-9: 0
SUM OF ALL RESPONSES TO PHQ QUESTIONS 1-9: 0

## 2025-02-17 ASSESSMENT — LIFESTYLE VARIABLES
HOW OFTEN DO YOU HAVE A DRINK CONTAINING ALCOHOL: NEVER
HOW MANY STANDARD DRINKS CONTAINING ALCOHOL DO YOU HAVE ON A TYPICAL DAY: 0
HOW OFTEN DO YOU HAVE SIX OR MORE DRINKS ON ONE OCCASION: 1
HOW OFTEN DO YOU HAVE A DRINK CONTAINING ALCOHOL: 1
HOW MANY STANDARD DRINKS CONTAINING ALCOHOL DO YOU HAVE ON A TYPICAL DAY: PATIENT DOES NOT DRINK

## 2025-02-17 NOTE — PROGRESS NOTES
This is a Subsequent Medicare Annual Wellness Visit providing Personalized Prevention Plan Services (PPPS) (Performed 12 months after initial AWV and PPPS )    I have reviewed the patient's medical history in detail and updated the computerized patient record.  He presents today accompanied by his wife for his Medicare subsequent annual wellness examination and screening questionnaire.    He is also in follow-up of his multiple medical problems include hypertension, hyperlipidemia, prior CVA, history of PACs, allergic rhinitis, DJD, history of prostate cancer, CKD stage III, gout, rosacea, and has had recent problems with hematuria and actually has an indwelling Lamas now placed back urology with a follow-up scheduled for 2 days from now.  He is passing some clots in the catheter.  He currently notes no chest congestion but does note some chest pain although he does not describe this chest pain is exertional he notes that it seems to be more associated with movement.  He notes no associated shortness of breath, nausea, vomiting.  He does note that the chest pain can be various places sometimes can be in the neck sometimes can be on the right sometimes can be on the left.  He noted last night it hurt when he was turning over in bed.  He notes no palpitations, PND, orthopnea or other cardiac respiratory complaints.  He notes no current GI or  complaints except for the problem where he had difficulty passing his urine and has an indwelling Lamas now with intermittent blood and clots noted.  He has no headaches, dizziness or new neurologic complaints.  He notes no new arthritic complaints and there are no other complaints on complete review of systems.    History     Past Medical History:   Diagnosis Date    Adverse effect of anesthesia     very bad gag reflex    Allergic rhinitis 8/19/2017    BPH (benign prostatic hypertrophy) 8/19/2017    Cancer (HCC)     basal cell carcinoma right ear    Chemosis of conjunctiva of

## 2025-02-18 ENCOUNTER — OFFICE VISIT (OUTPATIENT)
Facility: CLINIC | Age: 89
End: 2025-02-18

## 2025-02-18 VITALS
HEART RATE: 81 BPM | SYSTOLIC BLOOD PRESSURE: 154 MMHG | HEIGHT: 74 IN | DIASTOLIC BLOOD PRESSURE: 82 MMHG | BODY MASS INDEX: 24.36 KG/M2 | TEMPERATURE: 97.7 F | WEIGHT: 189.8 LBS | RESPIRATION RATE: 18 BRPM | OXYGEN SATURATION: 98 %

## 2025-02-18 DIAGNOSIS — Z13.39 ALCOHOL SCREENING: ICD-10-CM

## 2025-02-18 DIAGNOSIS — R00.2 PALPITATION: ICD-10-CM

## 2025-02-18 DIAGNOSIS — R07.9 CHEST PAIN, UNSPECIFIED TYPE: ICD-10-CM

## 2025-02-18 DIAGNOSIS — L71.9 ROSACEA: ICD-10-CM

## 2025-02-18 DIAGNOSIS — Z86.73 HISTORY OF CVA (CEREBROVASCULAR ACCIDENT): ICD-10-CM

## 2025-02-18 DIAGNOSIS — E78.2 MIXED HYPERLIPIDEMIA: ICD-10-CM

## 2025-02-18 DIAGNOSIS — M10.09 IDIOPATHIC GOUT OF MULTIPLE SITES, UNSPECIFIED CHRONICITY: ICD-10-CM

## 2025-02-18 DIAGNOSIS — N18.30 STAGE 3 CHRONIC KIDNEY DISEASE, UNSPECIFIED WHETHER STAGE 3A OR 3B CKD (HCC): ICD-10-CM

## 2025-02-18 DIAGNOSIS — I12.9 HYPERTENSION WITH RENAL DISEASE: ICD-10-CM

## 2025-02-18 DIAGNOSIS — J30.89 NON-SEASONAL ALLERGIC RHINITIS, UNSPECIFIED TRIGGER: ICD-10-CM

## 2025-02-18 DIAGNOSIS — E55.9 VITAMIN D DEFICIENCY: ICD-10-CM

## 2025-02-18 DIAGNOSIS — Z85.46 HISTORY OF PROSTATE CANCER: ICD-10-CM

## 2025-02-18 DIAGNOSIS — M15.0 PRIMARY OSTEOARTHRITIS INVOLVING MULTIPLE JOINTS: ICD-10-CM

## 2025-02-18 DIAGNOSIS — Z00.00 MEDICARE ANNUAL WELLNESS VISIT, SUBSEQUENT: ICD-10-CM

## 2025-02-18 LAB
COMMENT:: NORMAL
SPECIMEN HOLD: NORMAL

## 2025-02-18 NOTE — PROGRESS NOTES
Alen Renteria is a 89 y.o. male     Chief Complaint   Patient presents with    Medicare AWV       BP (!) 180/100 (Site: Left Upper Arm, Position: Sitting, Cuff Size: Large Adult)   Pulse 81   Temp 97.7 °F (36.5 °C) (Oral)   Resp 18   Ht 1.88 m (6' 2\")   Wt 86.1 kg (189 lb 12.8 oz)   SpO2 98%   BMI 24.37 kg/m²     Health Maintenance Due   Topic Date Due    Respiratory Syncytial Virus (RSV) Pregnant or age 60 yrs+ (1 - 1-dose 75+ series) Never done    DTaP/Tdap/Td vaccine (1 - Tdap) 06/22/2013    Pneumococcal 50+ years Vaccine (2 of 2 - PPSV23) 07/24/2016    COVID-19 Vaccine (3 - 2024-25 season) 09/01/2024    Annual Wellness Visit (Medicare)  02/01/2025    Prostate Specific Antigen (PSA) Screening or Monitoring  02/01/2025         \"Have you been to the ER, urgent care clinic since your last visit?  Hospitalized since your last visit?\"    NO    “Have you seen or consulted any other health care providers outside of [facility] since your last visit?”    NO

## 2025-02-18 NOTE — PATIENT INSTRUCTIONS
you take.  How can you care for yourself at home?  Diet    Use less salt when you cook and eat. This helps lower your blood pressure. Taste food before salting. Add only a little salt when you think you need it. With time, your taste buds will adjust to less salt.     Eat fewer snack items, fast foods, canned soups, and other high-salt, high-fat, processed foods.     Read food labels and try to avoid saturated and trans fats. They increase your risk of heart disease by raising cholesterol levels.     Limit the amount of solid fat--butter, margarine, and shortening--you eat. Use olive, peanut, or canola oil when you cook. Bake, broil, and steam foods instead of frying them.     Eat a variety of fruit and vegetables every day. Dark green, deep orange, red, or yellow fruits and vegetables are especially good for you. Examples include spinach, carrots, peaches, and berries.     Foods high in fiber can reduce your cholesterol and provide important vitamins and minerals. High-fiber foods include whole-grain cereals and breads, oatmeal, beans, brown rice, citrus fruits, and apples.     Eat lean proteins. Heart-healthy proteins include seafood, lean meats and poultry, eggs, beans, peas, nuts, seeds, and soy products.     Limit drinks and foods with added sugar. These include candy, desserts, and soda pop.   Heart-healthy lifestyle    If your doctor recommends it, get more exercise. For many people, walking is a good choice. Or you may want to swim, bike, or do other activities. Bit by bit, increase the time you're active every day. Try for at least 30 minutes on most days of the week.     Try to quit or cut back on using tobacco and other nicotine products. This includes smoking and vaping. If you need help quitting, talk to your doctor about stop-smoking programs and medicines. These can increase your chances of quitting for good. Quitting is one of the most important things you can do to protect your heart. It is never

## 2025-02-19 LAB
25(OH)D3 SERPL-MCNC: 54.5 NG/ML (ref 30–100)
ALBUMIN SERPL-MCNC: 3.7 G/DL (ref 3.5–5)
ALBUMIN/GLOB SERPL: 1.1 (ref 1.1–2.2)
ALP SERPL-CCNC: 105 U/L (ref 45–117)
ALT SERPL-CCNC: 23 U/L (ref 12–78)
ANION GAP SERPL CALC-SCNC: 8 MMOL/L (ref 2–12)
APPEARANCE UR: CLEAR
AST SERPL-CCNC: 24 U/L (ref 15–37)
BACTERIA URNS QL MICRO: NEGATIVE /HPF
BASOPHILS # BLD: 0.03 K/UL (ref 0–0.1)
BASOPHILS NFR BLD: 0.5 % (ref 0–1)
BILIRUB SERPL-MCNC: 0.8 MG/DL (ref 0.2–1)
BILIRUB UR QL: NEGATIVE
BUN SERPL-MCNC: 21 MG/DL (ref 6–20)
BUN/CREAT SERPL: 21 (ref 12–20)
CALCIUM SERPL-MCNC: 9.8 MG/DL (ref 8.5–10.1)
CHLORIDE SERPL-SCNC: 104 MMOL/L (ref 97–108)
CHOLEST SERPL-MCNC: 130 MG/DL
CK SERPL-CCNC: 47 U/L (ref 39–308)
CO2 SERPL-SCNC: 26 MMOL/L (ref 21–32)
COLOR UR: ABNORMAL
CREAT SERPL-MCNC: 0.98 MG/DL (ref 0.7–1.3)
DIFFERENTIAL METHOD BLD: ABNORMAL
EOSINOPHIL # BLD: 0.09 K/UL (ref 0–0.4)
EOSINOPHIL NFR BLD: 1.6 % (ref 0–7)
EPITH CASTS URNS QL MICRO: ABNORMAL /LPF
ERYTHROCYTE [DISTWIDTH] IN BLOOD BY AUTOMATED COUNT: 13.5 % (ref 11.5–14.5)
GLOBULIN SER CALC-MCNC: 3.4 G/DL (ref 2–4)
GLUCOSE SERPL-MCNC: 102 MG/DL (ref 65–100)
GLUCOSE UR STRIP.AUTO-MCNC: NEGATIVE MG/DL
HCT VFR BLD AUTO: 39.8 % (ref 36.6–50.3)
HDLC SERPL-MCNC: 59 MG/DL
HDLC SERPL: 2.2 (ref 0–5)
HGB BLD-MCNC: 13 G/DL (ref 12.1–17)
HGB UR QL STRIP: ABNORMAL
HYALINE CASTS URNS QL MICRO: ABNORMAL /LPF (ref 0–5)
IMM GRANULOCYTES # BLD AUTO: 0.01 K/UL (ref 0–0.04)
IMM GRANULOCYTES NFR BLD AUTO: 0.2 % (ref 0–0.5)
KETONES UR QL STRIP.AUTO: NEGATIVE MG/DL
LDLC SERPL CALC-MCNC: 57.6 MG/DL (ref 0–100)
LEUKOCYTE ESTERASE UR QL STRIP.AUTO: ABNORMAL
LYMPHOCYTES # BLD: 1.27 K/UL (ref 0.8–3.5)
LYMPHOCYTES NFR BLD: 22.2 % (ref 12–49)
MCH RBC QN AUTO: 32.2 PG (ref 26–34)
MCHC RBC AUTO-ENTMCNC: 32.7 G/DL (ref 30–36.5)
MCV RBC AUTO: 98.5 FL (ref 80–99)
MONOCYTES # BLD: 0.61 K/UL (ref 0–1)
MONOCYTES NFR BLD: 10.6 % (ref 5–13)
NEUTS SEG # BLD: 3.72 K/UL (ref 1.8–8)
NEUTS SEG NFR BLD: 64.9 % (ref 32–75)
NITRITE UR QL STRIP.AUTO: NEGATIVE
NRBC # BLD: 0 K/UL (ref 0–0.01)
NRBC BLD-RTO: 0 PER 100 WBC
PH UR STRIP: 6 (ref 5–8)
PLATELET # BLD AUTO: 199 K/UL (ref 150–400)
PMV BLD AUTO: 10 FL (ref 8.9–12.9)
POTASSIUM SERPL-SCNC: 4.2 MMOL/L (ref 3.5–5.1)
PROT SERPL-MCNC: 7.1 G/DL (ref 6.4–8.2)
PROT UR STRIP-MCNC: ABNORMAL MG/DL
PSA SERPL-MCNC: 0.2 NG/ML (ref 0.01–4)
RBC # BLD AUTO: 4.04 M/UL (ref 4.1–5.7)
RBC #/AREA URNS HPF: >100 /HPF (ref 0–5)
SODIUM SERPL-SCNC: 138 MMOL/L (ref 136–145)
SP GR UR REFRACTOMETRY: 1.01 (ref 1–1.03)
SPECIMEN HOLD: NORMAL
T4 FREE SERPL-MCNC: 1.1 NG/DL (ref 0.8–1.5)
TRIGL SERPL-MCNC: 67 MG/DL
TSH SERPL DL<=0.05 MIU/L-ACNC: 3.56 UIU/ML (ref 0.36–3.74)
UROBILINOGEN UR QL STRIP.AUTO: 0.2 EU/DL (ref 0.2–1)
VLDLC SERPL CALC-MCNC: 13.4 MG/DL
WBC # BLD AUTO: 5.7 K/UL (ref 4.1–11.1)
WBC URNS QL MICRO: ABNORMAL /HPF (ref 0–4)

## 2025-03-19 PROBLEM — Z00.00 MEDICARE ANNUAL WELLNESS VISIT, SUBSEQUENT: Status: RESOLVED | Noted: 2018-01-05 | Resolved: 2025-03-19

## 2025-04-22 RX ORDER — AMLODIPINE BESYLATE 5 MG/1
5 TABLET ORAL 2 TIMES DAILY
Qty: 180 TABLET | Refills: 1 | Status: SHIPPED | OUTPATIENT
Start: 2025-04-22

## 2025-04-22 NOTE — TELEPHONE ENCOUNTER
PCP: Jaswinder Sandoval MD    Last appt: 2/18/2025    Future Appointments   Date Time Provider Department Center   5/27/2025 10:40 AM Jaswinder Sandoval MD [facility] DEP       Requested Prescriptions     Pending Prescriptions Disp Refills    amLODIPine (NORVASC) 5 MG tablet [Pharmacy Med Name: amLODIPine BESYLATE 5 MG TAB] 180 tablet 1     Sig: TAKE 1 TABLET BY MOUTH TWICE A DAY

## 2025-05-19 NOTE — PROGRESS NOTES
Chief Complaint   Patient presents with    Hypertension with renal disease     Pt comes for a 3 month follow up.       SUBJECTIVE:    Alen Renteria is a 89 y.o. male who returns in follow-up for his medical problems include hypertension with labile/whitecoat component, hyperlipidemia, CKD stage III, DJD, history of PACs and other multimedical problems.  He has been monitoring his blood pressure regularly and brought in a list from the look at.  It has ranged from a systolic of 119 to a systolic of 158 which I think are all acceptable.  He notes no chest pain, shortness of breath or cardiac respiratory complaints.  There are no current GI or  complaints.  He has no headaches, dizziness or new neurologic complaints.  He has no new arthritic complaints and there are no other complaints on complete review of systems.      Current Outpatient Medications   Medication Sig Dispense Refill    amLODIPine (NORVASC) 5 MG tablet TAKE 1 TABLET BY MOUTH TWICE A  tablet 1    doxycycline hyclate (VIBRAMYCIN) 100 MG capsule TAKE 1 CAPSULE BY MOUTH DAILY 90 capsule 1    allopurinol (ZYLOPRIM) 100 MG tablet TAKE 1 TABLET BY MOUTH DAILY 90 tablet 3    NUBEQA 300 MG TABS Take 300 mg by mouth 2 times daily      gabapentin (NEURONTIN) 100 MG capsule Take 1 capsule by mouth 3 times daily.      RESTASIS 0.05 % ophthalmic emulsion INSTILL TWO DROPS IN EACH EYE DAILY AS DIRECTED 60 each 5    ORGOVYX 120 MG TABS       Vitamin D, Cholecalciferol, 25 MCG (1000 UT) TABS Take by mouth      aspirin 325 MG tablet Take 1 tablet by mouth daily      silodosin (RAPAFLO) 8 MG CAPS Take 1 capsule by mouth daily (with breakfast)       No current facility-administered medications for this visit.     Past Medical History:   Diagnosis Date    Adverse effect of anesthesia     very bad gag reflex    Allergic rhinitis 8/19/2017    BPH (benign prostatic hypertrophy) 8/19/2017    Cancer (HCC)     basal cell carcinoma right ear    Chemosis of

## 2025-05-20 ENCOUNTER — OFFICE VISIT (OUTPATIENT)
Facility: CLINIC | Age: 89
End: 2025-05-20
Payer: MEDICARE

## 2025-05-20 VITALS
OXYGEN SATURATION: 97 % | TEMPERATURE: 98.2 F | HEART RATE: 65 BPM | BODY MASS INDEX: 24.59 KG/M2 | DIASTOLIC BLOOD PRESSURE: 86 MMHG | HEIGHT: 74 IN | SYSTOLIC BLOOD PRESSURE: 157 MMHG | WEIGHT: 191.6 LBS

## 2025-05-20 DIAGNOSIS — M15.0 PRIMARY OSTEOARTHRITIS INVOLVING MULTIPLE JOINTS: ICD-10-CM

## 2025-05-20 DIAGNOSIS — N18.30 STAGE 3 CHRONIC KIDNEY DISEASE, UNSPECIFIED WHETHER STAGE 3A OR 3B CKD (HCC): ICD-10-CM

## 2025-05-20 DIAGNOSIS — Z86.73 HISTORY OF CVA (CEREBROVASCULAR ACCIDENT): ICD-10-CM

## 2025-05-20 DIAGNOSIS — E78.2 MIXED HYPERLIPIDEMIA: ICD-10-CM

## 2025-05-20 DIAGNOSIS — I12.9 HYPERTENSION WITH RENAL DISEASE: Primary | ICD-10-CM

## 2025-05-20 PROCEDURE — 99214 OFFICE O/P EST MOD 30 MIN: CPT | Performed by: INTERNAL MEDICINE

## 2025-05-20 PROCEDURE — 1160F RVW MEDS BY RX/DR IN RCRD: CPT | Performed by: INTERNAL MEDICINE

## 2025-05-20 PROCEDURE — G8420 CALC BMI NORM PARAMETERS: HCPCS | Performed by: INTERNAL MEDICINE

## 2025-05-20 PROCEDURE — 1159F MED LIST DOCD IN RCRD: CPT | Performed by: INTERNAL MEDICINE

## 2025-05-20 PROCEDURE — 1123F ACP DISCUSS/DSCN MKR DOCD: CPT | Performed by: INTERNAL MEDICINE

## 2025-05-20 PROCEDURE — 1036F TOBACCO NON-USER: CPT | Performed by: INTERNAL MEDICINE

## 2025-05-20 PROCEDURE — 1126F AMNT PAIN NOTED NONE PRSNT: CPT | Performed by: INTERNAL MEDICINE

## 2025-05-20 PROCEDURE — G8427 DOCREV CUR MEDS BY ELIG CLIN: HCPCS | Performed by: INTERNAL MEDICINE

## 2025-05-21 LAB
ALBUMIN SERPL-MCNC: 4 G/DL (ref 3.5–5)
ALBUMIN/GLOB SERPL: 1.2 (ref 1.1–2.2)
ALP SERPL-CCNC: 114 U/L (ref 45–117)
ALT SERPL-CCNC: 27 U/L (ref 12–78)
ANION GAP SERPL CALC-SCNC: 6 MMOL/L (ref 2–12)
AST SERPL-CCNC: 28 U/L (ref 15–37)
BILIRUB SERPL-MCNC: 0.7 MG/DL (ref 0.2–1)
BUN SERPL-MCNC: 19 MG/DL (ref 6–20)
BUN/CREAT SERPL: 19 (ref 12–20)
CALCIUM SERPL-MCNC: 9.8 MG/DL (ref 8.5–10.1)
CHLORIDE SERPL-SCNC: 105 MMOL/L (ref 97–108)
CHOLEST SERPL-MCNC: 136 MG/DL
CK SERPL-CCNC: 77 U/L (ref 39–308)
CO2 SERPL-SCNC: 27 MMOL/L (ref 21–32)
CREAT SERPL-MCNC: 0.98 MG/DL (ref 0.7–1.3)
GLOBULIN SER CALC-MCNC: 3.3 G/DL (ref 2–4)
GLUCOSE SERPL-MCNC: 102 MG/DL (ref 65–100)
HDLC SERPL-MCNC: 59 MG/DL
HDLC SERPL: 2.3 (ref 0–5)
LDLC SERPL CALC-MCNC: 61.4 MG/DL (ref 0–100)
POTASSIUM SERPL-SCNC: 4.3 MMOL/L (ref 3.5–5.1)
PROT SERPL-MCNC: 7.3 G/DL (ref 6.4–8.2)
SODIUM SERPL-SCNC: 138 MMOL/L (ref 136–145)
TRIGL SERPL-MCNC: 78 MG/DL
VLDLC SERPL CALC-MCNC: 15.6 MG/DL

## 2025-05-23 ENCOUNTER — RESULTS FOLLOW-UP (OUTPATIENT)
Facility: CLINIC | Age: 89
End: 2025-05-23

## 2025-08-14 ENCOUNTER — OFFICE VISIT (OUTPATIENT)
Facility: CLINIC | Age: 89
End: 2025-08-14

## 2025-08-14 VITALS
TEMPERATURE: 98 F | BODY MASS INDEX: 24.86 KG/M2 | SYSTOLIC BLOOD PRESSURE: 138 MMHG | HEIGHT: 74 IN | RESPIRATION RATE: 18 BRPM | HEART RATE: 67 BPM | DIASTOLIC BLOOD PRESSURE: 88 MMHG | WEIGHT: 193.7 LBS | OXYGEN SATURATION: 97 %

## 2025-08-14 DIAGNOSIS — Z86.73 HISTORY OF CVA (CEREBROVASCULAR ACCIDENT): ICD-10-CM

## 2025-08-14 DIAGNOSIS — I12.9 HYPERTENSION WITH RENAL DISEASE: Primary | ICD-10-CM

## 2025-08-14 DIAGNOSIS — N18.30 STAGE 3 CHRONIC KIDNEY DISEASE, UNSPECIFIED WHETHER STAGE 3A OR 3B CKD (HCC): ICD-10-CM

## 2025-08-14 DIAGNOSIS — M15.0 PRIMARY OSTEOARTHRITIS INVOLVING MULTIPLE JOINTS: ICD-10-CM

## 2025-08-14 DIAGNOSIS — E78.2 MIXED HYPERLIPIDEMIA: ICD-10-CM

## 2025-08-14 LAB
ALBUMIN SERPL-MCNC: 3.8 G/DL (ref 3.5–5.2)
ALBUMIN/GLOB SERPL: 1.3 (ref 1.1–2.2)
ALP SERPL-CCNC: 96 U/L (ref 40–129)
ALT SERPL-CCNC: 24 U/L (ref 10–50)
ANION GAP SERPL CALC-SCNC: 10 MMOL/L (ref 2–14)
AST SERPL-CCNC: 32 U/L (ref 10–50)
BILIRUB SERPL-MCNC: 0.7 MG/DL (ref 0–1.2)
BUN SERPL-MCNC: 22 MG/DL (ref 8–23)
BUN/CREAT SERPL: 24 (ref 12–20)
CALCIUM SERPL-MCNC: 9.4 MG/DL (ref 8.2–9.6)
CHLORIDE SERPL-SCNC: 104 MMOL/L (ref 98–107)
CHOLEST SERPL-MCNC: 133 MG/DL (ref 0–200)
CK SERPL-CCNC: 62 U/L (ref 39–308)
CO2 SERPL-SCNC: 26 MMOL/L (ref 20–29)
CREAT SERPL-MCNC: 0.92 MG/DL (ref 0.7–1.2)
GLOBULIN SER CALC-MCNC: 3 G/DL (ref 2–4)
GLUCOSE SERPL-MCNC: 104 MG/DL (ref 65–100)
HDLC SERPL-MCNC: 51 MG/DL (ref 40–60)
HDLC SERPL: 2.6 (ref 0–5)
LDLC SERPL CALC-MCNC: 66 MG/DL (ref 0–100)
POTASSIUM SERPL-SCNC: 4.2 MMOL/L (ref 3.5–5.1)
PROT SERPL-MCNC: 6.7 G/DL (ref 6.4–8.3)
SODIUM SERPL-SCNC: 140 MMOL/L (ref 136–145)
TRIGL SERPL-MCNC: 79 MG/DL (ref 0–150)
VLDLC SERPL CALC-MCNC: 16 MG/DL

## (undated) DEVICE — AIRLIFE™ FACE TENT MASK VINYL: Brand: AIRLIFE™

## (undated) DEVICE — TRAY PREP DRY W/ PREM GLV 2 APPL 6 SPNG 2 UNDPD 1 OVERWRAP

## (undated) DEVICE — TOWEL SURG W17XL27IN STD BLU COT NONFENESTRATED PREWASHED

## (undated) DEVICE — Z DISCONTINUEDSOLUTION PREP 2OZ 10% POVIDONE IOD SCR CAP BTL

## (undated) DEVICE — 1200 GUARD II KIT W/5MM TUBE W/O VAC TUBE: Brand: GUARDIAN

## (undated) DEVICE — INFECTION CONTROL KIT SYS

## (undated) DEVICE — MEDI-VAC NON-CONDUCTIVE SUCTION TUBING: Brand: CARDINAL HEALTH

## (undated) DEVICE — LIGHT HANDLE: Brand: DEVON

## (undated) DEVICE — ROCKER SWITCH PENCIL BLADE ELECTRODE, HOLSTER: Brand: EDGE

## (undated) DEVICE — SYR IRR BLB 2OZ DISP BLU STRL -- CONVERT TO ITEM 357637

## (undated) DEVICE — SYR 10ML CTRL LR LCK NSAF LF --

## (undated) DEVICE — BLADE OPHTH MINI BEAV SHRP --

## (undated) DEVICE — TIP SUCT BLU PLAS BLB W/O CTRL VENT YANK

## (undated) DEVICE — 3M™ TEGADERM™ TRANSPARENT FILM DRESSING FRAME STYLE, 1624W, 2-3/8 IN X 2-3/4 IN (6 CM X 7 CM), 100/CT 4CT/CASE: Brand: 3M™ TEGADERM™

## (undated) DEVICE — 1010 S-DRAPE TOWEL DRAPE 10/BX: Brand: STERI-DRAPE™

## (undated) DEVICE — BLADE 1884080EM TRICUT 4MMX13CM M4 ROHS: Brand: FUSION®

## (undated) DEVICE — PACK,EENT,TURBAN DRAPE,PK II: Brand: MEDLINE

## (undated) DEVICE — HEX-LOCKING BLADE ELECTRODE: Brand: EDGE

## (undated) DEVICE — SOLUTION IV 1000ML 0.9% SOD CHL

## (undated) DEVICE — SYR 10ML LUER LOK 1/5ML GRAD --

## (undated) DEVICE — NDL CTR 40/70 FM BLCK MAG: Brand: CARDINAL HEALTH

## (undated) DEVICE — REM POLYHESIVE ADULT PATIENT RETURN ELECTRODE: Brand: VALLEYLAB

## (undated) DEVICE — INSULATED BLADE ELECTRODE: Brand: EDGE

## (undated) DEVICE — SUTURE PERMAHAND SZ 3-0 L30IN NONABSORBABLE BLK SILK BRAID A304H

## (undated) DEVICE — INSTRUMENT TRACKER 9733533XOM ENT 1PK

## (undated) DEVICE — SURGIFOAM SPNG SZ 12-7

## (undated) DEVICE — GAUZE SPONGES,8 PLY: Brand: CURITY

## (undated) DEVICE — (D)STRIP SKN CLSR 0.5X4IN WHT --

## (undated) DEVICE — SUTURE PERMAHAND SZ 4-0 L12X30IN NONABSORBABLE BLK SILK A303H

## (undated) DEVICE — ADAPTER CIRC OD22XID15MM FOR MON VENT PARAMETER

## (undated) DEVICE — STERILE POLYISOPRENE POWDER-FREE SURGICAL GLOVES: Brand: PROTEXIS

## (undated) DEVICE — SUT CHRMC 3-0 27IN SH BRN --

## (undated) DEVICE — SOLUTION IV 500ML 0.9% SOD CHL FLX CONT

## (undated) DEVICE — NEEDLE HYPO 25GA L1.5IN BVL ORIENTED ECLIPSE

## (undated) DEVICE — FRAZIER SUCTION INSTRUMENT 7 FR W/CONTROL VENT & OBTURATOR: Brand: FRAZIER

## (undated) DEVICE — Device

## (undated) DEVICE — SPONGE: SPECIALTY PEANUT XR 100/CS: Brand: MEDICAL ACTION INDUSTRIES

## (undated) DEVICE — BLADE MYR 45DEG OFFSET S STL LANC TIP NAR SHFT DISP BEAV

## (undated) DEVICE — GAUZE SPONGES,12 PLY: Brand: CURITY

## (undated) DEVICE — SUTURE CHROMIC GUT SZ 4-0 L18IN ABSRB BRN L13MM P-3 3/8 CIR 1654G

## (undated) DEVICE — BIPOLAR FORCEPS CORD,BANANA LEADS: Brand: VALLEYLAB

## (undated) DEVICE — DRAIN WND PENRS RADPQ 0.25X18 -- CONVERT TO ITEM 360112

## (undated) DEVICE — NASAL EPISTAXIS 8 PACK: Brand: XEROGEL

## (undated) DEVICE — SUTURE NONABSORBABLE MONOFILAMENT 5-0 PS-2 18 IN BLK ETHILON 1666H

## (undated) DEVICE — AIRLIFE™ CORRUGATED FLEXIBLE POLYETHYLENE AND EVA TUBING FOR AEROSOL AND IPPB USE, SEGMENTED, 6 FEET (1.8 M) LENGTH, 22 MM I.D.: Brand: AIRLIFE™

## (undated) DEVICE — CONTINU-FLO SOLUTION SET, 2 INJECTION SITES, MALE LUER LOCK ADAPTER WITH RETRACTABLE COLLAR, LARGE BORE STOPCOCK WITH ROTATING MALE LUER LOCK EXTENSION SET, 2 INJECTION SITES, MALE LUER LOCK ADAPTER WITH RETRACTABLE COLLAR: Brand: INTERLINK/CONTINU-FLO

## (undated) DEVICE — KENDALL DL ECG CABLE AND LEAD WIRE SYSTEM, 3-LEAD, SINGLE PATIENT USE: Brand: KENDALL

## (undated) DEVICE — CATH IV AUTOGRD BC GRY 16GA 30 -- INSYTE

## (undated) DEVICE — MASTISOL ADHESIVE LIQ 2/3ML

## (undated) DEVICE — NON-ADHERENT DRESSING: Brand: TELFA

## (undated) DEVICE — SUTURE ETHLN SZ 4-0 L18IN NONABSORBABLE BLK L19MM PS-2 3/8 1667H

## (undated) DEVICE — KENDALL DL ECG CABLE AND LEAD WIRE SYSTEM, 5-LEAD, SINGLE PATIENT USE: Brand: KENDALL

## (undated) DEVICE — SOLUTION LACTATED RINGERS INJECTION USP

## (undated) DEVICE — CODMAN® SURGICAL PATTIES 1/2" X 3" (1.27CM X 7.62CM): Brand: CODMAN®

## (undated) DEVICE — SYRINGE MED 20ML STD CLR PLAS LUERLOCK TIP N CTRL DISP

## (undated) DEVICE — SOL ANTI-FOG 6ML MEDC -- MEDICHOICE - CONVERT TO 358427

## (undated) DEVICE — NDL SPNE QNCKE 25GX3.5IN LF --

## (undated) DEVICE — CONTAINER,SPECIMEN,OR STERILE,4OZ: Brand: MEDLINE